# Patient Record
Sex: FEMALE | Race: ASIAN | NOT HISPANIC OR LATINO
[De-identification: names, ages, dates, MRNs, and addresses within clinical notes are randomized per-mention and may not be internally consistent; named-entity substitution may affect disease eponyms.]

---

## 2022-01-01 ENCOUNTER — TRANSCRIPTION ENCOUNTER (OUTPATIENT)
Age: 67
End: 2022-01-01

## 2022-01-01 ENCOUNTER — RESULT REVIEW (OUTPATIENT)
Age: 67
End: 2022-01-01

## 2022-01-01 ENCOUNTER — APPOINTMENT (OUTPATIENT)
Dept: INFUSION THERAPY | Facility: HOSPITAL | Age: 67
End: 2022-01-01

## 2022-01-01 ENCOUNTER — FORM ENCOUNTER (OUTPATIENT)
Age: 67
End: 2022-01-01

## 2022-01-01 ENCOUNTER — APPOINTMENT (OUTPATIENT)
Dept: HEMATOLOGY ONCOLOGY | Facility: CLINIC | Age: 67
End: 2022-01-01

## 2022-01-01 ENCOUNTER — NON-APPOINTMENT (OUTPATIENT)
Age: 67
End: 2022-01-01

## 2022-01-01 ENCOUNTER — APPOINTMENT (OUTPATIENT)
Dept: ULTRASOUND IMAGING | Facility: IMAGING CENTER | Age: 67
End: 2022-01-01
Payer: MEDICARE

## 2022-01-01 ENCOUNTER — APPOINTMENT (OUTPATIENT)
Dept: HEMATOLOGY ONCOLOGY | Facility: CLINIC | Age: 67
End: 2022-01-01
Payer: MEDICARE

## 2022-01-01 ENCOUNTER — APPOINTMENT (OUTPATIENT)
Dept: ULTRASOUND IMAGING | Facility: IMAGING CENTER | Age: 67
End: 2022-01-01

## 2022-01-01 ENCOUNTER — OUTPATIENT (OUTPATIENT)
Dept: OUTPATIENT SERVICES | Facility: HOSPITAL | Age: 67
LOS: 1 days | End: 2022-01-01
Payer: MEDICARE

## 2022-01-01 ENCOUNTER — OUTPATIENT (OUTPATIENT)
Dept: OUTPATIENT SERVICES | Facility: HOSPITAL | Age: 67
LOS: 1 days | End: 2022-01-01
Payer: COMMERCIAL

## 2022-01-01 ENCOUNTER — APPOINTMENT (OUTPATIENT)
Dept: GYNECOLOGIC ONCOLOGY | Facility: CLINIC | Age: 67
End: 2022-01-01
Payer: MEDICARE

## 2022-01-01 ENCOUNTER — APPOINTMENT (OUTPATIENT)
Dept: GYNECOLOGIC ONCOLOGY | Facility: HOSPITAL | Age: 67
End: 2022-01-01

## 2022-01-01 ENCOUNTER — OUTPATIENT (OUTPATIENT)
Dept: OUTPATIENT SERVICES | Facility: HOSPITAL | Age: 67
LOS: 1 days | Discharge: HOME | End: 2022-01-01

## 2022-01-01 ENCOUNTER — APPOINTMENT (OUTPATIENT)
Dept: HOME HEALTH SERVICES | Facility: HOME HEALTH | Age: 67
End: 2022-01-01
Payer: MEDICARE

## 2022-01-01 ENCOUNTER — APPOINTMENT (OUTPATIENT)
Dept: HOME HEALTH SERVICES | Facility: HOME HEALTH | Age: 67
End: 2022-01-01

## 2022-01-01 ENCOUNTER — APPOINTMENT (OUTPATIENT)
Dept: PULMONOLOGY | Facility: CLINIC | Age: 67
End: 2022-01-01

## 2022-01-01 ENCOUNTER — APPOINTMENT (OUTPATIENT)
Dept: NUCLEAR MEDICINE | Facility: IMAGING CENTER | Age: 67
End: 2022-01-01

## 2022-01-01 ENCOUNTER — APPOINTMENT (OUTPATIENT)
Dept: PULMONOLOGY | Facility: CLINIC | Age: 67
End: 2022-01-01
Payer: MEDICARE

## 2022-01-01 ENCOUNTER — INPATIENT (INPATIENT)
Facility: HOSPITAL | Age: 67
LOS: 16 days | Discharge: HOSPICE | End: 2022-07-29
Attending: HOSPITALIST | Admitting: HOSPITALIST

## 2022-01-01 ENCOUNTER — INPATIENT (INPATIENT)
Facility: HOSPITAL | Age: 67
LOS: 1 days | Discharge: HOME | End: 2022-04-24
Attending: OBSTETRICS & GYNECOLOGY | Admitting: OBSTETRICS & GYNECOLOGY
Payer: MEDICARE

## 2022-01-01 ENCOUNTER — APPOINTMENT (OUTPATIENT)
Dept: GYNECOLOGIC ONCOLOGY | Facility: CLINIC | Age: 67
End: 2022-01-01

## 2022-01-01 ENCOUNTER — INPATIENT (INPATIENT)
Facility: HOSPITAL | Age: 67
LOS: 1 days | Discharge: HOME HEALTH | End: 2022-06-09
Attending: STUDENT IN AN ORGANIZED HEALTH CARE EDUCATION/TRAINING PROGRAM | Admitting: STUDENT IN AN ORGANIZED HEALTH CARE EDUCATION/TRAINING PROGRAM
Payer: MEDICARE

## 2022-01-01 ENCOUNTER — INPATIENT (INPATIENT)
Facility: HOSPITAL | Age: 67
LOS: 3 days | Discharge: HOME HEALTH | End: 2022-04-30
Attending: THORACIC SURGERY (CARDIOTHORACIC VASCULAR SURGERY) | Admitting: THORACIC SURGERY (CARDIOTHORACIC VASCULAR SURGERY)
Payer: MEDICARE

## 2022-01-01 ENCOUNTER — APPOINTMENT (OUTPATIENT)
Dept: THORACIC SURGERY | Facility: CLINIC | Age: 67
End: 2022-01-01
Payer: MEDICARE

## 2022-01-01 ENCOUNTER — OUTPATIENT (OUTPATIENT)
Dept: OUTPATIENT SERVICES | Facility: HOSPITAL | Age: 67
LOS: 1 days | End: 2022-01-01
Payer: SELF-PAY

## 2022-01-01 ENCOUNTER — APPOINTMENT (OUTPATIENT)
Dept: RADIOLOGY | Facility: HOSPITAL | Age: 67
End: 2022-01-01

## 2022-01-01 ENCOUNTER — APPOINTMENT (OUTPATIENT)
Dept: THORACIC SURGERY | Facility: HOSPITAL | Age: 67
End: 2022-01-01

## 2022-01-01 ENCOUNTER — OUTPATIENT (OUTPATIENT)
Dept: OUTPATIENT SERVICES | Facility: HOSPITAL | Age: 67
LOS: 1 days | Discharge: HOME | End: 2022-01-01
Payer: MEDICARE

## 2022-01-01 VITALS
DIASTOLIC BLOOD PRESSURE: 74 MMHG | BODY MASS INDEX: 28.22 KG/M2 | SYSTOLIC BLOOD PRESSURE: 113 MMHG | OXYGEN SATURATION: 96 % | HEART RATE: 100 BPM | WEIGHT: 140 LBS | HEIGHT: 59 IN

## 2022-01-01 VITALS
RESPIRATION RATE: 16 BRPM | SYSTOLIC BLOOD PRESSURE: 108 MMHG | OXYGEN SATURATION: 99 % | HEIGHT: 58.5 IN | DIASTOLIC BLOOD PRESSURE: 57 MMHG | TEMPERATURE: 97 F | HEART RATE: 98 BPM

## 2022-01-01 VITALS
DIASTOLIC BLOOD PRESSURE: 80 MMHG | HEIGHT: 58.5 IN | WEIGHT: 145.95 LBS | HEART RATE: 109 BPM | TEMPERATURE: 98 F | SYSTOLIC BLOOD PRESSURE: 113 MMHG | OXYGEN SATURATION: 97 % | RESPIRATION RATE: 18 BRPM

## 2022-01-01 VITALS
RESPIRATION RATE: 17 BRPM | DIASTOLIC BLOOD PRESSURE: 53 MMHG | OXYGEN SATURATION: 97 % | SYSTOLIC BLOOD PRESSURE: 83 MMHG | HEART RATE: 97 BPM

## 2022-01-01 VITALS
RESPIRATION RATE: 16 BRPM | SYSTOLIC BLOOD PRESSURE: 130 MMHG | DIASTOLIC BLOOD PRESSURE: 80 MMHG | OXYGEN SATURATION: 98 % | HEART RATE: 91 BPM

## 2022-01-01 VITALS
RESPIRATION RATE: 14 BRPM | HEIGHT: 58.5 IN | WEIGHT: 145.95 LBS | SYSTOLIC BLOOD PRESSURE: 110 MMHG | TEMPERATURE: 99 F | DIASTOLIC BLOOD PRESSURE: 80 MMHG | OXYGEN SATURATION: 99 %

## 2022-01-01 VITALS
SYSTOLIC BLOOD PRESSURE: 110 MMHG | RESPIRATION RATE: 16 BRPM | DIASTOLIC BLOOD PRESSURE: 80 MMHG | OXYGEN SATURATION: 94 % | HEART RATE: 105 BPM

## 2022-01-01 VITALS
HEART RATE: 102 BPM | RESPIRATION RATE: 16 BRPM | HEIGHT: 59 IN | DIASTOLIC BLOOD PRESSURE: 62 MMHG | TEMPERATURE: 98 F | SYSTOLIC BLOOD PRESSURE: 106 MMHG | OXYGEN SATURATION: 100 %

## 2022-01-01 VITALS
OXYGEN SATURATION: 97 % | TEMPERATURE: 98 F | SYSTOLIC BLOOD PRESSURE: 94 MMHG | RESPIRATION RATE: 16 BRPM | HEART RATE: 82 BPM | DIASTOLIC BLOOD PRESSURE: 54 MMHG

## 2022-01-01 VITALS
HEART RATE: 97 BPM | BODY MASS INDEX: 31.25 KG/M2 | WEIGHT: 144.84 LBS | HEIGHT: 57.09 IN | SYSTOLIC BLOOD PRESSURE: 131 MMHG | DIASTOLIC BLOOD PRESSURE: 85 MMHG | RESPIRATION RATE: 16 BRPM | TEMPERATURE: 98.1 F | OXYGEN SATURATION: 98 %

## 2022-01-01 VITALS
SYSTOLIC BLOOD PRESSURE: 92 MMHG | TEMPERATURE: 98.1 F | BODY MASS INDEX: 28.22 KG/M2 | WEIGHT: 139.99 LBS | DIASTOLIC BLOOD PRESSURE: 62 MMHG | RESPIRATION RATE: 16 BRPM | HEIGHT: 58.98 IN | HEART RATE: 103 BPM | OXYGEN SATURATION: 98 %

## 2022-01-01 VITALS
HEART RATE: 78 BPM | TEMPERATURE: 98.1 F | RESPIRATION RATE: 16 BRPM | OXYGEN SATURATION: 94 % | SYSTOLIC BLOOD PRESSURE: 106 MMHG | DIASTOLIC BLOOD PRESSURE: 71 MMHG

## 2022-01-01 VITALS
SYSTOLIC BLOOD PRESSURE: 133 MMHG | HEIGHT: 58.5 IN | HEART RATE: 99 BPM | TEMPERATURE: 98 F | DIASTOLIC BLOOD PRESSURE: 79 MMHG | OXYGEN SATURATION: 99 % | RESPIRATION RATE: 18 BRPM

## 2022-01-01 VITALS
WEIGHT: 155 LBS | SYSTOLIC BLOOD PRESSURE: 131 MMHG | BODY MASS INDEX: 31.25 KG/M2 | HEIGHT: 59 IN | HEART RATE: 97 BPM | DIASTOLIC BLOOD PRESSURE: 81 MMHG

## 2022-01-01 VITALS
RESPIRATION RATE: 19 BRPM | TEMPERATURE: 98 F | DIASTOLIC BLOOD PRESSURE: 80 MMHG | HEART RATE: 95 BPM | OXYGEN SATURATION: 95 % | SYSTOLIC BLOOD PRESSURE: 120 MMHG

## 2022-01-01 VITALS — WEIGHT: 145.06 LBS

## 2022-01-01 VITALS
TEMPERATURE: 98 F | SYSTOLIC BLOOD PRESSURE: 115 MMHG | HEART RATE: 101 BPM | RESPIRATION RATE: 16 BRPM | DIASTOLIC BLOOD PRESSURE: 78 MMHG | OXYGEN SATURATION: 98 %

## 2022-01-01 VITALS
SYSTOLIC BLOOD PRESSURE: 89 MMHG | RESPIRATION RATE: 16 BRPM | DIASTOLIC BLOOD PRESSURE: 64 MMHG | OXYGEN SATURATION: 99 % | TEMPERATURE: 97 F | HEART RATE: 104 BPM | HEIGHT: 59 IN

## 2022-01-01 VITALS
DIASTOLIC BLOOD PRESSURE: 79 MMHG | TEMPERATURE: 98 F | OXYGEN SATURATION: 98 % | RESPIRATION RATE: 14 BRPM | SYSTOLIC BLOOD PRESSURE: 126 MMHG | HEART RATE: 77 BPM

## 2022-01-01 VITALS
SYSTOLIC BLOOD PRESSURE: 113 MMHG | OXYGEN SATURATION: 98 % | HEART RATE: 99 BPM | TEMPERATURE: 98.1 F | DIASTOLIC BLOOD PRESSURE: 80 MMHG

## 2022-01-01 DIAGNOSIS — Z23 ENCOUNTER FOR IMMUNIZATION: ICD-10-CM

## 2022-01-01 DIAGNOSIS — Z98.890 OTHER SPECIFIED POSTPROCEDURAL STATES: Chronic | ICD-10-CM

## 2022-01-01 DIAGNOSIS — J90 PLEURAL EFFUSION, NOT ELSEWHERE CLASSIFIED: ICD-10-CM

## 2022-01-01 DIAGNOSIS — C80.1 MALIGNANT (PRIMARY) NEOPLASM, UNSPECIFIED: ICD-10-CM

## 2022-01-01 DIAGNOSIS — Z90.49 ACQUIRED ABSENCE OF OTHER SPECIFIED PARTS OF DIGESTIVE TRACT: Chronic | ICD-10-CM

## 2022-01-01 DIAGNOSIS — K46.0 UNSPECIFIED ABDOMINAL HERNIA WITH OBSTRUCTION, WITHOUT GANGRENE: Chronic | ICD-10-CM

## 2022-01-01 DIAGNOSIS — R11.2 NAUSEA WITH VOMITING, UNSPECIFIED: ICD-10-CM

## 2022-01-01 DIAGNOSIS — Z98.51 TUBAL LIGATION STATUS: Chronic | ICD-10-CM

## 2022-01-01 DIAGNOSIS — C57.9 MALIGNANT NEOPLASM OF FEMALE GENITAL ORGAN, UNSPECIFIED: ICD-10-CM

## 2022-01-01 DIAGNOSIS — I26.99 OTHER PULMONARY EMBOLISM WITHOUT ACUTE COR PULMONALE: ICD-10-CM

## 2022-01-01 DIAGNOSIS — R18.8 OTHER ASCITES: ICD-10-CM

## 2022-01-01 DIAGNOSIS — R06.00 DYSPNEA, UNSPECIFIED: ICD-10-CM

## 2022-01-01 DIAGNOSIS — R19.00 INTRA-ABDOMINAL AND PELVIC SWELLING, MASS AND LUMP, UNSPECIFIED SITE: ICD-10-CM

## 2022-01-01 DIAGNOSIS — I95.9 HYPOTENSION, UNSPECIFIED: ICD-10-CM

## 2022-01-01 DIAGNOSIS — N39.0 URINARY TRACT INFECTION, SITE NOT SPECIFIED: ICD-10-CM

## 2022-01-01 DIAGNOSIS — Z51.11 ENCOUNTER FOR ANTINEOPLASTIC CHEMOTHERAPY: ICD-10-CM

## 2022-01-01 DIAGNOSIS — C78.6 SECONDARY MALIGNANT NEOPLASM OF RETROPERITONEUM AND PERITONEUM: ICD-10-CM

## 2022-01-01 DIAGNOSIS — K21.9 GASTRO-ESOPHAGEAL REFLUX DISEASE WITHOUT ESOPHAGITIS: ICD-10-CM

## 2022-01-01 DIAGNOSIS — Z51.5 ENCOUNTER FOR PALLIATIVE CARE: ICD-10-CM

## 2022-01-01 DIAGNOSIS — Z86.711 PERSONAL HISTORY OF PULMONARY EMBOLISM: ICD-10-CM

## 2022-01-01 DIAGNOSIS — K59.00 CONSTIPATION, UNSPECIFIED: ICD-10-CM

## 2022-01-01 DIAGNOSIS — C79.9 SECONDARY MALIGNANT NEOPLASM OF UNSPECIFIED SITE: ICD-10-CM

## 2022-01-01 DIAGNOSIS — Z00.8 ENCOUNTER FOR OTHER GENERAL EXAMINATION: ICD-10-CM

## 2022-01-01 DIAGNOSIS — R82.90 UNSPECIFIED ABNORMAL FINDINGS IN URINE: ICD-10-CM

## 2022-01-01 DIAGNOSIS — E78.5 HYPERLIPIDEMIA, UNSPECIFIED: ICD-10-CM

## 2022-01-01 DIAGNOSIS — A41.9 SEPSIS, UNSPECIFIED ORGANISM: ICD-10-CM

## 2022-01-01 DIAGNOSIS — T45.1X5A NAUSEA WITH VOMITING, UNSPECIFIED: ICD-10-CM

## 2022-01-01 DIAGNOSIS — K56.609 UNSPECIFIED INTESTINAL OBSTRUCTION, UNSPECIFIED AS TO PARTIAL VERSUS COMPLETE OBSTRUCTION: ICD-10-CM

## 2022-01-01 DIAGNOSIS — E03.9 HYPOTHYROIDISM, UNSPECIFIED: ICD-10-CM

## 2022-01-01 DIAGNOSIS — J98.4 OTHER DISORDERS OF LUNG: ICD-10-CM

## 2022-01-01 DIAGNOSIS — N17.9 ACUTE KIDNEY FAILURE, UNSPECIFIED: ICD-10-CM

## 2022-01-01 DIAGNOSIS — R09.89 OTHER SPECIFIED SYMPTOMS AND SIGNS INVOLVING THE CIRCULATORY AND RESPIRATORY SYSTEMS: ICD-10-CM

## 2022-01-01 DIAGNOSIS — Z01.818 ENCOUNTER FOR OTHER PREPROCEDURAL EXAMINATION: ICD-10-CM

## 2022-01-01 DIAGNOSIS — I82.90 ACUTE EMBOLISM AND THROMBOSIS OF UNSPECIFIED VEIN: ICD-10-CM

## 2022-01-01 DIAGNOSIS — R10.9 UNSPECIFIED ABDOMINAL PAIN: ICD-10-CM

## 2022-01-01 DIAGNOSIS — F43.23 ADJUSTMENT DISORDER WITH MIXED ANXIETY AND DEPRESSED MOOD: ICD-10-CM

## 2022-01-01 DIAGNOSIS — E16.2 HYPOGLYCEMIA, UNSPECIFIED: ICD-10-CM

## 2022-01-01 DIAGNOSIS — E87.1 HYPO-OSMOLALITY AND HYPONATREMIA: ICD-10-CM

## 2022-01-01 DIAGNOSIS — G89.3 NEOPLASM RELATED PAIN (ACUTE) (CHRONIC): ICD-10-CM

## 2022-01-01 DIAGNOSIS — Z29.9 ENCOUNTER FOR PROPHYLACTIC MEASURES, UNSPECIFIED: ICD-10-CM

## 2022-01-01 DIAGNOSIS — M17.11 UNILATERAL PRIMARY OSTEOARTHRITIS, RIGHT KNEE: ICD-10-CM

## 2022-01-01 DIAGNOSIS — K43.2 INCISIONAL HERNIA WITHOUT OBSTRUCTION OR GANGRENE: ICD-10-CM

## 2022-01-01 LAB
ALBUMIN SERPL ELPH-MCNC: 1.6 G/DL — LOW (ref 3.3–5)
ALBUMIN SERPL ELPH-MCNC: 1.7 G/DL — LOW (ref 3.3–5)
ALBUMIN SERPL ELPH-MCNC: 1.9 G/DL — LOW (ref 3.3–5)
ALBUMIN SERPL ELPH-MCNC: 1.9 G/DL — LOW (ref 3.3–5)
ALBUMIN SERPL ELPH-MCNC: 2.2 G/DL — LOW (ref 3.3–5)
ALBUMIN SERPL ELPH-MCNC: 2.3 G/DL — LOW (ref 3.3–5)
ALBUMIN SERPL ELPH-MCNC: 2.3 G/DL — LOW (ref 3.3–5)
ALBUMIN SERPL ELPH-MCNC: 2.4 G/DL — LOW (ref 3.3–5)
ALBUMIN SERPL ELPH-MCNC: 2.4 G/DL — LOW (ref 3.3–5)
ALBUMIN SERPL ELPH-MCNC: 2.7 G/DL — LOW (ref 3.3–5)
ALBUMIN SERPL ELPH-MCNC: 2.9 G/DL — LOW (ref 3.3–5)
ALBUMIN SERPL ELPH-MCNC: 3 G/DL — LOW (ref 3.3–5)
ALBUMIN SERPL ELPH-MCNC: 3.1 G/DL
ALBUMIN SERPL ELPH-MCNC: 3.2 G/DL
ALBUMIN SERPL ELPH-MCNC: 3.2 G/DL — LOW (ref 3.3–5)
ALBUMIN SERPL ELPH-MCNC: 3.3 G/DL — SIGNIFICANT CHANGE UP (ref 3.3–5)
ALBUMIN SERPL ELPH-MCNC: 3.4 G/DL — SIGNIFICANT CHANGE UP (ref 3.3–5)
ALBUMIN SERPL ELPH-MCNC: 3.4 G/DL — SIGNIFICANT CHANGE UP (ref 3.3–5)
ALP BLD-CCNC: 94 U/L
ALP BLD-CCNC: 96 U/L
ALP SERPL-CCNC: 107 U/L — SIGNIFICANT CHANGE UP (ref 40–120)
ALP SERPL-CCNC: 131 U/L — HIGH (ref 40–120)
ALP SERPL-CCNC: 145 U/L — HIGH (ref 40–120)
ALP SERPL-CCNC: 148 U/L — HIGH (ref 40–120)
ALP SERPL-CCNC: 171 U/L — HIGH (ref 40–120)
ALP SERPL-CCNC: 190 U/L — HIGH (ref 40–120)
ALP SERPL-CCNC: 215 U/L — HIGH (ref 40–120)
ALP SERPL-CCNC: 217 U/L — HIGH (ref 40–120)
ALP SERPL-CCNC: 218 U/L — HIGH (ref 40–120)
ALP SERPL-CCNC: 65 U/L — SIGNIFICANT CHANGE UP (ref 40–120)
ALP SERPL-CCNC: 78 U/L — SIGNIFICANT CHANGE UP (ref 40–120)
ALP SERPL-CCNC: 79 U/L — SIGNIFICANT CHANGE UP (ref 40–120)
ALP SERPL-CCNC: 82 U/L — SIGNIFICANT CHANGE UP (ref 40–120)
ALP SERPL-CCNC: 85 U/L — SIGNIFICANT CHANGE UP (ref 40–120)
ALP SERPL-CCNC: 90 U/L — SIGNIFICANT CHANGE UP (ref 40–120)
ALP SERPL-CCNC: 92 U/L — SIGNIFICANT CHANGE UP (ref 40–120)
ALT FLD-CCNC: 12 U/L — SIGNIFICANT CHANGE UP (ref 4–33)
ALT FLD-CCNC: 12 U/L — SIGNIFICANT CHANGE UP (ref 4–33)
ALT FLD-CCNC: 15 U/L — SIGNIFICANT CHANGE UP (ref 10–45)
ALT FLD-CCNC: 15 U/L — SIGNIFICANT CHANGE UP (ref 4–33)
ALT FLD-CCNC: 15 U/L — SIGNIFICANT CHANGE UP (ref 4–33)
ALT FLD-CCNC: 18 U/L — SIGNIFICANT CHANGE UP (ref 4–33)
ALT FLD-CCNC: 19 U/L — SIGNIFICANT CHANGE UP (ref 4–33)
ALT FLD-CCNC: 22 U/L — SIGNIFICANT CHANGE UP (ref 4–33)
ALT FLD-CCNC: 26 U/L — SIGNIFICANT CHANGE UP (ref 10–45)
ALT FLD-CCNC: 29 U/L — SIGNIFICANT CHANGE UP (ref 4–33)
ALT FLD-CCNC: 30 U/L — SIGNIFICANT CHANGE UP (ref 4–33)
ALT FLD-CCNC: 46 U/L — HIGH (ref 4–33)
ALT FLD-CCNC: 7 U/L — SIGNIFICANT CHANGE UP (ref 4–33)
ALT FLD-CCNC: 7 U/L — SIGNIFICANT CHANGE UP (ref 4–33)
ALT FLD-CCNC: 8 U/L — LOW (ref 10–45)
ALT FLD-CCNC: 8 U/L — LOW (ref 10–45)
ALT SERPL-CCNC: 11 U/L
ALT SERPL-CCNC: 33 U/L
ANION GAP SERPL CALC-SCNC: 10 MMOL/L — SIGNIFICANT CHANGE UP (ref 5–17)
ANION GAP SERPL CALC-SCNC: 10 MMOL/L — SIGNIFICANT CHANGE UP (ref 7–14)
ANION GAP SERPL CALC-SCNC: 11 MMOL/L — SIGNIFICANT CHANGE UP (ref 7–14)
ANION GAP SERPL CALC-SCNC: 12 MMOL/L — SIGNIFICANT CHANGE UP (ref 5–17)
ANION GAP SERPL CALC-SCNC: 12 MMOL/L — SIGNIFICANT CHANGE UP (ref 7–14)
ANION GAP SERPL CALC-SCNC: 12 MMOL/L — SIGNIFICANT CHANGE UP (ref 7–14)
ANION GAP SERPL CALC-SCNC: 13 MMOL/L
ANION GAP SERPL CALC-SCNC: 13 MMOL/L
ANION GAP SERPL CALC-SCNC: 13 MMOL/L — SIGNIFICANT CHANGE UP (ref 7–14)
ANION GAP SERPL CALC-SCNC: 14 MMOL/L
ANION GAP SERPL CALC-SCNC: 14 MMOL/L — SIGNIFICANT CHANGE UP (ref 5–17)
ANION GAP SERPL CALC-SCNC: 14 MMOL/L — SIGNIFICANT CHANGE UP (ref 5–17)
ANION GAP SERPL CALC-SCNC: 14 MMOL/L — SIGNIFICANT CHANGE UP (ref 7–14)
ANION GAP SERPL CALC-SCNC: 14 MMOL/L — SIGNIFICANT CHANGE UP (ref 7–14)
ANION GAP SERPL CALC-SCNC: 15 MMOL/L — HIGH (ref 7–14)
ANION GAP SERPL CALC-SCNC: 16 MMOL/L — HIGH (ref 7–14)
ANION GAP SERPL CALC-SCNC: 17 MMOL/L
ANION GAP SERPL CALC-SCNC: 17 MMOL/L — HIGH (ref 7–14)
ANION GAP SERPL CALC-SCNC: 19 MMOL/L — HIGH (ref 7–14)
ANION GAP SERPL CALC-SCNC: 20 MMOL/L — HIGH (ref 7–14)
APPEARANCE UR: ABNORMAL
APPEARANCE UR: ABNORMAL
APPEARANCE UR: CLEAR — SIGNIFICANT CHANGE UP
APTT BLD: 105 SEC — HIGH (ref 27–36.3)
APTT BLD: 110.1 SEC — HIGH (ref 27–36.3)
APTT BLD: 117.2 SEC — HIGH (ref 27–36.3)
APTT BLD: 127.4 SEC — CRITICAL HIGH (ref 27–36.3)
APTT BLD: 141.1 SEC — CRITICAL HIGH (ref 27–36.3)
APTT BLD: 146.9 SEC — CRITICAL HIGH (ref 27–36.3)
APTT BLD: 172.5 SEC — SIGNIFICANT CHANGE UP (ref 27–36.3)
APTT BLD: 24.4 SEC — LOW (ref 27–36.3)
APTT BLD: 28.9 SEC — SIGNIFICANT CHANGE UP (ref 27–36.3)
APTT BLD: 29.4 SEC — SIGNIFICANT CHANGE UP (ref 27–36.3)
APTT BLD: 29.8 SEC
APTT BLD: 29.9 SEC — SIGNIFICANT CHANGE UP (ref 27–36.3)
APTT BLD: 30.3 SEC — SIGNIFICANT CHANGE UP (ref 27–36.3)
APTT BLD: 31 SEC
APTT BLD: 32.1 SEC — SIGNIFICANT CHANGE UP (ref 27–36.3)
APTT BLD: 34.2 SEC — SIGNIFICANT CHANGE UP (ref 27–36.3)
APTT BLD: 45.2 SEC — HIGH (ref 27–36.3)
APTT BLD: 53.2 SEC — HIGH (ref 27–36.3)
APTT BLD: 54.9 SEC — HIGH (ref 27–36.3)
APTT BLD: 57.2 SEC — HIGH (ref 27–36.3)
APTT BLD: 59.3 SEC — HIGH (ref 27–36.3)
APTT BLD: 59.3 SEC — HIGH (ref 27–36.3)
APTT BLD: 60.4 SEC — HIGH (ref 27–36.3)
APTT BLD: 70.4 SEC — HIGH (ref 27–36.3)
APTT BLD: 79.8 SEC — HIGH (ref 27–36.3)
APTT BLD: 80 SEC — HIGH (ref 27–36.3)
APTT BLD: 80.6 SEC — HIGH (ref 27–36.3)
APTT BLD: 88.8 SEC — HIGH (ref 27–36.3)
APTT BLD: 97.7 SEC — HIGH (ref 27–36.3)
APTT BLD: >200 SEC — CRITICAL HIGH (ref 27–36.3)
AST SERPL-CCNC: 23 U/L — SIGNIFICANT CHANGE UP (ref 10–40)
AST SERPL-CCNC: 24 U/L — SIGNIFICANT CHANGE UP (ref 4–32)
AST SERPL-CCNC: 26 U/L — SIGNIFICANT CHANGE UP (ref 4–32)
AST SERPL-CCNC: 28 U/L — SIGNIFICANT CHANGE UP (ref 4–32)
AST SERPL-CCNC: 28 U/L — SIGNIFICANT CHANGE UP (ref 4–32)
AST SERPL-CCNC: 33 U/L — SIGNIFICANT CHANGE UP (ref 10–40)
AST SERPL-CCNC: 34 U/L
AST SERPL-CCNC: 36 U/L — SIGNIFICANT CHANGE UP (ref 10–40)
AST SERPL-CCNC: 38 U/L — HIGH (ref 4–32)
AST SERPL-CCNC: 38 U/L — SIGNIFICANT CHANGE UP (ref 10–40)
AST SERPL-CCNC: 40 U/L — HIGH (ref 4–32)
AST SERPL-CCNC: 43 U/L — HIGH (ref 4–32)
AST SERPL-CCNC: 43 U/L — HIGH (ref 4–32)
AST SERPL-CCNC: 48 U/L — HIGH (ref 4–32)
AST SERPL-CCNC: 51 U/L
AST SERPL-CCNC: 57 U/L — HIGH (ref 4–32)
AST SERPL-CCNC: 58 U/L — HIGH (ref 4–32)
AST SERPL-CCNC: 88 U/L — HIGH (ref 4–32)
B PERT DNA SPEC QL NAA+PROBE: SIGNIFICANT CHANGE UP
B PERT DNA SPEC QL NAA+PROBE: SIGNIFICANT CHANGE UP
B PERT IGG+IGM PNL SER: ABNORMAL
B PERT+PARAPERT DNA PNL SPEC NAA+PROBE: SIGNIFICANT CHANGE UP
B PERT+PARAPERT DNA PNL SPEC NAA+PROBE: SIGNIFICANT CHANGE UP
BACTERIA # UR AUTO: ABNORMAL
BACTERIA # UR AUTO: NEGATIVE — SIGNIFICANT CHANGE UP
BASE EXCESS BLDV CALC-SCNC: -1 MMOL/L — SIGNIFICANT CHANGE UP (ref -2–3)
BASE EXCESS BLDV CALC-SCNC: -2.6 MMOL/L — LOW (ref -2–3)
BASOPHILS # BLD AUTO: 0 K/UL — SIGNIFICANT CHANGE UP (ref 0–0.2)
BASOPHILS # BLD AUTO: 0.02 K/UL — SIGNIFICANT CHANGE UP (ref 0–0.2)
BASOPHILS # BLD AUTO: 0.03 K/UL — SIGNIFICANT CHANGE UP (ref 0–0.2)
BASOPHILS # BLD AUTO: 0.03 K/UL — SIGNIFICANT CHANGE UP (ref 0–0.2)
BASOPHILS # BLD AUTO: 0.04 K/UL
BASOPHILS # BLD AUTO: 0.05 K/UL — SIGNIFICANT CHANGE UP (ref 0–0.2)
BASOPHILS # BLD AUTO: 0.06 K/UL
BASOPHILS # BLD AUTO: 0.06 K/UL — SIGNIFICANT CHANGE UP (ref 0–0.2)
BASOPHILS # BLD AUTO: 0.07 K/UL — SIGNIFICANT CHANGE UP (ref 0–0.2)
BASOPHILS # BLD AUTO: 0.08 K/UL — SIGNIFICANT CHANGE UP (ref 0–0.2)
BASOPHILS # BLD AUTO: 0.08 K/UL — SIGNIFICANT CHANGE UP (ref 0–0.2)
BASOPHILS # BLD AUTO: 0.09 K/UL — SIGNIFICANT CHANGE UP (ref 0–0.2)
BASOPHILS # BLD AUTO: 0.11 K/UL — SIGNIFICANT CHANGE UP (ref 0–0.2)
BASOPHILS NFR BLD AUTO: 0 % — SIGNIFICANT CHANGE UP (ref 0–2)
BASOPHILS NFR BLD AUTO: 0.2 % — SIGNIFICANT CHANGE UP (ref 0–2)
BASOPHILS NFR BLD AUTO: 0.3 % — SIGNIFICANT CHANGE UP (ref 0–2)
BASOPHILS NFR BLD AUTO: 0.4 %
BASOPHILS NFR BLD AUTO: 0.6 % — SIGNIFICANT CHANGE UP (ref 0–2)
BASOPHILS NFR BLD AUTO: 0.7 %
BASOPHILS NFR BLD AUTO: 0.7 % — SIGNIFICANT CHANGE UP (ref 0–2)
BASOPHILS NFR BLD AUTO: 0.8 % — SIGNIFICANT CHANGE UP (ref 0–2)
BASOPHILS NFR BLD AUTO: 0.9 % — SIGNIFICANT CHANGE UP (ref 0–2)
BILIRUB SERPL-MCNC: 0.3 MG/DL
BILIRUB SERPL-MCNC: 0.3 MG/DL — SIGNIFICANT CHANGE UP (ref 0.2–1.2)
BILIRUB SERPL-MCNC: 0.4 MG/DL — SIGNIFICANT CHANGE UP (ref 0.2–1.2)
BILIRUB SERPL-MCNC: 0.5 MG/DL — SIGNIFICANT CHANGE UP (ref 0.2–1.2)
BILIRUB SERPL-MCNC: 0.6 MG/DL
BILIRUB SERPL-MCNC: 0.7 MG/DL — SIGNIFICANT CHANGE UP (ref 0.2–1.2)
BILIRUB SERPL-MCNC: <0.2 MG/DL — SIGNIFICANT CHANGE UP (ref 0.2–1.2)
BILIRUB UR-MCNC: ABNORMAL
BILIRUB UR-MCNC: NEGATIVE — SIGNIFICANT CHANGE UP
BILIRUB UR-MCNC: NEGATIVE — SIGNIFICANT CHANGE UP
BLD GP AB SCN SERPL QL: NEGATIVE — SIGNIFICANT CHANGE UP
BLOOD GAS ARTERIAL COMPREHENSIVE RESULT: SIGNIFICANT CHANGE UP
BLOOD GAS VENOUS COMPREHENSIVE RESULT: SIGNIFICANT CHANGE UP
BLOOD GAS VENOUS COMPREHENSIVE RESULT: SIGNIFICANT CHANGE UP
BORDETELLA PARAPERTUSSIS (RAPRVP): SIGNIFICANT CHANGE UP
BORDETELLA PARAPERTUSSIS (RAPRVP): SIGNIFICANT CHANGE UP
BUN SERPL-MCNC: 10 MG/DL — SIGNIFICANT CHANGE UP (ref 7–23)
BUN SERPL-MCNC: 10 MG/DL — SIGNIFICANT CHANGE UP (ref 7–23)
BUN SERPL-MCNC: 12 MG/DL — SIGNIFICANT CHANGE UP (ref 7–23)
BUN SERPL-MCNC: 16 MG/DL — SIGNIFICANT CHANGE UP (ref 7–23)
BUN SERPL-MCNC: 39 MG/DL — HIGH (ref 7–23)
BUN SERPL-MCNC: 42 MG/DL — HIGH (ref 7–23)
BUN SERPL-MCNC: 42 MG/DL — HIGH (ref 7–23)
BUN SERPL-MCNC: 43 MG/DL — HIGH (ref 7–23)
BUN SERPL-MCNC: 44 MG/DL — HIGH (ref 7–23)
BUN SERPL-MCNC: 46 MG/DL — HIGH (ref 7–23)
BUN SERPL-MCNC: 47 MG/DL — HIGH (ref 7–23)
BUN SERPL-MCNC: 48 MG/DL — HIGH (ref 7–23)
BUN SERPL-MCNC: 49 MG/DL — HIGH (ref 7–23)
BUN SERPL-MCNC: 51 MG/DL — HIGH (ref 7–23)
BUN SERPL-MCNC: 52 MG/DL — HIGH (ref 7–23)
BUN SERPL-MCNC: 52 MG/DL — HIGH (ref 7–23)
BUN SERPL-MCNC: 54 MG/DL — HIGH (ref 7–23)
BUN SERPL-MCNC: 58 MG/DL — HIGH (ref 7–23)
BUN SERPL-MCNC: 6 MG/DL — LOW (ref 7–23)
BUN SERPL-MCNC: 6 MG/DL — LOW (ref 7–23)
BUN SERPL-MCNC: 62 MG/DL — HIGH (ref 7–23)
BUN SERPL-MCNC: 63 MG/DL — HIGH (ref 7–23)
BUN SERPL-MCNC: 64 MG/DL — HIGH (ref 7–23)
BUN SERPL-MCNC: 7 MG/DL
BUN SERPL-MCNC: 7 MG/DL
BUN SERPL-MCNC: 7 MG/DL — SIGNIFICANT CHANGE UP (ref 7–23)
BUN SERPL-MCNC: 8 MG/DL
BUN SERPL-MCNC: 8 MG/DL — SIGNIFICANT CHANGE UP (ref 7–23)
BUN SERPL-MCNC: 9 MG/DL
BUN SERPL-MCNC: 9 MG/DL — SIGNIFICANT CHANGE UP (ref 7–23)
C PNEUM DNA SPEC QL NAA+PROBE: SIGNIFICANT CHANGE UP
C PNEUM DNA SPEC QL NAA+PROBE: SIGNIFICANT CHANGE UP
CA-I SERPL-SCNC: 1.01 MMOL/L — LOW (ref 1.15–1.33)
CALCIUM SERPL-MCNC: 11.2 MG/DL — HIGH (ref 8.4–10.5)
CALCIUM SERPL-MCNC: 6.3 MG/DL — CRITICAL LOW (ref 8.4–10.5)
CALCIUM SERPL-MCNC: 6.5 MG/DL — CRITICAL LOW (ref 8.4–10.5)
CALCIUM SERPL-MCNC: 7.4 MG/DL — LOW (ref 8.4–10.5)
CALCIUM SERPL-MCNC: 7.4 MG/DL — LOW (ref 8.4–10.5)
CALCIUM SERPL-MCNC: 7.5 MG/DL — LOW (ref 8.4–10.5)
CALCIUM SERPL-MCNC: 7.7 MG/DL — LOW (ref 8.4–10.5)
CALCIUM SERPL-MCNC: 7.8 MG/DL — LOW (ref 8.4–10.5)
CALCIUM SERPL-MCNC: 7.9 MG/DL — LOW (ref 8.4–10.5)
CALCIUM SERPL-MCNC: 8 MG/DL — LOW (ref 8.4–10.5)
CALCIUM SERPL-MCNC: 8.1 MG/DL — LOW (ref 8.4–10.5)
CALCIUM SERPL-MCNC: 8.2 MG/DL — LOW (ref 8.4–10.5)
CALCIUM SERPL-MCNC: 8.3 MG/DL — LOW (ref 8.4–10.5)
CALCIUM SERPL-MCNC: 8.3 MG/DL — LOW (ref 8.4–10.5)
CALCIUM SERPL-MCNC: 8.4 MG/DL
CALCIUM SERPL-MCNC: 8.5 MG/DL
CALCIUM SERPL-MCNC: 8.5 MG/DL — SIGNIFICANT CHANGE UP (ref 8.4–10.5)
CALCIUM SERPL-MCNC: 8.6 MG/DL
CALCIUM SERPL-MCNC: 8.6 MG/DL — SIGNIFICANT CHANGE UP (ref 8.4–10.5)
CALCIUM SERPL-MCNC: 8.8 MG/DL — SIGNIFICANT CHANGE UP (ref 8.4–10.5)
CALCIUM SERPL-MCNC: 9 MG/DL — SIGNIFICANT CHANGE UP (ref 8.4–10.5)
CALCIUM SERPL-MCNC: 9.1 MG/DL — SIGNIFICANT CHANGE UP (ref 8.4–10.5)
CALCIUM SERPL-MCNC: 9.1 MG/DL — SIGNIFICANT CHANGE UP (ref 8.4–10.5)
CALCIUM SERPL-MCNC: 9.3 MG/DL
CANCER AG19-9 SERPL-ACNC: 2778 U/ML — HIGH
CANCER AG19-9 SERPL-ACNC: 3061 U/ML — HIGH
CANCER AG19-9 SERPL-ACNC: 3684 U/ML — HIGH
CANCER AG19-9 SERPL-ACNC: 5500 U/ML — HIGH
CHLORIDE BLDV-SCNC: 85 MMOL/L — LOW (ref 96–108)
CHLORIDE BLDV-SCNC: 94 MMOL/L — LOW (ref 96–108)
CHLORIDE SERPL-SCNC: 100 MMOL/L
CHLORIDE SERPL-SCNC: 100 MMOL/L — SIGNIFICANT CHANGE UP (ref 98–107)
CHLORIDE SERPL-SCNC: 100 MMOL/L — SIGNIFICANT CHANGE UP (ref 98–107)
CHLORIDE SERPL-SCNC: 101 MMOL/L
CHLORIDE SERPL-SCNC: 101 MMOL/L — SIGNIFICANT CHANGE UP (ref 98–107)
CHLORIDE SERPL-SCNC: 101 MMOL/L — SIGNIFICANT CHANGE UP (ref 98–107)
CHLORIDE SERPL-SCNC: 102 MMOL/L — SIGNIFICANT CHANGE UP (ref 96–108)
CHLORIDE SERPL-SCNC: 112 MMOL/L — HIGH (ref 96–108)
CHLORIDE SERPL-SCNC: 80 MMOL/L — LOW (ref 98–107)
CHLORIDE SERPL-SCNC: 82 MMOL/L — LOW (ref 98–107)
CHLORIDE SERPL-SCNC: 82 MMOL/L — LOW (ref 98–107)
CHLORIDE SERPL-SCNC: 85 MMOL/L — LOW (ref 98–107)
CHLORIDE SERPL-SCNC: 86 MMOL/L — LOW (ref 98–107)
CHLORIDE SERPL-SCNC: 86 MMOL/L — LOW (ref 98–107)
CHLORIDE SERPL-SCNC: 87 MMOL/L — LOW (ref 98–107)
CHLORIDE SERPL-SCNC: 87 MMOL/L — LOW (ref 98–107)
CHLORIDE SERPL-SCNC: 88 MMOL/L — LOW (ref 98–107)
CHLORIDE SERPL-SCNC: 88 MMOL/L — LOW (ref 98–107)
CHLORIDE SERPL-SCNC: 89 MMOL/L — LOW (ref 98–107)
CHLORIDE SERPL-SCNC: 90 MMOL/L — LOW (ref 98–107)
CHLORIDE SERPL-SCNC: 90 MMOL/L — LOW (ref 98–107)
CHLORIDE SERPL-SCNC: 95 MMOL/L — LOW (ref 98–107)
CHLORIDE SERPL-SCNC: 96 MMOL/L
CHLORIDE SERPL-SCNC: 96 MMOL/L — LOW (ref 98–107)
CHLORIDE SERPL-SCNC: 97 MMOL/L — SIGNIFICANT CHANGE UP (ref 96–108)
CHLORIDE SERPL-SCNC: 98 MMOL/L
CHLORIDE SERPL-SCNC: 98 MMOL/L — SIGNIFICANT CHANGE UP (ref 98–107)
CHLORIDE SERPL-SCNC: 99 MMOL/L — SIGNIFICANT CHANGE UP (ref 96–108)
CHLORIDE SERPL-SCNC: 99 MMOL/L — SIGNIFICANT CHANGE UP (ref 98–107)
CHLORIDE UR-SCNC: <20 MMOL/L — SIGNIFICANT CHANGE UP
CHOLEST SERPL-MCNC: 101 MG/DL — SIGNIFICANT CHANGE UP
CO2 BLDV-SCNC: 23.7 MMOL/L — SIGNIFICANT CHANGE UP (ref 22–26)
CO2 BLDV-SCNC: 24.6 MMOL/L — SIGNIFICANT CHANGE UP (ref 22–26)
CO2 SERPL-SCNC: 15 MMOL/L — LOW (ref 22–31)
CO2 SERPL-SCNC: 15 MMOL/L — LOW (ref 22–31)
CO2 SERPL-SCNC: 17 MMOL/L — LOW (ref 22–31)
CO2 SERPL-SCNC: 18 MMOL/L — LOW (ref 22–31)
CO2 SERPL-SCNC: 18 MMOL/L — LOW (ref 22–31)
CO2 SERPL-SCNC: 19 MMOL/L — LOW (ref 22–31)
CO2 SERPL-SCNC: 20 MMOL/L — LOW (ref 22–31)
CO2 SERPL-SCNC: 21 MMOL/L
CO2 SERPL-SCNC: 21 MMOL/L — LOW (ref 22–31)
CO2 SERPL-SCNC: 22 MMOL/L — SIGNIFICANT CHANGE UP (ref 22–31)
CO2 SERPL-SCNC: 23 MMOL/L — SIGNIFICANT CHANGE UP (ref 22–31)
CO2 SERPL-SCNC: 24 MMOL/L
CO2 SERPL-SCNC: 24 MMOL/L
CO2 SERPL-SCNC: 24 MMOL/L — SIGNIFICANT CHANGE UP (ref 22–31)
CO2 SERPL-SCNC: 25 MMOL/L
CO2 SERPL-SCNC: 27 MMOL/L — SIGNIFICANT CHANGE UP (ref 22–31)
COLOR FLD: YELLOW
COLOR SPEC: ABNORMAL
COLOR SPEC: YELLOW — SIGNIFICANT CHANGE UP
COLOR SPEC: YELLOW — SIGNIFICANT CHANGE UP
COMMENT - FLUIDS: SIGNIFICANT CHANGE UP
COMMENT - FLUIDS: SIGNIFICANT CHANGE UP
COMMENT - URINE: SIGNIFICANT CHANGE UP
CORE LAB BIOPSY: NORMAL
CREAT SERPL-MCNC: 0.62 MG/DL — SIGNIFICANT CHANGE UP (ref 0.5–1.3)
CREAT SERPL-MCNC: 0.63 MG/DL — SIGNIFICANT CHANGE UP (ref 0.5–1.3)
CREAT SERPL-MCNC: 0.63 MG/DL — SIGNIFICANT CHANGE UP (ref 0.5–1.3)
CREAT SERPL-MCNC: 0.67 MG/DL — SIGNIFICANT CHANGE UP (ref 0.5–1.3)
CREAT SERPL-MCNC: 0.67 MG/DL — SIGNIFICANT CHANGE UP (ref 0.5–1.3)
CREAT SERPL-MCNC: 0.69 MG/DL — SIGNIFICANT CHANGE UP (ref 0.5–1.3)
CREAT SERPL-MCNC: 0.7 MG/DL — SIGNIFICANT CHANGE UP (ref 0.5–1.3)
CREAT SERPL-MCNC: 0.71 MG/DL — SIGNIFICANT CHANGE UP (ref 0.5–1.3)
CREAT SERPL-MCNC: 0.71 MG/DL — SIGNIFICANT CHANGE UP (ref 0.5–1.3)
CREAT SERPL-MCNC: 0.72 MG/DL — SIGNIFICANT CHANGE UP (ref 0.5–1.3)
CREAT SERPL-MCNC: 0.73 MG/DL
CREAT SERPL-MCNC: 0.73 MG/DL
CREAT SERPL-MCNC: 0.73 MG/DL — SIGNIFICANT CHANGE UP (ref 0.5–1.3)
CREAT SERPL-MCNC: 0.74 MG/DL
CREAT SERPL-MCNC: 0.77 MG/DL — SIGNIFICANT CHANGE UP (ref 0.5–1.3)
CREAT SERPL-MCNC: 0.79 MG/DL — SIGNIFICANT CHANGE UP (ref 0.5–1.3)
CREAT SERPL-MCNC: 0.82 MG/DL — SIGNIFICANT CHANGE UP (ref 0.5–1.3)
CREAT SERPL-MCNC: 0.92 MG/DL
CREAT SERPL-MCNC: 0.99 MG/DL — SIGNIFICANT CHANGE UP (ref 0.5–1.3)
CREAT SERPL-MCNC: 1.12 MG/DL — SIGNIFICANT CHANGE UP (ref 0.5–1.3)
CREAT SERPL-MCNC: 1.13 MG/DL — SIGNIFICANT CHANGE UP (ref 0.5–1.3)
CREAT SERPL-MCNC: 1.28 MG/DL — SIGNIFICANT CHANGE UP (ref 0.5–1.3)
CREAT SERPL-MCNC: 1.28 MG/DL — SIGNIFICANT CHANGE UP (ref 0.5–1.3)
CREAT SERPL-MCNC: 1.29 MG/DL — SIGNIFICANT CHANGE UP (ref 0.5–1.3)
CREAT SERPL-MCNC: 1.36 MG/DL — HIGH (ref 0.5–1.3)
CREAT SERPL-MCNC: 1.48 MG/DL — HIGH (ref 0.5–1.3)
CREAT SERPL-MCNC: 1.57 MG/DL — HIGH (ref 0.5–1.3)
CREAT SERPL-MCNC: 1.6 MG/DL — HIGH (ref 0.5–1.3)
CREAT SERPL-MCNC: 1.72 MG/DL — HIGH (ref 0.5–1.3)
CREAT SERPL-MCNC: 1.91 MG/DL — HIGH (ref 0.5–1.3)
CREAT SERPL-MCNC: 1.95 MG/DL — HIGH (ref 0.5–1.3)
CREAT SERPL-MCNC: 2 MG/DL — HIGH (ref 0.5–1.3)
CREAT SERPL-MCNC: 2.45 MG/DL — HIGH (ref 0.5–1.3)
CREAT SERPL-MCNC: 2.71 MG/DL — HIGH (ref 0.5–1.3)
CREAT SERPL-MCNC: 2.71 MG/DL — HIGH (ref 0.5–1.3)
CREAT SERPL-MCNC: 2.79 MG/DL — HIGH (ref 0.5–1.3)
CULTURE RESULTS: NO GROWTH — SIGNIFICANT CHANGE UP
CULTURE RESULTS: SIGNIFICANT CHANGE UP
DEPRECATED D DIMER PPP IA-ACNC: 904 NG/ML DDU
DIFF PNL FLD: ABNORMAL
DIFF PNL FLD: NEGATIVE — SIGNIFICANT CHANGE UP
DIFF PNL FLD: NEGATIVE — SIGNIFICANT CHANGE UP
EGFR: 18 ML/MIN/1.73M2 — LOW
EGFR: 19 ML/MIN/1.73M2 — LOW
EGFR: 19 ML/MIN/1.73M2 — LOW
EGFR: 21 ML/MIN/1.73M2 — LOW
EGFR: 27 ML/MIN/1.73M2 — LOW
EGFR: 28 ML/MIN/1.73M2 — LOW
EGFR: 28 ML/MIN/1.73M2 — LOW
EGFR: 32 ML/MIN/1.73M2 — LOW
EGFR: 35 ML/MIN/1.73M2 — LOW
EGFR: 36 ML/MIN/1.73M2 — LOW
EGFR: 39 ML/MIN/1.73M2 — LOW
EGFR: 43 ML/MIN/1.73M2 — LOW
EGFR: 45 ML/MIN/1.73M2 — LOW
EGFR: 46 ML/MIN/1.73M2 — LOW
EGFR: 46 ML/MIN/1.73M2 — LOW
EGFR: 53 ML/MIN/1.73M2 — LOW
EGFR: 54 ML/MIN/1.73M2 — LOW
EGFR: 62 ML/MIN/1.73M2 — SIGNIFICANT CHANGE UP
EGFR: 68 ML/MIN/1.73M2
EGFR: 78 ML/MIN/1.73M2 — SIGNIFICANT CHANGE UP
EGFR: 82 ML/MIN/1.73M2 — SIGNIFICANT CHANGE UP
EGFR: 84 ML/MIN/1.73M2 — SIGNIFICANT CHANGE UP
EGFR: 89 ML/MIN/1.73M2
EGFR: 90 ML/MIN/1.73M2
EGFR: 90 ML/MIN/1.73M2
EGFR: 90 ML/MIN/1.73M2 — SIGNIFICANT CHANGE UP
EGFR: 92 ML/MIN/1.73M2 — SIGNIFICANT CHANGE UP
EGFR: 93 ML/MIN/1.73M2 — SIGNIFICANT CHANGE UP
EGFR: 93 ML/MIN/1.73M2 — SIGNIFICANT CHANGE UP
EGFR: 95 ML/MIN/1.73M2 — SIGNIFICANT CHANGE UP
EGFR: 95 ML/MIN/1.73M2 — SIGNIFICANT CHANGE UP
EGFR: 96 ML/MIN/1.73M2 — SIGNIFICANT CHANGE UP
EGFR: 96 ML/MIN/1.73M2 — SIGNIFICANT CHANGE UP
EGFR: 97 ML/MIN/1.73M2 — SIGNIFICANT CHANGE UP
EGFR: 97 ML/MIN/1.73M2 — SIGNIFICANT CHANGE UP
EGFR: 98 ML/MIN/1.73M2 — SIGNIFICANT CHANGE UP
EOSINOPHIL # BLD AUTO: 0 K/UL — SIGNIFICANT CHANGE UP (ref 0–0.5)
EOSINOPHIL # BLD AUTO: 0 K/UL — SIGNIFICANT CHANGE UP (ref 0–0.5)
EOSINOPHIL # BLD AUTO: 0.01 K/UL — SIGNIFICANT CHANGE UP (ref 0–0.5)
EOSINOPHIL # BLD AUTO: 0.02 K/UL — SIGNIFICANT CHANGE UP (ref 0–0.5)
EOSINOPHIL # BLD AUTO: 0.02 K/UL — SIGNIFICANT CHANGE UP (ref 0–0.5)
EOSINOPHIL # BLD AUTO: 0.07 K/UL — SIGNIFICANT CHANGE UP (ref 0–0.5)
EOSINOPHIL # BLD AUTO: 0.12 K/UL — SIGNIFICANT CHANGE UP (ref 0–0.5)
EOSINOPHIL # BLD AUTO: 0.16 K/UL — SIGNIFICANT CHANGE UP (ref 0–0.5)
EOSINOPHIL # BLD AUTO: 0.23 K/UL — SIGNIFICANT CHANGE UP (ref 0–0.5)
EOSINOPHIL # BLD AUTO: 0.23 K/UL — SIGNIFICANT CHANGE UP (ref 0–0.5)
EOSINOPHIL # BLD AUTO: 0.26 K/UL
EOSINOPHIL # BLD AUTO: 0.3 K/UL — SIGNIFICANT CHANGE UP (ref 0–0.5)
EOSINOPHIL # BLD AUTO: 0.31 K/UL — SIGNIFICANT CHANGE UP (ref 0–0.5)
EOSINOPHIL # BLD AUTO: 0.37 K/UL — SIGNIFICANT CHANGE UP (ref 0–0.5)
EOSINOPHIL # BLD AUTO: 0.44 K/UL
EOSINOPHIL # FLD: 1 % — SIGNIFICANT CHANGE UP
EOSINOPHIL NFR BLD AUTO: 0 % — SIGNIFICANT CHANGE UP (ref 0–6)
EOSINOPHIL NFR BLD AUTO: 0 % — SIGNIFICANT CHANGE UP (ref 0–6)
EOSINOPHIL NFR BLD AUTO: 0.1 % — SIGNIFICANT CHANGE UP (ref 0–6)
EOSINOPHIL NFR BLD AUTO: 0.1 % — SIGNIFICANT CHANGE UP (ref 0–6)
EOSINOPHIL NFR BLD AUTO: 0.2 % — SIGNIFICANT CHANGE UP (ref 0–6)
EOSINOPHIL NFR BLD AUTO: 0.9 % — SIGNIFICANT CHANGE UP (ref 0–6)
EOSINOPHIL NFR BLD AUTO: 1.2 % — SIGNIFICANT CHANGE UP (ref 0–6)
EOSINOPHIL NFR BLD AUTO: 1.4 % — SIGNIFICANT CHANGE UP (ref 0–6)
EOSINOPHIL NFR BLD AUTO: 2.2 % — SIGNIFICANT CHANGE UP (ref 0–6)
EOSINOPHIL NFR BLD AUTO: 2.5 % — SIGNIFICANT CHANGE UP (ref 0–6)
EOSINOPHIL NFR BLD AUTO: 2.7 %
EOSINOPHIL NFR BLD AUTO: 2.9 % — SIGNIFICANT CHANGE UP (ref 0–6)
EOSINOPHIL NFR BLD AUTO: 3 % — SIGNIFICANT CHANGE UP (ref 0–6)
EOSINOPHIL NFR BLD AUTO: 3.9 % — SIGNIFICANT CHANGE UP (ref 0–6)
EOSINOPHIL NFR BLD AUTO: 5.2 %
EPI CELLS # UR: 1 /HPF — SIGNIFICANT CHANGE UP (ref 0–5)
EPI CELLS # UR: 2 /HPF — SIGNIFICANT CHANGE UP (ref 0–5)
ERYTHROCYTE [SEDIMENTATION RATE] IN BLOOD BY WESTERGREN METHOD: 85 MM/HR
FLUAV AG NPH QL: SIGNIFICANT CHANGE UP
FLUAV AG NPH QL: SIGNIFICANT CHANGE UP
FLUAV SUBTYP SPEC NAA+PROBE: SIGNIFICANT CHANGE UP
FLUAV SUBTYP SPEC NAA+PROBE: SIGNIFICANT CHANGE UP
FLUBV AG NPH QL: SIGNIFICANT CHANGE UP
FLUBV AG NPH QL: SIGNIFICANT CHANGE UP
FLUBV RNA SPEC QL NAA+PROBE: SIGNIFICANT CHANGE UP
FLUBV RNA SPEC QL NAA+PROBE: SIGNIFICANT CHANGE UP
FLUID INTAKE SUBSTANCE CLASS: SIGNIFICANT CHANGE UP
FOLATE+VIT B12 SERBLD-IMP: 0 % — SIGNIFICANT CHANGE UP
GAS PNL BLDV: 117 MMOL/L — CRITICAL LOW (ref 136–145)
GAS PNL BLDV: 121 MMOL/L — LOW (ref 136–145)
GAS PNL BLDV: SIGNIFICANT CHANGE UP
GAS PNL BLDV: SIGNIFICANT CHANGE UP
GLUCOSE BLDC GLUCOMTR-MCNC: 101 MG/DL — HIGH (ref 70–99)
GLUCOSE BLDC GLUCOMTR-MCNC: 101 MG/DL — HIGH (ref 70–99)
GLUCOSE BLDC GLUCOMTR-MCNC: 109 MG/DL — HIGH (ref 70–99)
GLUCOSE BLDC GLUCOMTR-MCNC: 110 MG/DL — HIGH (ref 70–99)
GLUCOSE BLDC GLUCOMTR-MCNC: 112 MG/DL — HIGH (ref 70–99)
GLUCOSE BLDC GLUCOMTR-MCNC: 113 MG/DL — HIGH (ref 70–99)
GLUCOSE BLDC GLUCOMTR-MCNC: 119 MG/DL — HIGH (ref 70–99)
GLUCOSE BLDC GLUCOMTR-MCNC: 122 MG/DL — HIGH (ref 70–99)
GLUCOSE BLDC GLUCOMTR-MCNC: 126 MG/DL — HIGH (ref 70–99)
GLUCOSE BLDC GLUCOMTR-MCNC: 296 MG/DL — HIGH (ref 70–99)
GLUCOSE BLDC GLUCOMTR-MCNC: 63 MG/DL — LOW (ref 70–99)
GLUCOSE BLDC GLUCOMTR-MCNC: 67 MG/DL — LOW (ref 70–99)
GLUCOSE BLDC GLUCOMTR-MCNC: 69 MG/DL — LOW (ref 70–99)
GLUCOSE BLDC GLUCOMTR-MCNC: 79 MG/DL — SIGNIFICANT CHANGE UP (ref 70–99)
GLUCOSE BLDC GLUCOMTR-MCNC: 82 MG/DL — SIGNIFICANT CHANGE UP (ref 70–99)
GLUCOSE BLDV-MCNC: 107 MG/DL — HIGH (ref 70–99)
GLUCOSE BLDV-MCNC: 160 MG/DL — HIGH (ref 70–99)
GLUCOSE SERPL-MCNC: 101 MG/DL — HIGH (ref 70–99)
GLUCOSE SERPL-MCNC: 101 MG/DL — HIGH (ref 70–99)
GLUCOSE SERPL-MCNC: 107 MG/DL — HIGH (ref 70–99)
GLUCOSE SERPL-MCNC: 109 MG/DL — HIGH (ref 70–99)
GLUCOSE SERPL-MCNC: 112 MG/DL — HIGH (ref 70–99)
GLUCOSE SERPL-MCNC: 117 MG/DL — HIGH (ref 70–99)
GLUCOSE SERPL-MCNC: 121 MG/DL — HIGH (ref 70–99)
GLUCOSE SERPL-MCNC: 32 MG/DL — CRITICAL LOW (ref 70–99)
GLUCOSE SERPL-MCNC: 53 MG/DL — CRITICAL LOW (ref 70–99)
GLUCOSE SERPL-MCNC: 65 MG/DL — LOW (ref 70–99)
GLUCOSE SERPL-MCNC: 65 MG/DL — LOW (ref 70–99)
GLUCOSE SERPL-MCNC: 66 MG/DL — LOW (ref 70–99)
GLUCOSE SERPL-MCNC: 69 MG/DL — LOW (ref 70–99)
GLUCOSE SERPL-MCNC: 71 MG/DL — SIGNIFICANT CHANGE UP (ref 70–99)
GLUCOSE SERPL-MCNC: 77 MG/DL — SIGNIFICANT CHANGE UP (ref 70–99)
GLUCOSE SERPL-MCNC: 78 MG/DL — SIGNIFICANT CHANGE UP (ref 70–99)
GLUCOSE SERPL-MCNC: 79 MG/DL — SIGNIFICANT CHANGE UP (ref 70–99)
GLUCOSE SERPL-MCNC: 80 MG/DL — SIGNIFICANT CHANGE UP (ref 70–99)
GLUCOSE SERPL-MCNC: 81 MG/DL — SIGNIFICANT CHANGE UP (ref 70–99)
GLUCOSE SERPL-MCNC: 83 MG/DL
GLUCOSE SERPL-MCNC: 83 MG/DL — SIGNIFICANT CHANGE UP (ref 70–99)
GLUCOSE SERPL-MCNC: 86 MG/DL — SIGNIFICANT CHANGE UP (ref 70–99)
GLUCOSE SERPL-MCNC: 88 MG/DL — SIGNIFICANT CHANGE UP (ref 70–99)
GLUCOSE SERPL-MCNC: 89 MG/DL — SIGNIFICANT CHANGE UP (ref 70–99)
GLUCOSE SERPL-MCNC: 90 MG/DL — SIGNIFICANT CHANGE UP (ref 70–99)
GLUCOSE SERPL-MCNC: 91 MG/DL — SIGNIFICANT CHANGE UP (ref 70–99)
GLUCOSE SERPL-MCNC: 92 MG/DL — SIGNIFICANT CHANGE UP (ref 70–99)
GLUCOSE SERPL-MCNC: 92 MG/DL — SIGNIFICANT CHANGE UP (ref 70–99)
GLUCOSE SERPL-MCNC: 93 MG/DL
GLUCOSE SERPL-MCNC: 94 MG/DL — SIGNIFICANT CHANGE UP (ref 70–99)
GLUCOSE SERPL-MCNC: 96 MG/DL — SIGNIFICANT CHANGE UP (ref 70–99)
GLUCOSE SERPL-MCNC: 97 MG/DL
GLUCOSE SERPL-MCNC: 98 MG/DL
GLUCOSE SERPL-MCNC: 98 MG/DL — SIGNIFICANT CHANGE UP (ref 70–99)
GLUCOSE UR QL: NEGATIVE — SIGNIFICANT CHANGE UP
GRAM STN FLD: SIGNIFICANT CHANGE UP
HADV DNA SPEC QL NAA+PROBE: SIGNIFICANT CHANGE UP
HADV DNA SPEC QL NAA+PROBE: SIGNIFICANT CHANGE UP
HBV CORE AB SER-ACNC: SIGNIFICANT CHANGE UP
HBV SURFACE AB SER-ACNC: SIGNIFICANT CHANGE UP
HBV SURFACE AG SER-ACNC: SIGNIFICANT CHANGE UP
HCO3 BLDV-SCNC: 22 MMOL/L — SIGNIFICANT CHANGE UP (ref 22–29)
HCO3 BLDV-SCNC: 24 MMOL/L — SIGNIFICANT CHANGE UP (ref 22–29)
HCOV 229E RNA SPEC QL NAA+PROBE: SIGNIFICANT CHANGE UP
HCOV 229E RNA SPEC QL NAA+PROBE: SIGNIFICANT CHANGE UP
HCOV HKU1 RNA SPEC QL NAA+PROBE: SIGNIFICANT CHANGE UP
HCOV HKU1 RNA SPEC QL NAA+PROBE: SIGNIFICANT CHANGE UP
HCOV NL63 RNA SPEC QL NAA+PROBE: SIGNIFICANT CHANGE UP
HCOV NL63 RNA SPEC QL NAA+PROBE: SIGNIFICANT CHANGE UP
HCOV OC43 RNA SPEC QL NAA+PROBE: SIGNIFICANT CHANGE UP
HCOV OC43 RNA SPEC QL NAA+PROBE: SIGNIFICANT CHANGE UP
HCT VFR BLD CALC: 24.3 % — LOW (ref 34.5–45)
HCT VFR BLD CALC: 24.5 % — LOW (ref 34.5–45)
HCT VFR BLD CALC: 25.1 % — LOW (ref 34.5–45)
HCT VFR BLD CALC: 25.4 % — LOW (ref 34.5–45)
HCT VFR BLD CALC: 25.5 % — LOW (ref 34.5–45)
HCT VFR BLD CALC: 25.9 % — LOW (ref 34.5–45)
HCT VFR BLD CALC: 27 % — LOW (ref 34.5–45)
HCT VFR BLD CALC: 27.4 % — LOW (ref 34.5–45)
HCT VFR BLD CALC: 27.7 % — LOW (ref 34.5–45)
HCT VFR BLD CALC: 28 % — LOW (ref 34.5–45)
HCT VFR BLD CALC: 28.3 % — LOW (ref 34.5–45)
HCT VFR BLD CALC: 30.2 % — LOW (ref 34.5–45)
HCT VFR BLD CALC: 33.5 % — LOW (ref 34.5–45)
HCT VFR BLD CALC: 34 % — LOW (ref 34.5–45)
HCT VFR BLD CALC: 34.3 % — LOW (ref 34.5–45)
HCT VFR BLD CALC: 34.5 % — SIGNIFICANT CHANGE UP (ref 34.5–45)
HCT VFR BLD CALC: 34.6 % — SIGNIFICANT CHANGE UP (ref 34.5–45)
HCT VFR BLD CALC: 34.7 % — SIGNIFICANT CHANGE UP (ref 34.5–45)
HCT VFR BLD CALC: 34.7 % — SIGNIFICANT CHANGE UP (ref 34.5–45)
HCT VFR BLD CALC: 35.7 % — SIGNIFICANT CHANGE UP (ref 34.5–45)
HCT VFR BLD CALC: 35.7 % — SIGNIFICANT CHANGE UP (ref 34.5–45)
HCT VFR BLD CALC: 36.7 % — SIGNIFICANT CHANGE UP (ref 34.5–45)
HCT VFR BLD CALC: 38.2 % — SIGNIFICANT CHANGE UP (ref 34.5–45)
HCT VFR BLD CALC: 39.1 % — SIGNIFICANT CHANGE UP (ref 34.5–45)
HCT VFR BLD CALC: 39.4 % — SIGNIFICANT CHANGE UP (ref 34.5–45)
HCT VFR BLD CALC: 39.6 % — SIGNIFICANT CHANGE UP (ref 34.5–45)
HCT VFR BLD CALC: 39.9 % — SIGNIFICANT CHANGE UP (ref 34.5–45)
HCT VFR BLD CALC: 40 % — SIGNIFICANT CHANGE UP (ref 34.5–45)
HCT VFR BLD CALC: 41.7 %
HCT VFR BLD CALC: 43.2 % — SIGNIFICANT CHANGE UP (ref 34.5–45)
HCT VFR BLD CALC: 44 % — SIGNIFICANT CHANGE UP (ref 34.5–45)
HCT VFR BLD CALC: 44.4 %
HCT VFR BLD CALC: 46.2 % — HIGH (ref 34.5–45)
HCT VFR BLDA CALC: 25 % — LOW (ref 34.5–46.5)
HCT VFR BLDA CALC: 25 % — LOW (ref 34.5–46.5)
HCV AB S/CO SERPL IA: 0.11 S/CO — SIGNIFICANT CHANGE UP (ref 0–0.99)
HCV AB S/CO SERPL IA: 0.11 S/CO — SIGNIFICANT CHANGE UP (ref 0–0.99)
HCV AB SERPL-IMP: SIGNIFICANT CHANGE UP
HCV AB SERPL-IMP: SIGNIFICANT CHANGE UP
HDLC SERPL-MCNC: 38 MG/DL — LOW
HEMOLYSIS INDEX: 27 — SIGNIFICANT CHANGE UP
HGB BLD CALC-MCNC: 8.2 G/DL — LOW (ref 11.5–15.5)
HGB BLD CALC-MCNC: 8.2 G/DL — LOW (ref 11.5–15.5)
HGB BLD-MCNC: 10.3 G/DL — LOW (ref 11.5–15.5)
HGB BLD-MCNC: 10.4 G/DL — LOW (ref 11.5–15.5)
HGB BLD-MCNC: 10.4 G/DL — LOW (ref 11.5–15.5)
HGB BLD-MCNC: 10.6 G/DL — LOW (ref 11.5–15.5)
HGB BLD-MCNC: 10.8 G/DL — LOW (ref 11.5–15.5)
HGB BLD-MCNC: 10.9 G/DL — LOW (ref 11.5–15.5)
HGB BLD-MCNC: 11.2 G/DL — LOW (ref 11.5–15.5)
HGB BLD-MCNC: 11.2 G/DL — LOW (ref 11.5–15.5)
HGB BLD-MCNC: 11.4 G/DL — LOW (ref 11.5–15.5)
HGB BLD-MCNC: 11.8 G/DL — SIGNIFICANT CHANGE UP (ref 11.5–15.5)
HGB BLD-MCNC: 12 G/DL — SIGNIFICANT CHANGE UP (ref 11.5–15.5)
HGB BLD-MCNC: 12.2 G/DL
HGB BLD-MCNC: 12.2 G/DL — SIGNIFICANT CHANGE UP (ref 11.5–15.5)
HGB BLD-MCNC: 12.3 G/DL — SIGNIFICANT CHANGE UP (ref 11.5–15.5)
HGB BLD-MCNC: 12.5 G/DL — SIGNIFICANT CHANGE UP (ref 11.5–15.5)
HGB BLD-MCNC: 13.1 G/DL — SIGNIFICANT CHANGE UP (ref 11.5–15.5)
HGB BLD-MCNC: 13.2 G/DL — SIGNIFICANT CHANGE UP (ref 11.5–15.5)
HGB BLD-MCNC: 13.3 G/DL
HGB BLD-MCNC: 14 G/DL — SIGNIFICANT CHANGE UP (ref 11.5–15.5)
HGB BLD-MCNC: 7.8 G/DL — LOW (ref 11.5–15.5)
HGB BLD-MCNC: 7.9 G/DL — LOW (ref 11.5–15.5)
HGB BLD-MCNC: 8 G/DL — LOW (ref 11.5–15.5)
HGB BLD-MCNC: 8.1 G/DL — LOW (ref 11.5–15.5)
HGB BLD-MCNC: 8.2 G/DL — LOW (ref 11.5–15.5)
HGB BLD-MCNC: 8.3 G/DL — LOW (ref 11.5–15.5)
HGB BLD-MCNC: 8.6 G/DL — LOW (ref 11.5–15.5)
HGB BLD-MCNC: 8.7 G/DL — LOW (ref 11.5–15.5)
HGB BLD-MCNC: 8.7 G/DL — LOW (ref 11.5–15.5)
HGB BLD-MCNC: 8.8 G/DL — LOW (ref 11.5–15.5)
HGB BLD-MCNC: 9 G/DL — LOW (ref 11.5–15.5)
HGB BLD-MCNC: 9.3 G/DL — LOW (ref 11.5–15.5)
HMPV RNA SPEC QL NAA+PROBE: SIGNIFICANT CHANGE UP
HMPV RNA SPEC QL NAA+PROBE: SIGNIFICANT CHANGE UP
HPIV1 RNA SPEC QL NAA+PROBE: SIGNIFICANT CHANGE UP
HPIV1 RNA SPEC QL NAA+PROBE: SIGNIFICANT CHANGE UP
HPIV2 RNA SPEC QL NAA+PROBE: SIGNIFICANT CHANGE UP
HPIV2 RNA SPEC QL NAA+PROBE: SIGNIFICANT CHANGE UP
HPIV3 RNA SPEC QL NAA+PROBE: SIGNIFICANT CHANGE UP
HPIV3 RNA SPEC QL NAA+PROBE: SIGNIFICANT CHANGE UP
HPIV4 RNA SPEC QL NAA+PROBE: SIGNIFICANT CHANGE UP
HPIV4 RNA SPEC QL NAA+PROBE: SIGNIFICANT CHANGE UP
HYALINE CASTS # UR AUTO: 2 /LPF — SIGNIFICANT CHANGE UP (ref 0–7)
HYALINE CASTS # UR AUTO: 2 /LPF — SIGNIFICANT CHANGE UP (ref 0–7)
IANC: 10.65 K/UL — HIGH (ref 1.8–7.4)
IANC: 23.58 K/UL — HIGH (ref 1.8–7.4)
IANC: 24 K/UL — HIGH (ref 1.8–7.4)
IANC: 5.92 K/UL — SIGNIFICANT CHANGE UP (ref 1.8–7.4)
IANC: 6.17 K/UL — SIGNIFICANT CHANGE UP (ref 1.8–7.4)
IANC: 6.45 K/UL — SIGNIFICANT CHANGE UP (ref 1.8–7.4)
IANC: 6.48 K/UL — SIGNIFICANT CHANGE UP (ref 1.8–7.4)
IANC: 7.09 K/UL — SIGNIFICANT CHANGE UP (ref 1.8–7.4)
IMM GRANULOCYTES NFR BLD AUTO: 0.2 %
IMM GRANULOCYTES NFR BLD AUTO: 0.2 % — SIGNIFICANT CHANGE UP (ref 0–1.5)
IMM GRANULOCYTES NFR BLD AUTO: 0.4 %
IMM GRANULOCYTES NFR BLD AUTO: 0.4 % — SIGNIFICANT CHANGE UP (ref 0–1.5)
IMM GRANULOCYTES NFR BLD AUTO: 0.6 % — SIGNIFICANT CHANGE UP (ref 0–1.5)
IMM GRANULOCYTES NFR BLD AUTO: 0.8 % — SIGNIFICANT CHANGE UP (ref 0–1.5)
IMM GRANULOCYTES NFR BLD AUTO: 0.8 % — SIGNIFICANT CHANGE UP (ref 0–1.5)
IMM GRANULOCYTES NFR BLD AUTO: 0.9 % — SIGNIFICANT CHANGE UP (ref 0–1.5)
IMM GRANULOCYTES NFR BLD AUTO: 1.1 % — SIGNIFICANT CHANGE UP (ref 0–1.5)
IMM GRANULOCYTES NFR BLD AUTO: 1.1 % — SIGNIFICANT CHANGE UP (ref 0–1.5)
IMM GRANULOCYTES NFR BLD AUTO: 8.1 % — HIGH (ref 0–1.5)
INR BLD: 0.99 RATIO — SIGNIFICANT CHANGE UP (ref 0.88–1.16)
INR BLD: 1.03 RATIO — SIGNIFICANT CHANGE UP (ref 0.88–1.16)
INR BLD: 1.08 RATIO — SIGNIFICANT CHANGE UP (ref 0.88–1.16)
INR BLD: 1.12 RATIO — SIGNIFICANT CHANGE UP (ref 0.88–1.16)
INR BLD: 1.24 RATIO — HIGH (ref 0.88–1.16)
INR BLD: 1.77 RATIO — HIGH (ref 0.88–1.16)
INR PPP: 0.93 RATIO
INR PPP: 0.97 RATIO
KETONES UR-MCNC: ABNORMAL
KETONES UR-MCNC: NEGATIVE — SIGNIFICANT CHANGE UP
KETONES UR-MCNC: NEGATIVE — SIGNIFICANT CHANGE UP
LACTATE BLDV-MCNC: 2.8 MMOL/L — HIGH (ref 0.5–2)
LACTATE BLDV-MCNC: 4.1 MMOL/L — CRITICAL HIGH (ref 0.5–2)
LDH SERPL L TO P-CCNC: 1298 U/L — SIGNIFICANT CHANGE UP
LEUKOCYTE ESTERASE UR-ACNC: ABNORMAL
LEUKOCYTE ESTERASE UR-ACNC: ABNORMAL
LEUKOCYTE ESTERASE UR-ACNC: NEGATIVE — SIGNIFICANT CHANGE UP
LIDOCAIN IGE QN: 29 U/L — SIGNIFICANT CHANGE UP (ref 7–60)
LIDOCAIN IGE QN: 40 U/L — SIGNIFICANT CHANGE UP (ref 7–60)
LIDOCAIN IGE QN: 66 U/L — HIGH (ref 7–60)
LIPID PNL WITH DIRECT LDL SERPL: 32 MG/DL — SIGNIFICANT CHANGE UP
LYMPHOCYTES # BLD AUTO: 0.9 K/UL — LOW (ref 1–3.3)
LYMPHOCYTES # BLD AUTO: 1.25 K/UL — SIGNIFICANT CHANGE UP (ref 1–3.3)
LYMPHOCYTES # BLD AUTO: 1.32 K/UL — SIGNIFICANT CHANGE UP (ref 1–3.3)
LYMPHOCYTES # BLD AUTO: 1.46 K/UL — SIGNIFICANT CHANGE UP (ref 1–3.3)
LYMPHOCYTES # BLD AUTO: 1.47 K/UL — SIGNIFICANT CHANGE UP (ref 1–3.3)
LYMPHOCYTES # BLD AUTO: 1.59 K/UL — SIGNIFICANT CHANGE UP (ref 1–3.3)
LYMPHOCYTES # BLD AUTO: 1.66 K/UL — SIGNIFICANT CHANGE UP (ref 1–3.3)
LYMPHOCYTES # BLD AUTO: 1.7 K/UL
LYMPHOCYTES # BLD AUTO: 1.81 K/UL — SIGNIFICANT CHANGE UP (ref 1–3.3)
LYMPHOCYTES # BLD AUTO: 1.9 K/UL
LYMPHOCYTES # BLD AUTO: 1.95 K/UL — SIGNIFICANT CHANGE UP (ref 1–3.3)
LYMPHOCYTES # BLD AUTO: 15.4 % — SIGNIFICANT CHANGE UP (ref 13–44)
LYMPHOCYTES # BLD AUTO: 15.5 % — SIGNIFICANT CHANGE UP (ref 13–44)
LYMPHOCYTES # BLD AUTO: 15.8 % — SIGNIFICANT CHANGE UP (ref 13–44)
LYMPHOCYTES # BLD AUTO: 16 % — SIGNIFICANT CHANGE UP (ref 13–44)
LYMPHOCYTES # BLD AUTO: 16.4 % — SIGNIFICANT CHANGE UP (ref 13–44)
LYMPHOCYTES # BLD AUTO: 17.3 % — SIGNIFICANT CHANGE UP (ref 13–44)
LYMPHOCYTES # BLD AUTO: 17.6 % — SIGNIFICANT CHANGE UP (ref 13–44)
LYMPHOCYTES # BLD AUTO: 19.6 % — SIGNIFICANT CHANGE UP (ref 13–44)
LYMPHOCYTES # BLD AUTO: 19.8 % — SIGNIFICANT CHANGE UP (ref 13–44)
LYMPHOCYTES # BLD AUTO: 2.05 K/UL — SIGNIFICANT CHANGE UP (ref 1–3.3)
LYMPHOCYTES # BLD AUTO: 2.07 K/UL — SIGNIFICANT CHANGE UP (ref 1–3.3)
LYMPHOCYTES # BLD AUTO: 2.1 K/UL — SIGNIFICANT CHANGE UP (ref 1–3.3)
LYMPHOCYTES # BLD AUTO: 2.34 K/UL — SIGNIFICANT CHANGE UP (ref 1–3.3)
LYMPHOCYTES # BLD AUTO: 22.3 % — SIGNIFICANT CHANGE UP (ref 13–44)
LYMPHOCYTES # BLD AUTO: 6.4 % — LOW (ref 13–44)
LYMPHOCYTES # BLD AUTO: 7 % — LOW (ref 13–44)
LYMPHOCYTES # BLD AUTO: 7.6 % — LOW (ref 13–44)
LYMPHOCYTES # FLD: 18 % — SIGNIFICANT CHANGE UP
LYMPHOCYTES NFR BLD AUTO: 20.1 %
LYMPHOCYTES NFR BLD AUTO: 20.2 %
M PNEUMO DNA SPEC QL NAA+PROBE: SIGNIFICANT CHANGE UP
M PNEUMO DNA SPEC QL NAA+PROBE: SIGNIFICANT CHANGE UP
MAGNESIUM SERPL-MCNC: 1.9 MG/DL — SIGNIFICANT CHANGE UP (ref 1.6–2.6)
MAGNESIUM SERPL-MCNC: 1.9 MG/DL — SIGNIFICANT CHANGE UP (ref 1.6–2.6)
MAGNESIUM SERPL-MCNC: 2 MG/DL — SIGNIFICANT CHANGE UP (ref 1.6–2.6)
MAGNESIUM SERPL-MCNC: 2.1 MG/DL — SIGNIFICANT CHANGE UP (ref 1.6–2.6)
MAGNESIUM SERPL-MCNC: 2.2 MG/DL — SIGNIFICANT CHANGE UP (ref 1.6–2.6)
MAGNESIUM SERPL-MCNC: 2.2 MG/DL — SIGNIFICANT CHANGE UP (ref 1.6–2.6)
MAN DIFF?: NORMAL
MAN DIFF?: NORMAL
MCHC RBC-ENTMCNC: 20.7 PG — LOW (ref 27–34)
MCHC RBC-ENTMCNC: 20.8 PG — LOW (ref 27–34)
MCHC RBC-ENTMCNC: 20.8 PG — LOW (ref 27–34)
MCHC RBC-ENTMCNC: 20.9 PG — LOW (ref 27–34)
MCHC RBC-ENTMCNC: 21 PG — LOW (ref 27–34)
MCHC RBC-ENTMCNC: 21.1 PG — LOW (ref 27–34)
MCHC RBC-ENTMCNC: 21.2 PG — LOW (ref 27–34)
MCHC RBC-ENTMCNC: 21.3 PG — LOW (ref 27–34)
MCHC RBC-ENTMCNC: 21.3 PG — LOW (ref 27–34)
MCHC RBC-ENTMCNC: 21.4 PG — LOW (ref 27–34)
MCHC RBC-ENTMCNC: 21.5 PG
MCHC RBC-ENTMCNC: 21.5 PG — LOW (ref 27–34)
MCHC RBC-ENTMCNC: 21.6 PG — LOW (ref 27–34)
MCHC RBC-ENTMCNC: 21.7 PG — LOW (ref 27–34)
MCHC RBC-ENTMCNC: 21.8 PG — LOW (ref 27–34)
MCHC RBC-ENTMCNC: 21.9 PG
MCHC RBC-ENTMCNC: 22 PG — LOW (ref 27–34)
MCHC RBC-ENTMCNC: 22.1 PG — LOW (ref 27–34)
MCHC RBC-ENTMCNC: 29.3 GM/DL
MCHC RBC-ENTMCNC: 29.3 GM/DL — LOW (ref 32–36)
MCHC RBC-ENTMCNC: 29.4 GM/DL — LOW (ref 32–36)
MCHC RBC-ENTMCNC: 29.9 GM/DL — LOW (ref 32–36)
MCHC RBC-ENTMCNC: 30 GM/DL
MCHC RBC-ENTMCNC: 30 GM/DL — LOW (ref 32–36)
MCHC RBC-ENTMCNC: 30.1 GM/DL — LOW (ref 32–36)
MCHC RBC-ENTMCNC: 30.3 G/DL — LOW (ref 32–36)
MCHC RBC-ENTMCNC: 30.3 GM/DL — LOW (ref 32–36)
MCHC RBC-ENTMCNC: 30.8 GM/DL — LOW (ref 32–36)
MCHC RBC-ENTMCNC: 30.8 GM/DL — LOW (ref 32–36)
MCHC RBC-ENTMCNC: 31.1 G/DL — LOW (ref 32–36)
MCHC RBC-ENTMCNC: 31.1 GM/DL — LOW (ref 32–36)
MCHC RBC-ENTMCNC: 31.1 GM/DL — LOW (ref 32–36)
MCHC RBC-ENTMCNC: 31.2 GM/DL — LOW (ref 32–36)
MCHC RBC-ENTMCNC: 31.3 GM/DL — LOW (ref 32–36)
MCHC RBC-ENTMCNC: 31.4 GM/DL — LOW (ref 32–36)
MCHC RBC-ENTMCNC: 31.5 G/DL — LOW (ref 32–36)
MCHC RBC-ENTMCNC: 31.5 GM/DL — LOW (ref 32–36)
MCHC RBC-ENTMCNC: 31.6 G/DL — LOW (ref 32–36)
MCHC RBC-ENTMCNC: 31.8 GM/DL — LOW (ref 32–36)
MCHC RBC-ENTMCNC: 31.9 G/DL — LOW (ref 32–36)
MCHC RBC-ENTMCNC: 32 GM/DL — SIGNIFICANT CHANGE UP (ref 32–36)
MCHC RBC-ENTMCNC: 32.1 GM/DL — SIGNIFICANT CHANGE UP (ref 32–36)
MCHC RBC-ENTMCNC: 32.2 GM/DL — SIGNIFICANT CHANGE UP (ref 32–36)
MCHC RBC-ENTMCNC: 32.2 GM/DL — SIGNIFICANT CHANGE UP (ref 32–36)
MCHC RBC-ENTMCNC: 32.3 GM/DL — SIGNIFICANT CHANGE UP (ref 32–36)
MCHC RBC-ENTMCNC: 32.6 GM/DL — SIGNIFICANT CHANGE UP (ref 32–36)
MCV RBC AUTO: 65.2 FL — LOW (ref 80–100)
MCV RBC AUTO: 65.2 FL — LOW (ref 80–100)
MCV RBC AUTO: 65.3 FL — LOW (ref 80–100)
MCV RBC AUTO: 65.4 FL — LOW (ref 80–100)
MCV RBC AUTO: 66.1 FL — LOW (ref 80–100)
MCV RBC AUTO: 66.1 FL — LOW (ref 80–100)
MCV RBC AUTO: 66.4 FL — LOW (ref 80–100)
MCV RBC AUTO: 66.8 FL — LOW (ref 80–100)
MCV RBC AUTO: 67 FL — LOW (ref 80–100)
MCV RBC AUTO: 67.2 FL — LOW (ref 80–100)
MCV RBC AUTO: 67.4 FL — LOW (ref 80–100)
MCV RBC AUTO: 67.4 FL — LOW (ref 80–100)
MCV RBC AUTO: 67.9 FL — LOW (ref 80–100)
MCV RBC AUTO: 68 FL — LOW (ref 80–100)
MCV RBC AUTO: 68.1 FL — LOW (ref 80–100)
MCV RBC AUTO: 68.4 FL — LOW (ref 80–100)
MCV RBC AUTO: 68.4 FL — LOW (ref 80–100)
MCV RBC AUTO: 69.1 FL — LOW (ref 80–100)
MCV RBC AUTO: 69.4 FL — LOW (ref 80–100)
MCV RBC AUTO: 69.4 FL — LOW (ref 80–100)
MCV RBC AUTO: 70.1 FL — LOW (ref 80–100)
MCV RBC AUTO: 70.4 FL — LOW (ref 80–100)
MCV RBC AUTO: 70.4 FL — LOW (ref 80–100)
MCV RBC AUTO: 71.3 FL — LOW (ref 80–100)
MCV RBC AUTO: 71.3 FL — LOW (ref 80–100)
MCV RBC AUTO: 71.6 FL — LOW (ref 80–100)
MCV RBC AUTO: 71.7 FL
MCV RBC AUTO: 71.8 FL — LOW (ref 80–100)
MCV RBC AUTO: 71.8 FL — LOW (ref 80–100)
MCV RBC AUTO: 71.9 FL — LOW (ref 80–100)
MCV RBC AUTO: 72.3 FL — LOW (ref 80–100)
MCV RBC AUTO: 72.8 FL — LOW (ref 80–100)
MCV RBC AUTO: 74.7 FL
MESOTHL CELL # FLD: 5 % — SIGNIFICANT CHANGE UP
MONOCYTES # BLD AUTO: 0.24 K/UL — SIGNIFICANT CHANGE UP (ref 0–0.9)
MONOCYTES # BLD AUTO: 0.41 K/UL — SIGNIFICANT CHANGE UP (ref 0–0.9)
MONOCYTES # BLD AUTO: 0.46 K/UL
MONOCYTES # BLD AUTO: 0.63 K/UL — SIGNIFICANT CHANGE UP (ref 0–0.9)
MONOCYTES # BLD AUTO: 0.64 K/UL
MONOCYTES # BLD AUTO: 0.64 K/UL — SIGNIFICANT CHANGE UP (ref 0–0.9)
MONOCYTES # BLD AUTO: 0.68 K/UL — SIGNIFICANT CHANGE UP (ref 0–0.9)
MONOCYTES # BLD AUTO: 0.87 K/UL — SIGNIFICANT CHANGE UP (ref 0–0.9)
MONOCYTES # BLD AUTO: 0.89 K/UL — SIGNIFICANT CHANGE UP (ref 0–0.9)
MONOCYTES # BLD AUTO: 0.89 K/UL — SIGNIFICANT CHANGE UP (ref 0–0.9)
MONOCYTES # BLD AUTO: 0.91 K/UL — HIGH (ref 0–0.9)
MONOCYTES # BLD AUTO: 1.03 K/UL — HIGH (ref 0–0.9)
MONOCYTES # BLD AUTO: 1.29 K/UL — HIGH (ref 0–0.9)
MONOCYTES # BLD AUTO: 1.75 K/UL — HIGH (ref 0–0.9)
MONOCYTES # BLD AUTO: 1.8 K/UL — HIGH (ref 0–0.9)
MONOCYTES NFR BLD AUTO: 10.9 % — SIGNIFICANT CHANGE UP (ref 2–14)
MONOCYTES NFR BLD AUTO: 2 % — SIGNIFICANT CHANGE UP (ref 2–14)
MONOCYTES NFR BLD AUTO: 5.5 %
MONOCYTES NFR BLD AUTO: 5.5 % — SIGNIFICANT CHANGE UP (ref 2–14)
MONOCYTES NFR BLD AUTO: 5.8 % — SIGNIFICANT CHANGE UP (ref 2–14)
MONOCYTES NFR BLD AUTO: 6 % — SIGNIFICANT CHANGE UP (ref 2–14)
MONOCYTES NFR BLD AUTO: 6.8 %
MONOCYTES NFR BLD AUTO: 6.8 % — SIGNIFICANT CHANGE UP (ref 2–14)
MONOCYTES NFR BLD AUTO: 7.6 % — SIGNIFICANT CHANGE UP (ref 2–14)
MONOCYTES NFR BLD AUTO: 8.4 % — SIGNIFICANT CHANGE UP (ref 2–14)
MONOCYTES NFR BLD AUTO: 8.5 % — SIGNIFICANT CHANGE UP (ref 2–14)
MONOCYTES NFR BLD AUTO: 8.6 % — SIGNIFICANT CHANGE UP (ref 2–14)
MONOCYTES NFR BLD AUTO: 8.6 % — SIGNIFICANT CHANGE UP (ref 2–14)
MONOCYTES NFR BLD AUTO: 9.4 % — SIGNIFICANT CHANGE UP (ref 2–14)
MONOCYTES NFR BLD AUTO: 9.6 % — SIGNIFICANT CHANGE UP (ref 2–14)
MONOS+MACROS # FLD: 19 % — SIGNIFICANT CHANGE UP
NEUTROPHILS # BLD AUTO: 10.65 K/UL — HIGH (ref 1.8–7.4)
NEUTROPHILS # BLD AUTO: 24 K/UL — HIGH (ref 1.8–7.4)
NEUTROPHILS # BLD AUTO: 25.53 K/UL — HIGH (ref 1.8–7.4)
NEUTROPHILS # BLD AUTO: 5.61 K/UL — SIGNIFICANT CHANGE UP (ref 1.8–7.4)
NEUTROPHILS # BLD AUTO: 5.74 K/UL
NEUTROPHILS # BLD AUTO: 5.92 K/UL — SIGNIFICANT CHANGE UP (ref 1.8–7.4)
NEUTROPHILS # BLD AUTO: 6.17 K/UL — SIGNIFICANT CHANGE UP (ref 1.8–7.4)
NEUTROPHILS # BLD AUTO: 6.45 K/UL — SIGNIFICANT CHANGE UP (ref 1.8–7.4)
NEUTROPHILS # BLD AUTO: 6.48 K/UL — SIGNIFICANT CHANGE UP (ref 1.8–7.4)
NEUTROPHILS # BLD AUTO: 6.58 K/UL
NEUTROPHILS # BLD AUTO: 6.82 K/UL — SIGNIFICANT CHANGE UP (ref 1.8–7.4)
NEUTROPHILS # BLD AUTO: 7.02 K/UL — SIGNIFICANT CHANGE UP (ref 1.8–7.4)
NEUTROPHILS # BLD AUTO: 7.09 K/UL — SIGNIFICANT CHANGE UP (ref 1.8–7.4)
NEUTROPHILS # BLD AUTO: 7.34 K/UL — SIGNIFICANT CHANGE UP (ref 1.8–7.4)
NEUTROPHILS # BLD AUTO: 9.7 K/UL — HIGH (ref 1.8–7.4)
NEUTROPHILS NFR BLD AUTO: 65.1 % — SIGNIFICANT CHANGE UP (ref 43–77)
NEUTROPHILS NFR BLD AUTO: 67.7 % — SIGNIFICANT CHANGE UP (ref 43–77)
NEUTROPHILS NFR BLD AUTO: 68.2 %
NEUTROPHILS NFR BLD AUTO: 68.3 % — SIGNIFICANT CHANGE UP (ref 43–77)
NEUTROPHILS NFR BLD AUTO: 69.6 %
NEUTROPHILS NFR BLD AUTO: 70.2 % — SIGNIFICANT CHANGE UP (ref 43–77)
NEUTROPHILS NFR BLD AUTO: 70.6 % — SIGNIFICANT CHANGE UP (ref 43–77)
NEUTROPHILS NFR BLD AUTO: 71.9 % — SIGNIFICANT CHANGE UP (ref 43–77)
NEUTROPHILS NFR BLD AUTO: 73.3 % — SIGNIFICANT CHANGE UP (ref 43–77)
NEUTROPHILS NFR BLD AUTO: 73.3 % — SIGNIFICANT CHANGE UP (ref 43–77)
NEUTROPHILS NFR BLD AUTO: 74.9 % — SIGNIFICANT CHANGE UP (ref 43–77)
NEUTROPHILS NFR BLD AUTO: 74.9 % — SIGNIFICANT CHANGE UP (ref 43–77)
NEUTROPHILS NFR BLD AUTO: 75 % — SIGNIFICANT CHANGE UP (ref 43–77)
NEUTROPHILS NFR BLD AUTO: 79.4 % — HIGH (ref 43–77)
NEUTROPHILS NFR BLD AUTO: 89.3 % — HIGH (ref 43–77)
NEUTROPHILS-BODY FLUID: 32 % — SIGNIFICANT CHANGE UP
NITRITE UR-MCNC: NEGATIVE — SIGNIFICANT CHANGE UP
NON HDL CHOLESTEROL: 63 MG/DL — SIGNIFICANT CHANGE UP
NON-GYNECOLOGICAL CYTOLOGY STUDY: SIGNIFICANT CHANGE UP
NON-GYNECOLOGICAL CYTOLOGY STUDY: SIGNIFICANT CHANGE UP
NRBC # BLD: 0 /100 WBCS — SIGNIFICANT CHANGE UP
NRBC # BLD: 0 /100 WBCS — SIGNIFICANT CHANGE UP (ref 0–0)
NRBC # FLD: 0 K/UL — SIGNIFICANT CHANGE UP
NRBC # FLD: 0.02 K/UL — HIGH
NRBC # FLD: 0.03 K/UL — HIGH
NRBC # FLD: 0.03 K/UL — HIGH
NRBC # FLD: 0.04 K/UL — HIGH
NRBC # FLD: 0.06 K/UL — HIGH
NRBC # FLD: 0.06 K/UL — HIGH
NRBC # FLD: 0.07 K/UL — HIGH
NRBC # FLD: 0.09 K/UL — HIGH
NRBC # FLD: 0.12 K/UL — HIGH
NT-PROBNP SERPL-MCNC: 174 PG/ML
OSMOLALITY SERPL: 273 MOSM/KG — LOW (ref 275–295)
OSMOLALITY SERPL: 281 MOSM/KG — SIGNIFICANT CHANGE UP (ref 275–295)
OSMOLALITY UR: 310 MOSM/KG — SIGNIFICANT CHANGE UP (ref 50–1200)
OTHER CELLS FLD MANUAL: 25 % — SIGNIFICANT CHANGE UP
PCO2 BLDV: 37 MMHG — LOW (ref 39–42)
PCO2 BLDV: 39 MMHG — SIGNIFICANT CHANGE UP (ref 39–42)
PH BLDV: 7.37 — SIGNIFICANT CHANGE UP (ref 7.32–7.43)
PH BLDV: 7.41 — SIGNIFICANT CHANGE UP (ref 7.32–7.43)
PH UR: 5.5 — SIGNIFICANT CHANGE UP (ref 5–8)
PH UR: 5.5 — SIGNIFICANT CHANGE UP (ref 5–8)
PH UR: 6 — SIGNIFICANT CHANGE UP (ref 5–8)
PHOSPHATE SERPL-MCNC: 2.3 MG/DL — LOW (ref 2.5–4.5)
PHOSPHATE SERPL-MCNC: 2.5 MG/DL — SIGNIFICANT CHANGE UP (ref 2.5–4.5)
PHOSPHATE SERPL-MCNC: 2.7 MG/DL — SIGNIFICANT CHANGE UP (ref 2.5–4.5)
PHOSPHATE SERPL-MCNC: 3 MG/DL — SIGNIFICANT CHANGE UP (ref 2.5–4.5)
PHOSPHATE SERPL-MCNC: 3.4 MG/DL — SIGNIFICANT CHANGE UP (ref 2.5–4.5)
PHOSPHATE SERPL-MCNC: 3.8 MG/DL — SIGNIFICANT CHANGE UP (ref 2.5–4.5)
PHOSPHATE SERPL-MCNC: 3.8 MG/DL — SIGNIFICANT CHANGE UP (ref 2.5–4.5)
PHOSPHATE SERPL-MCNC: 3.9 MG/DL — SIGNIFICANT CHANGE UP (ref 2.5–4.5)
PHOSPHATE SERPL-MCNC: 4 MG/DL — SIGNIFICANT CHANGE UP (ref 2.5–4.5)
PHOSPHATE SERPL-MCNC: 4.1 MG/DL — SIGNIFICANT CHANGE UP (ref 2.5–4.5)
PHOSPHATE SERPL-MCNC: 4.7 MG/DL — HIGH (ref 2.5–4.5)
PLATELET # BLD AUTO: 398 K/UL — SIGNIFICANT CHANGE UP (ref 150–400)
PLATELET # BLD AUTO: 446 K/UL — HIGH (ref 150–400)
PLATELET # BLD AUTO: 447 K/UL — HIGH (ref 150–400)
PLATELET # BLD AUTO: 448 K/UL — HIGH (ref 150–400)
PLATELET # BLD AUTO: 453 K/UL — HIGH (ref 150–400)
PLATELET # BLD AUTO: 454 K/UL — HIGH (ref 150–400)
PLATELET # BLD AUTO: 456 K/UL — HIGH (ref 150–400)
PLATELET # BLD AUTO: 465 K/UL — HIGH (ref 150–400)
PLATELET # BLD AUTO: 465 K/UL — HIGH (ref 150–400)
PLATELET # BLD AUTO: 467 K/UL
PLATELET # BLD AUTO: 468 K/UL — HIGH (ref 150–400)
PLATELET # BLD AUTO: 475 K/UL — HIGH (ref 150–400)
PLATELET # BLD AUTO: 479 K/UL — HIGH (ref 150–400)
PLATELET # BLD AUTO: 494 K/UL — HIGH (ref 150–400)
PLATELET # BLD AUTO: 494 K/UL — HIGH (ref 150–400)
PLATELET # BLD AUTO: 502 K/UL — HIGH (ref 150–400)
PLATELET # BLD AUTO: 504 K/UL — HIGH (ref 150–400)
PLATELET # BLD AUTO: 509 K/UL — HIGH (ref 150–400)
PLATELET # BLD AUTO: 514 K/UL — HIGH (ref 150–400)
PLATELET # BLD AUTO: 514 K/UL — HIGH (ref 150–400)
PLATELET # BLD AUTO: 518 K/UL — HIGH (ref 150–400)
PLATELET # BLD AUTO: 520 K/UL — HIGH (ref 150–400)
PLATELET # BLD AUTO: 524 K/UL — HIGH (ref 150–400)
PLATELET # BLD AUTO: 531 K/UL — HIGH (ref 150–400)
PLATELET # BLD AUTO: 532 K/UL — HIGH (ref 150–400)
PLATELET # BLD AUTO: 532 K/UL — HIGH (ref 150–400)
PLATELET # BLD AUTO: 535 K/UL — HIGH (ref 150–400)
PLATELET # BLD AUTO: 551 K/UL — HIGH (ref 150–400)
PLATELET # BLD AUTO: 563 K/UL — HIGH (ref 150–400)
PLATELET # BLD AUTO: 591 K/UL — HIGH (ref 150–400)
PLATELET # BLD AUTO: 595 K/UL
PLATELET # BLD AUTO: 610 K/UL — HIGH (ref 150–400)
PLATELET # BLD AUTO: 777 K/UL — HIGH (ref 150–400)
PO2 BLDV: 22 MMHG — SIGNIFICANT CHANGE UP
PO2 BLDV: 44 MMHG — SIGNIFICANT CHANGE UP
POTASSIUM BLDV-SCNC: 4.7 MMOL/L — SIGNIFICANT CHANGE UP (ref 3.5–5.1)
POTASSIUM BLDV-SCNC: 4.8 MMOL/L — SIGNIFICANT CHANGE UP (ref 3.5–5.1)
POTASSIUM SERPL-MCNC: 3.2 MMOL/L — LOW (ref 3.5–5.3)
POTASSIUM SERPL-MCNC: 3.4 MMOL/L — LOW (ref 3.5–5.3)
POTASSIUM SERPL-MCNC: 3.6 MMOL/L — SIGNIFICANT CHANGE UP (ref 3.5–5.3)
POTASSIUM SERPL-MCNC: 3.7 MMOL/L — SIGNIFICANT CHANGE UP (ref 3.5–5.3)
POTASSIUM SERPL-MCNC: 3.7 MMOL/L — SIGNIFICANT CHANGE UP (ref 3.5–5.3)
POTASSIUM SERPL-MCNC: 3.8 MMOL/L — SIGNIFICANT CHANGE UP (ref 3.5–5.3)
POTASSIUM SERPL-MCNC: 3.8 MMOL/L — SIGNIFICANT CHANGE UP (ref 3.5–5.3)
POTASSIUM SERPL-MCNC: 3.9 MMOL/L — SIGNIFICANT CHANGE UP (ref 3.5–5.3)
POTASSIUM SERPL-MCNC: 4 MMOL/L — SIGNIFICANT CHANGE UP (ref 3.5–5.3)
POTASSIUM SERPL-MCNC: 4.1 MMOL/L — SIGNIFICANT CHANGE UP (ref 3.5–5.3)
POTASSIUM SERPL-MCNC: 4.2 MMOL/L — SIGNIFICANT CHANGE UP (ref 3.5–5.3)
POTASSIUM SERPL-MCNC: 4.3 MMOL/L — SIGNIFICANT CHANGE UP (ref 3.5–5.3)
POTASSIUM SERPL-MCNC: 4.3 MMOL/L — SIGNIFICANT CHANGE UP (ref 3.5–5.3)
POTASSIUM SERPL-MCNC: 4.4 MMOL/L — SIGNIFICANT CHANGE UP (ref 3.5–5.3)
POTASSIUM SERPL-MCNC: 4.4 MMOL/L — SIGNIFICANT CHANGE UP (ref 3.5–5.3)
POTASSIUM SERPL-MCNC: 4.5 MMOL/L — SIGNIFICANT CHANGE UP (ref 3.5–5.3)
POTASSIUM SERPL-MCNC: 4.6 MMOL/L — SIGNIFICANT CHANGE UP (ref 3.5–5.3)
POTASSIUM SERPL-MCNC: 4.7 MMOL/L — SIGNIFICANT CHANGE UP (ref 3.5–5.3)
POTASSIUM SERPL-MCNC: 4.7 MMOL/L — SIGNIFICANT CHANGE UP (ref 3.5–5.3)
POTASSIUM SERPL-MCNC: 4.8 MMOL/L — SIGNIFICANT CHANGE UP (ref 3.5–5.3)
POTASSIUM SERPL-MCNC: 4.8 MMOL/L — SIGNIFICANT CHANGE UP (ref 3.5–5.3)
POTASSIUM SERPL-MCNC: 4.9 MMOL/L — SIGNIFICANT CHANGE UP (ref 3.5–5.3)
POTASSIUM SERPL-MCNC: 5 MMOL/L — SIGNIFICANT CHANGE UP (ref 3.5–5.3)
POTASSIUM SERPL-MCNC: 5 MMOL/L — SIGNIFICANT CHANGE UP (ref 3.5–5.3)
POTASSIUM SERPL-MCNC: 5.2 MMOL/L — SIGNIFICANT CHANGE UP (ref 3.5–5.3)
POTASSIUM SERPL-MCNC: 5.3 MMOL/L — SIGNIFICANT CHANGE UP (ref 3.5–5.3)
POTASSIUM SERPL-MCNC: 5.7 MMOL/L — HIGH (ref 3.5–5.3)
POTASSIUM SERPL-MCNC: 5.9 MMOL/L — HIGH (ref 3.5–5.3)
POTASSIUM SERPL-MCNC: SIGNIFICANT CHANGE UP MMOL/L (ref 3.5–5.3)
POTASSIUM SERPL-SCNC: 3.2 MMOL/L — LOW (ref 3.5–5.3)
POTASSIUM SERPL-SCNC: 3.4 MMOL/L — LOW (ref 3.5–5.3)
POTASSIUM SERPL-SCNC: 3.6 MMOL/L — SIGNIFICANT CHANGE UP (ref 3.5–5.3)
POTASSIUM SERPL-SCNC: 3.7 MMOL/L — SIGNIFICANT CHANGE UP (ref 3.5–5.3)
POTASSIUM SERPL-SCNC: 3.7 MMOL/L — SIGNIFICANT CHANGE UP (ref 3.5–5.3)
POTASSIUM SERPL-SCNC: 3.8 MMOL/L
POTASSIUM SERPL-SCNC: 3.8 MMOL/L
POTASSIUM SERPL-SCNC: 3.8 MMOL/L — SIGNIFICANT CHANGE UP (ref 3.5–5.3)
POTASSIUM SERPL-SCNC: 3.8 MMOL/L — SIGNIFICANT CHANGE UP (ref 3.5–5.3)
POTASSIUM SERPL-SCNC: 3.9 MMOL/L — SIGNIFICANT CHANGE UP (ref 3.5–5.3)
POTASSIUM SERPL-SCNC: 4 MMOL/L — SIGNIFICANT CHANGE UP (ref 3.5–5.3)
POTASSIUM SERPL-SCNC: 4.1 MMOL/L — SIGNIFICANT CHANGE UP (ref 3.5–5.3)
POTASSIUM SERPL-SCNC: 4.2 MMOL/L
POTASSIUM SERPL-SCNC: 4.2 MMOL/L — SIGNIFICANT CHANGE UP (ref 3.5–5.3)
POTASSIUM SERPL-SCNC: 4.3 MMOL/L — SIGNIFICANT CHANGE UP (ref 3.5–5.3)
POTASSIUM SERPL-SCNC: 4.3 MMOL/L — SIGNIFICANT CHANGE UP (ref 3.5–5.3)
POTASSIUM SERPL-SCNC: 4.4 MMOL/L
POTASSIUM SERPL-SCNC: 4.4 MMOL/L — SIGNIFICANT CHANGE UP (ref 3.5–5.3)
POTASSIUM SERPL-SCNC: 4.4 MMOL/L — SIGNIFICANT CHANGE UP (ref 3.5–5.3)
POTASSIUM SERPL-SCNC: 4.5 MMOL/L — SIGNIFICANT CHANGE UP (ref 3.5–5.3)
POTASSIUM SERPL-SCNC: 4.6 MMOL/L — SIGNIFICANT CHANGE UP (ref 3.5–5.3)
POTASSIUM SERPL-SCNC: 4.7 MMOL/L — SIGNIFICANT CHANGE UP (ref 3.5–5.3)
POTASSIUM SERPL-SCNC: 4.7 MMOL/L — SIGNIFICANT CHANGE UP (ref 3.5–5.3)
POTASSIUM SERPL-SCNC: 4.8 MMOL/L — SIGNIFICANT CHANGE UP (ref 3.5–5.3)
POTASSIUM SERPL-SCNC: 4.8 MMOL/L — SIGNIFICANT CHANGE UP (ref 3.5–5.3)
POTASSIUM SERPL-SCNC: 4.9 MMOL/L — SIGNIFICANT CHANGE UP (ref 3.5–5.3)
POTASSIUM SERPL-SCNC: 5 MMOL/L — SIGNIFICANT CHANGE UP (ref 3.5–5.3)
POTASSIUM SERPL-SCNC: 5 MMOL/L — SIGNIFICANT CHANGE UP (ref 3.5–5.3)
POTASSIUM SERPL-SCNC: 5.2 MMOL/L — SIGNIFICANT CHANGE UP (ref 3.5–5.3)
POTASSIUM SERPL-SCNC: 5.3 MMOL/L — SIGNIFICANT CHANGE UP (ref 3.5–5.3)
POTASSIUM SERPL-SCNC: 5.7 MMOL/L — HIGH (ref 3.5–5.3)
POTASSIUM SERPL-SCNC: 5.9 MMOL/L — HIGH (ref 3.5–5.3)
POTASSIUM SERPL-SCNC: SIGNIFICANT CHANGE UP MMOL/L (ref 3.5–5.3)
POTASSIUM UR-SCNC: 70.1 MMOL/L — SIGNIFICANT CHANGE UP
PROT FLD-MCNC: 3.7 G/DL — SIGNIFICANT CHANGE UP
PROT SERPL-MCNC: 4.3 G/DL — LOW (ref 6–8.3)
PROT SERPL-MCNC: 4.7 G/DL — LOW (ref 6–8.3)
PROT SERPL-MCNC: 5.3 G/DL — LOW (ref 6–8.3)
PROT SERPL-MCNC: 5.5 G/DL — LOW (ref 6–8.3)
PROT SERPL-MCNC: 5.6 G/DL — LOW (ref 6–8.3)
PROT SERPL-MCNC: 5.7 G/DL — LOW (ref 6–8.3)
PROT SERPL-MCNC: 5.8 G/DL — LOW (ref 6–8.3)
PROT SERPL-MCNC: 5.9 G/DL
PROT SERPL-MCNC: 6.1 G/DL — SIGNIFICANT CHANGE UP (ref 6–8.3)
PROT SERPL-MCNC: 6.1 G/DL — SIGNIFICANT CHANGE UP (ref 6–8.3)
PROT SERPL-MCNC: 6.2 G/DL — SIGNIFICANT CHANGE UP (ref 6–8.3)
PROT SERPL-MCNC: 6.3 G/DL
PROT SERPL-MCNC: 6.3 G/DL — SIGNIFICANT CHANGE UP (ref 6–8.3)
PROT SERPL-MCNC: 6.6 G/DL — SIGNIFICANT CHANGE UP (ref 6–8.3)
PROT SERPL-MCNC: 7 G/DL — SIGNIFICANT CHANGE UP (ref 6–8.3)
PROT SERPL-MCNC: 7.1 G/DL — SIGNIFICANT CHANGE UP (ref 6–8.3)
PROT SERPL-MCNC: 7.1 G/DL — SIGNIFICANT CHANGE UP (ref 6–8.3)
PROT SERPL-MCNC: 7.3 G/DL — SIGNIFICANT CHANGE UP (ref 6–8.3)
PROT UR-MCNC: ABNORMAL
PROTHROM AB SERPL-ACNC: 11.5 SEC — SIGNIFICANT CHANGE UP (ref 10.5–13.4)
PROTHROM AB SERPL-ACNC: 11.9 SEC — SIGNIFICANT CHANGE UP (ref 10.5–13.4)
PROTHROM AB SERPL-ACNC: 12.5 SEC — SIGNIFICANT CHANGE UP (ref 10.5–13.4)
PROTHROM AB SERPL-ACNC: 13 SEC — SIGNIFICANT CHANGE UP (ref 10.5–13.4)
PROTHROM AB SERPL-ACNC: 14.4 SEC — HIGH (ref 10.5–13.4)
PROTHROM AB SERPL-ACNC: 20.6 SEC — HIGH (ref 10.5–13.4)
PT BLD: 11 SEC
PT BLD: 11.3 SEC
RAPID RVP RESULT: SIGNIFICANT CHANGE UP
RAPID RVP RESULT: SIGNIFICANT CHANGE UP
RBC # BLD: 3.72 M/UL — LOW (ref 3.8–5.2)
RBC # BLD: 3.76 M/UL — LOW (ref 3.8–5.2)
RBC # BLD: 3.78 M/UL — LOW (ref 3.8–5.2)
RBC # BLD: 3.84 M/UL — SIGNIFICANT CHANGE UP (ref 3.8–5.2)
RBC # BLD: 3.84 M/UL — SIGNIFICANT CHANGE UP (ref 3.8–5.2)
RBC # BLD: 3.92 M/UL — SIGNIFICANT CHANGE UP (ref 3.8–5.2)
RBC # BLD: 4.09 M/UL — SIGNIFICANT CHANGE UP (ref 3.8–5.2)
RBC # BLD: 4.14 M/UL — SIGNIFICANT CHANGE UP (ref 3.8–5.2)
RBC # BLD: 4.19 M/UL — SIGNIFICANT CHANGE UP (ref 3.8–5.2)
RBC # BLD: 4.19 M/UL — SIGNIFICANT CHANGE UP (ref 3.8–5.2)
RBC # BLD: 4.2 M/UL — SIGNIFICANT CHANGE UP (ref 3.8–5.2)
RBC # BLD: 4.45 M/UL — SIGNIFICANT CHANGE UP (ref 3.8–5.2)
RBC # BLD: 4.81 M/UL — SIGNIFICANT CHANGE UP (ref 3.8–5.2)
RBC # BLD: 4.82 M/UL — SIGNIFICANT CHANGE UP (ref 3.8–5.2)
RBC # BLD: 4.92 M/UL — SIGNIFICANT CHANGE UP (ref 3.8–5.2)
RBC # BLD: 4.97 M/UL — SIGNIFICANT CHANGE UP (ref 3.8–5.2)
RBC # BLD: 5 M/UL — SIGNIFICANT CHANGE UP (ref 3.8–5.2)
RBC # BLD: 5.04 M/UL — SIGNIFICANT CHANGE UP (ref 3.8–5.2)
RBC # BLD: 5.09 M/UL — SIGNIFICANT CHANGE UP (ref 3.8–5.2)
RBC # BLD: 5.15 M/UL — SIGNIFICANT CHANGE UP (ref 3.8–5.2)
RBC # BLD: 5.17 M/UL — SIGNIFICANT CHANGE UP (ref 3.8–5.2)
RBC # BLD: 5.22 M/UL — HIGH (ref 3.8–5.2)
RBC # BLD: 5.28 M/UL — HIGH (ref 3.8–5.2)
RBC # BLD: 5.49 M/UL — HIGH (ref 3.8–5.2)
RBC # BLD: 5.51 M/UL — HIGH (ref 3.8–5.2)
RBC # BLD: 5.58 M/UL
RBC # BLD: 5.63 M/UL — HIGH (ref 3.8–5.2)
RBC # BLD: 5.68 M/UL — HIGH (ref 3.8–5.2)
RBC # BLD: 5.69 M/UL — HIGH (ref 3.8–5.2)
RBC # BLD: 6.06 M/UL — HIGH (ref 3.8–5.2)
RBC # BLD: 6.13 M/UL — HIGH (ref 3.8–5.2)
RBC # BLD: 6.19 M/UL
RBC # BLD: 6.56 M/UL — HIGH (ref 3.8–5.2)
RBC # FLD: 16.4 % — HIGH (ref 10.3–14.5)
RBC # FLD: 17 % — HIGH (ref 10.3–14.5)
RBC # FLD: 17.1 % — HIGH (ref 10.3–14.5)
RBC # FLD: 17.1 % — HIGH (ref 10.3–14.5)
RBC # FLD: 17.4 %
RBC # FLD: 17.4 % — HIGH (ref 10.3–14.5)
RBC # FLD: 17.6 % — HIGH (ref 10.3–14.5)
RBC # FLD: 18 % — HIGH (ref 10.3–14.5)
RBC # FLD: 18 % — HIGH (ref 10.3–14.5)
RBC # FLD: 18.2 % — HIGH (ref 10.3–14.5)
RBC # FLD: 18.2 % — HIGH (ref 10.3–14.5)
RBC # FLD: 18.3 % — HIGH (ref 10.3–14.5)
RBC # FLD: 18.4 % — HIGH (ref 10.3–14.5)
RBC # FLD: 18.6 %
RBC # FLD: 18.8 % — HIGH (ref 10.3–14.5)
RBC # FLD: 19.5 % — HIGH (ref 10.3–14.5)
RBC # FLD: 19.5 % — HIGH (ref 10.3–14.5)
RBC # FLD: 19.6 % — HIGH (ref 10.3–14.5)
RBC # FLD: 19.7 % — HIGH (ref 10.3–14.5)
RBC # FLD: 19.7 % — HIGH (ref 10.3–14.5)
RBC # FLD: 19.8 % — HIGH (ref 10.3–14.5)
RBC # FLD: 19.8 % — HIGH (ref 10.3–14.5)
RBC # FLD: 19.9 % — HIGH (ref 10.3–14.5)
RBC # FLD: 20 % — HIGH (ref 10.3–14.5)
RBC # FLD: 20 % — HIGH (ref 10.3–14.5)
RBC # FLD: 20.1 % — HIGH (ref 10.3–14.5)
RBC # FLD: 20.4 % — HIGH (ref 10.3–14.5)
RBC # FLD: 20.5 % — HIGH (ref 10.3–14.5)
RBC # FLD: 20.6 % — HIGH (ref 10.3–14.5)
RBC CASTS # UR COMP ASSIST: 2 /HPF — SIGNIFICANT CHANGE UP (ref 0–4)
RBC CASTS # UR COMP ASSIST: 3 /HPF — SIGNIFICANT CHANGE UP (ref 0–4)
RCV VOL RI: HIGH CELLS/UL (ref 0–5)
RH IG SCN BLD-IMP: POSITIVE — SIGNIFICANT CHANGE UP
RSV RNA NPH QL NAA+NON-PROBE: SIGNIFICANT CHANGE UP
RSV RNA NPH QL NAA+NON-PROBE: SIGNIFICANT CHANGE UP
RSV RNA SPEC QL NAA+PROBE: SIGNIFICANT CHANGE UP
RSV RNA SPEC QL NAA+PROBE: SIGNIFICANT CHANGE UP
RV+EV RNA SPEC QL NAA+PROBE: SIGNIFICANT CHANGE UP
RV+EV RNA SPEC QL NAA+PROBE: SIGNIFICANT CHANGE UP
SAO2 % BLDV: 30 % — SIGNIFICANT CHANGE UP
SAO2 % BLDV: 73.9 % — SIGNIFICANT CHANGE UP
SARS-COV-2 N GENE NPH QL NAA+PROBE: NOT DETECTED
SARS-COV-2 RNA SPEC QL NAA+PROBE: SIGNIFICANT CHANGE UP
SODIUM SERPL-SCNC: 116 MMOL/L — CRITICAL LOW (ref 135–145)
SODIUM SERPL-SCNC: 117 MMOL/L — CRITICAL LOW (ref 135–145)
SODIUM SERPL-SCNC: 117 MMOL/L — CRITICAL LOW (ref 135–145)
SODIUM SERPL-SCNC: 118 MMOL/L — CRITICAL LOW (ref 135–145)
SODIUM SERPL-SCNC: 120 MMOL/L — CRITICAL LOW (ref 135–145)
SODIUM SERPL-SCNC: 121 MMOL/L — LOW (ref 135–145)
SODIUM SERPL-SCNC: 122 MMOL/L — LOW (ref 135–145)
SODIUM SERPL-SCNC: 123 MMOL/L — LOW (ref 135–145)
SODIUM SERPL-SCNC: 124 MMOL/L — LOW (ref 135–145)
SODIUM SERPL-SCNC: 129 MMOL/L — LOW (ref 135–145)
SODIUM SERPL-SCNC: 131 MMOL/L — LOW (ref 135–145)
SODIUM SERPL-SCNC: 131 MMOL/L — LOW (ref 135–145)
SODIUM SERPL-SCNC: 132 MMOL/L — LOW (ref 135–145)
SODIUM SERPL-SCNC: 133 MMOL/L — LOW (ref 135–145)
SODIUM SERPL-SCNC: 133 MMOL/L — LOW (ref 135–145)
SODIUM SERPL-SCNC: 134 MMOL/L
SODIUM SERPL-SCNC: 134 MMOL/L — LOW (ref 135–145)
SODIUM SERPL-SCNC: 136 MMOL/L
SODIUM SERPL-SCNC: 136 MMOL/L — SIGNIFICANT CHANGE UP (ref 135–145)
SODIUM SERPL-SCNC: 137 MMOL/L
SODIUM SERPL-SCNC: 137 MMOL/L — SIGNIFICANT CHANGE UP (ref 135–145)
SODIUM SERPL-SCNC: 138 MMOL/L — SIGNIFICANT CHANGE UP (ref 135–145)
SODIUM SERPL-SCNC: 140 MMOL/L
SODIUM SERPL-SCNC: 141 MMOL/L — SIGNIFICANT CHANGE UP (ref 135–145)
SODIUM UR-SCNC: <20 MMOL/L — SIGNIFICANT CHANGE UP
SODIUM UR-SCNC: <20 MMOL/L — SIGNIFICANT CHANGE UP
SP GR SPEC: 1.03 — SIGNIFICANT CHANGE UP (ref 1–1.05)
SPECIMEN SOURCE: SIGNIFICANT CHANGE UP
SURGICAL PATHOLOGY STUDY: SIGNIFICANT CHANGE UP
SURGICAL PATHOLOGY STUDY: SIGNIFICANT CHANGE UP
T4 FREE SERPL-MCNC: 1.3 NG/DL — SIGNIFICANT CHANGE UP (ref 0.9–1.8)
TOTAL NUCLEATED CELL COUNT, BODY FLUID: 2648 CELLS/UL — HIGH (ref 0–5)
TRIGL SERPL-MCNC: 157 MG/DL — HIGH
TROPONIN T, HIGH SENSITIVITY RESULT: 24 NG/L — SIGNIFICANT CHANGE UP
TROPONIN T, HIGH SENSITIVITY RESULT: 27 NG/L — SIGNIFICANT CHANGE UP
TSH SERPL-MCNC: 20.96 UIU/ML — HIGH (ref 0.27–4.2)
TUBE TYPE: SIGNIFICANT CHANGE UP
URATE SERPL-MCNC: 6.8 MG/DL — SIGNIFICANT CHANGE UP (ref 2.5–7)
UROBILINOGEN FLD QL: ABNORMAL
UROBILINOGEN FLD QL: ABNORMAL
UROBILINOGEN FLD QL: SIGNIFICANT CHANGE UP
VANCOMYCIN TROUGH SERPL-MCNC: 20 UG/ML — SIGNIFICANT CHANGE UP (ref 10–20)
VANCOMYCIN TROUGH SERPL-MCNC: 9.5 UG/ML — LOW (ref 10–20)
WBC # BLD: 10.36 K/UL — SIGNIFICANT CHANGE UP (ref 3.8–10.5)
WBC # BLD: 10.37 K/UL — SIGNIFICANT CHANGE UP (ref 3.8–10.5)
WBC # BLD: 10.38 K/UL — SIGNIFICANT CHANGE UP (ref 3.8–10.5)
WBC # BLD: 10.47 K/UL — SIGNIFICANT CHANGE UP (ref 3.8–10.5)
WBC # BLD: 11.92 K/UL — HIGH (ref 3.8–10.5)
WBC # BLD: 13.48 K/UL — HIGH (ref 3.8–10.5)
WBC # BLD: 25.91 K/UL — HIGH (ref 3.8–10.5)
WBC # BLD: 26.2 K/UL — HIGH (ref 3.8–10.5)
WBC # BLD: 28.2 K/UL — HIGH (ref 3.8–10.5)
WBC # BLD: 29.71 K/UL — HIGH (ref 3.8–10.5)
WBC # BLD: 29.73 K/UL — HIGH (ref 3.8–10.5)
WBC # BLD: 30.04 K/UL — HIGH (ref 3.8–10.5)
WBC # BLD: 30.25 K/UL — HIGH (ref 3.8–10.5)
WBC # BLD: 33.84 K/UL — HIGH (ref 3.8–10.5)
WBC # BLD: 36.31 K/UL — HIGH (ref 3.8–10.5)
WBC # BLD: 38.75 K/UL — HIGH (ref 3.8–10.5)
WBC # BLD: 40.97 K/UL — CRITICAL HIGH (ref 3.8–10.5)
WBC # BLD: 43.95 K/UL — CRITICAL HIGH (ref 3.8–10.5)
WBC # BLD: 47.89 K/UL — CRITICAL HIGH (ref 3.8–10.5)
WBC # BLD: 48.43 K/UL — CRITICAL HIGH (ref 3.8–10.5)
WBC # BLD: 7.49 K/UL — SIGNIFICANT CHANGE UP (ref 3.8–10.5)
WBC # BLD: 7.9 K/UL — SIGNIFICANT CHANGE UP (ref 3.8–10.5)
WBC # BLD: 8.26 K/UL — SIGNIFICANT CHANGE UP (ref 3.8–10.5)
WBC # BLD: 8.43 K/UL — SIGNIFICANT CHANGE UP (ref 3.8–10.5)
WBC # BLD: 8.84 K/UL — SIGNIFICANT CHANGE UP (ref 3.8–10.5)
WBC # BLD: 9.24 K/UL — SIGNIFICANT CHANGE UP (ref 3.8–10.5)
WBC # BLD: 9.32 K/UL — SIGNIFICANT CHANGE UP (ref 3.8–10.5)
WBC # BLD: 9.46 K/UL — SIGNIFICANT CHANGE UP (ref 3.8–10.5)
WBC # BLD: 9.6 K/UL — SIGNIFICANT CHANGE UP (ref 3.8–10.5)
WBC # BLD: 9.68 K/UL — SIGNIFICANT CHANGE UP (ref 3.8–10.5)
WBC # BLD: 9.88 K/UL — SIGNIFICANT CHANGE UP (ref 3.8–10.5)
WBC # FLD AUTO: 10.36 K/UL — SIGNIFICANT CHANGE UP (ref 3.8–10.5)
WBC # FLD AUTO: 10.37 K/UL — SIGNIFICANT CHANGE UP (ref 3.8–10.5)
WBC # FLD AUTO: 10.38 K/UL — SIGNIFICANT CHANGE UP (ref 3.8–10.5)
WBC # FLD AUTO: 10.47 K/UL — SIGNIFICANT CHANGE UP (ref 3.8–10.5)
WBC # FLD AUTO: 11.92 K/UL — HIGH (ref 3.8–10.5)
WBC # FLD AUTO: 13.48 K/UL — HIGH (ref 3.8–10.5)
WBC # FLD AUTO: 25.91 K/UL — HIGH (ref 3.8–10.5)
WBC # FLD AUTO: 26.2 K/UL — HIGH (ref 3.8–10.5)
WBC # FLD AUTO: 28.2 K/UL — HIGH (ref 3.8–10.5)
WBC # FLD AUTO: 29.71 K/UL — HIGH (ref 3.8–10.5)
WBC # FLD AUTO: 29.73 K/UL — HIGH (ref 3.8–10.5)
WBC # FLD AUTO: 30.04 K/UL — HIGH (ref 3.8–10.5)
WBC # FLD AUTO: 30.25 K/UL — HIGH (ref 3.8–10.5)
WBC # FLD AUTO: 33.84 K/UL — HIGH (ref 3.8–10.5)
WBC # FLD AUTO: 36.31 K/UL — HIGH (ref 3.8–10.5)
WBC # FLD AUTO: 38.75 K/UL — HIGH (ref 3.8–10.5)
WBC # FLD AUTO: 40.97 K/UL — CRITICAL HIGH (ref 3.8–10.5)
WBC # FLD AUTO: 43.95 K/UL — CRITICAL HIGH (ref 3.8–10.5)
WBC # FLD AUTO: 47.89 K/UL — CRITICAL HIGH (ref 3.8–10.5)
WBC # FLD AUTO: 48.43 K/UL — CRITICAL HIGH (ref 3.8–10.5)
WBC # FLD AUTO: 7.49 K/UL — SIGNIFICANT CHANGE UP (ref 3.8–10.5)
WBC # FLD AUTO: 7.9 K/UL — SIGNIFICANT CHANGE UP (ref 3.8–10.5)
WBC # FLD AUTO: 8.26 K/UL — SIGNIFICANT CHANGE UP (ref 3.8–10.5)
WBC # FLD AUTO: 8.42 K/UL
WBC # FLD AUTO: 8.43 K/UL — SIGNIFICANT CHANGE UP (ref 3.8–10.5)
WBC # FLD AUTO: 8.84 K/UL — SIGNIFICANT CHANGE UP (ref 3.8–10.5)
WBC # FLD AUTO: 9.24 K/UL — SIGNIFICANT CHANGE UP (ref 3.8–10.5)
WBC # FLD AUTO: 9.32 K/UL — SIGNIFICANT CHANGE UP (ref 3.8–10.5)
WBC # FLD AUTO: 9.46 K/UL
WBC # FLD AUTO: 9.46 K/UL — SIGNIFICANT CHANGE UP (ref 3.8–10.5)
WBC # FLD AUTO: 9.6 K/UL — SIGNIFICANT CHANGE UP (ref 3.8–10.5)
WBC # FLD AUTO: 9.68 K/UL — SIGNIFICANT CHANGE UP (ref 3.8–10.5)
WBC # FLD AUTO: 9.88 K/UL — SIGNIFICANT CHANGE UP (ref 3.8–10.5)
WBC UR QL: 19 /HPF — HIGH (ref 0–5)
WBC UR QL: 2 /HPF — SIGNIFICANT CHANGE UP (ref 0–5)

## 2022-01-01 PROCEDURE — 88341 IMHCHEM/IMCYTCHM EA ADD ANTB: CPT | Mod: 26,59

## 2022-01-01 PROCEDURE — 99233 SBSQ HOSP IP/OBS HIGH 50: CPT

## 2022-01-01 PROCEDURE — 90791 PSYCH DIAGNOSTIC EVALUATION: CPT | Mod: 95

## 2022-01-01 PROCEDURE — 49083 ABD PARACENTESIS W/IMAGING: CPT

## 2022-01-01 PROCEDURE — 99233 SBSQ HOSP IP/OBS HIGH 50: CPT | Mod: GC

## 2022-01-01 PROCEDURE — 88360 TUMOR IMMUNOHISTOCHEM/MANUAL: CPT | Mod: 26

## 2022-01-01 PROCEDURE — 99205 OFFICE O/P NEW HI 60 MIN: CPT

## 2022-01-01 PROCEDURE — 88305 TISSUE EXAM BY PATHOLOGIST: CPT | Mod: 26

## 2022-01-01 PROCEDURE — 88172 CYTP DX EVAL FNA 1ST EA SITE: CPT

## 2022-01-01 PROCEDURE — 93970 EXTREMITY STUDY: CPT | Mod: 26

## 2022-01-01 PROCEDURE — 71250 CT THORAX DX C-: CPT | Mod: 26

## 2022-01-01 PROCEDURE — 76770 US EXAM ABDO BACK WALL COMP: CPT | Mod: 26

## 2022-01-01 PROCEDURE — 99232 SBSQ HOSP IP/OBS MODERATE 35: CPT

## 2022-01-01 PROCEDURE — 71045 X-RAY EXAM CHEST 1 VIEW: CPT | Mod: 26

## 2022-01-01 PROCEDURE — 71275 CT ANGIOGRAPHY CHEST: CPT | Mod: 26

## 2022-01-01 PROCEDURE — 94727 GAS DIL/WSHOT DETER LNG VOL: CPT

## 2022-01-01 PROCEDURE — 88112 CYTOPATH CELL ENHANCE TECH: CPT | Mod: 26

## 2022-01-01 PROCEDURE — 99285 EMERGENCY DEPT VISIT HI MDM: CPT

## 2022-01-01 PROCEDURE — 99215 OFFICE O/P EST HI 40 MIN: CPT

## 2022-01-01 PROCEDURE — 76942 ECHO GUIDE FOR BIOPSY: CPT | Mod: 26,59

## 2022-01-01 PROCEDURE — 99223 1ST HOSP IP/OBS HIGH 75: CPT | Mod: GC

## 2022-01-01 PROCEDURE — 93306 TTE W/DOPPLER COMPLETE: CPT | Mod: 26

## 2022-01-01 PROCEDURE — 99239 HOSP IP/OBS DSCHRG MGMT >30: CPT

## 2022-01-01 PROCEDURE — 49321 LAPAROSCOPY BIOPSY: CPT

## 2022-01-01 PROCEDURE — 88738 HGB QUANT TRANSCUTANEOUS: CPT

## 2022-01-01 PROCEDURE — 88331 PATH CONSLTJ SURG 1 BLK 1SPC: CPT | Mod: 26

## 2022-01-01 PROCEDURE — 99232 SBSQ HOSP IP/OBS MODERATE 35: CPT | Mod: GC

## 2022-01-01 PROCEDURE — 76942 ECHO GUIDE FOR BIOPSY: CPT

## 2022-01-01 PROCEDURE — 32550 INSERT PLEURAL CATH: CPT

## 2022-01-01 PROCEDURE — 99223 1ST HOSP IP/OBS HIGH 75: CPT

## 2022-01-01 PROCEDURE — 93010 ELECTROCARDIOGRAM REPORT: CPT

## 2022-01-01 PROCEDURE — 71046 X-RAY EXAM CHEST 2 VIEWS: CPT

## 2022-01-01 PROCEDURE — 74176 CT ABD & PELVIS W/O CONTRAST: CPT | Mod: 26

## 2022-01-01 PROCEDURE — 99497 ADVNCD CARE PLAN 30 MIN: CPT | Mod: 25

## 2022-01-01 PROCEDURE — 93975 VASCULAR STUDY: CPT | Mod: 26

## 2022-01-01 PROCEDURE — 99214 OFFICE O/P EST MOD 30 MIN: CPT | Mod: 25

## 2022-01-01 PROCEDURE — 99349 HOME/RES VST EST MOD MDM 40: CPT

## 2022-01-01 PROCEDURE — 99284 EMERGENCY DEPT VISIT MOD MDM: CPT | Mod: 25

## 2022-01-01 PROCEDURE — 88321 CONSLTJ&REPRT SLD PREP ELSWR: CPT

## 2022-01-01 PROCEDURE — 76705 ECHO EXAM OF ABDOMEN: CPT | Mod: 26

## 2022-01-01 PROCEDURE — 74177 CT ABD & PELVIS W/CONTRAST: CPT | Mod: 26

## 2022-01-01 PROCEDURE — 99291 CRITICAL CARE FIRST HOUR: CPT | Mod: GC

## 2022-01-01 PROCEDURE — 94729 DIFFUSING CAPACITY: CPT

## 2022-01-01 PROCEDURE — 20206 BIOPSY MUSCLE PERQ NEEDLE: CPT

## 2022-01-01 PROCEDURE — 99222 1ST HOSP IP/OBS MODERATE 55: CPT

## 2022-01-01 PROCEDURE — 32557 INSERT CATH PLEURA W/ IMAGE: CPT

## 2022-01-01 PROCEDURE — 99498 ADVNCD CARE PLAN ADDL 30 MIN: CPT | Mod: 25

## 2022-01-01 PROCEDURE — 71046 X-RAY EXAM CHEST 2 VIEWS: CPT | Mod: 26

## 2022-01-01 PROCEDURE — 12345: CPT | Mod: NC

## 2022-01-01 PROCEDURE — 70450 CT HEAD/BRAIN W/O DYE: CPT | Mod: 26

## 2022-01-01 PROCEDURE — 93970 EXTREMITY STUDY: CPT

## 2022-01-01 PROCEDURE — 88342 IMHCHEM/IMCYTCHM 1ST ANTB: CPT | Mod: 26,59

## 2022-01-01 PROCEDURE — 74177 CT ABD & PELVIS W/CONTRAST: CPT | Mod: 26,MA

## 2022-01-01 PROCEDURE — 88341 IMHCHEM/IMCYTCHM EA ADD ANTB: CPT | Mod: 26

## 2022-01-01 PROCEDURE — 99231 SBSQ HOSP IP/OBS SF/LOW 25: CPT | Mod: 25

## 2022-01-01 PROCEDURE — 90832 PSYTX W PT 30 MINUTES: CPT | Mod: 95

## 2022-01-01 PROCEDURE — 99349 HOME/RES VST EST MOD MDM 40: CPT | Mod: 25

## 2022-01-01 PROCEDURE — 94010 BREATHING CAPACITY TEST: CPT

## 2022-01-01 PROCEDURE — 99024 POSTOP FOLLOW-UP VISIT: CPT

## 2022-01-01 PROCEDURE — ZZZZZ: CPT

## 2022-01-01 PROCEDURE — 0004A: CPT

## 2022-01-01 PROCEDURE — 71045 X-RAY EXAM CHEST 1 VIEW: CPT | Mod: 26,77

## 2022-01-01 PROCEDURE — 99204 OFFICE O/P NEW MOD 45 MIN: CPT | Mod: 25

## 2022-01-01 PROCEDURE — 88108 CYTOPATH CONCENTRATE TECH: CPT | Mod: 26,59

## 2022-01-01 PROCEDURE — 88305 TISSUE EXAM BY PATHOLOGIST: CPT

## 2022-01-01 PROCEDURE — 88342 IMHCHEM/IMCYTCHM 1ST ANTB: CPT | Mod: 26

## 2022-01-01 PROCEDURE — 74018 RADEX ABDOMEN 1 VIEW: CPT | Mod: 26

## 2022-01-01 PROCEDURE — 32651 THORACOSCOPY REMOVE CORTEX: CPT

## 2022-01-01 PROCEDURE — 99214 OFFICE O/P EST MOD 30 MIN: CPT

## 2022-01-01 PROCEDURE — 44050 REDUCE BOWEL OBSTRUCTION: CPT | Mod: 78

## 2022-01-01 PROCEDURE — 88305 TISSUE EXAM BY PATHOLOGIST: CPT | Mod: 26,59

## 2022-01-01 DEVICE — PLEURX CATHETER KIT: Type: IMPLANTABLE DEVICE | Site: LEFT | Status: FUNCTIONAL

## 2022-01-01 DEVICE — TIT PORT W/ATTACH 8.0 CHRONOFLEX: Type: IMPLANTABLE DEVICE | Site: LEFT | Status: FUNCTIONAL

## 2022-01-01 RX ORDER — CEFEPIME 1 G/1
1000 INJECTION, POWDER, FOR SOLUTION INTRAMUSCULAR; INTRAVENOUS EVERY 12 HOURS
Refills: 0 | Status: DISCONTINUED | OUTPATIENT
Start: 2022-01-01 | End: 2022-01-01

## 2022-01-01 RX ORDER — HEPARIN SODIUM 5000 [USP'U]/ML
5000 INJECTION INTRAVENOUS; SUBCUTANEOUS EVERY 6 HOURS
Refills: 0 | Status: DISCONTINUED | OUTPATIENT
Start: 2022-01-01 | End: 2022-01-01

## 2022-01-01 RX ORDER — SODIUM CHLORIDE 9 MG/ML
500 INJECTION INTRAMUSCULAR; INTRAVENOUS; SUBCUTANEOUS ONCE
Refills: 0 | Status: COMPLETED | OUTPATIENT
Start: 2022-01-01 | End: 2022-01-01

## 2022-01-01 RX ORDER — APIXABAN 2.5 MG/1
10 TABLET, FILM COATED ORAL EVERY 12 HOURS
Refills: 0 | Status: DISCONTINUED | OUTPATIENT
Start: 2022-01-01 | End: 2022-01-01

## 2022-01-01 RX ORDER — HEPARIN SODIUM 5000 [USP'U]/ML
5000 INJECTION INTRAVENOUS; SUBCUTANEOUS EVERY 8 HOURS
Refills: 0 | Status: DISCONTINUED | OUTPATIENT
Start: 2022-01-01 | End: 2022-01-01

## 2022-01-01 RX ORDER — ACETAMINOPHEN 500 MG
2 TABLET ORAL
Qty: 0 | Refills: 0 | DISCHARGE
Start: 2022-01-01

## 2022-01-01 RX ORDER — POLYETHYLENE GLYCOL 3350 17 G/17G
17 POWDER, FOR SOLUTION ORAL
Qty: 85 | Refills: 0
Start: 2022-01-01 | End: 2022-01-01

## 2022-01-01 RX ORDER — SIMVASTATIN 20 MG/1
40 TABLET, FILM COATED ORAL AT BEDTIME
Refills: 0 | Status: DISCONTINUED | OUTPATIENT
Start: 2022-01-01 | End: 2022-01-01

## 2022-01-01 RX ORDER — LEVOTHYROXINE SODIUM 125 MCG
1 TABLET ORAL
Qty: 30 | Refills: 0
Start: 2022-01-01 | End: 2022-08-27

## 2022-01-01 RX ORDER — ALBUMIN HUMAN 25 %
50 VIAL (ML) INTRAVENOUS ONCE
Refills: 0 | Status: COMPLETED | OUTPATIENT
Start: 2022-01-01 | End: 2022-01-01

## 2022-01-01 RX ORDER — SENNA PLUS 8.6 MG/1
2 TABLET ORAL AT BEDTIME
Refills: 0 | Status: DISCONTINUED | OUTPATIENT
Start: 2022-01-01 | End: 2022-01-01

## 2022-01-01 RX ORDER — HYDROMORPHONE HYDROCHLORIDE 2 MG/ML
0.2 INJECTION INTRAMUSCULAR; INTRAVENOUS; SUBCUTANEOUS EVERY 6 HOURS
Refills: 0 | Status: DISCONTINUED | OUTPATIENT
Start: 2022-01-01 | End: 2022-01-01

## 2022-01-01 RX ORDER — ONDANSETRON 8 MG/1
4 TABLET, FILM COATED ORAL EVERY 8 HOURS
Refills: 0 | Status: DISCONTINUED | OUTPATIENT
Start: 2022-01-01 | End: 2022-01-01

## 2022-01-01 RX ORDER — OXYCODONE HYDROCHLORIDE 5 MG/1
1 TABLET ORAL
Qty: 20 | Refills: 0
Start: 2022-01-01 | End: 2022-01-01

## 2022-01-01 RX ORDER — HEPARIN SODIUM 5000 [USP'U]/ML
INJECTION INTRAVENOUS; SUBCUTANEOUS
Qty: 25000 | Refills: 0 | Status: DISCONTINUED | OUTPATIENT
Start: 2022-01-01 | End: 2022-01-01

## 2022-01-01 RX ORDER — POLYETHYLENE GLYCOL 3350 17 G/17G
17 POWDER, FOR SOLUTION ORAL
Qty: 510 | Refills: 0
Start: 2022-01-01 | End: 2022-08-27

## 2022-01-01 RX ORDER — LANOLIN ALCOHOL/MO/W.PET/CERES
3 CREAM (GRAM) TOPICAL AT BEDTIME
Refills: 0 | Status: DISCONTINUED | OUTPATIENT
Start: 2022-01-01 | End: 2022-01-01

## 2022-01-01 RX ORDER — SODIUM CHLORIDE 9 MG/ML
1 INJECTION INTRAMUSCULAR; INTRAVENOUS; SUBCUTANEOUS THREE TIMES A DAY
Refills: 0 | Status: DISCONTINUED | OUTPATIENT
Start: 2022-01-01 | End: 2022-01-01

## 2022-01-01 RX ORDER — ROBINUL 0.2 MG/ML
0.4 INJECTION INTRAMUSCULAR; INTRAVENOUS
Refills: 0 | Status: DISCONTINUED | OUTPATIENT
Start: 2022-01-01 | End: 2022-01-01

## 2022-01-01 RX ORDER — SODIUM CHLORIDE 9 MG/ML
1000 INJECTION INTRAMUSCULAR; INTRAVENOUS; SUBCUTANEOUS ONCE
Refills: 0 | Status: COMPLETED | OUTPATIENT
Start: 2022-01-01 | End: 2022-01-01

## 2022-01-01 RX ORDER — ONDANSETRON 8 MG/1
4 TABLET, FILM COATED ORAL ONCE
Refills: 0 | Status: DISCONTINUED | OUTPATIENT
Start: 2022-01-01 | End: 2022-01-01

## 2022-01-01 RX ORDER — CHOLECALCIFEROL (VITAMIN D3) 125 MCG
1000 CAPSULE ORAL DAILY
Refills: 0 | Status: DISCONTINUED | OUTPATIENT
Start: 2022-01-01 | End: 2022-01-01

## 2022-01-01 RX ORDER — ACETAMINOPHEN 500 MG
750 TABLET ORAL ONCE
Refills: 0 | Status: COMPLETED | OUTPATIENT
Start: 2022-01-01 | End: 2022-01-01

## 2022-01-01 RX ORDER — FAMOTIDINE 10 MG/ML
20 INJECTION INTRAVENOUS ONCE
Refills: 0 | Status: COMPLETED | OUTPATIENT
Start: 2022-01-01 | End: 2022-01-01

## 2022-01-01 RX ORDER — HYDROMORPHONE HYDROCHLORIDE 2 MG/ML
0.5 INJECTION INTRAMUSCULAR; INTRAVENOUS; SUBCUTANEOUS
Refills: 0 | Status: DISCONTINUED | OUTPATIENT
Start: 2022-01-01 | End: 2022-01-01

## 2022-01-01 RX ORDER — PANTOPRAZOLE SODIUM 20 MG/1
1 TABLET, DELAYED RELEASE ORAL
Qty: 30 | Refills: 0
Start: 2022-01-01 | End: 2022-08-27

## 2022-01-01 RX ORDER — ACETAMINOPHEN 500 MG
650 TABLET ORAL EVERY 6 HOURS
Refills: 0 | Status: DISCONTINUED | OUTPATIENT
Start: 2022-01-01 | End: 2022-01-01

## 2022-01-01 RX ORDER — SODIUM CHLORIDE 9 MG/ML
250 INJECTION INTRAMUSCULAR; INTRAVENOUS; SUBCUTANEOUS
Refills: 0 | Status: COMPLETED | OUTPATIENT
Start: 2022-01-01 | End: 2022-01-01

## 2022-01-01 RX ORDER — SIMVASTATIN 40 MG/1
40 TABLET, FILM COATED ORAL
Refills: 0 | Status: ACTIVE | COMMUNITY
Start: 2022-01-01

## 2022-01-01 RX ORDER — KETOROLAC TROMETHAMINE 30 MG/ML
15 SYRINGE (ML) INJECTION ONCE
Refills: 0 | Status: DISCONTINUED | OUTPATIENT
Start: 2022-01-01 | End: 2022-01-01

## 2022-01-01 RX ORDER — MEROPENEM 1 G/30ML
INJECTION INTRAVENOUS
Refills: 0 | Status: DISCONTINUED | OUTPATIENT
Start: 2022-01-01 | End: 2022-01-01

## 2022-01-01 RX ORDER — SODIUM CHLORIDE 9 MG/ML
500 INJECTION, SOLUTION INTRAVENOUS ONCE
Refills: 0 | Status: COMPLETED | OUTPATIENT
Start: 2022-01-01 | End: 2022-01-01

## 2022-01-01 RX ORDER — HEPARIN SODIUM 5000 [USP'U]/ML
5000 INJECTION INTRAVENOUS; SUBCUTANEOUS ONCE
Refills: 0 | Status: COMPLETED | OUTPATIENT
Start: 2022-01-01 | End: 2022-01-01

## 2022-01-01 RX ORDER — SIMVASTATIN 20 MG/1
1 TABLET, FILM COATED ORAL
Qty: 0 | Refills: 0 | DISCHARGE

## 2022-01-01 RX ORDER — ONDANSETRON 8 MG/1
4 TABLET, FILM COATED ORAL EVERY 6 HOURS
Refills: 0 | Status: DISCONTINUED | OUTPATIENT
Start: 2022-01-01 | End: 2022-01-01

## 2022-01-01 RX ORDER — VANCOMYCIN HCL 1 G
1000 VIAL (EA) INTRAVENOUS ONCE
Refills: 0 | Status: COMPLETED | OUTPATIENT
Start: 2022-01-01 | End: 2022-01-01

## 2022-01-01 RX ORDER — OLANZAPINE 2.5 MG/1
2.5 TABLET, FILM COATED ORAL
Qty: 30 | Refills: 1 | Status: ACTIVE | COMMUNITY
Start: 2022-01-01 | End: 1900-01-01

## 2022-01-01 RX ORDER — ASCORBIC ACID 60 MG
500 TABLET,CHEWABLE ORAL DAILY
Refills: 0 | Status: DISCONTINUED | OUTPATIENT
Start: 2022-01-01 | End: 2022-01-01

## 2022-01-01 RX ORDER — BUMETANIDE 0.25 MG/ML
1 INJECTION INTRAMUSCULAR; INTRAVENOUS ONCE
Refills: 0 | Status: COMPLETED | OUTPATIENT
Start: 2022-01-01 | End: 2022-01-01

## 2022-01-01 RX ORDER — LEVOTHYROXINE SODIUM 125 MCG
1 TABLET ORAL
Qty: 0 | Refills: 0 | DISCHARGE

## 2022-01-01 RX ORDER — MIDODRINE HYDROCHLORIDE 2.5 MG/1
20 TABLET ORAL THREE TIMES A DAY
Refills: 0 | Status: DISCONTINUED | OUTPATIENT
Start: 2022-01-01 | End: 2022-01-01

## 2022-01-01 RX ORDER — HEPARIN SODIUM 5000 [USP'U]/ML
2500 INJECTION INTRAVENOUS; SUBCUTANEOUS EVERY 6 HOURS
Refills: 0 | Status: DISCONTINUED | OUTPATIENT
Start: 2022-01-01 | End: 2022-01-01

## 2022-01-01 RX ORDER — LEVOTHYROXINE SODIUM 125 MCG
125 TABLET ORAL DAILY
Refills: 0 | Status: DISCONTINUED | OUTPATIENT
Start: 2022-01-01 | End: 2022-01-01

## 2022-01-01 RX ORDER — LEVOTHYROXINE SODIUM 125 UG/1
125 TABLET ORAL DAILY
Refills: 0 | Status: ACTIVE | COMMUNITY
Start: 2022-01-01

## 2022-01-01 RX ORDER — CEFTRIAXONE 500 MG/1
2000 INJECTION, POWDER, FOR SOLUTION INTRAMUSCULAR; INTRAVENOUS ONCE
Refills: 0 | Status: COMPLETED | OUTPATIENT
Start: 2022-01-01 | End: 2022-01-01

## 2022-01-01 RX ORDER — POTASSIUM CHLORIDE 20 MEQ
20 PACKET (EA) ORAL ONCE
Refills: 0 | Status: COMPLETED | OUTPATIENT
Start: 2022-01-01 | End: 2022-01-01

## 2022-01-01 RX ORDER — SODIUM CHLORIDE 9 MG/ML
1000 INJECTION, SOLUTION INTRAVENOUS
Refills: 0 | Status: DISCONTINUED | OUTPATIENT
Start: 2022-01-01 | End: 2022-01-01

## 2022-01-01 RX ORDER — ONDANSETRON 8 MG/1
8 TABLET, FILM COATED ORAL EVERY 8 HOURS
Refills: 0 | Status: DISCONTINUED | OUTPATIENT
Start: 2022-01-01 | End: 2022-01-01

## 2022-01-01 RX ORDER — MEROPENEM 1 G/30ML
1000 INJECTION INTRAVENOUS EVERY 12 HOURS
Refills: 0 | Status: DISCONTINUED | OUTPATIENT
Start: 2022-01-01 | End: 2022-01-01

## 2022-01-01 RX ORDER — CEFTRIAXONE 500 MG/1
1000 INJECTION, POWDER, FOR SOLUTION INTRAMUSCULAR; INTRAVENOUS EVERY 24 HOURS
Refills: 0 | Status: DISCONTINUED | OUTPATIENT
Start: 2022-01-01 | End: 2022-01-01

## 2022-01-01 RX ORDER — ONDANSETRON 8 MG/1
4 TABLET, FILM COATED ORAL ONCE
Refills: 0 | Status: COMPLETED | OUTPATIENT
Start: 2022-01-01 | End: 2022-01-01

## 2022-01-01 RX ORDER — ACETAMINOPHEN 500 MG
650 TABLET ORAL EVERY 4 HOURS
Refills: 0 | Status: DISCONTINUED | OUTPATIENT
Start: 2022-01-01 | End: 2022-01-01

## 2022-01-01 RX ORDER — ASCORBIC ACID 60 MG
1 TABLET,CHEWABLE ORAL
Qty: 0 | Refills: 0 | DISCHARGE

## 2022-01-01 RX ORDER — OXYCODONE HYDROCHLORIDE 5 MG/1
5 TABLET ORAL EVERY 4 HOURS
Refills: 0 | Status: DISCONTINUED | OUTPATIENT
Start: 2022-01-01 | End: 2022-01-01

## 2022-01-01 RX ORDER — ONDANSETRON 8 MG/1
1 TABLET, FILM COATED ORAL
Qty: 42 | Refills: 0
Start: 2022-01-01 | End: 2022-08-11

## 2022-01-01 RX ORDER — ONDANSETRON 8 MG/1
1 TABLET, FILM COATED ORAL
Qty: 30 | Refills: 0
Start: 2022-01-01 | End: 2022-08-07

## 2022-01-01 RX ORDER — MULTIVIT-MIN/FOLIC/VIT K/LYCOP 400-300MCG
25 MCG TABLET ORAL
Refills: 0 | Status: ACTIVE | COMMUNITY
Start: 2022-01-01

## 2022-01-01 RX ORDER — ACETAMINOPHEN 500 MG
1000 TABLET ORAL ONCE
Refills: 0 | Status: DISCONTINUED | OUTPATIENT
Start: 2022-01-01 | End: 2022-01-01

## 2022-01-01 RX ORDER — ACETAMINOPHEN 500 MG
975 TABLET ORAL EVERY 6 HOURS
Refills: 0 | Status: DISCONTINUED | OUTPATIENT
Start: 2022-01-01 | End: 2022-01-01

## 2022-01-01 RX ORDER — MEROPENEM 1 G/30ML
1000 INJECTION INTRAVENOUS ONCE
Refills: 0 | Status: COMPLETED | OUTPATIENT
Start: 2022-01-01 | End: 2022-01-01

## 2022-01-01 RX ORDER — OXYCODONE HYDROCHLORIDE 5 MG/1
1 TABLET ORAL
Qty: 5 | Refills: 0
Start: 2022-01-01

## 2022-01-01 RX ORDER — OXYCODONE HYDROCHLORIDE 5 MG/1
5 TABLET ORAL ONCE
Refills: 0 | Status: DISCONTINUED | OUTPATIENT
Start: 2022-01-01 | End: 2022-01-01

## 2022-01-01 RX ORDER — HYDROMORPHONE HYDROCHLORIDE 2 MG/ML
1 INJECTION INTRAMUSCULAR; INTRAVENOUS; SUBCUTANEOUS
Qty: 0 | Refills: 0 | DISCHARGE
Start: 2022-01-01

## 2022-01-01 RX ORDER — HALOPERIDOL DECANOATE 100 MG/ML
0.25 INJECTION INTRAMUSCULAR EVERY 6 HOURS
Refills: 0 | Status: DISCONTINUED | OUTPATIENT
Start: 2022-01-01 | End: 2022-01-01

## 2022-01-01 RX ORDER — SODIUM BICARBONATE 1 MEQ/ML
0.12 SYRINGE (ML) INTRAVENOUS
Qty: 75 | Refills: 0 | Status: DISCONTINUED | OUTPATIENT
Start: 2022-01-01 | End: 2022-01-01

## 2022-01-01 RX ORDER — LIDOCAINE AND PRILOCAINE 25; 25 MG/G; MG/G
2.5-2.5 CREAM TOPICAL
Qty: 1 | Refills: 2 | Status: ACTIVE | COMMUNITY
Start: 2022-01-01 | End: 1900-01-01

## 2022-01-01 RX ORDER — HYDROMORPHONE HYDROCHLORIDE 2 MG/ML
2 INJECTION INTRAMUSCULAR; INTRAVENOUS; SUBCUTANEOUS EVERY 4 HOURS
Refills: 0 | Status: DISCONTINUED | OUTPATIENT
Start: 2022-01-01 | End: 2022-01-01

## 2022-01-01 RX ORDER — CHLORHEXIDINE GLUCONATE 4 %
1000 LIQUID (ML) TOPICAL
Refills: 0 | Status: ACTIVE | COMMUNITY
Start: 2022-01-01

## 2022-01-01 RX ORDER — POLYETHYLENE GLYCOL 3350 17 G/17G
17 POWDER, FOR SOLUTION ORAL DAILY
Refills: 0 | Status: DISCONTINUED | OUTPATIENT
Start: 2022-01-01 | End: 2022-01-01

## 2022-01-01 RX ORDER — MIDODRINE HYDROCHLORIDE 2.5 MG/1
2 TABLET ORAL
Qty: 0 | Refills: 0 | DISCHARGE
Start: 2022-01-01

## 2022-01-01 RX ORDER — DEXTROSE 50 % IN WATER 50 %
50 SYRINGE (ML) INTRAVENOUS ONCE
Refills: 0 | Status: COMPLETED | OUTPATIENT
Start: 2022-01-01 | End: 2022-01-01

## 2022-01-01 RX ORDER — SODIUM CHLORIDE 9 MG/ML
3 INJECTION INTRAMUSCULAR; INTRAVENOUS; SUBCUTANEOUS EVERY 8 HOURS
Refills: 0 | Status: DISCONTINUED | OUTPATIENT
Start: 2022-01-01 | End: 2022-01-01

## 2022-01-01 RX ORDER — LEVOTHYROXINE SODIUM 125 MCG
100 TABLET ORAL AT BEDTIME
Refills: 0 | Status: DISCONTINUED | OUTPATIENT
Start: 2022-01-01 | End: 2022-01-01

## 2022-01-01 RX ORDER — OXYCODONE 10 MG/1
10 TABLET ORAL EVERY 6 HOURS
Qty: 60 | Refills: 0 | Status: ACTIVE | COMMUNITY
Start: 2022-01-01 | End: 1900-01-01

## 2022-01-01 RX ORDER — APIXABAN 5 MG/1
5 TABLET, FILM COATED ORAL
Refills: 0 | Status: ACTIVE | COMMUNITY
Start: 2022-01-01

## 2022-01-01 RX ORDER — MIDODRINE HYDROCHLORIDE 2.5 MG/1
2 TABLET ORAL
Qty: 180 | Refills: 0
Start: 2022-01-01 | End: 2022-08-27

## 2022-01-01 RX ORDER — SENNA PLUS 8.6 MG/1
2 TABLET ORAL
Qty: 10 | Refills: 0
Start: 2022-01-01 | End: 2022-01-01

## 2022-01-01 RX ORDER — HYDROMORPHONE HYDROCHLORIDE 2 MG/ML
0.2 INJECTION INTRAMUSCULAR; INTRAVENOUS; SUBCUTANEOUS
Refills: 0 | Status: DISCONTINUED | OUTPATIENT
Start: 2022-01-01 | End: 2022-01-01

## 2022-01-01 RX ORDER — ACETAMINOPHEN 500 MG
1000 TABLET ORAL ONCE
Refills: 0 | Status: COMPLETED | OUTPATIENT
Start: 2022-01-01 | End: 2022-01-01

## 2022-01-01 RX ORDER — ONDANSETRON 4 MG/1
4 TABLET ORAL
Qty: 45 | Refills: 1 | Status: COMPLETED | COMMUNITY
Start: 2022-01-01 | End: 2022-01-01

## 2022-01-01 RX ORDER — HYDROMORPHONE HYDROCHLORIDE 2 MG/ML
30 INJECTION INTRAMUSCULAR; INTRAVENOUS; SUBCUTANEOUS
Refills: 0 | Status: DISCONTINUED | OUTPATIENT
Start: 2022-01-01 | End: 2022-01-01

## 2022-01-01 RX ORDER — BUMETANIDE 0.25 MG/ML
1 INJECTION INTRAMUSCULAR; INTRAVENOUS ONCE
Refills: 0 | Status: DISCONTINUED | OUTPATIENT
Start: 2022-01-01 | End: 2022-01-01

## 2022-01-01 RX ORDER — VANCOMYCIN HCL 1 G
1000 VIAL (EA) INTRAVENOUS EVERY 24 HOURS
Refills: 0 | Status: DISCONTINUED | OUTPATIENT
Start: 2022-01-01 | End: 2022-01-01

## 2022-01-01 RX ORDER — PANTOPRAZOLE SODIUM 20 MG/1
1 TABLET, DELAYED RELEASE ORAL
Qty: 0 | Refills: 0 | DISCHARGE
Start: 2022-01-01

## 2022-01-01 RX ORDER — DEXTROSE 50 % IN WATER 50 %
25 SYRINGE (ML) INTRAVENOUS ONCE
Refills: 0 | Status: COMPLETED | OUTPATIENT
Start: 2022-01-01 | End: 2022-01-01

## 2022-01-01 RX ORDER — ONDANSETRON 8 MG/1
2 TABLET, FILM COATED ORAL ONCE
Refills: 0 | Status: COMPLETED | OUTPATIENT
Start: 2022-01-01 | End: 2022-01-01

## 2022-01-01 RX ORDER — PANTOPRAZOLE SODIUM 20 MG/1
40 TABLET, DELAYED RELEASE ORAL
Refills: 0 | Status: DISCONTINUED | OUTPATIENT
Start: 2022-01-01 | End: 2022-01-01

## 2022-01-01 RX ORDER — MIDODRINE HYDROCHLORIDE 2.5 MG/1
10 TABLET ORAL ONCE
Refills: 0 | Status: COMPLETED | OUTPATIENT
Start: 2022-01-01 | End: 2022-01-01

## 2022-01-01 RX ORDER — LIDOCAINE 4 G/100G
10 CREAM TOPICAL ONCE
Refills: 0 | Status: COMPLETED | OUTPATIENT
Start: 2022-01-01 | End: 2022-01-01

## 2022-01-01 RX ORDER — PANTOPRAZOLE 40 MG/1
40 TABLET, DELAYED RELEASE ORAL DAILY
Qty: 30 | Refills: 5 | Status: ACTIVE | COMMUNITY
Start: 2022-01-01

## 2022-01-01 RX ORDER — CEFEPIME 1 G/1
1000 INJECTION, POWDER, FOR SOLUTION INTRAMUSCULAR; INTRAVENOUS ONCE
Refills: 0 | Status: COMPLETED | OUTPATIENT
Start: 2022-01-01 | End: 2022-01-01

## 2022-01-01 RX ORDER — LIDOCAINE 4 G/100G
1 CREAM TOPICAL EVERY 24 HOURS
Refills: 0 | Status: DISCONTINUED | OUTPATIENT
Start: 2022-01-01 | End: 2022-01-01

## 2022-01-01 RX ORDER — LEVOTHYROXINE SODIUM 125 MCG
125 TABLET ORAL AT BEDTIME
Refills: 0 | Status: DISCONTINUED | OUTPATIENT
Start: 2022-01-01 | End: 2022-01-01

## 2022-01-01 RX ORDER — HYDROMORPHONE HYDROCHLORIDE 2 MG/ML
1 INJECTION INTRAMUSCULAR; INTRAVENOUS; SUBCUTANEOUS
Qty: 42 | Refills: 0
Start: 2022-01-01 | End: 2022-08-04

## 2022-01-01 RX ORDER — HYDROMORPHONE HYDROCHLORIDE 2 MG/ML
1 INJECTION INTRAMUSCULAR; INTRAVENOUS; SUBCUTANEOUS
Refills: 0 | Status: DISCONTINUED | OUTPATIENT
Start: 2022-01-01 | End: 2022-01-01

## 2022-01-01 RX ORDER — SENNA PLUS 8.6 MG/1
2 TABLET ORAL
Qty: 60 | Refills: 0
Start: 2022-01-01 | End: 2022-08-27

## 2022-01-01 RX ORDER — PROCHLORPERAZINE MALEATE 10 MG/1
10 TABLET ORAL EVERY 6 HOURS
Qty: 40 | Refills: 2 | Status: ACTIVE | COMMUNITY
Start: 2022-01-01 | End: 1900-01-01

## 2022-01-01 RX ORDER — CEFTRIAXONE 500 MG/1
1000 INJECTION, POWDER, FOR SOLUTION INTRAMUSCULAR; INTRAVENOUS ONCE
Refills: 0 | Status: DISCONTINUED | OUTPATIENT
Start: 2022-01-01 | End: 2022-01-01

## 2022-01-01 RX ORDER — CHOLECALCIFEROL (VITAMIN D3) 125 MCG
1 CAPSULE ORAL
Qty: 0 | Refills: 0 | DISCHARGE

## 2022-01-01 RX ORDER — LEVOTHYROXINE SODIUM 125 MCG
112 TABLET ORAL DAILY
Refills: 0 | Status: DISCONTINUED | OUTPATIENT
Start: 2022-01-01 | End: 2022-01-01

## 2022-01-01 RX ORDER — ACETAMINOPHEN 500 MG
100 TABLET ORAL
Qty: 0 | Refills: 0 | DISCHARGE
Start: 2022-01-01

## 2022-01-01 RX ORDER — ONDANSETRON 8 MG/1
1 TABLET, FILM COATED ORAL
Qty: 21 | Refills: 0
Start: 2022-01-01 | End: 2022-08-04

## 2022-01-01 RX ORDER — CALCIUM GLUCONATE 100 MG/ML
2 VIAL (ML) INTRAVENOUS ONCE
Refills: 0 | Status: COMPLETED | OUTPATIENT
Start: 2022-01-01 | End: 2022-01-01

## 2022-01-01 RX ORDER — ONDANSETRON 8 MG/1
1 TABLET, FILM COATED ORAL
Qty: 0 | Refills: 0 | DISCHARGE
Start: 2022-01-01

## 2022-01-01 RX ORDER — APIXABAN 2.5 MG/1
1 TABLET, FILM COATED ORAL
Qty: 1 | Refills: 0
Start: 2022-01-01

## 2022-01-01 RX ORDER — LANOLIN ALCOHOL/MO/W.PET/CERES
1 CREAM (GRAM) TOPICAL
Qty: 0 | Refills: 0 | DISCHARGE
Start: 2022-01-01

## 2022-01-01 RX ORDER — PANTOPRAZOLE SODIUM 20 MG/1
1 TABLET, DELAYED RELEASE ORAL
Qty: 30 | Refills: 0
Start: 2022-01-01 | End: 2022-01-01

## 2022-01-01 RX ORDER — ACETAMINOPHEN 500 MG
1000 TABLET ORAL EVERY 6 HOURS
Refills: 0 | Status: DISCONTINUED | OUTPATIENT
Start: 2022-01-01 | End: 2022-01-01

## 2022-01-01 RX ORDER — SENNA PLUS 8.6 MG/1
2 TABLET ORAL
Qty: 0 | Refills: 0 | DISCHARGE
Start: 2022-01-01

## 2022-01-01 RX ORDER — CEFEPIME 1 G/1
1000 INJECTION, POWDER, FOR SOLUTION INTRAMUSCULAR; INTRAVENOUS EVERY 24 HOURS
Refills: 0 | Status: DISCONTINUED | OUTPATIENT
Start: 2022-01-01 | End: 2022-01-01

## 2022-01-01 RX ORDER — POLYETHYLENE GLYCOL 3350 17 G/17G
17 POWDER, FOR SOLUTION ORAL DAILY
Qty: 1 | Refills: 0 | Status: ACTIVE | COMMUNITY
Start: 2022-01-01 | End: 1900-01-01

## 2022-01-01 RX ORDER — PANTOPRAZOLE SODIUM 20 MG/1
1 TABLET, DELAYED RELEASE ORAL
Qty: 0 | Refills: 0 | DISCHARGE

## 2022-01-01 RX ORDER — KETOROLAC TROMETHAMINE 30 MG/ML
30 SYRINGE (ML) INJECTION EVERY 6 HOURS
Refills: 0 | Status: DISCONTINUED | OUTPATIENT
Start: 2022-01-01 | End: 2022-01-01

## 2022-01-01 RX ORDER — NALOXONE HYDROCHLORIDE 4 MG/.1ML
0.1 SPRAY NASAL
Refills: 0 | Status: DISCONTINUED | OUTPATIENT
Start: 2022-01-01 | End: 2022-01-01

## 2022-01-01 RX ORDER — ELECTROLYTES/DEXTROSE
SOLUTION, ORAL ORAL
Refills: 0 | Status: ACTIVE | COMMUNITY
Start: 2022-01-01

## 2022-01-01 RX ORDER — SODIUM CHLORIDE 9 MG/ML
1000 INJECTION, SOLUTION INTRAVENOUS ONCE
Refills: 0 | Status: COMPLETED | OUTPATIENT
Start: 2022-01-01 | End: 2022-01-01

## 2022-01-01 RX ORDER — SODIUM POLYSTYRENE SULFONATE 4.1 MEQ/G
15 POWDER, FOR SUSPENSION ORAL ONCE
Refills: 0 | Status: DISCONTINUED | OUTPATIENT
Start: 2022-01-01 | End: 2022-01-01

## 2022-01-01 RX ORDER — CEFEPIME 1 G/1
INJECTION, POWDER, FOR SOLUTION INTRAMUSCULAR; INTRAVENOUS
Refills: 0 | Status: DISCONTINUED | OUTPATIENT
Start: 2022-01-01 | End: 2022-01-01

## 2022-01-01 RX ORDER — ALPRAZOLAM 0.5 MG/1
0.5 TABLET ORAL
Qty: 1 | Refills: 0 | Status: ACTIVE | COMMUNITY
Start: 2022-01-01

## 2022-01-01 RX ORDER — LEVOTHYROXINE SODIUM 125 MCG
1 TABLET ORAL
Qty: 0 | Refills: 0 | DISCHARGE
Start: 2022-01-01

## 2022-01-01 RX ORDER — OXYCODONE HYDROCHLORIDE 5 MG/1
5 TABLET ORAL
Refills: 0 | Status: DISCONTINUED | OUTPATIENT
Start: 2022-01-01 | End: 2022-01-01

## 2022-01-01 RX ORDER — SODIUM ZIRCONIUM CYCLOSILICATE 10 G/10G
10 POWDER, FOR SUSPENSION ORAL ONCE
Refills: 0 | Status: DISCONTINUED | OUTPATIENT
Start: 2022-01-01 | End: 2022-01-01

## 2022-01-01 RX ORDER — UBIDECARENONE 200 MG
500 CAPSULE ORAL
Refills: 0 | Status: ACTIVE | COMMUNITY
Start: 2022-01-01

## 2022-01-01 RX ORDER — POLYETHYLENE GLYCOL 3350 17 G/17G
17 POWDER, FOR SOLUTION ORAL
Qty: 0 | Refills: 0 | DISCHARGE
Start: 2022-01-01

## 2022-01-01 RX ORDER — METOCLOPRAMIDE HCL 10 MG
10 TABLET ORAL ONCE
Refills: 0 | Status: DISCONTINUED | OUTPATIENT
Start: 2022-01-01 | End: 2022-01-01

## 2022-01-01 RX ORDER — FENOFIBRATE 48 MG/1
48 TABLET ORAL
Qty: 90 | Refills: 0 | Status: COMPLETED | COMMUNITY
Start: 2021-01-01

## 2022-01-01 RX ORDER — HEPARIN SODIUM 5000 [USP'U]/ML
1100 INJECTION INTRAVENOUS; SUBCUTANEOUS
Qty: 25000 | Refills: 0 | Status: DISCONTINUED | OUTPATIENT
Start: 2022-01-01 | End: 2022-01-01

## 2022-01-01 RX ORDER — METOCLOPRAMIDE 10 MG/1
10 TABLET ORAL 3 TIMES DAILY
Qty: 45 | Refills: 2 | Status: COMPLETED | COMMUNITY
Start: 2022-01-01 | End: 2022-01-01

## 2022-01-01 RX ORDER — MIDODRINE HYDROCHLORIDE 2.5 MG/1
5 TABLET ORAL ONCE
Refills: 0 | Status: COMPLETED | OUTPATIENT
Start: 2022-01-01 | End: 2022-01-01

## 2022-01-01 RX ADMIN — ONDANSETRON 8 MILLIGRAM(S): 8 TABLET, FILM COATED ORAL at 06:20

## 2022-01-01 RX ADMIN — Medication 1000 MILLIGRAM(S): at 08:00

## 2022-01-01 RX ADMIN — Medication 125 MICROGRAM(S): at 05:41

## 2022-01-01 RX ADMIN — HYDROMORPHONE HYDROCHLORIDE 0.2 MILLIGRAM(S): 2 INJECTION INTRAMUSCULAR; INTRAVENOUS; SUBCUTANEOUS at 18:03

## 2022-01-01 RX ADMIN — Medication 112 MICROGRAM(S): at 05:02

## 2022-01-01 RX ADMIN — Medication 100 MEQ/KG/HR: at 20:43

## 2022-01-01 RX ADMIN — HYDROMORPHONE HYDROCHLORIDE 0.5 MILLIGRAM(S): 2 INJECTION INTRAMUSCULAR; INTRAVENOUS; SUBCUTANEOUS at 01:05

## 2022-01-01 RX ADMIN — HYDROMORPHONE HYDROCHLORIDE 0.5 MILLIGRAM(S): 2 INJECTION INTRAMUSCULAR; INTRAVENOUS; SUBCUTANEOUS at 00:55

## 2022-01-01 RX ADMIN — ONDANSETRON 4 MILLIGRAM(S): 8 TABLET, FILM COATED ORAL at 18:52

## 2022-01-01 RX ADMIN — PANTOPRAZOLE SODIUM 40 MILLIGRAM(S): 20 TABLET, DELAYED RELEASE ORAL at 05:02

## 2022-01-01 RX ADMIN — Medication 650 MILLIGRAM(S): at 22:40

## 2022-01-01 RX ADMIN — HYDROMORPHONE HYDROCHLORIDE 30 MILLILITER(S): 2 INJECTION INTRAMUSCULAR; INTRAVENOUS; SUBCUTANEOUS at 21:03

## 2022-01-01 RX ADMIN — HYDROMORPHONE HYDROCHLORIDE 0.5 MILLIGRAM(S): 2 INJECTION INTRAMUSCULAR; INTRAVENOUS; SUBCUTANEOUS at 00:35

## 2022-01-01 RX ADMIN — MIDODRINE HYDROCHLORIDE 20 MILLIGRAM(S): 2.5 TABLET ORAL at 13:56

## 2022-01-01 RX ADMIN — HYDROMORPHONE HYDROCHLORIDE 0.5 MILLIGRAM(S): 2 INJECTION INTRAMUSCULAR; INTRAVENOUS; SUBCUTANEOUS at 21:33

## 2022-01-01 RX ADMIN — HYDROMORPHONE HYDROCHLORIDE 0.5 MILLIGRAM(S): 2 INJECTION INTRAMUSCULAR; INTRAVENOUS; SUBCUTANEOUS at 00:20

## 2022-01-01 RX ADMIN — HYDROMORPHONE HYDROCHLORIDE 0.5 MILLIGRAM(S): 2 INJECTION INTRAMUSCULAR; INTRAVENOUS; SUBCUTANEOUS at 06:31

## 2022-01-01 RX ADMIN — Medication 650 MILLIGRAM(S): at 12:42

## 2022-01-01 RX ADMIN — HEPARIN SODIUM 5000 UNIT(S): 5000 INJECTION INTRAVENOUS; SUBCUTANEOUS at 12:05

## 2022-01-01 RX ADMIN — PANTOPRAZOLE SODIUM 40 MILLIGRAM(S): 20 TABLET, DELAYED RELEASE ORAL at 06:21

## 2022-01-01 RX ADMIN — HYDROMORPHONE HYDROCHLORIDE 0.2 MILLIGRAM(S): 2 INJECTION INTRAMUSCULAR; INTRAVENOUS; SUBCUTANEOUS at 14:08

## 2022-01-01 RX ADMIN — SIMVASTATIN 40 MILLIGRAM(S): 20 TABLET, FILM COATED ORAL at 21:54

## 2022-01-01 RX ADMIN — Medication 1000 UNIT(S): at 13:59

## 2022-01-01 RX ADMIN — SIMVASTATIN 40 MILLIGRAM(S): 20 TABLET, FILM COATED ORAL at 22:48

## 2022-01-01 RX ADMIN — HEPARIN SODIUM 800 UNIT(S)/HR: 5000 INJECTION INTRAVENOUS; SUBCUTANEOUS at 07:01

## 2022-01-01 RX ADMIN — MEROPENEM 100 MILLIGRAM(S): 1 INJECTION INTRAVENOUS at 06:32

## 2022-01-01 RX ADMIN — HEPARIN SODIUM 5000 UNIT(S): 5000 INJECTION INTRAVENOUS; SUBCUTANEOUS at 13:34

## 2022-01-01 RX ADMIN — HYDROMORPHONE HYDROCHLORIDE 2 MILLIGRAM(S): 2 INJECTION INTRAMUSCULAR; INTRAVENOUS; SUBCUTANEOUS at 17:27

## 2022-01-01 RX ADMIN — ONDANSETRON 8 MILLIGRAM(S): 8 TABLET, FILM COATED ORAL at 22:05

## 2022-01-01 RX ADMIN — Medication 30 MILLIGRAM(S): at 23:59

## 2022-01-01 RX ADMIN — HYDROMORPHONE HYDROCHLORIDE 2 MILLIGRAM(S): 2 INJECTION INTRAMUSCULAR; INTRAVENOUS; SUBCUTANEOUS at 14:12

## 2022-01-01 RX ADMIN — Medication 650 MILLIGRAM(S): at 02:15

## 2022-01-01 RX ADMIN — MIDODRINE HYDROCHLORIDE 20 MILLIGRAM(S): 2.5 TABLET ORAL at 13:44

## 2022-01-01 RX ADMIN — MIDODRINE HYDROCHLORIDE 20 MILLIGRAM(S): 2.5 TABLET ORAL at 22:11

## 2022-01-01 RX ADMIN — HYDROMORPHONE HYDROCHLORIDE 2 MILLIGRAM(S): 2 INJECTION INTRAMUSCULAR; INTRAVENOUS; SUBCUTANEOUS at 01:44

## 2022-01-01 RX ADMIN — HYDROMORPHONE HYDROCHLORIDE 0.2 MILLIGRAM(S): 2 INJECTION INTRAMUSCULAR; INTRAVENOUS; SUBCUTANEOUS at 06:30

## 2022-01-01 RX ADMIN — Medication 1 TABLET(S): at 13:59

## 2022-01-01 RX ADMIN — ONDANSETRON 4 MILLIGRAM(S): 8 TABLET, FILM COATED ORAL at 05:00

## 2022-01-01 RX ADMIN — HYDROMORPHONE HYDROCHLORIDE 0.2 MILLIGRAM(S): 2 INJECTION INTRAMUSCULAR; INTRAVENOUS; SUBCUTANEOUS at 22:27

## 2022-01-01 RX ADMIN — Medication 1 TABLET(S): at 12:05

## 2022-01-01 RX ADMIN — HYDROMORPHONE HYDROCHLORIDE 0.5 MILLIGRAM(S): 2 INJECTION INTRAMUSCULAR; INTRAVENOUS; SUBCUTANEOUS at 21:23

## 2022-01-01 RX ADMIN — HYDROMORPHONE HYDROCHLORIDE 0.5 MILLIGRAM(S): 2 INJECTION INTRAMUSCULAR; INTRAVENOUS; SUBCUTANEOUS at 20:14

## 2022-01-01 RX ADMIN — HEPARIN SODIUM 0 UNIT(S)/HR: 5000 INJECTION INTRAVENOUS; SUBCUTANEOUS at 00:36

## 2022-01-01 RX ADMIN — Medication 112 MICROGRAM(S): at 05:14

## 2022-01-01 RX ADMIN — ONDANSETRON 4 MILLIGRAM(S): 8 TABLET, FILM COATED ORAL at 06:50

## 2022-01-01 RX ADMIN — Medication 1 TABLET(S): at 12:02

## 2022-01-01 RX ADMIN — MEROPENEM 100 MILLIGRAM(S): 1 INJECTION INTRAVENOUS at 18:20

## 2022-01-01 RX ADMIN — Medication 750 MILLIGRAM(S): at 02:24

## 2022-01-01 RX ADMIN — MIDODRINE HYDROCHLORIDE 20 MILLIGRAM(S): 2.5 TABLET ORAL at 13:58

## 2022-01-01 RX ADMIN — ONDANSETRON 4 MILLIGRAM(S): 8 TABLET, FILM COATED ORAL at 14:15

## 2022-01-01 RX ADMIN — SIMVASTATIN 40 MILLIGRAM(S): 20 TABLET, FILM COATED ORAL at 22:23

## 2022-01-01 RX ADMIN — ONDANSETRON 8 MILLIGRAM(S): 8 TABLET, FILM COATED ORAL at 21:05

## 2022-01-01 RX ADMIN — MIDODRINE HYDROCHLORIDE 20 MILLIGRAM(S): 2.5 TABLET ORAL at 05:13

## 2022-01-01 RX ADMIN — ONDANSETRON 8 MILLIGRAM(S): 8 TABLET, FILM COATED ORAL at 05:51

## 2022-01-01 RX ADMIN — OXYCODONE HYDROCHLORIDE 5 MILLIGRAM(S): 5 TABLET ORAL at 13:33

## 2022-01-01 RX ADMIN — Medication 30 MILLIGRAM(S): at 00:30

## 2022-01-01 RX ADMIN — Medication 1 TABLET(S): at 14:14

## 2022-01-01 RX ADMIN — Medication 250 MILLIGRAM(S): at 16:42

## 2022-01-01 RX ADMIN — Medication 250 MILLIGRAM(S): at 11:14

## 2022-01-01 RX ADMIN — HEPARIN SODIUM 1000 UNIT(S)/HR: 5000 INJECTION INTRAVENOUS; SUBCUTANEOUS at 07:31

## 2022-01-01 RX ADMIN — HYDROMORPHONE HYDROCHLORIDE 2 MILLIGRAM(S): 2 INJECTION INTRAMUSCULAR; INTRAVENOUS; SUBCUTANEOUS at 18:33

## 2022-01-01 RX ADMIN — Medication 112 MICROGRAM(S): at 07:36

## 2022-01-01 RX ADMIN — Medication 650 MILLIGRAM(S): at 21:48

## 2022-01-01 RX ADMIN — HYDROMORPHONE HYDROCHLORIDE 0.2 MILLIGRAM(S): 2 INJECTION INTRAMUSCULAR; INTRAVENOUS; SUBCUTANEOUS at 12:20

## 2022-01-01 RX ADMIN — HYDROMORPHONE HYDROCHLORIDE 0.2 MILLIGRAM(S): 2 INJECTION INTRAMUSCULAR; INTRAVENOUS; SUBCUTANEOUS at 15:50

## 2022-01-01 RX ADMIN — SIMVASTATIN 40 MILLIGRAM(S): 20 TABLET, FILM COATED ORAL at 22:06

## 2022-01-01 RX ADMIN — HYDROMORPHONE HYDROCHLORIDE 0.5 MILLIGRAM(S): 2 INJECTION INTRAMUSCULAR; INTRAVENOUS; SUBCUTANEOUS at 13:46

## 2022-01-01 RX ADMIN — HYDROMORPHONE HYDROCHLORIDE 0.5 MILLIGRAM(S): 2 INJECTION INTRAMUSCULAR; INTRAVENOUS; SUBCUTANEOUS at 06:00

## 2022-01-01 RX ADMIN — SIMVASTATIN 40 MILLIGRAM(S): 20 TABLET, FILM COATED ORAL at 21:26

## 2022-01-01 RX ADMIN — HEPARIN SODIUM 800 UNIT(S)/HR: 5000 INJECTION INTRAVENOUS; SUBCUTANEOUS at 00:37

## 2022-01-01 RX ADMIN — HYDROMORPHONE HYDROCHLORIDE 0.5 MILLIGRAM(S): 2 INJECTION INTRAMUSCULAR; INTRAVENOUS; SUBCUTANEOUS at 21:08

## 2022-01-01 RX ADMIN — Medication 1000 MILLIGRAM(S): at 05:40

## 2022-01-01 RX ADMIN — ONDANSETRON 8 MILLIGRAM(S): 8 TABLET, FILM COATED ORAL at 05:50

## 2022-01-01 RX ADMIN — HEPARIN SODIUM 0 UNIT(S)/HR: 5000 INJECTION INTRAVENOUS; SUBCUTANEOUS at 10:00

## 2022-01-01 RX ADMIN — SODIUM CHLORIDE 1 GRAM(S): 9 INJECTION INTRAMUSCULAR; INTRAVENOUS; SUBCUTANEOUS at 06:19

## 2022-01-01 RX ADMIN — Medication 1 TABLET(S): at 13:33

## 2022-01-01 RX ADMIN — HYDROMORPHONE HYDROCHLORIDE 0.5 MILLIGRAM(S): 2 INJECTION INTRAMUSCULAR; INTRAVENOUS; SUBCUTANEOUS at 00:10

## 2022-01-01 RX ADMIN — SENNA PLUS 2 TABLET(S): 8.6 TABLET ORAL at 21:52

## 2022-01-01 RX ADMIN — HYDROMORPHONE HYDROCHLORIDE 0.2 MILLIGRAM(S): 2 INJECTION INTRAMUSCULAR; INTRAVENOUS; SUBCUTANEOUS at 11:08

## 2022-01-01 RX ADMIN — Medication 100 MEQ/KG/HR: at 06:18

## 2022-01-01 RX ADMIN — HYDROMORPHONE HYDROCHLORIDE 0.5 MILLIGRAM(S): 2 INJECTION INTRAMUSCULAR; INTRAVENOUS; SUBCUTANEOUS at 22:48

## 2022-01-01 RX ADMIN — MEROPENEM 100 MILLIGRAM(S): 1 INJECTION INTRAVENOUS at 17:43

## 2022-01-01 RX ADMIN — Medication 650 MILLIGRAM(S): at 12:24

## 2022-01-01 RX ADMIN — Medication 1 TABLET(S): at 12:54

## 2022-01-01 RX ADMIN — Medication 650 MILLIGRAM(S): at 09:55

## 2022-01-01 RX ADMIN — SODIUM CHLORIDE 1000 MILLILITER(S): 9 INJECTION INTRAMUSCULAR; INTRAVENOUS; SUBCUTANEOUS at 01:45

## 2022-01-01 RX ADMIN — Medication 500 MILLIGRAM(S): at 12:56

## 2022-01-01 RX ADMIN — HEPARIN SODIUM 5000 UNIT(S): 5000 INJECTION INTRAVENOUS; SUBCUTANEOUS at 06:08

## 2022-01-01 RX ADMIN — Medication 650 MILLIGRAM(S): at 13:30

## 2022-01-01 RX ADMIN — CEFEPIME 100 MILLIGRAM(S): 1 INJECTION, POWDER, FOR SOLUTION INTRAMUSCULAR; INTRAVENOUS at 06:09

## 2022-01-01 RX ADMIN — SODIUM CHLORIDE 1000 MILLILITER(S): 9 INJECTION INTRAMUSCULAR; INTRAVENOUS; SUBCUTANEOUS at 20:34

## 2022-01-01 RX ADMIN — HYDROMORPHONE HYDROCHLORIDE 0.2 MILLIGRAM(S): 2 INJECTION INTRAMUSCULAR; INTRAVENOUS; SUBCUTANEOUS at 09:43

## 2022-01-01 RX ADMIN — SIMVASTATIN 40 MILLIGRAM(S): 20 TABLET, FILM COATED ORAL at 22:40

## 2022-01-01 RX ADMIN — Medication 975 MILLIGRAM(S): at 23:57

## 2022-01-01 RX ADMIN — HYDROMORPHONE HYDROCHLORIDE 2 MILLIGRAM(S): 2 INJECTION INTRAMUSCULAR; INTRAVENOUS; SUBCUTANEOUS at 14:02

## 2022-01-01 RX ADMIN — APIXABAN 10 MILLIGRAM(S): 2.5 TABLET, FILM COATED ORAL at 15:27

## 2022-01-01 RX ADMIN — MEROPENEM 100 MILLIGRAM(S): 1 INJECTION INTRAVENOUS at 05:37

## 2022-01-01 RX ADMIN — Medication 112 MICROGRAM(S): at 05:00

## 2022-01-01 RX ADMIN — Medication 100 MEQ/KG/HR: at 21:03

## 2022-01-01 RX ADMIN — Medication 1000 UNIT(S): at 12:42

## 2022-01-01 RX ADMIN — Medication 112 MICROGRAM(S): at 06:19

## 2022-01-01 RX ADMIN — HEPARIN SODIUM 1000 UNIT(S)/HR: 5000 INJECTION INTRAVENOUS; SUBCUTANEOUS at 08:56

## 2022-01-01 RX ADMIN — Medication 400 MILLIGRAM(S): at 01:26

## 2022-01-01 RX ADMIN — APIXABAN 10 MILLIGRAM(S): 2.5 TABLET, FILM COATED ORAL at 15:15

## 2022-01-01 RX ADMIN — HYDROMORPHONE HYDROCHLORIDE 0.5 MILLIGRAM(S): 2 INJECTION INTRAMUSCULAR; INTRAVENOUS; SUBCUTANEOUS at 18:25

## 2022-01-01 RX ADMIN — SIMVASTATIN 40 MILLIGRAM(S): 20 TABLET, FILM COATED ORAL at 19:29

## 2022-01-01 RX ADMIN — HYDROMORPHONE HYDROCHLORIDE 0.2 MILLIGRAM(S): 2 INJECTION INTRAMUSCULAR; INTRAVENOUS; SUBCUTANEOUS at 15:35

## 2022-01-01 RX ADMIN — HYDROMORPHONE HYDROCHLORIDE 0.2 MILLIGRAM(S): 2 INJECTION INTRAMUSCULAR; INTRAVENOUS; SUBCUTANEOUS at 05:49

## 2022-01-01 RX ADMIN — MEROPENEM 100 MILLIGRAM(S): 1 INJECTION INTRAVENOUS at 17:38

## 2022-01-01 RX ADMIN — Medication 400 MILLIGRAM(S): at 11:42

## 2022-01-01 RX ADMIN — Medication 100 MEQ/KG/HR: at 12:40

## 2022-01-01 RX ADMIN — HYDROMORPHONE HYDROCHLORIDE 0.5 MILLIGRAM(S): 2 INJECTION INTRAMUSCULAR; INTRAVENOUS; SUBCUTANEOUS at 23:52

## 2022-01-01 RX ADMIN — Medication 1 TABLET(S): at 12:56

## 2022-01-01 RX ADMIN — Medication 975 MILLIGRAM(S): at 10:47

## 2022-01-01 RX ADMIN — HYDROMORPHONE HYDROCHLORIDE 0.5 MILLIGRAM(S): 2 INJECTION INTRAMUSCULAR; INTRAVENOUS; SUBCUTANEOUS at 05:16

## 2022-01-01 RX ADMIN — Medication 100 MEQ/KG/HR: at 07:00

## 2022-01-01 RX ADMIN — HEPARIN SODIUM 1000 UNIT(S)/HR: 5000 INJECTION INTRAVENOUS; SUBCUTANEOUS at 03:56

## 2022-01-01 RX ADMIN — MEROPENEM 100 MILLIGRAM(S): 1 INJECTION INTRAVENOUS at 18:49

## 2022-01-01 RX ADMIN — ONDANSETRON 4 MILLIGRAM(S): 8 TABLET, FILM COATED ORAL at 21:04

## 2022-01-01 RX ADMIN — MEROPENEM 100 MILLIGRAM(S): 1 INJECTION INTRAVENOUS at 18:14

## 2022-01-01 RX ADMIN — Medication 125 MICROGRAM(S): at 05:17

## 2022-01-01 RX ADMIN — HYDROMORPHONE HYDROCHLORIDE 0.5 MILLIGRAM(S): 2 INJECTION INTRAMUSCULAR; INTRAVENOUS; SUBCUTANEOUS at 18:30

## 2022-01-01 RX ADMIN — SODIUM CHLORIDE 1000 MILLILITER(S): 9 INJECTION, SOLUTION INTRAVENOUS at 21:24

## 2022-01-01 RX ADMIN — Medication 200 GRAM(S): at 22:32

## 2022-01-01 RX ADMIN — MEROPENEM 100 MILLIGRAM(S): 1 INJECTION INTRAVENOUS at 17:31

## 2022-01-01 RX ADMIN — HYDROMORPHONE HYDROCHLORIDE 0.5 MILLIGRAM(S): 2 INJECTION INTRAMUSCULAR; INTRAVENOUS; SUBCUTANEOUS at 20:19

## 2022-01-01 RX ADMIN — ONDANSETRON 4 MILLIGRAM(S): 8 TABLET, FILM COATED ORAL at 12:53

## 2022-01-01 RX ADMIN — HEPARIN SODIUM 900 UNIT(S)/HR: 5000 INJECTION INTRAVENOUS; SUBCUTANEOUS at 08:17

## 2022-01-01 RX ADMIN — Medication 975 MILLIGRAM(S): at 22:26

## 2022-01-01 RX ADMIN — HYDROMORPHONE HYDROCHLORIDE 0.5 MILLIGRAM(S): 2 INJECTION INTRAMUSCULAR; INTRAVENOUS; SUBCUTANEOUS at 10:00

## 2022-01-01 RX ADMIN — MEROPENEM 100 MILLIGRAM(S): 1 INJECTION INTRAVENOUS at 05:52

## 2022-01-01 RX ADMIN — HEPARIN SODIUM 800 UNIT(S)/HR: 5000 INJECTION INTRAVENOUS; SUBCUTANEOUS at 19:32

## 2022-01-01 RX ADMIN — ONDANSETRON 4 MILLIGRAM(S): 8 TABLET, FILM COATED ORAL at 02:16

## 2022-01-01 RX ADMIN — Medication 30 MILLIGRAM(S): at 13:52

## 2022-01-01 RX ADMIN — ONDANSETRON 4 MILLIGRAM(S): 8 TABLET, FILM COATED ORAL at 10:50

## 2022-01-01 RX ADMIN — Medication 1000 MILLIGRAM(S): at 12:10

## 2022-01-01 RX ADMIN — FAMOTIDINE 20 MILLIGRAM(S): 10 INJECTION INTRAVENOUS at 11:33

## 2022-01-01 RX ADMIN — HEPARIN SODIUM 1100 UNIT(S)/HR: 5000 INJECTION INTRAVENOUS; SUBCUTANEOUS at 01:08

## 2022-01-01 RX ADMIN — MEROPENEM 100 MILLIGRAM(S): 1 INJECTION INTRAVENOUS at 05:49

## 2022-01-01 RX ADMIN — HYDROMORPHONE HYDROCHLORIDE 0.5 MILLIGRAM(S): 2 INJECTION INTRAMUSCULAR; INTRAVENOUS; SUBCUTANEOUS at 17:30

## 2022-01-01 RX ADMIN — PANTOPRAZOLE SODIUM 40 MILLIGRAM(S): 20 TABLET, DELAYED RELEASE ORAL at 06:51

## 2022-01-01 RX ADMIN — Medication 400 MILLIGRAM(S): at 14:02

## 2022-01-01 RX ADMIN — HYDROMORPHONE HYDROCHLORIDE 2 MILLIGRAM(S): 2 INJECTION INTRAMUSCULAR; INTRAVENOUS; SUBCUTANEOUS at 18:58

## 2022-01-01 RX ADMIN — HYDROMORPHONE HYDROCHLORIDE 0.5 MILLIGRAM(S): 2 INJECTION INTRAMUSCULAR; INTRAVENOUS; SUBCUTANEOUS at 09:30

## 2022-01-01 RX ADMIN — ONDANSETRON 8 MILLIGRAM(S): 8 TABLET, FILM COATED ORAL at 05:14

## 2022-01-01 RX ADMIN — PANTOPRAZOLE SODIUM 40 MILLIGRAM(S): 20 TABLET, DELAYED RELEASE ORAL at 05:17

## 2022-01-01 RX ADMIN — HYDROMORPHONE HYDROCHLORIDE 0.2 MILLIGRAM(S): 2 INJECTION INTRAMUSCULAR; INTRAVENOUS; SUBCUTANEOUS at 18:15

## 2022-01-01 RX ADMIN — HEPARIN SODIUM 0 UNIT(S)/HR: 5000 INJECTION INTRAVENOUS; SUBCUTANEOUS at 06:05

## 2022-01-01 RX ADMIN — ONDANSETRON 4 MILLIGRAM(S): 8 TABLET, FILM COATED ORAL at 12:01

## 2022-01-01 RX ADMIN — MIDODRINE HYDROCHLORIDE 20 MILLIGRAM(S): 2.5 TABLET ORAL at 12:03

## 2022-01-01 RX ADMIN — Medication 1000 MILLIGRAM(S): at 23:30

## 2022-01-01 RX ADMIN — Medication 650 MILLIGRAM(S): at 03:05

## 2022-01-01 RX ADMIN — Medication 1 TABLET(S): at 12:41

## 2022-01-01 RX ADMIN — HEPARIN SODIUM 1000 UNIT(S)/HR: 5000 INJECTION INTRAVENOUS; SUBCUTANEOUS at 11:02

## 2022-01-01 RX ADMIN — PANTOPRAZOLE SODIUM 40 MILLIGRAM(S): 20 TABLET, DELAYED RELEASE ORAL at 06:20

## 2022-01-01 RX ADMIN — HYDROMORPHONE HYDROCHLORIDE 2 MILLIGRAM(S): 2 INJECTION INTRAMUSCULAR; INTRAVENOUS; SUBCUTANEOUS at 19:33

## 2022-01-01 RX ADMIN — ONDANSETRON 8 MILLIGRAM(S): 8 TABLET, FILM COATED ORAL at 13:57

## 2022-01-01 RX ADMIN — Medication 1000 UNIT(S): at 12:56

## 2022-01-01 RX ADMIN — PANTOPRAZOLE SODIUM 40 MILLIGRAM(S): 20 TABLET, DELAYED RELEASE ORAL at 06:00

## 2022-01-01 RX ADMIN — HYDROMORPHONE HYDROCHLORIDE 0.2 MILLIGRAM(S): 2 INJECTION INTRAMUSCULAR; INTRAVENOUS; SUBCUTANEOUS at 11:23

## 2022-01-01 RX ADMIN — Medication 1000 MILLIGRAM(S): at 02:00

## 2022-01-01 RX ADMIN — HEPARIN SODIUM 800 UNIT(S)/HR: 5000 INJECTION INTRAVENOUS; SUBCUTANEOUS at 10:14

## 2022-01-01 RX ADMIN — Medication 112 MICROGRAM(S): at 05:49

## 2022-01-01 RX ADMIN — HYDROMORPHONE HYDROCHLORIDE 0.2 MILLIGRAM(S): 2 INJECTION INTRAMUSCULAR; INTRAVENOUS; SUBCUTANEOUS at 19:38

## 2022-01-01 RX ADMIN — HYDROMORPHONE HYDROCHLORIDE 30 MILLILITER(S): 2 INJECTION INTRAMUSCULAR; INTRAVENOUS; SUBCUTANEOUS at 22:02

## 2022-01-01 RX ADMIN — CEFEPIME 100 MILLIGRAM(S): 1 INJECTION, POWDER, FOR SOLUTION INTRAMUSCULAR; INTRAVENOUS at 17:36

## 2022-01-01 RX ADMIN — HEPARIN SODIUM 5000 UNIT(S): 5000 INJECTION INTRAVENOUS; SUBCUTANEOUS at 13:53

## 2022-01-01 RX ADMIN — ONDANSETRON 4 MILLIGRAM(S): 8 TABLET, FILM COATED ORAL at 02:47

## 2022-01-01 RX ADMIN — HEPARIN SODIUM 0 UNIT(S)/HR: 5000 INJECTION INTRAVENOUS; SUBCUTANEOUS at 20:07

## 2022-01-01 RX ADMIN — HEPARIN SODIUM 5000 UNIT(S): 5000 INJECTION INTRAVENOUS; SUBCUTANEOUS at 06:11

## 2022-01-01 RX ADMIN — HYDROMORPHONE HYDROCHLORIDE 0.5 MILLIGRAM(S): 2 INJECTION INTRAMUSCULAR; INTRAVENOUS; SUBCUTANEOUS at 06:59

## 2022-01-01 RX ADMIN — HEPARIN SODIUM 800 UNIT(S)/HR: 5000 INJECTION INTRAVENOUS; SUBCUTANEOUS at 19:31

## 2022-01-01 RX ADMIN — HYDROMORPHONE HYDROCHLORIDE 0.5 MILLIGRAM(S): 2 INJECTION INTRAMUSCULAR; INTRAVENOUS; SUBCUTANEOUS at 19:36

## 2022-01-01 RX ADMIN — Medication 112 MICROGRAM(S): at 05:50

## 2022-01-01 RX ADMIN — MEROPENEM 100 MILLIGRAM(S): 1 INJECTION INTRAVENOUS at 18:25

## 2022-01-01 RX ADMIN — MEROPENEM 100 MILLIGRAM(S): 1 INJECTION INTRAVENOUS at 06:29

## 2022-01-01 RX ADMIN — ONDANSETRON 8 MILLIGRAM(S): 8 TABLET, FILM COATED ORAL at 13:56

## 2022-01-01 RX ADMIN — HEPARIN SODIUM 900 UNIT(S)/HR: 5000 INJECTION INTRAVENOUS; SUBCUTANEOUS at 19:22

## 2022-01-01 RX ADMIN — Medication 30 MILLILITER(S): at 18:24

## 2022-01-01 RX ADMIN — HYDROMORPHONE HYDROCHLORIDE 0.5 MILLIGRAM(S): 2 INJECTION INTRAMUSCULAR; INTRAVENOUS; SUBCUTANEOUS at 02:54

## 2022-01-01 RX ADMIN — Medication 30 MILLIGRAM(S): at 18:22

## 2022-01-01 RX ADMIN — ONDANSETRON 4 MILLIGRAM(S): 8 TABLET, FILM COATED ORAL at 21:00

## 2022-01-01 RX ADMIN — Medication 1000 UNIT(S): at 14:23

## 2022-01-01 RX ADMIN — HYDROMORPHONE HYDROCHLORIDE 0.5 MILLIGRAM(S): 2 INJECTION INTRAMUSCULAR; INTRAVENOUS; SUBCUTANEOUS at 05:29

## 2022-01-01 RX ADMIN — ONDANSETRON 4 MILLIGRAM(S): 8 TABLET, FILM COATED ORAL at 15:35

## 2022-01-01 RX ADMIN — Medication 300 MILLIGRAM(S): at 00:43

## 2022-01-01 RX ADMIN — HYDROMORPHONE HYDROCHLORIDE 0.5 MILLIGRAM(S): 2 INJECTION INTRAMUSCULAR; INTRAVENOUS; SUBCUTANEOUS at 22:33

## 2022-01-01 RX ADMIN — Medication 500 MILLIGRAM(S): at 14:14

## 2022-01-01 RX ADMIN — ONDANSETRON 4 MILLIGRAM(S): 8 TABLET, FILM COATED ORAL at 06:56

## 2022-01-01 RX ADMIN — CEFEPIME 100 MILLIGRAM(S): 1 INJECTION, POWDER, FOR SOLUTION INTRAMUSCULAR; INTRAVENOUS at 05:26

## 2022-01-01 RX ADMIN — HYDROMORPHONE HYDROCHLORIDE 0.5 MILLIGRAM(S): 2 INJECTION INTRAMUSCULAR; INTRAVENOUS; SUBCUTANEOUS at 08:41

## 2022-01-01 RX ADMIN — HYDROMORPHONE HYDROCHLORIDE 0.5 MILLIGRAM(S): 2 INJECTION INTRAMUSCULAR; INTRAVENOUS; SUBCUTANEOUS at 14:01

## 2022-01-01 RX ADMIN — SODIUM CHLORIDE 1 GRAM(S): 9 INJECTION INTRAMUSCULAR; INTRAVENOUS; SUBCUTANEOUS at 17:36

## 2022-01-01 RX ADMIN — HYDROMORPHONE HYDROCHLORIDE 2 MILLIGRAM(S): 2 INJECTION INTRAMUSCULAR; INTRAVENOUS; SUBCUTANEOUS at 13:02

## 2022-01-01 RX ADMIN — CEFEPIME 100 MILLIGRAM(S): 1 INJECTION, POWDER, FOR SOLUTION INTRAMUSCULAR; INTRAVENOUS at 18:19

## 2022-01-01 RX ADMIN — Medication 100 MEQ/KG/HR: at 22:54

## 2022-01-01 RX ADMIN — MEROPENEM 100 MILLIGRAM(S): 1 INJECTION INTRAVENOUS at 17:32

## 2022-01-01 RX ADMIN — ONDANSETRON 8 MILLIGRAM(S): 8 TABLET, FILM COATED ORAL at 05:30

## 2022-01-01 RX ADMIN — MEROPENEM 100 MILLIGRAM(S): 1 INJECTION INTRAVENOUS at 06:16

## 2022-01-01 RX ADMIN — MIDODRINE HYDROCHLORIDE 20 MILLIGRAM(S): 2.5 TABLET ORAL at 17:55

## 2022-01-01 RX ADMIN — ONDANSETRON 8 MILLIGRAM(S): 8 TABLET, FILM COATED ORAL at 22:51

## 2022-01-01 RX ADMIN — Medication 975 MILLIGRAM(S): at 22:05

## 2022-01-01 RX ADMIN — Medication 975 MILLIGRAM(S): at 10:00

## 2022-01-01 RX ADMIN — PANTOPRAZOLE SODIUM 40 MILLIGRAM(S): 20 TABLET, DELAYED RELEASE ORAL at 06:11

## 2022-01-01 RX ADMIN — Medication 975 MILLIGRAM(S): at 09:11

## 2022-01-01 RX ADMIN — SODIUM CHLORIDE 1 GRAM(S): 9 INJECTION INTRAMUSCULAR; INTRAVENOUS; SUBCUTANEOUS at 13:57

## 2022-01-01 RX ADMIN — Medication 100 MEQ/KG/HR: at 14:50

## 2022-01-01 RX ADMIN — Medication 400 MILLIGRAM(S): at 22:51

## 2022-01-01 RX ADMIN — ONDANSETRON 4 MILLIGRAM(S): 8 TABLET, FILM COATED ORAL at 15:27

## 2022-01-01 RX ADMIN — HYDROMORPHONE HYDROCHLORIDE 0.2 MILLIGRAM(S): 2 INJECTION INTRAMUSCULAR; INTRAVENOUS; SUBCUTANEOUS at 09:55

## 2022-01-01 RX ADMIN — MIDODRINE HYDROCHLORIDE 10 MILLIGRAM(S): 2.5 TABLET ORAL at 00:42

## 2022-01-01 RX ADMIN — HEPARIN SODIUM 1100 UNIT(S)/HR: 5000 INJECTION INTRAVENOUS; SUBCUTANEOUS at 04:54

## 2022-01-01 RX ADMIN — SODIUM CHLORIDE 1000 MILLILITER(S): 9 INJECTION INTRAMUSCULAR; INTRAVENOUS; SUBCUTANEOUS at 11:34

## 2022-01-01 RX ADMIN — Medication 50 MILLILITER(S): at 07:20

## 2022-01-01 RX ADMIN — HEPARIN SODIUM 5000 UNIT(S): 5000 INJECTION INTRAVENOUS; SUBCUTANEOUS at 22:23

## 2022-01-01 RX ADMIN — SODIUM CHLORIDE 75 MILLILITER(S): 9 INJECTION, SOLUTION INTRAVENOUS at 05:58

## 2022-01-01 RX ADMIN — HEPARIN SODIUM 5000 UNIT(S): 5000 INJECTION INTRAVENOUS; SUBCUTANEOUS at 04:55

## 2022-01-01 RX ADMIN — Medication 25 GRAM(S): at 08:36

## 2022-01-01 RX ADMIN — HYDROMORPHONE HYDROCHLORIDE 0.5 MILLIGRAM(S): 2 INJECTION INTRAMUSCULAR; INTRAVENOUS; SUBCUTANEOUS at 16:16

## 2022-01-01 RX ADMIN — SODIUM CHLORIDE 250 MILLILITER(S): 9 INJECTION INTRAMUSCULAR; INTRAVENOUS; SUBCUTANEOUS at 12:34

## 2022-01-01 RX ADMIN — Medication 975 MILLIGRAM(S): at 23:48

## 2022-01-01 RX ADMIN — SIMVASTATIN 40 MILLIGRAM(S): 20 TABLET, FILM COATED ORAL at 22:30

## 2022-01-01 RX ADMIN — Medication 20 MILLIEQUIVALENT(S): at 23:51

## 2022-01-01 RX ADMIN — ONDANSETRON 8 MILLIGRAM(S): 8 TABLET, FILM COATED ORAL at 13:03

## 2022-01-01 RX ADMIN — HEPARIN SODIUM 900 UNIT(S)/HR: 5000 INJECTION INTRAVENOUS; SUBCUTANEOUS at 05:27

## 2022-01-01 RX ADMIN — HYDROMORPHONE HYDROCHLORIDE 0.2 MILLIGRAM(S): 2 INJECTION INTRAMUSCULAR; INTRAVENOUS; SUBCUTANEOUS at 15:57

## 2022-01-01 RX ADMIN — HYDROMORPHONE HYDROCHLORIDE 0.2 MILLIGRAM(S): 2 INJECTION INTRAMUSCULAR; INTRAVENOUS; SUBCUTANEOUS at 19:32

## 2022-01-01 RX ADMIN — MEROPENEM 100 MILLIGRAM(S): 1 INJECTION INTRAVENOUS at 17:27

## 2022-01-01 RX ADMIN — MIDODRINE HYDROCHLORIDE 20 MILLIGRAM(S): 2.5 TABLET ORAL at 08:00

## 2022-01-01 RX ADMIN — SODIUM CHLORIDE 75 MILLILITER(S): 9 INJECTION, SOLUTION INTRAVENOUS at 22:00

## 2022-01-01 RX ADMIN — Medication 100 MICROGRAM(S): at 22:01

## 2022-01-01 RX ADMIN — HYDROMORPHONE HYDROCHLORIDE 0.5 MILLIGRAM(S): 2 INJECTION INTRAMUSCULAR; INTRAVENOUS; SUBCUTANEOUS at 08:58

## 2022-01-01 RX ADMIN — Medication 125 MICROGRAM(S): at 06:11

## 2022-01-01 RX ADMIN — MEROPENEM 100 MILLIGRAM(S): 1 INJECTION INTRAVENOUS at 05:16

## 2022-01-01 RX ADMIN — ONDANSETRON 4 MILLIGRAM(S): 8 TABLET, FILM COATED ORAL at 06:12

## 2022-01-01 RX ADMIN — Medication 30 MILLILITER(S): at 22:20

## 2022-01-01 RX ADMIN — HYDROMORPHONE HYDROCHLORIDE 0.5 MILLIGRAM(S): 2 INJECTION INTRAMUSCULAR; INTRAVENOUS; SUBCUTANEOUS at 02:55

## 2022-01-01 RX ADMIN — Medication 112 MICROGRAM(S): at 06:13

## 2022-01-01 RX ADMIN — Medication 400 MILLIGRAM(S): at 07:24

## 2022-01-01 RX ADMIN — HEPARIN SODIUM 1100 UNIT(S)/HR: 5000 INJECTION INTRAVENOUS; SUBCUTANEOUS at 19:35

## 2022-01-01 RX ADMIN — Medication 1 TABLET(S): at 11:14

## 2022-01-01 RX ADMIN — HEPARIN SODIUM 5000 UNIT(S): 5000 INJECTION INTRAVENOUS; SUBCUTANEOUS at 22:06

## 2022-01-01 RX ADMIN — HEPARIN SODIUM 900 UNIT(S)/HR: 5000 INJECTION INTRAVENOUS; SUBCUTANEOUS at 02:46

## 2022-01-01 RX ADMIN — HYDROMORPHONE HYDROCHLORIDE 0.5 MILLIGRAM(S): 2 INJECTION INTRAMUSCULAR; INTRAVENOUS; SUBCUTANEOUS at 02:59

## 2022-01-01 RX ADMIN — HYDROMORPHONE HYDROCHLORIDE 0.5 MILLIGRAM(S): 2 INJECTION INTRAMUSCULAR; INTRAVENOUS; SUBCUTANEOUS at 13:25

## 2022-01-01 RX ADMIN — HEPARIN SODIUM 5000 UNIT(S): 5000 INJECTION INTRAVENOUS; SUBCUTANEOUS at 21:27

## 2022-01-01 RX ADMIN — HYDROMORPHONE HYDROCHLORIDE 0.5 MILLIGRAM(S): 2 INJECTION INTRAMUSCULAR; INTRAVENOUS; SUBCUTANEOUS at 04:44

## 2022-01-01 RX ADMIN — HYDROMORPHONE HYDROCHLORIDE 0.2 MILLIGRAM(S): 2 INJECTION INTRAMUSCULAR; INTRAVENOUS; SUBCUTANEOUS at 19:57

## 2022-01-01 RX ADMIN — HEPARIN SODIUM 5000 UNIT(S): 5000 INJECTION INTRAVENOUS; SUBCUTANEOUS at 23:16

## 2022-01-01 RX ADMIN — PANTOPRAZOLE SODIUM 40 MILLIGRAM(S): 20 TABLET, DELAYED RELEASE ORAL at 06:14

## 2022-01-01 RX ADMIN — Medication 650 MILLIGRAM(S): at 13:00

## 2022-01-01 RX ADMIN — HYDROMORPHONE HYDROCHLORIDE 0.2 MILLIGRAM(S): 2 INJECTION INTRAMUSCULAR; INTRAVENOUS; SUBCUTANEOUS at 06:16

## 2022-01-01 RX ADMIN — OXYCODONE HYDROCHLORIDE 5 MILLIGRAM(S): 5 TABLET ORAL at 08:40

## 2022-01-01 RX ADMIN — Medication 1000 UNIT(S): at 12:54

## 2022-01-01 RX ADMIN — ONDANSETRON 8 MILLIGRAM(S): 8 TABLET, FILM COATED ORAL at 06:30

## 2022-01-01 RX ADMIN — Medication 100 MEQ/KG/HR: at 12:53

## 2022-01-01 RX ADMIN — HYDROMORPHONE HYDROCHLORIDE 0.5 MILLIGRAM(S): 2 INJECTION INTRAMUSCULAR; INTRAVENOUS; SUBCUTANEOUS at 06:09

## 2022-01-01 RX ADMIN — HYDROMORPHONE HYDROCHLORIDE 0.5 MILLIGRAM(S): 2 INJECTION INTRAMUSCULAR; INTRAVENOUS; SUBCUTANEOUS at 05:31

## 2022-01-01 RX ADMIN — MIDODRINE HYDROCHLORIDE 20 MILLIGRAM(S): 2.5 TABLET ORAL at 12:56

## 2022-01-01 RX ADMIN — Medication 650 MILLIGRAM(S): at 05:39

## 2022-01-01 RX ADMIN — BUMETANIDE 1 MILLIGRAM(S): 0.25 INJECTION INTRAMUSCULAR; INTRAVENOUS at 19:22

## 2022-01-01 RX ADMIN — MIDODRINE HYDROCHLORIDE 20 MILLIGRAM(S): 2.5 TABLET ORAL at 12:42

## 2022-01-01 RX ADMIN — MIDODRINE HYDROCHLORIDE 20 MILLIGRAM(S): 2.5 TABLET ORAL at 21:44

## 2022-01-01 RX ADMIN — HEPARIN SODIUM 5000 UNIT(S): 5000 INJECTION INTRAVENOUS; SUBCUTANEOUS at 05:00

## 2022-01-01 RX ADMIN — MEROPENEM 100 MILLIGRAM(S): 1 INJECTION INTRAVENOUS at 18:03

## 2022-01-01 RX ADMIN — HEPARIN SODIUM 900 UNIT(S)/HR: 5000 INJECTION INTRAVENOUS; SUBCUTANEOUS at 18:07

## 2022-01-01 RX ADMIN — ONDANSETRON 4 MILLIGRAM(S): 8 TABLET, FILM COATED ORAL at 06:54

## 2022-01-01 RX ADMIN — Medication 975 MILLIGRAM(S): at 21:54

## 2022-01-01 RX ADMIN — Medication 500 MILLIGRAM(S): at 12:41

## 2022-01-01 RX ADMIN — HEPARIN SODIUM 2500 UNIT(S): 5000 INJECTION INTRAVENOUS; SUBCUTANEOUS at 03:44

## 2022-01-01 RX ADMIN — Medication 125 MICROGRAM(S): at 05:31

## 2022-01-01 RX ADMIN — MIDODRINE HYDROCHLORIDE 20 MILLIGRAM(S): 2.5 TABLET ORAL at 17:43

## 2022-01-01 RX ADMIN — SODIUM CHLORIDE 1 GRAM(S): 9 INJECTION INTRAMUSCULAR; INTRAVENOUS; SUBCUTANEOUS at 08:01

## 2022-01-01 RX ADMIN — HYDROMORPHONE HYDROCHLORIDE 0.2 MILLIGRAM(S): 2 INJECTION INTRAMUSCULAR; INTRAVENOUS; SUBCUTANEOUS at 06:50

## 2022-01-01 RX ADMIN — HYDROMORPHONE HYDROCHLORIDE 0.2 MILLIGRAM(S): 2 INJECTION INTRAMUSCULAR; INTRAVENOUS; SUBCUTANEOUS at 13:38

## 2022-01-01 RX ADMIN — SODIUM CHLORIDE 1000 MILLILITER(S): 9 INJECTION INTRAMUSCULAR; INTRAVENOUS; SUBCUTANEOUS at 23:08

## 2022-01-01 RX ADMIN — ONDANSETRON 4 MILLIGRAM(S): 8 TABLET, FILM COATED ORAL at 06:29

## 2022-01-01 RX ADMIN — HEPARIN SODIUM 900 UNIT(S)/HR: 5000 INJECTION INTRAVENOUS; SUBCUTANEOUS at 20:11

## 2022-01-01 RX ADMIN — HEPARIN SODIUM 1000 UNIT(S)/HR: 5000 INJECTION INTRAVENOUS; SUBCUTANEOUS at 11:44

## 2022-01-01 RX ADMIN — CEFEPIME 1000 MILLIGRAM(S): 1 INJECTION, POWDER, FOR SOLUTION INTRAMUSCULAR; INTRAVENOUS at 21:51

## 2022-01-01 RX ADMIN — SIMVASTATIN 40 MILLIGRAM(S): 20 TABLET, FILM COATED ORAL at 21:04

## 2022-01-01 RX ADMIN — SODIUM CHLORIDE 1 GRAM(S): 9 INJECTION INTRAMUSCULAR; INTRAVENOUS; SUBCUTANEOUS at 21:03

## 2022-01-01 RX ADMIN — Medication 112 MICROGRAM(S): at 06:21

## 2022-01-01 RX ADMIN — HYDROMORPHONE HYDROCHLORIDE 0.5 MILLIGRAM(S): 2 INJECTION INTRAMUSCULAR; INTRAVENOUS; SUBCUTANEOUS at 09:12

## 2022-01-01 RX ADMIN — HYDROMORPHONE HYDROCHLORIDE 0.2 MILLIGRAM(S): 2 INJECTION INTRAMUSCULAR; INTRAVENOUS; SUBCUTANEOUS at 21:59

## 2022-01-01 RX ADMIN — Medication 1000 UNIT(S): at 12:02

## 2022-01-01 RX ADMIN — HEPARIN SODIUM 700 UNIT(S)/HR: 5000 INJECTION INTRAVENOUS; SUBCUTANEOUS at 03:43

## 2022-01-01 RX ADMIN — SODIUM CHLORIDE 125 MILLILITER(S): 9 INJECTION, SOLUTION INTRAVENOUS at 23:59

## 2022-01-01 RX ADMIN — SODIUM CHLORIDE 30 MILLILITER(S): 9 INJECTION, SOLUTION INTRAVENOUS at 21:12

## 2022-01-01 RX ADMIN — HYDROMORPHONE HYDROCHLORIDE 2 MILLIGRAM(S): 2 INJECTION INTRAMUSCULAR; INTRAVENOUS; SUBCUTANEOUS at 19:32

## 2022-01-01 RX ADMIN — SIMVASTATIN 40 MILLIGRAM(S): 20 TABLET, FILM COATED ORAL at 22:20

## 2022-01-01 RX ADMIN — HYDROMORPHONE HYDROCHLORIDE 0.2 MILLIGRAM(S): 2 INJECTION INTRAMUSCULAR; INTRAVENOUS; SUBCUTANEOUS at 22:12

## 2022-01-01 RX ADMIN — HEPARIN SODIUM 5000 UNIT(S): 5000 INJECTION INTRAVENOUS; SUBCUTANEOUS at 20:15

## 2022-01-01 RX ADMIN — CEFEPIME 100 MILLIGRAM(S): 1 INJECTION, POWDER, FOR SOLUTION INTRAMUSCULAR; INTRAVENOUS at 17:12

## 2022-01-01 RX ADMIN — HEPARIN SODIUM 900 UNIT(S)/HR: 5000 INJECTION INTRAVENOUS; SUBCUTANEOUS at 08:23

## 2022-01-01 RX ADMIN — ONDANSETRON 4 MILLIGRAM(S): 8 TABLET, FILM COATED ORAL at 21:50

## 2022-01-01 RX ADMIN — SODIUM CHLORIDE 1000 MILLILITER(S): 9 INJECTION, SOLUTION INTRAVENOUS at 04:31

## 2022-01-01 RX ADMIN — HEPARIN SODIUM 2500 UNIT(S): 5000 INJECTION INTRAVENOUS; SUBCUTANEOUS at 03:59

## 2022-01-01 RX ADMIN — HYDROMORPHONE HYDROCHLORIDE 0.5 MILLIGRAM(S): 2 INJECTION INTRAMUSCULAR; INTRAVENOUS; SUBCUTANEOUS at 03:20

## 2022-01-01 RX ADMIN — CEFEPIME 100 MILLIGRAM(S): 1 INJECTION, POWDER, FOR SOLUTION INTRAMUSCULAR; INTRAVENOUS at 18:20

## 2022-01-01 RX ADMIN — HYDROMORPHONE HYDROCHLORIDE 0.2 MILLIGRAM(S): 2 INJECTION INTRAMUSCULAR; INTRAVENOUS; SUBCUTANEOUS at 21:44

## 2022-01-01 RX ADMIN — PANTOPRAZOLE SODIUM 40 MILLIGRAM(S): 20 TABLET, DELAYED RELEASE ORAL at 05:41

## 2022-01-01 RX ADMIN — HYDROMORPHONE HYDROCHLORIDE 0.2 MILLIGRAM(S): 2 INJECTION INTRAMUSCULAR; INTRAVENOUS; SUBCUTANEOUS at 20:15

## 2022-01-01 RX ADMIN — CEFTRIAXONE 100 MILLIGRAM(S): 500 INJECTION, POWDER, FOR SOLUTION INTRAMUSCULAR; INTRAVENOUS at 21:40

## 2022-01-01 RX ADMIN — SODIUM CHLORIDE 1 GRAM(S): 9 INJECTION INTRAMUSCULAR; INTRAVENOUS; SUBCUTANEOUS at 13:03

## 2022-01-01 RX ADMIN — Medication 30 MILLIGRAM(S): at 06:16

## 2022-01-01 RX ADMIN — Medication 100 MEQ/KG/HR: at 13:13

## 2022-01-01 RX ADMIN — HYDROMORPHONE HYDROCHLORIDE 0.5 MILLIGRAM(S): 2 INJECTION INTRAMUSCULAR; INTRAVENOUS; SUBCUTANEOUS at 05:36

## 2022-01-01 RX ADMIN — HEPARIN SODIUM 5000 UNIT(S): 5000 INJECTION INTRAVENOUS; SUBCUTANEOUS at 05:42

## 2022-01-01 RX ADMIN — Medication 3 MILLIGRAM(S): at 23:09

## 2022-01-01 RX ADMIN — SODIUM CHLORIDE 1000 MILLILITER(S): 9 INJECTION INTRAMUSCULAR; INTRAVENOUS; SUBCUTANEOUS at 18:44

## 2022-01-01 RX ADMIN — HEPARIN SODIUM 800 UNIT(S)/HR: 5000 INJECTION INTRAVENOUS; SUBCUTANEOUS at 11:17

## 2022-01-01 RX ADMIN — HYDROMORPHONE HYDROCHLORIDE 0.2 MILLIGRAM(S): 2 INJECTION INTRAMUSCULAR; INTRAVENOUS; SUBCUTANEOUS at 10:39

## 2022-01-01 RX ADMIN — OXYCODONE HYDROCHLORIDE 5 MILLIGRAM(S): 5 TABLET ORAL at 14:13

## 2022-01-01 RX ADMIN — Medication 100 MEQ/KG/HR: at 18:36

## 2022-01-01 RX ADMIN — Medication 125 MICROGRAM(S): at 06:07

## 2022-01-01 RX ADMIN — HYDROMORPHONE HYDROCHLORIDE 30 MILLILITER(S): 2 INJECTION INTRAMUSCULAR; INTRAVENOUS; SUBCUTANEOUS at 07:51

## 2022-01-01 RX ADMIN — ONDANSETRON 8 MILLIGRAM(S): 8 TABLET, FILM COATED ORAL at 22:02

## 2022-01-01 RX ADMIN — HYDROMORPHONE HYDROCHLORIDE 0.5 MILLIGRAM(S): 2 INJECTION INTRAMUSCULAR; INTRAVENOUS; SUBCUTANEOUS at 18:35

## 2022-01-01 RX ADMIN — ONDANSETRON 4 MILLIGRAM(S): 8 TABLET, FILM COATED ORAL at 17:55

## 2022-01-01 RX ADMIN — HEPARIN SODIUM 800 UNIT(S)/HR: 5000 INJECTION INTRAVENOUS; SUBCUTANEOUS at 07:28

## 2022-01-01 RX ADMIN — HEPARIN SODIUM 800 UNIT(S)/HR: 5000 INJECTION INTRAVENOUS; SUBCUTANEOUS at 12:45

## 2022-01-01 RX ADMIN — SODIUM CHLORIDE 1 GRAM(S): 9 INJECTION INTRAMUSCULAR; INTRAVENOUS; SUBCUTANEOUS at 06:00

## 2022-01-01 RX ADMIN — HYDROMORPHONE HYDROCHLORIDE 0.5 MILLIGRAM(S): 2 INJECTION INTRAMUSCULAR; INTRAVENOUS; SUBCUTANEOUS at 00:06

## 2022-01-01 RX ADMIN — HYDROMORPHONE HYDROCHLORIDE 0.5 MILLIGRAM(S): 2 INJECTION INTRAMUSCULAR; INTRAVENOUS; SUBCUTANEOUS at 21:05

## 2022-01-01 RX ADMIN — HYDROMORPHONE HYDROCHLORIDE 0.5 MILLIGRAM(S): 2 INJECTION INTRAMUSCULAR; INTRAVENOUS; SUBCUTANEOUS at 17:43

## 2022-01-01 RX ADMIN — HEPARIN SODIUM 800 UNIT(S)/HR: 5000 INJECTION INTRAVENOUS; SUBCUTANEOUS at 14:13

## 2022-01-01 RX ADMIN — MIDODRINE HYDROCHLORIDE 5 MILLIGRAM(S): 2.5 TABLET ORAL at 10:55

## 2022-01-01 RX ADMIN — ROBINUL 0.4 MILLIGRAM(S): 0.2 INJECTION INTRAMUSCULAR; INTRAVENOUS at 18:07

## 2022-01-01 RX ADMIN — Medication 250 MILLIGRAM(S): at 17:11

## 2022-01-01 RX ADMIN — Medication 650 MILLIGRAM(S): at 20:25

## 2022-01-01 RX ADMIN — HEPARIN SODIUM 900 UNIT(S)/HR: 5000 INJECTION INTRAVENOUS; SUBCUTANEOUS at 01:13

## 2022-01-01 RX ADMIN — OXYCODONE HYDROCHLORIDE 5 MILLIGRAM(S): 5 TABLET ORAL at 09:25

## 2022-01-01 RX ADMIN — HEPARIN SODIUM 900 UNIT(S)/HR: 5000 INJECTION INTRAVENOUS; SUBCUTANEOUS at 06:07

## 2022-01-01 RX ADMIN — HEPARIN SODIUM 900 UNIT(S)/HR: 5000 INJECTION INTRAVENOUS; SUBCUTANEOUS at 20:57

## 2022-01-01 RX ADMIN — SODIUM CHLORIDE 500 MILLILITER(S): 9 INJECTION INTRAMUSCULAR; INTRAVENOUS; SUBCUTANEOUS at 21:04

## 2022-01-01 RX ADMIN — ONDANSETRON 8 MILLIGRAM(S): 8 TABLET, FILM COATED ORAL at 21:44

## 2022-01-01 RX ADMIN — PANTOPRAZOLE SODIUM 40 MILLIGRAM(S): 20 TABLET, DELAYED RELEASE ORAL at 05:31

## 2022-01-01 RX ADMIN — HYDROMORPHONE HYDROCHLORIDE 0.5 MILLIGRAM(S): 2 INJECTION INTRAMUSCULAR; INTRAVENOUS; SUBCUTANEOUS at 13:13

## 2022-01-01 RX ADMIN — Medication 1000 UNIT(S): at 12:41

## 2022-01-01 RX ADMIN — HYDROMORPHONE HYDROCHLORIDE 0.5 MILLIGRAM(S): 2 INJECTION INTRAMUSCULAR; INTRAVENOUS; SUBCUTANEOUS at 00:15

## 2022-01-01 RX ADMIN — Medication 500 MILLIGRAM(S): at 12:54

## 2022-01-01 RX ADMIN — SODIUM CHLORIDE 250 MILLILITER(S): 9 INJECTION INTRAMUSCULAR; INTRAVENOUS; SUBCUTANEOUS at 19:34

## 2022-01-01 RX ADMIN — Medication 250 MILLIGRAM(S): at 16:11

## 2022-01-01 RX ADMIN — Medication 500 MILLIGRAM(S): at 12:42

## 2022-01-01 RX ADMIN — Medication 50 MILLILITER(S): at 21:04

## 2022-01-01 RX ADMIN — ONDANSETRON 8 MILLIGRAM(S): 8 TABLET, FILM COATED ORAL at 13:38

## 2022-01-01 RX ADMIN — ONDANSETRON 8 MILLIGRAM(S): 8 TABLET, FILM COATED ORAL at 06:27

## 2022-01-01 RX ADMIN — Medication 112 MICROGRAM(S): at 05:26

## 2022-01-01 RX ADMIN — MIDODRINE HYDROCHLORIDE 20 MILLIGRAM(S): 2.5 TABLET ORAL at 05:50

## 2022-01-01 RX ADMIN — MIDODRINE HYDROCHLORIDE 20 MILLIGRAM(S): 2.5 TABLET ORAL at 13:12

## 2022-01-01 RX ADMIN — HYDROMORPHONE HYDROCHLORIDE 0.5 MILLIGRAM(S): 2 INJECTION INTRAMUSCULAR; INTRAVENOUS; SUBCUTANEOUS at 12:38

## 2022-01-01 RX ADMIN — MIDODRINE HYDROCHLORIDE 20 MILLIGRAM(S): 2.5 TABLET ORAL at 13:03

## 2022-01-01 RX ADMIN — ONDANSETRON 2 MILLIGRAM(S): 8 TABLET, FILM COATED ORAL at 17:36

## 2022-01-01 RX ADMIN — APIXABAN 10 MILLIGRAM(S): 2.5 TABLET, FILM COATED ORAL at 03:05

## 2022-01-01 RX ADMIN — SODIUM CHLORIDE 1 GRAM(S): 9 INJECTION INTRAMUSCULAR; INTRAVENOUS; SUBCUTANEOUS at 21:54

## 2022-01-01 RX ADMIN — ONDANSETRON 8 MILLIGRAM(S): 8 TABLET, FILM COATED ORAL at 06:31

## 2022-01-01 RX ADMIN — PANTOPRAZOLE SODIUM 40 MILLIGRAM(S): 20 TABLET, DELAYED RELEASE ORAL at 05:49

## 2022-01-01 RX ADMIN — HYDROMORPHONE HYDROCHLORIDE 2 MILLIGRAM(S): 2 INJECTION INTRAMUSCULAR; INTRAVENOUS; SUBCUTANEOUS at 11:00

## 2022-01-01 RX ADMIN — PANTOPRAZOLE SODIUM 40 MILLIGRAM(S): 20 TABLET, DELAYED RELEASE ORAL at 07:36

## 2022-01-01 RX ADMIN — SODIUM CHLORIDE 1 GRAM(S): 9 INJECTION INTRAMUSCULAR; INTRAVENOUS; SUBCUTANEOUS at 05:50

## 2022-01-01 RX ADMIN — CEFEPIME 100 MILLIGRAM(S): 1 INJECTION, POWDER, FOR SOLUTION INTRAMUSCULAR; INTRAVENOUS at 06:17

## 2022-01-01 RX ADMIN — PANTOPRAZOLE SODIUM 40 MILLIGRAM(S): 20 TABLET, DELAYED RELEASE ORAL at 06:28

## 2022-01-01 RX ADMIN — Medication 400 MILLIGRAM(S): at 05:08

## 2022-01-01 RX ADMIN — PANTOPRAZOLE SODIUM 40 MILLIGRAM(S): 20 TABLET, DELAYED RELEASE ORAL at 05:00

## 2022-01-01 RX ADMIN — MIDODRINE HYDROCHLORIDE 20 MILLIGRAM(S): 2.5 TABLET ORAL at 16:50

## 2022-01-01 RX ADMIN — Medication 100 MEQ/KG/HR: at 08:58

## 2022-01-01 RX ADMIN — Medication 15 MILLIGRAM(S): at 04:27

## 2022-01-01 RX ADMIN — ONDANSETRON 4 MILLIGRAM(S): 8 TABLET, FILM COATED ORAL at 12:29

## 2022-01-01 RX ADMIN — HYDROMORPHONE HYDROCHLORIDE 0.5 MILLIGRAM(S): 2 INJECTION INTRAMUSCULAR; INTRAVENOUS; SUBCUTANEOUS at 16:50

## 2022-01-01 RX ADMIN — Medication 30 MILLIGRAM(S): at 06:46

## 2022-01-01 RX ADMIN — HYDROMORPHONE HYDROCHLORIDE 2 MILLIGRAM(S): 2 INJECTION INTRAMUSCULAR; INTRAVENOUS; SUBCUTANEOUS at 00:45

## 2022-01-01 RX ADMIN — Medication 100 MEQ/KG/HR: at 21:41

## 2022-01-01 RX ADMIN — Medication 500 MILLIGRAM(S): at 14:22

## 2022-01-01 RX ADMIN — HYDROMORPHONE HYDROCHLORIDE 0.5 MILLIGRAM(S): 2 INJECTION INTRAMUSCULAR; INTRAVENOUS; SUBCUTANEOUS at 06:15

## 2022-01-01 RX ADMIN — HYDROMORPHONE HYDROCHLORIDE 0.2 MILLIGRAM(S): 2 INJECTION INTRAMUSCULAR; INTRAVENOUS; SUBCUTANEOUS at 23:17

## 2022-01-01 RX ADMIN — HYDROMORPHONE HYDROCHLORIDE 0.5 MILLIGRAM(S): 2 INJECTION INTRAMUSCULAR; INTRAVENOUS; SUBCUTANEOUS at 18:36

## 2022-01-01 RX ADMIN — CEFEPIME 100 MILLIGRAM(S): 1 INJECTION, POWDER, FOR SOLUTION INTRAMUSCULAR; INTRAVENOUS at 05:03

## 2022-01-01 RX ADMIN — ONDANSETRON 8 MILLIGRAM(S): 8 TABLET, FILM COATED ORAL at 22:08

## 2022-01-01 RX ADMIN — HYDROMORPHONE HYDROCHLORIDE 2 MILLIGRAM(S): 2 INJECTION INTRAMUSCULAR; INTRAVENOUS; SUBCUTANEOUS at 13:12

## 2022-01-01 RX ADMIN — HYDROMORPHONE HYDROCHLORIDE 0.5 MILLIGRAM(S): 2 INJECTION INTRAMUSCULAR; INTRAVENOUS; SUBCUTANEOUS at 21:20

## 2022-01-01 RX ADMIN — HYDROMORPHONE HYDROCHLORIDE 0.5 MILLIGRAM(S): 2 INJECTION INTRAMUSCULAR; INTRAVENOUS; SUBCUTANEOUS at 16:31

## 2022-01-01 RX ADMIN — Medication 975 MILLIGRAM(S): at 11:12

## 2022-01-01 RX ADMIN — HYDROMORPHONE HYDROCHLORIDE 0.2 MILLIGRAM(S): 2 INJECTION INTRAMUSCULAR; INTRAVENOUS; SUBCUTANEOUS at 05:34

## 2022-01-01 RX ADMIN — HYDROMORPHONE HYDROCHLORIDE 0.5 MILLIGRAM(S): 2 INJECTION INTRAMUSCULAR; INTRAVENOUS; SUBCUTANEOUS at 05:55

## 2022-01-01 RX ADMIN — MIDODRINE HYDROCHLORIDE 20 MILLIGRAM(S): 2.5 TABLET ORAL at 06:27

## 2022-01-01 RX ADMIN — HEPARIN SODIUM 900 UNIT(S)/HR: 5000 INJECTION INTRAVENOUS; SUBCUTANEOUS at 16:16

## 2022-01-01 RX ADMIN — ONDANSETRON 4 MILLIGRAM(S): 8 TABLET, FILM COATED ORAL at 18:07

## 2022-01-01 RX ADMIN — MIDODRINE HYDROCHLORIDE 20 MILLIGRAM(S): 2.5 TABLET ORAL at 06:19

## 2022-01-01 RX ADMIN — HYDROMORPHONE HYDROCHLORIDE 0.5 MILLIGRAM(S): 2 INJECTION INTRAMUSCULAR; INTRAVENOUS; SUBCUTANEOUS at 19:33

## 2022-01-01 RX ADMIN — MIDODRINE HYDROCHLORIDE 20 MILLIGRAM(S): 2.5 TABLET ORAL at 21:04

## 2022-01-01 RX ADMIN — Medication 1 TABLET(S): at 14:22

## 2022-01-01 RX ADMIN — ONDANSETRON 8 MILLIGRAM(S): 8 TABLET, FILM COATED ORAL at 05:56

## 2022-01-01 RX ADMIN — MEROPENEM 100 MILLIGRAM(S): 1 INJECTION INTRAVENOUS at 05:29

## 2022-01-01 RX ADMIN — HYDROMORPHONE HYDROCHLORIDE 0.5 MILLIGRAM(S): 2 INJECTION INTRAMUSCULAR; INTRAVENOUS; SUBCUTANEOUS at 06:43

## 2022-01-01 RX ADMIN — HYDROMORPHONE HYDROCHLORIDE 0.2 MILLIGRAM(S): 2 INJECTION INTRAMUSCULAR; INTRAVENOUS; SUBCUTANEOUS at 21:48

## 2022-01-01 RX ADMIN — HYDROMORPHONE HYDROCHLORIDE 0.2 MILLIGRAM(S): 2 INJECTION INTRAMUSCULAR; INTRAVENOUS; SUBCUTANEOUS at 08:34

## 2022-01-01 RX ADMIN — Medication 100 MEQ/KG/HR: at 20:36

## 2022-01-01 RX ADMIN — HYDROMORPHONE HYDROCHLORIDE 0.5 MILLIGRAM(S): 2 INJECTION INTRAMUSCULAR; INTRAVENOUS; SUBCUTANEOUS at 14:05

## 2022-01-01 RX ADMIN — HYDROMORPHONE HYDROCHLORIDE 2 MILLIGRAM(S): 2 INJECTION INTRAMUSCULAR; INTRAVENOUS; SUBCUTANEOUS at 10:14

## 2022-01-01 RX ADMIN — CEFEPIME 100 MILLIGRAM(S): 1 INJECTION, POWDER, FOR SOLUTION INTRAMUSCULAR; INTRAVENOUS at 17:22

## 2022-01-01 RX ADMIN — MEROPENEM 100 MILLIGRAM(S): 1 INJECTION INTRAVENOUS at 06:30

## 2022-01-01 RX ADMIN — ONDANSETRON 8 MILLIGRAM(S): 8 TABLET, FILM COATED ORAL at 13:44

## 2022-01-01 RX ADMIN — HEPARIN SODIUM 5000 UNIT(S): 5000 INJECTION INTRAVENOUS; SUBCUTANEOUS at 11:59

## 2022-01-01 RX ADMIN — Medication 15 MILLIGRAM(S): at 03:42

## 2022-01-01 RX ADMIN — MIDODRINE HYDROCHLORIDE 20 MILLIGRAM(S): 2.5 TABLET ORAL at 05:49

## 2022-01-01 RX ADMIN — HYDROMORPHONE HYDROCHLORIDE 0.5 MILLIGRAM(S): 2 INJECTION INTRAMUSCULAR; INTRAVENOUS; SUBCUTANEOUS at 09:45

## 2022-01-01 RX ADMIN — Medication 112 MICROGRAM(S): at 06:27

## 2022-01-01 RX ADMIN — MEROPENEM 100 MILLIGRAM(S): 1 INJECTION INTRAVENOUS at 06:21

## 2022-01-01 RX ADMIN — PANTOPRAZOLE SODIUM 40 MILLIGRAM(S): 20 TABLET, DELAYED RELEASE ORAL at 06:35

## 2022-01-01 RX ADMIN — HYDROMORPHONE HYDROCHLORIDE 0.2 MILLIGRAM(S): 2 INJECTION INTRAMUSCULAR; INTRAVENOUS; SUBCUTANEOUS at 23:36

## 2022-01-01 RX ADMIN — MIDODRINE HYDROCHLORIDE 20 MILLIGRAM(S): 2.5 TABLET ORAL at 06:16

## 2022-01-01 RX ADMIN — Medication 1000 UNIT(S): at 14:14

## 2022-01-01 RX ADMIN — HYDROMORPHONE HYDROCHLORIDE 0.5 MILLIGRAM(S): 2 INJECTION INTRAMUSCULAR; INTRAVENOUS; SUBCUTANEOUS at 02:06

## 2022-01-01 RX ADMIN — HEPARIN SODIUM 600 UNIT(S)/HR: 5000 INJECTION INTRAVENOUS; SUBCUTANEOUS at 21:09

## 2022-01-01 RX ADMIN — HYDROMORPHONE HYDROCHLORIDE 0.5 MILLIGRAM(S): 2 INJECTION INTRAMUSCULAR; INTRAVENOUS; SUBCUTANEOUS at 13:41

## 2022-01-01 RX ADMIN — FAMOTIDINE 20 MILLIGRAM(S): 10 INJECTION INTRAVENOUS at 06:05

## 2022-01-01 RX ADMIN — HEPARIN SODIUM 1100 UNIT(S)/HR: 5000 INJECTION INTRAVENOUS; SUBCUTANEOUS at 15:12

## 2022-01-01 RX ADMIN — MIDODRINE HYDROCHLORIDE 20 MILLIGRAM(S): 2.5 TABLET ORAL at 22:05

## 2022-01-01 RX ADMIN — MIDODRINE HYDROCHLORIDE 20 MILLIGRAM(S): 2.5 TABLET ORAL at 12:05

## 2022-01-01 RX ADMIN — ONDANSETRON 4 MILLIGRAM(S): 8 TABLET, FILM COATED ORAL at 14:17

## 2022-01-01 RX ADMIN — Medication 50 MILLILITER(S): at 15:02

## 2022-01-01 RX ADMIN — LIDOCAINE 10 MILLILITER(S): 4 CREAM TOPICAL at 16:25

## 2022-01-01 RX ADMIN — PANTOPRAZOLE SODIUM 40 MILLIGRAM(S): 20 TABLET, DELAYED RELEASE ORAL at 06:07

## 2022-01-01 RX ADMIN — HYDROMORPHONE HYDROCHLORIDE 0.2 MILLIGRAM(S): 2 INJECTION INTRAMUSCULAR; INTRAVENOUS; SUBCUTANEOUS at 23:22

## 2022-01-01 RX ADMIN — ONDANSETRON 4 MILLIGRAM(S): 8 TABLET, FILM COATED ORAL at 20:34

## 2022-01-01 RX ADMIN — HEPARIN SODIUM 1100 UNIT(S)/HR: 5000 INJECTION INTRAVENOUS; SUBCUTANEOUS at 07:08

## 2022-01-01 RX ADMIN — ONDANSETRON 4 MILLIGRAM(S): 8 TABLET, FILM COATED ORAL at 11:33

## 2022-01-01 RX ADMIN — HEPARIN SODIUM 1100 UNIT(S)/HR: 5000 INJECTION INTRAVENOUS; SUBCUTANEOUS at 23:17

## 2022-01-01 RX ADMIN — HYDROMORPHONE HYDROCHLORIDE 0.5 MILLIGRAM(S): 2 INJECTION INTRAMUSCULAR; INTRAVENOUS; SUBCUTANEOUS at 20:43

## 2022-01-01 RX ADMIN — Medication 100 MEQ/KG/HR: at 12:04

## 2022-01-01 RX ADMIN — ONDANSETRON 4 MILLIGRAM(S): 8 TABLET, FILM COATED ORAL at 22:34

## 2022-01-01 RX ADMIN — SODIUM CHLORIDE 75 MILLILITER(S): 9 INJECTION, SOLUTION INTRAVENOUS at 20:15

## 2022-01-01 RX ADMIN — BUMETANIDE 1 MILLIGRAM(S): 0.25 INJECTION INTRAMUSCULAR; INTRAVENOUS at 12:55

## 2022-01-01 RX ADMIN — Medication 250 MILLIGRAM(S): at 17:13

## 2022-01-01 RX ADMIN — Medication 500 MILLIGRAM(S): at 12:02

## 2022-01-01 RX ADMIN — HEPARIN SODIUM 0 UNIT(S)/HR: 5000 INJECTION INTRAVENOUS; SUBCUTANEOUS at 23:30

## 2022-01-01 RX ADMIN — Medication 100 MEQ/KG/HR: at 22:18

## 2022-01-01 RX ADMIN — ONDANSETRON 8 MILLIGRAM(S): 8 TABLET, FILM COATED ORAL at 21:12

## 2022-01-01 RX ADMIN — Medication 500 MILLIGRAM(S): at 13:59

## 2022-01-01 RX ADMIN — ONDANSETRON 8 MILLIGRAM(S): 8 TABLET, FILM COATED ORAL at 05:35

## 2022-01-01 RX ADMIN — Medication 650 MILLIGRAM(S): at 08:55

## 2022-01-01 RX ADMIN — Medication 250 MILLIGRAM(S): at 22:16

## 2022-01-01 RX ADMIN — CEFEPIME 100 MILLIGRAM(S): 1 INJECTION, POWDER, FOR SOLUTION INTRAMUSCULAR; INTRAVENOUS at 05:00

## 2022-01-01 RX ADMIN — HYDROMORPHONE HYDROCHLORIDE 0.2 MILLIGRAM(S): 2 INJECTION INTRAMUSCULAR; INTRAVENOUS; SUBCUTANEOUS at 20:33

## 2022-01-01 RX ADMIN — ONDANSETRON 8 MILLIGRAM(S): 8 TABLET, FILM COATED ORAL at 13:12

## 2022-01-01 RX ADMIN — ONDANSETRON 4 MILLIGRAM(S): 8 TABLET, FILM COATED ORAL at 14:13

## 2022-01-01 RX ADMIN — MIDODRINE HYDROCHLORIDE 20 MILLIGRAM(S): 2.5 TABLET ORAL at 17:36

## 2022-01-01 RX ADMIN — PANTOPRAZOLE SODIUM 40 MILLIGRAM(S): 20 TABLET, DELAYED RELEASE ORAL at 05:26

## 2022-01-01 RX ADMIN — HEPARIN SODIUM 5000 UNIT(S): 5000 INJECTION INTRAVENOUS; SUBCUTANEOUS at 05:08

## 2022-01-01 RX ADMIN — SODIUM CHLORIDE 1 GRAM(S): 9 INJECTION INTRAMUSCULAR; INTRAVENOUS; SUBCUTANEOUS at 14:35

## 2022-01-01 RX ADMIN — SODIUM CHLORIDE 1 GRAM(S): 9 INJECTION INTRAMUSCULAR; INTRAVENOUS; SUBCUTANEOUS at 22:11

## 2022-01-01 RX ADMIN — HYDROMORPHONE HYDROCHLORIDE 0.2 MILLIGRAM(S): 2 INJECTION INTRAMUSCULAR; INTRAVENOUS; SUBCUTANEOUS at 12:06

## 2022-01-01 RX ADMIN — SIMVASTATIN 40 MILLIGRAM(S): 20 TABLET, FILM COATED ORAL at 21:00

## 2022-01-01 RX ADMIN — HYDROMORPHONE HYDROCHLORIDE 0.5 MILLIGRAM(S): 2 INJECTION INTRAMUSCULAR; INTRAVENOUS; SUBCUTANEOUS at 01:54

## 2022-01-01 RX ADMIN — HYDROMORPHONE HYDROCHLORIDE 0.5 MILLIGRAM(S): 2 INJECTION INTRAMUSCULAR; INTRAVENOUS; SUBCUTANEOUS at 13:20

## 2022-01-01 RX ADMIN — SODIUM CHLORIDE 125 MILLILITER(S): 9 INJECTION, SOLUTION INTRAVENOUS at 20:14

## 2022-01-01 RX ADMIN — Medication 112 MICROGRAM(S): at 06:16

## 2022-03-07 PROBLEM — Z00.00 ENCOUNTER FOR PREVENTIVE HEALTH EXAMINATION: Status: ACTIVE | Noted: 2022-01-01

## 2022-04-12 NOTE — H&P PST ADULT - GENERAL COMMENTS
loss of 24 pounds since 2/2022// preop dx intra-abdominal and pelvic swelling mass and lump, other ascites

## 2022-04-12 NOTE — H&P PST ADULT - GASTROINTESTINAL COMMENTS
preop dx intra-abdominal and pelvic swelling mass and lump, other ascites RUQ pain- localized, constant, s/p paracentesis 4300 ml removed 4/11/22,  preop dx intra-abdominal and pelvic swelling mass and lump, other ascites

## 2022-04-12 NOTE — H&P PST ADULT - HISTORY OF PRESENT ILLNESS
66 yr old female presents with preop dx intra-abdominal and pelvic swelling mass and lump, other ascites scheduled for diagnostic laparoscopy, tumor biopsies, patient reports right to mid epigastric pain in February 2022, admitted to [facility]  2/28- CT AP- Multiple suspicious carcinomatosis, hepatic/splenic lesions, with ascites and complex left pleural effusion. IR guided paracentesis on 3/1 4 L removed; 4/12 4300 ml removed, reports mild nausea

## 2022-04-12 NOTE — H&P PST ADULT - PROBLEM SELECTOR PLAN 2
Problem: Hypothyroidism  Plan: Instructed to take levothyroxine as prescribed, verbalized understanding

## 2022-04-12 NOTE — H&P PST ADULT - NSICDXPASTMEDICALHX_GEN_ALL_CORE_FT
PAST MEDICAL HISTORY:  Arthritis     Ascites     GERD (gastroesophageal reflux disease)     HLD (hyperlipidemia)     Hypothyroidism     Intra-abdominal and pelvic swelling of mass or lump

## 2022-04-12 NOTE — H&P PST ADULT - PROBLEM SELECTOR PLAN 1
Assessment and Plan: Patient scheduled for surgery on 4/18/22  Patient provided with verbal and written presurgical instructions; verbalized understanding  with teach back.    Patient provided with famotidine for GI prophylaxis; verbalized understanding.    Patient provided with Chlorhexidine wash, verbal and written instructions reviewed. Patient demonstrated understanding with teach back.   Patient instructed to call surgeon to schedule COVID appointment     Medical evaluation requested by PST for midsternal chest pain, patient verbalized understanding, will obtain    patient instructed to stop mtv today Assessment and Plan: Patient scheduled for surgery on 4/18/22  Patient provided with verbal and written presurgical instructions; verbalized understanding  with teach back.    Patient provided with famotidine for GI prophylaxis; verbalized understanding.    Patient provided with Chlorhexidine wash, verbal and written instructions reviewed. Patient demonstrated understanding with teach back.   Patient instructed to call surgeon to schedule COVID appointment     Medical evaluation requested by PST for midsternal chest pain, diminished breath sounds patient verbalized understanding, will obtain    patient instructed to stop mtv today

## 2022-04-12 NOTE — H&P PST ADULT - NSANTHOSAYNRD_GEN_A_CORE
No. DOMITILA screening performed.  STOP BANG Legend: 0-2 = LOW Risk; 3-4 = INTERMEDIATE Risk; 5-8 = HIGH Risk

## 2022-04-12 NOTE — H&P PST ADULT - NSICDXPASTSURGICALHX_GEN_ALL_CORE_FT
PAST SURGICAL HISTORY:  S/P bunionectomy left foot    S/P cholecystectomy     S/P tubal ligation

## 2022-04-15 NOTE — ASU PATIENT PROFILE, ADULT - FALL HARM RISK - HARM RISK INTERVENTIONS

## 2022-04-18 NOTE — ASU DISCHARGE PLAN (ADULT/PEDIATRIC) - ASU DC SPECIAL INSTRUCTIONSFT
Postoperative Instructions      Pain control     For pain control, take the followin. Motrin 600mg four times a day, take with food  2. Add Tylenol 975mg four times a day, alternated with motrin  3. Add oxycodone as needed for severe pain not managed well by motrin and tylenol. A prescription has been sent to your pharmacy on file.     Motrin and Tylenol can be obtained over the counter.    Do not drive or make important decisions for 24 hours after anesthesia. You may feel drowsy for 24 hours and should have a responsible adult with you during that time. No tub baths, pools or hot tubs for 8 weeks (showers are ok!). No lifting anything heavier than 15 lbs, no strenuous exercise for 8 weeks after surgery. Do not pull or cut any stitches that you see around your incision.       Signs of Infection  Some postoperative pain and discomfort is normal. However, if you experience any of the following, you may be developing an infection and should be seen by your doctor: pain that does not get better with the oral medications listed above, fever greater than 100.4F, foul smelling discharge coming from the surgical site, nausea and vomiting that does not stop (especially if you are unable to tolerate oral intake), or inability to urinate. If you experience any of these symptoms, call your doctor's office to be seen right away.    Follow Up  Call Dr. Jordan's office to schedule a postoperative appointment in 2 weeks. The results from the procedure will be discussed with you at that time.

## 2022-04-18 NOTE — BRIEF OPERATIVE NOTE - OPERATION/FINDINGS
EUA: EUA: Nodular cervix with posterior mass, shortened parametria b/l, mass occupying the posterior cul de sac  Laparoscopy: 3 L ascites drained.  Extensive carcinomatosis with nodularity throughout upper abdomen, liver, hemidiaphragms, stomach, omentum. Bilateral tubal masses with pelvic mass adherent to sigmoid colon.   FS = carcinoma

## 2022-04-18 NOTE — ASU DISCHARGE PLAN (ADULT/PEDIATRIC) - FOLLOW UP APPOINTMENTS
436 may also call Recovery Room (PACU) 24/7 @ [phone]/[facility]

## 2022-04-18 NOTE — ASU DISCHARGE PLAN (ADULT/PEDIATRIC) - NS MD DC FALL RISK RISK
For information on Fall & Injury Prevention, visit: [email or web address] OR  [email or web address] OR  https://www.cdc.gov/steadi/patient.html

## 2022-04-18 NOTE — CHART NOTE - NSCHARTNOTEFT_GEN_A_CORE
Patient seen and examined at bedside, recently post-op. Endorses minimal back soreness. Denies CP, SOB, N/V, fevers, and chills.    Vital Signs Last 24 Hours  T(C): 36.7 (04-18-22 @ 16:20), Max: 36.7 (04-18-22 @ 16:20)  HR: 89 (04-18-22 @ 17:15) (89 - 109)  BP: 98/62 (04-18-22 @ 17:15) (98/62 - 115/66)  RR: 16 (04-18-22 @ 17:15) (16 - 27)  SpO2: 95% (04-18-22 @ 17:15) (93% - 97%)    I&O's Summary    18 Apr 2022 07:01  -  18 Apr 2022 17:37  --------------------------------------------------------  IN: 135 mL / OUT: 0 mL / NET: 135 mL    Physical Exam:  General: NAD  CV: RRR  Lungs: CTA-B  Abdomen: Soft, appropriately tender, mildly distended, +BS  Incision: port sites c/d/i dressings in place  Ext: No pain or swelling    MEDICATIONS  (STANDING):  lactated ringers. 1000 milliLiter(s) (75 mL/Hr) IV Continuous <Continuous>  lactated ringers. 1000 milliLiter(s) (30 mL/Hr) IV Continuous <Continuous>  sodium chloride 0.9% lock flush 3 milliLiter(s) IV Push every 8 hours    MEDICATIONS  (PRN):      66yyo s/p recovering well in acute post-operative state.    Neuro: c/w PO pain meds  CV: Hemodynamically stable  Pulm: Ecourage oob/ambulation, incentive spirometer at bedside  GI: Regular Diet    : F DTV@130a  Heme: SCDs for DVT PPX, encourage OOB  ID: afebrile  Dispo: discharge home once voided, pt instructed to f/u with Dr. Jordan in 2 weeks    D/w Oncology Team  Odessa Kruse PGY-1

## 2022-04-18 NOTE — ASU DISCHARGE PLAN (ADULT/PEDIATRIC) - CARE PROVIDER_API CALL
Allyson Jordan)  Gynecologic Oncology; Obstetrics and Gynecology  [address] [place] [address]  Phone: [phone]  Fax: [phone]  Follow Up Time: 2 weeks

## 2022-04-18 NOTE — ASU DISCHARGE PLAN (ADULT/PEDIATRIC) - CALL YOUR DOCTOR IF YOU HAVE ANY OF THE FOLLOWING:
Bleeding that does not stop/Pain not relieved by Medications/Fever greater than (need to indicate Fahrenheit or Celsius)/Wound/Surgical Site with redness, or foul smelling discharge or pus/Nausea and vomiting that does not stop/Unable to urinate/Excessive diarrhea/Inability to tolerate liquids or foods/Increased irritability or sluggishness Bleeding that does not stop/Swelling that gets worse/Pain not relieved by Medications/Fever greater than (need to indicate Fahrenheit or Celsius)/Wound/Surgical Site with redness, or foul smelling discharge or pus/Nausea and vomiting that does not stop/Unable to urinate/Excessive diarrhea/Inability to tolerate liquids or foods/Increased irritability or sluggishness

## 2022-04-21 PROBLEM — E78.5 HYPERLIPIDEMIA, UNSPECIFIED: Chronic | Status: ACTIVE | Noted: 2022-01-01

## 2022-04-21 PROBLEM — M19.90 UNSPECIFIED OSTEOARTHRITIS, UNSPECIFIED SITE: Chronic | Status: ACTIVE | Noted: 2022-01-01

## 2022-04-21 PROBLEM — K21.9 GASTRO-ESOPHAGEAL REFLUX DISEASE WITHOUT ESOPHAGITIS: Chronic | Status: ACTIVE | Noted: 2022-01-01

## 2022-04-21 PROBLEM — R18.8 OTHER ASCITES: Chronic | Status: ACTIVE | Noted: 2022-01-01

## 2022-04-21 PROBLEM — E03.9 HYPOTHYROIDISM, UNSPECIFIED: Chronic | Status: ACTIVE | Noted: 2022-01-01

## 2022-04-22 NOTE — ED PROVIDER NOTE - PHYSICAL EXAMINATION
Yair CASTRO:  VITALS: Initial triage and subsequent vitals have been reviewed by me.  GEN APPEARANCE: WDWN, alert and cooperative, non-toxic appearing and in NAD  HEAD: Atraumatic, normocephalic   EYES: PERRLa, EOMI, vision grossly intact.   EARS: Gross hearing intact.   NOSE: No nasal discharge, no external evidence of epistaxis.   NECK: Supple  CV: RRR, S1S2, no c/r/m/g. No cyanosis or pallor. Extremities warm, well perfused. Cap refill <2 seconds. No bruits.   LUNGS: CTAB. No wheezing. No rales. No rhonchi. No diminished breath sounds.   ABDOMEN: Soft, NTND. No guarding or rebound. No masses.   MSK/EXT: Spine appears normal, no spine point tenderness. No CVA ttp. Normal muscular development. Pelvis stable. No obvious joint or bony deformity, no peripheral edema.   NEURO: Alert, follows commands. Weight bearing normal. Speech normal. Sensation and motor normal x4 extremities.   SKIN: Normal color for race, warm, dry and intact. No evidence of rash. x3 bx sites on anterior abdomen appear c/d/i   PSYCH: Normal mood and affect.

## 2022-04-22 NOTE — CONSULT NOTE ADULT - SUBJECTIVE AND OBJECTIVE BOX
GENERAL SURGERY CONSULT  ========================    HPI:  65yo F with a history of cholecystectomy, tubal ligation and recently diagnosed peritoneal carcinomatosis s/p diagnostic lap, evacuation of ascites and omental biopsy (2022, Dr. Jordan) presenting for nausea, vomiting and abdominal pain. Patient reports onset of nausea, vomiting and abdominal pain on Tuesday. Endorses pain at umbilical port site. Reports last flatus and BM yesterday. Denies fevers, chills. Endorses poor PO intake.    In the ED, patient was stable. Labs unremarkable. CT showed an SBO with transition point at the level of the umbilicus containing fat and bowel.    PAST MEDICAL & SURGICAL HISTORY:  Ascites    Intra-abdominal and pelvic swelling of mass or lump    Hypothyroidism    GERD (gastroesophageal reflux disease)    Arthritis    HLD (hyperlipidemia)    S/P cholecystectomy    S/P tubal ligation    S/P bunionectomy  left foot    MEDICATIONS  (STANDING):  lactated ringers. 1000 milliLiter(s) (125 mL/Hr) IV Continuous <Continuous>    ALLERGIES:  Nuts (Anaphylaxis)  penicillin (Angioedema; Urticaria)  sulfa drugs (Hives; Urticaria)    ___________________________________________  REVIEW OF SYSTEMS:  All ROS negative except as per HPI.    ___________________________________________  VITALS:  Vital Signs Last 24 Hrs  T(C): 36.6 (2022 18:10), Max: 37 (2022 15:57)  T(F): 97.8 (2022 18:10), Max: 98.6 (2022 15:57)  HR: 97 (2022 18:10) (94 - 99)  BP: 119/82 (2022 18:10) (119/82 - 141/89)  BP(mean): --  RR: 18 (2022 18:10) (16 - 20)  SpO2: 95% (2022 18:10) (95% - 100%)    PHYSICAL EXAM:  General: AAOx3, no acute distress.  Respiratory: breathing comfortably, no increased WOB   Abdomen: soft, mildly distended, port sites c/d/i, mild tenderness over supraumbilical port site, no skin changes, no rebound, no guarding  Extremities: Moves all four.     ____________________________________________  LABS:  CBC Full  -  ( 2022 11:48 )  WBC Count : 9.68 K/uL  RBC Count : 6.56 M/uL  Hemoglobin : 14.0 g/dL  Hematocrit : 46.2 %  Platelet Count - Automated : 479 K/uL  Mean Cell Volume : 70.4 fL  Mean Cell Hemoglobin : 21.3 pg  Mean Cell Hemoglobin Concentration : 30.3 gm/dL  Auto Neutrophil # : 7.09 K/uL  Auto Lymphocyte # : 1.59 K/uL  Auto Monocyte # : 0.91 K/uL  Auto Eosinophil # : 0.02 K/uL  Auto Basophil # : 0.03 K/uL  Auto Neutrophil % : 73.3 %  Auto Lymphocyte % : 16.4 %  Auto Monocyte % : 9.4 %  Auto Eosinophil % : 0.2 %  Auto Basophil % : 0.3 %        137  |  96<L>  |  16  ----------------------------<  92  4.5   |  24  |  0.73    Ca    9.0      2022 11:48    TPro  7.1  /  Alb  3.4  /  TBili  0.7  /  DBili  x   /  AST  58<H>  /  ALT  46<H>  /  AlkPhos  78  04-    LIVER FUNCTIONS - ( 2022 11:48 )  Alb: 3.4 g/dL / Pro: 7.1 g/dL / ALK PHOS: 78 U/L / ALT: 46 U/L / AST: 58 U/L / GGT: x           PT/INR - ( 2022 11:48 )   PT: 11.5 sec;   INR: 0.99 ratio      PTT - ( 2022 11:48 )  PTT:24.4 sec  Urinalysis Basic - ( 2022 12:51 )    Color: Yellow / Appearance: Slightly Turbid / S.035 / pH: x  Gluc: x / Ketone: Large  / Bili: Small / Urobili: 6 mg/dL   Blood: x / Protein: 30 mg/dL / Nitrite: Negative   Leuk Esterase: Negative / RBC: 3 /HPF / WBC 2 /HPF   Sq Epi: x / Non Sq Epi: 2 /HPF / Bacteria: Few    ____________________________________________  RADIOLOGY & ADDITIONAL STUDIES:    < from: CT Abdomen and Pelvis w/ IV Cont (22 @ 14:46) >  ACC: [id number] EXAM:  CT ABDOMEN AND PELVIS IC                          PROCEDURE DATE:  2022      INTERPRETATION:  CLINICAL INFORMATION: Nausea and vomiting. Evaluate for   bowel obstruction.    COMPARISON: PET CT scan 3/9/2022    CONTRAST/COMPLICATIONS:  IV Contrast: Omnipaque 350  90 mL administered. 10 mL discarded  Oral Contrast: None  Complications: None reported at the time of exam    PROCEDURE:  CT of the Abdomen and Pelvis was performed.  Sagittal and coronal reformats were performed.    FINDINGS:  LOWER CHEST: Within normal limits.    LIVER: Within normal limits.  BILE DUCTS: Normal caliber.  GALLBLADDER: Within normal limits.  SPLEEN: Within normal limits.  PANCREAS: Within normal limits.  ADRENALS: Within normal limits.  KIDNEYS/URETERS: Within normal limits.    BLADDER: Within normal limits.  REPRODUCTIVE ORGANS: Small ascites.    BOWEL: Small bowel obstruction with transition point at the level of a   bowel and fat-containing ventral midline infraumbilical hernia (2:79).    PERITONEUM: Small amount of ascites. Extensive peritoneal carcinomatosis   with numerous implants in the pelvis, along the diaphragm, capsule liver,   as well as in the omentum. For reference:  *  Left upper quadrant implant along the gastric fundus measuring 3.5 x   3.5 cm. (2:23)  *  Omental implant measures 2.1 x 2.3 cm (2:53)  *  Left adnexal lesion measures 2.8 x 2.9 cm (2:93)    VESSELS: Mild atherosclerotic calcifications.  RETROPERITONEUM/LYMPH NODES: No lymphadenopathy.  ABDOMINAL WALL: Hernia as above.  *  Umbilical implant measures 1.8 cm. (602:71)    BONES: Degenerative changes.    IMPRESSION:  Small bowel obstruction with transition point in the infraumbilical   abdominal midline, at the level of a bowel containing ventral hernia.  Extensive peritoneal carcinomatosis with small ascites.    < end of copied text >

## 2022-04-22 NOTE — H&P ADULT - ATTENDING COMMENTS
Patient seen and evaluated.   CT images reviewed.   SBO - suspected incarcerated loop in recent surgical incision.   For exploration, reduction, possible bowel resection urgently.

## 2022-04-22 NOTE — ED ADULT NURSE REASSESSMENT NOTE - NS ED NURSE REASSESS COMMENT FT1
NGT inserted into left nostril by Dr. Duarte. 700 mL output prior to leaving E.D. for the O.R. Pt endorsed relief of stomach discomfort.

## 2022-04-22 NOTE — ED PROVIDER NOTE - ATTENDING CONTRIBUTION TO CARE
I have personally performed a face to face medical and diagnostic evaluation of the patient. I have discussed with and reviewed the Resident's note and agree with the History, ROS, Physical Exam and MDM unless otherwise indicated. A brief summary of my personal evaluation and impression can be found below.    Yair CASTRO: Please see the HPI, ROS, PE and MDM as authored by me.

## 2022-04-22 NOTE — ED PROVIDER NOTE - CLINICAL SUMMARY MEDICAL DECISION MAKING FREE TEXT BOX
Yair CASTRO: 66F [facility]x of ovarian ca [facility], hypothyroidism, s/p nolan years prior presents with a cc of periumbilical abdominal discomfort a/w repeated episodes of NB nausea and vomiting since Monday has not been able to tolerate PO since, no fevers, no rash, no urinary complaints, is passing small bowel movements, decreased gas 2/2 decreased PO intake, no discharge from drainage site. Also notes mild lightheadedness, dizziness and headache.  exam vss non toxic PE as above ddx c/f bowel obstruction, mass, or other intraabdominal surgical pathology will get labs ctap urine meds as needed, reassess thereafter.

## 2022-04-22 NOTE — CONSULT NOTE ADULT - ASSESSMENT
65yo F with a history of cholecystectomy, tubal ligation and recently diagnosed peritoneal carcinomatosis s/p diagnostic lap, evacuation of ascites and omental biopsy (4/18/2022, Dr. Jordan) presenting for nausea, vomiting and abdominal pain found to have an incarcerated bowel-containing umbilical hernia likely at site of recent surgical incision. Nontoxic and hemodynamically stable.    Plan/Recommendations:  - NGT placement  - NPO/IVF  - To OR for repair, possible bowel resection with Gyn Onc team    D/w Dr. Mikael Putnam, PGY2  B Team Surgery   [id number]

## 2022-04-22 NOTE — ED ADULT TRIAGE NOTE - CHIEF COMPLAINT QUOTE
amb abd surgery- exploratory monday " to check source of cancer"   reports nausea since ,vomited x 1 this am. reports weakness. denies fever

## 2022-04-22 NOTE — H&P ADULT - NSHPPHYSICALEXAM_GEN_ALL_CORE
Vital Signs Last 24 Hrs  T(C): 37 (22 Apr 2022 15:57), Max: 37 (22 Apr 2022 15:57)  T(F): 98.6 (22 Apr 2022 15:57), Max: 98.6 (22 Apr 2022 15:57)  HR: 95 (22 Apr 2022 15:57) (94 - 99)  BP: 141/89 (22 Apr 2022 15:57) (129/81 - 141/89)  BP(mean): --  RR: 20 (22 Apr 2022 15:57) (16 - 20)  SpO2: 100% (22 Apr 2022 15:57) (99% - 100%)      Physical Exam:   General: sitting comftorably in bed, NAD   HEENT: neck supple, full ROM  CV: RR S1S2 no m/r/g  Lungs: CTA b/l, good air flow b/l   Back: No CVA tenderness  Abd: Soft, non-tender, non-distended.  Bowel sounds present.    :  No bleeding on pad.    External labia wnl.  Bimanual exam with no cervical motion tenderness, uterus wnl, adnexa non palpable b/l.  Cervix closed vs. Cervix dilated    cm   Speculum Exam: No active bleeding from os.  Physiologic discharge.    Ext: non-tender b/l, no edema Vital Signs Last 24 Hrs  T(C): 37 (22 Apr 2022 15:57), Max: 37 (22 Apr 2022 15:57)  T(F): 98.6 (22 Apr 2022 15:57), Max: 98.6 (22 Apr 2022 15:57)  HR: 95 (22 Apr 2022 15:57) (94 - 99)  BP: 141/89 (22 Apr 2022 15:57) (129/81 - 141/89)  BP(mean): --  RR: 20 (22 Apr 2022 15:57) (16 - 20)  SpO2: 100% (22 Apr 2022 15:57) (99% - 100%)    Physical Exam:   General: sitting comfortably in bed, NAD, NGT in place  HEENT: neck supple, full ROM  CV: RR S1S2 no m/r/g  Lungs: CTA b/l, good air flow b/l   Back: No CVA tenderness  Abd: Soft, non-tender, non-distended.  Bowel sounds present. Supraumbilical and L lateral port site with steri strips, c/d.   :  deferred  Ext: non-tender b/l, no edema

## 2022-04-22 NOTE — ED ADULT NURSE REASSESSMENT NOTE - NS ED NURSE REASSESS COMMENT FT1
Pt resting in bed. Medicated for nausea as per eMAR. Pending surgery consult for possible obstruction. Call bell in reach.

## 2022-04-22 NOTE — ED ADULT TRIAGE NOTE - RESPIRATORY RATE (BREATHS/MIN)
70yo M pmhx prostate ca on chemo, kidney transplant, HTN p/w CC SOB. Pt. explains he has been experiencing productive cough w/ worsening shortness of breath for past couple of days, came to ED today bc SOB so severe he could barely speaking. On arrival pt. O2 Sat 78% on 100% NRB. Denies fever chills CP palpitations n/v/d abdominal pain. 18

## 2022-04-22 NOTE — CONSULT NOTE ADULT - ATTENDING COMMENTS
CT findings reviewed.  Agree with resident eval and plan.  Will be available if general surgery is needed by operating surgeon.

## 2022-04-22 NOTE — BRIEF OPERATIVE NOTE - NSICDXBRIEFPROCEDURE_GEN_ALL_CORE_FT
PROCEDURES:  Laparoscopy, diagnostic 22-Apr-2022 20:11:34  Denise Cedillo  Reduction, hernia, internal 22-Apr-2022 20:12:50  Denise Cedillo

## 2022-04-22 NOTE — ED PROVIDER NOTE - OBJECTIVE STATEMENT
Yair CASTRO: 66F hx of ovarian ca s/p bx on Monday w [facility], hypothyroidism, s/p nolan years prior presents with a cc of periumbilical abdominal discomfort a/w repeated episodes of NB nausea and vomiting since Monday has not been able to tolerate PO since, no fevers, no rash, no urinary complaints, is passing small bowel movements, decreased gas 2/2 decreased PO intake, no discharge from drainage site. Also notes mild lightheadedness, dizziness and headache. Denies /f/c/cp/sob. Denies syncope, Denies chest palpitations, abdominal pain. Denies edema. Denies dysuria, hematuria, BRBPR, tarry stools, diarrhea,

## 2022-04-22 NOTE — BRIEF OPERATIVE NOTE - OPERATION/FINDINGS
2L ascites drained. Extensive carcinomatosis with nodularity throughout upper abdomen, liver, hemidiaphragms, stomach, omentum. Annular area of hyperemic small bowel noted following reduction of incarcerated hernia, bowel otherwise normal with peristalsis noted. 2L ascites drained. Extensive carcinomatosis with nodularity throughout upper abdomen, liver, hemidiaphragms, stomach, omentum. Annular area of hyperemic small bowel noted following reduction of incarcerated hernia through inferior portion of supraumbilical port site, bowel otherwise normal with peristalsis noted. 1L ascites drained. Extensive carcinomatosis with nodularity throughout upper abdomen, liver, hemidiaphragms, stomach, omentum. Annular area of hyperemic small bowel noted following reduction of incarcerated hernia through inferior portion of supraumbilical port site, bowel otherwise normal with peristalsis noted.

## 2022-04-22 NOTE — ED PROVIDER NOTE - PROGRESS NOTE DETAILS
Reji Duarte PGY2: Pt seen by gyn onc and surg for SBO. NG tube placed. Plan for OR this afternoon. Admitting to GYN ONC. Yair CASTRO: (Back Charting) Pt signed out to my colleague Dr. VLAD Sutherland pending surg eval dispo planning.

## 2022-04-22 NOTE — H&P ADULT - ASSESSMENT
A/P: 65y/o Female POD#4 s/p Dx laparoscopy with biopsies and drainage of ascites for a pelvic mass (frozen = carcinoma), presents with worsening abdominal pain and n/v. Patient found to have an incarcerated hernia.    -Patient added-on to the OR emergently    Patient seen and d/w Dr. Shonna Saldana PGY-3 A/P: 65y/o F POD#4 s/p Dx laparoscopy with biopsies and drainage of ascites for a pelvic mass (frozen = carcinoma), presents with worsening abdominal pain and n/v. Patient found to have an SBO with incarcerated hernia through umbilical incision. Currently with stable vitals, added on emergently to OR.     -LR@125  -NPO  -Consents signed    Patient seen and d/w Dr. Kilpatrick and Dr. Julian Saldana PGY-3 A/P: 67y/o F POD#4 s/p Dx laparoscopy with biopsies and drainage of ascites for a pelvic mass (frozen = carcinoma), presents with worsening abdominal pain and n/v. Patient found to have an SBO with incarcerated hernia through umbilical incision. Currently with stable vitals, added on emergently to OR.     -LR@125  -NPO  -Consents signed    Patient seen and d/w Dr. Kilpatrick and Dr. Julian Saldana PGY-3

## 2022-04-22 NOTE — PATIENT PROFILE ADULT - FALL HARM RISK - HARM RISK INTERVENTIONS

## 2022-04-22 NOTE — H&P ADULT - NSHPLABSRESULTS_GEN_ALL_CORE
14.0   9.68  )-----------( 479      ( 2022 11:48 )             46.2         137  |  96<L>  |  16  ----------------------------<  92  4.5   |  24  |  0.73    Ca    9.0      2022 11:48    TPro  7.1  /  Alb  3.4  /  TBili  0.7  /  DBili  x   /  AST  58<H>  /  ALT  46<H>  /  AlkPhos  78      I&O's Detail    PT/INR - ( 2022 11:48 )   PT: 11.5 sec;   INR: 0.99 ratio         PTT - ( 2022 11:48 )  PTT:24.4 sec  Urinalysis Basic - ( 2022 12:51 )    Color: Yellow / Appearance: Slightly Turbid / S.035 / pH: x  Gluc: x / Ketone: Large  / Bili: Small / Urobili: 6 mg/dL   Blood: x / Protein: 30 mg/dL / Nitrite: Negative   Leuk Esterase: Negative / RBC: 3 /HPF / WBC 2 /HPF   Sq Epi: x / Non Sq Epi: 2 /HPF / Bacteria: Few    RADIOLOGY:    ACC: [id number] EXAM:  CT ABDOMEN AND PELVIS IC                          PROCEDURE DATE:  2022      INTERPRETATION:  CLINICAL INFORMATION: Nausea and vomiting. Evaluate for   bowel obstruction.    COMPARISON: PET CT scan 3/9/2022    CONTRAST/COMPLICATIONS:  IV Contrast: Omnipaque 350  90 mL administered. 10 mL discarded  Oral Contrast: None  Complications: None reported at the time of exam    PROCEDURE:  CT of the Abdomen and Pelvis was performed.  Sagittal and coronal reformats were performed.    FINDINGS:  LOWER CHEST: Within normal limits.    LIVER: Within normal limits.  BILE DUCTS: Normal caliber.  GALLBLADDER: Within normal limits.  SPLEEN: Within normal limits.  PANCREAS: Within normal limits.  ADRENALS: Within normal limits.  KIDNEYS/URETERS: Within normal limits.    BLADDER: Within normal limits.  REPRODUCTIVE ORGANS: Small ascites.    BOWEL: Small bowel obstruction with transition point at the level of a   bowel and fat-containing ventral midline infraumbilical hernia (2:79).    PERITONEUM: Small amount of ascites. Extensive peritoneal carcinomatosis   with numerous implants in the pelvis, along the diaphragm, capsule liver,   as well as in the omentum. For reference:  *  Left upper quadrant implant along the gastric fundus measuring 3.5 x   3.5 cm. (2:23)  *  Omental implant measures 2.1 x 2.3 cm (2:53)  *  Left adnexal lesion measures 2.8 x 2.9 cm (2:93)    VESSELS: Mild atherosclerotic calcifications.  RETROPERITONEUM/LYMPH NODES: No lymphadenopathy.  ABDOMINAL WALL: Hernia as above.  *  Umbilical implant measures 1.8 cm. (602:71)    BONES: Degenerative changes.    IMPRESSION:  Small bowel obstruction with transition point in the infraumbilical   abdominal midline, at the level of a bowel containing ventral hernia.  Extensive peritoneal carcinomatosis with small ascites.

## 2022-04-22 NOTE — CHART NOTE - NSCHARTNOTEFT_GEN_A_CORE
R2 OBGYN POST-OP CHECK    S: Patient seen and evaluated at bedside.  Pt awake and alert resting comfortably in bed. Patient reports pain controlled with analgesia. Pt denies lightheadedness/dizziness, N/V, SOB, CP, palpitations, fever/chills. Not OOB yet.    O:   T(C): 36.5 (04-22-22 @ 21:00), Max: 37.1 (04-22-22 @ 19:40)  HR: 84 (04-22-22 @ 21:30) (77 - 100)  BP: 119/71 (04-22-22 @ 21:30) (105/68 - 135/73)  RR: 20 (04-22-22 @ 21:30) (18 - 25)  SpO2: 94% (04-22-22 @ 21:30) (93% - 98%)  Wt(kg): --  I&O's Summary    22 Apr 2022 07:01  -  22 Apr 2022 21:50  --------------------------------------------------------  IN: 250 mL / OUT: 120 mL / NET: 130 mL      Gen: Resting comfortably in bed, NAD, NGT in place with no output  CV: RRR  Lungs: CTA  Abd: soft, appropriately tender, occasional BS x 4 quadrants.    Inc: LSC incisions,  clean/dry/intact w/ bandage in place  : Powers in place, 20cc of concentrated urine in   Ext: SCD's in place and functional, non-tender b/l, no edema        A/P: 66y Female s/p diagnostic laparoscopy, reduction of incarcerated hernia. EBL 5cc. Recovering appropriately.    Neuro: IV tylenol, Toradol, and dilaudid PRN for pain control  CV: Hemodynamically stable.  Monitor VS. CBC in AM.   Pulm: Desaturated after receiving dilaudid, now satting well on 2L NC.  CXR with large L pleural effusion. Encourage OOB and incentive spirometer use.   GI: NPO, NGT in place, placement confirmed by CXR  : Powers to gravity. UOP not adequate, giving 500cc bolus.   FEN: Electrolytes: LR@125cc/hr. BMP in AM.   Heme: DVT ppx w/ SCD's while in bed. Early ambulation, initially with assistance then as tolerated.  Continue HSQ   ID: Afebrile  Endo: Continue home synthroid.     Dispo: Discharge from PACU when criteria met     Teresa Elizondo, PGY2

## 2022-04-22 NOTE — H&P ADULT - HISTORY OF PRESENT ILLNESS
INCOMPLETE    HPI:  65y/o Female     GYN Oncologist:    POB:      Pgyn: Denies fibroids, cysts, endometriosis, STI's, Abnormal pap smears     Home meds:     Hospital Meds:   MEDICATIONS  (STANDING):    MEDICATIONS  (PRN):      Allergies    Nuts (Anaphylaxis)  penicillin (Angioedema; Urticaria)  sulfa drugs (Hives; Urticaria)    Intolerances        PAST MEDICAL & SURGICAL HISTORY:  Ascites    Intra-abdominal and pelvic swelling of mass or lump    Hypothyroidism    GERD (gastroesophageal reflux disease)    Arthritis    HLD (hyperlipidemia)    S/P cholecystectomy    S/P tubal ligation    S/P bunionectomy  left foot        FAMILY HISTORY:  FH: stroke (Grandparent)        Social History:  Denies smoking use, drug use, alcohol use.   +occasional social alcohol use GynOnc H&P    Ms. REYES is a 66 year old  female, initially seen for right adnexal mass and ascites, after ED admission. See course below.  Today, s/p Dx LSC, biopsy, and removal of ascites. Since Tuesday has had n/v/abdominal pain. No passage of gas. Worsening symptoms today brought her in. Denies abdominal distention, no reported hunger. Has not kept down any PO including liquids since Tuesday.      Course as follows:  - CT AP- Multiple suspicious carcinomatosis, hepatic/splenic lesions, with ascites and complex left pleural effusion. There is a complex cystic and solid soft tissue mass in the pelvis ? 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 Hypodensity in the spleen measuring 1.8 x 1.7 cm    3/3- Pancreatic MRI- WNL    3/3- CEA- 10.1, - 2585, - 109, AFP- 2.5     3/4 EGD/Colonoscopy - biopsies negative    3/9- PET/CT- Increased uptake in the region of the KATHY suggests neoplasm/metastasis, FDG active in bilateral adnexa to suggest neoplasm/metastasis, numerous metabolically active foci in the abdomen/pelvis as seen  Metabolically active foci localizing to the pleura in the left mid lower lung fields for pleural metastasis. Mild avide left pleural effusion likely malignant         PMHx- hypothyroidism, HLD  PSHx- Laparoscopic Cholecystectomy (), bunion surgery, tubal ligation     HM  Pap  - WNL as per patient  Mammo - 2018 WLN as per patient  Colonoscopy/EGD- 3/2022     GYN Oncologist: Dr. Jordan      Pgyn: Denies fibroids, cysts, endometriosis, STI's, Abnormal pap smears           Allergies    Nuts (Anaphylaxis)  penicillin (Angioedema; Urticaria)  sulfa drugs (Hives; Urticaria)    Intolerances      PAST MEDICAL & SURGICAL HISTORY:  Ascites    Intra-abdominal and pelvic swelling of mass or lump    Hypothyroidism    GERD (gastroesophageal reflux disease)    Arthritis    HLD (hyperlipidemia)    S/P cholecystectomy    S/P tubal ligation    S/P bunionectomy  left foot        FAMILY HISTORY:  FH: stroke (Grandparent)        Social History:  Denies smoking use, drug use, alcohol use.   +occasional social alcohol use (2022 17:22)        Name of GYN Physician:     CELE:      Pgyn: Denies fibroids, cysts, endometriosis, STI's, Abnormal pap smears     Home meds:     Hospital Meds:   MEDICATIONS  (STANDING):    MEDICATIONS  (PRN):      Allergies    Nuts (Anaphylaxis)  penicillin (Angioedema; Urticaria)  sulfa drugs (Hives; Urticaria)    Intolerances        PAST MEDICAL & SURGICAL HISTORY:  Ascites    Intra-abdominal and pelvic swelling of mass or lump    Hypothyroidism    GERD (gastroesophageal reflux disease)    Arthritis    HLD (hyperlipidemia)    S/P cholecystectomy    S/P tubal ligation    S/P bunionectomy  left foot        FAMILY HISTORY:  FH: stroke (Grandparent)        Social History:  Denies smoking use, drug use, alcohol use.   +occasional social alcohol use    LABS:                        14.0   9.68  )-----------( 479      ( 2022 11:48 )             46.2         137  |  96<L>  |  16  ----------------------------<  92  4.5   |  24  |  0.73    Ca    9.0      2022 11:48    TPro  7.1  /  Alb  3.4  /  TBili  0.7  /  DBili  x   /  AST  58<H>  /  ALT  46<H>  /  AlkPhos  78      I&O's Detail    PT/INR - ( 2022 11:48 )   PT: 11.5 sec;   INR: 0.99 ratio         PTT - ( 2022 11:48 )  PTT:24.4 sec  Urinalysis Basic - ( 2022 12:51 )    Color: Yellow / Appearance: Slightly Turbid / S.035 / pH: x  Gluc: x / Ketone: Large  / Bili: Small / Urobili: 6 mg/dL   Blood: x / Protein: 30 mg/dL / Nitrite: Negative   Leuk Esterase: Negative / RBC: 3 /HPF / WBC 2 /HPF   Sq Epi: x / Non Sq Epi: 2 /HPF / Bacteria: Few

## 2022-04-23 NOTE — PROGRESS NOTE ADULT - SUBJECTIVE AND OBJECTIVE BOX
POD # 1    Pt seen and examined at bedside. No overnight events.  Pt without complaints. Pain well controlled on current regimen.   She denies SOB/CP/palpitations, fever/chills, nausea/emesis. Tolerating regular diet.  +OOB,  No flatus, haddad in place    MEDICATIONS  (STANDING):  acetaminophen   IVPB .. 1000 milliGRAM(s) IV Intermittent once  acetaminophen   IVPB .. 1000 milliGRAM(s) IV Intermittent once  heparin   Injectable 5000 Unit(s) SubCutaneous every 8 hours  ketorolac   Injectable 30 milliGRAM(s) IV Push every 6 hours  lactated ringers. 1000 milliLiter(s) (125 mL/Hr) IV Continuous <Continuous>  levothyroxine Injectable 100 MICROGram(s) IV Push at bedtime    MEDICATIONS  (PRN):  ondansetron Injectable 4 milliGRAM(s) IV Push every 6 hours PRN Nausea and/or Vomiting        Vital Signs Last 24 Hrs  T(C): 37 (23 Apr 2022 05:06), Max: 37.1 (22 Apr 2022 19:40)  T(F): 98.6 (23 Apr 2022 05:06), Max: 98.8 (22 Apr 2022 19:40)  HR: 76 (23 Apr 2022 05:06) (71 - 100)  BP: 115/69 (23 Apr 2022 05:06) (105/68 - 141/89)  BP(mean): 87 (22 Apr 2022 22:00) (78 - 91)  RR: 18 (23 Apr 2022 05:06) (16 - 25)  SpO2: 96% (23 Apr 2022 05:06) (71% - 100%)    04-22 @ 07:01  -  04-23 @ 06:03  --------------------------------------------------------  IN: 1850 mL / OUT: 295 mL / NET: 1555 mL        PHYSICAL EXAM:  Gen: NAD, A+O x 3  CV: RRR  Pulm: CTA BL  Abd: Soft, appropriately tender,  mildly distended, no rebound or guarding, +BS  Incision: Clean, dry and intact; dressing in place  Extremities: No swelling or calf tenderness, SCDs in place    Labs, additional tests:             14.0   9.68  )-----------( 479      ( 04-22 @ 11:48 )             46.2       04-22    137  |  96<L>  |  16  ----------------------------<  92  4.5   |  24  |  0.73    Ca    9.0      22 Apr 2022 11:48    TPro  7.1  /  Alb  3.4  /  TBili  0.7  /  DBili  x   /  AST  58<H>  /  ALT  46<H>  /  AlkPhos  78  04-22       POD # 1    Pt seen and examined at bedside. No overnight events.  Pt without concerns, though did endorse nausea overnight. Pain well controlled on current regimen.   She denies SOB/CP/palpitations, fever/chills, emesis. NPO.   No flatus, haddad in place    MEDICATIONS  (STANDING):  acetaminophen   IVPB .. 1000 milliGRAM(s) IV Intermittent once  acetaminophen   IVPB .. 1000 milliGRAM(s) IV Intermittent once  heparin   Injectable 5000 Unit(s) SubCutaneous every 8 hours  ketorolac   Injectable 30 milliGRAM(s) IV Push every 6 hours  lactated ringers. 1000 milliLiter(s) (125 mL/Hr) IV Continuous <Continuous>  levothyroxine Injectable 100 MICROGram(s) IV Push at bedtime    MEDICATIONS  (PRN):  ondansetron Injectable 4 milliGRAM(s) IV Push every 6 hours PRN Nausea and/or Vomiting        Vital Signs Last 24 Hrs  T(C): 37 (23 Apr 2022 05:06), Max: 37.1 (22 Apr 2022 19:40)  T(F): 98.6 (23 Apr 2022 05:06), Max: 98.8 (22 Apr 2022 19:40)  HR: 76 (23 Apr 2022 05:06) (71 - 100)  BP: 115/69 (23 Apr 2022 05:06) (105/68 - 141/89)  BP(mean): 87 (22 Apr 2022 22:00) (78 - 91)  RR: 18 (23 Apr 2022 05:06) (16 - 25)  SpO2: 96% (23 Apr 2022 05:06) (71% - 100%)    04-22 @ 07:01  -  04-23 @ 06:03  --------------------------------------------------------  IN: 1850 mL / OUT: 295 mL / NET: 1555 mL        PHYSICAL EXAM:  Gen: NAD, A+O x 3  CV: RRR  Pulm: CTA BL  Abd: Soft, appropriately tender, non distended, no rebound or guarding, +BS  Incision: lsc port sites clean, dry and intact  Extremities: No swelling or calf tenderness, SCDs in place    Labs, additional tests:             14.0   9.68  )-----------( 479      ( 04-22 @ 11:48 )             46.2       04-22    137  |  96<L>  |  16  ----------------------------<  92  4.5   |  24  |  0.73    Ca    9.0      22 Apr 2022 11:48    TPro  7.1  /  Alb  3.4  /  TBili  0.7  /  DBili  x   /  AST  58<H>  /  ALT  46<H>  /  AlkPhos  78  04-22

## 2022-04-24 NOTE — DISCHARGE NOTE NURSING/CASE MANAGEMENT/SOCIAL WORK - NSDCPNINST_GEN_ALL_CORE
Call MD with any problems . notify MD for fever, inability to tolerate food or liquids or increased drainage from incisions

## 2022-04-24 NOTE — DISCHARGE NOTE PROVIDER - CARE PROVIDER_API CALL
Allyson Jordan)  Gynecologic Oncology; Obstetrics and Gynecology  [address] [place] [address]  Phone: [phone]  Fax: [phone]  Established Patient  Follow Up Time:

## 2022-04-24 NOTE — DISCHARGE NOTE NURSING/CASE MANAGEMENT/SOCIAL WORK - PATIENT PORTAL LINK FT
You can access the FollowMyHealth Patient Portal offered by [facility] by registering at the following website: [email or web address]. By joining [facility]’s FollowMyHealth portal, you will also be able to view your health information using other applications (apps) compatible with our system.

## 2022-04-24 NOTE — DISCHARGE NOTE NURSING/CASE MANAGEMENT/SOCIAL WORK - NSDCPEFALRISK_GEN_ALL_CORE
For information on Fall & Injury Prevention, visit: [email or web address] OR  [email or web address] OR  https://www.cdc.gov/steadi/patient.html

## 2022-04-24 NOTE — PROGRESS NOTE ADULT - ASSESSMENT
A/P: 66y Female s/p diagnostic laparoscopy, reduction of incarcerated hernia (ebl 5cc). Pt stable and recovering well postoperatively.     Neuro: IV tylenol, Toradol, and dilaudid PRN for pain control  CV: Hemodynamically stable.  Monitor VS. CBC in AM.   Pulm: On 2L NC.  CXR with large L pleural effusion. Encourage OOB and incentive spirometer use.   GI: NPO, NGT in place  : Powers to gravity. UOP not adequate, giving 500cc bolus.   FEN: Electrolytes: LR@125cc/hr. BMP in AM.   Heme: DVT ppx w/ SCD's while in bed. Early ambulation, initially with assistance then as tolerated.  Continue HSQ   ID: Afebrile  Endo: Continue home synthroid.     Dispo: Continued inpt care    Raquel Rangel, PGY-2
`This 66y female, s/p  diagnostic laparoscopy, reduction of incarcerated hernia (ebl 5cc). Pt stable and recovering well postoperatively.     CV: hemodynamically stable, H/H stable  PUL: adequate on RA  GI: passing a lot of flatus, tolerating clear fluid, advance to low residue diet.  :  voiding with adequate output without dysuria  ID: afebrile, WBC stable  DVT prophylaxis: SQH, venodynes when not ambulating  ENDO: c/w Synthroid   Pain Management: controlled on current regimen  d/w Dr. Bryson and Dr. Dhillon on morning rounds  -encourage ambulation and OOB to chair  -encourage incentive spirometry  -start d/c planning for possible late today or for Monday (4/25) morning.  All questions answered, medications sent to pt pharmacy. Home instructions verbalized by both myself and the RN.  continue with current inpatient care    ELISE Leung  #[id number] spectra

## 2022-04-24 NOTE — PROGRESS NOTE ADULT - SUBJECTIVE AND OBJECTIVE BOX
PA GynOnc Progress Note POD #    Pt seen, examined at bedside and doing well meeting all post operative milestones. Pt states mild abdominal pain.  Pt denies fever, chills, chest pain, SOB, nausea, vomiting, lightheadedness, dizziness.  Pt states passing flatus, []BM    T(F): 97.6 (22 @ 05:10), Max: 98.4 (22 @ 01:52)  HR: 74 (22 @ 05:10) (72 - 77)  BP: 108/69 (22 @ 05:10) (108/69 - 128/70)  RR: 16 (22 @ 05:10) (14 - 17)  SpO2: 96% (22 @ 05:10) (94% - 99%)  Wt(kg): --  CAPILLARY BLOOD GLUCOSE    I&O's Detail    2022 07:01  -  2022 07:00  --------------------------------------------------------  IN:    IV PiggyBack: 200 mL    Lactated Ringers: 1500 mL    Oral Fluid: 910 mL  Total IN: 2610 mL    OUT:    Indwelling Catheter - Urethral (mL): 100 mL    Nasogastric/Oral tube (mL): 100 mL    Voided (mL): 300 mL  Total OUT: 500 mL    Total NET: 2110 mL          MEDICATIONS  (STANDING):  acetaminophen   IVPB .. 1000 milliGRAM(s) IV Intermittent every 6 hours  acetaminophen   IVPB .. 1000 milliGRAM(s) IV Intermittent once  heparin   Injectable 5000 Unit(s) SubCutaneous every 8 hours  lactated ringers. 1000 milliLiter(s) (75 mL/Hr) IV Continuous <Continuous>  levothyroxine Injectable 100 MICROGram(s) IV Push at bedtime    MEDICATIONS  (PRN):  aluminum hydroxide/magnesium hydroxide/simethicone Suspension 30 milliLiter(s) Oral every 4 hours PRN Dyspepsia  ondansetron Injectable 4 milliGRAM(s) IV Push every 6 hours PRN Nausea and/or Vomiting      Physical Exam:  Constitutional: WDWN female, NAD AxOx3  Skin: no breakdowns noted, warm and dry  Chest: s1s2+, RRR, clear to auscultation bilaterally, no w/r/r    Abdomen: softly distended, no guarding, no rebound, [] bowel sounds, appropriate tenderness noted   Incision site:   vertical incision clean and dry with []intact.    Extremities: no lower extremity edema or calf tenderness bilaterally; intermittent compression stockings in place     LABS:             12.0   8.43  )-----------( 454      (  @ 06:15 )             39.6                11.8   7.90  )-----------( 453      (  @ 07:20 )             39.4                14.0   9.68  )-----------( 479      (  @ 11:48 )             46.2       04    133<L>  |  99     |  16     ----------------------------<  80     3.4<L>   |  24     |  0.63   04    137    |  101    |  16     ----------------------------<  89     4.1     |  23     |  0.71     Ca    7.7<L>      2022 06:15  Ca    8.2<L>      2022 07:20  Phos  2.3       04-24  Phos  4.1       04-23  Mg     1.90      04-24  Mg     2.00      04-23          PT/INR - ( 2022 11:48 )   PT: 11.5 sec;   INR: 0.99 ratio         PTT - ( 2022 11:48 )  PTT:24.4 sec  Urinalysis Basic - ( 2022 12:51 )    Color: Yellow / Appearance: Slightly Turbid / S.035 / pH: x  Gluc: x / Ketone: Large  / Bili: Small / Urobili: 6 mg/dL   Blood: x / Protein: 30 mg/dL / Nitrite: Negative   Leuk Esterase: Negative / RBC: 3 /HPF / WBC 2 /HPF   Sq Epi: x / Non Sq Epi: 2 /HPF / Bacteria: Few        RADIOLOGY & ADDITIONAL TESTS:    a/p: This 66y female, s/p     CV: hemodynamically stable, H/H  PUL: adequate on RA  GI: tolerating  regular diet  :  voiding with adequate output without dysuria  ID: afebrile, WBC stable  DVT prophylaxis:   Pain Management: controlled on current regimen  d/w [] on morning rounds  -encourage ambulation and OOB to chair  -encourage incentive spirometry  -continue IV fluids  -start d/c planning for home later today. All questions answered, medications sent to pt pharmacy.  Home instructions verbalized by both myself and the RN.  continue with current care    Barb Gallegos PAC  #[id number] spectra PA GynOnc Progress Note POD #    Pt seen, examined at bedside and doing well meeting all post operative milestones. Pt states mild abdominal pain.  Pt denies fever, chills, chest pain, SOB, nausea, vomiting, lightheadedness, dizziness.  Pt states passing flatus, +bm (very little)    T(F): 97.6 (22 @ 05:10), Max: 98.4 (22 @ 01:52)  HR: 74 (22 @ 05:10) (72 - 77)  BP: 108/69 (22 @ 05:10) (108/69 - 128/70)  RR: 16 (22 @ 05:10) (14 - 17)  SpO2: 96% (22 @ 05:10) (94% - 99%)    CAPILLARY BLOOD GLUCOSE    I&O's Detail    2022 07:01  -  2022 07:00  --------------------------------------------------------  IN:    IV PiggyBack: 200 mL    Lactated Ringers: 1500 mL    Oral Fluid: 910 mL  Total IN: 2610 mL    OUT:    Indwelling Catheter - Urethral (mL): 100 mL    Nasogastric/Oral tube (mL): 100 mL    Voided (mL): 300 mL  Total OUT: 500 mL    Total NET: 2110 mL      MEDICATIONS  (STANDING):  acetaminophen   IVPB .. 1000 milliGRAM(s) IV Intermittent every 6 hours  acetaminophen   IVPB .. 1000 milliGRAM(s) IV Intermittent once  heparin   Injectable 5000 Unit(s) SubCutaneous every 8 hours  lactated ringers. 1000 milliLiter(s) (75 mL/Hr) IV Continuous <Continuous>  levothyroxine Injectable 100 MICROGram(s) IV Push at bedtime    MEDICATIONS  (PRN):  aluminum hydroxide/magnesium hydroxide/simethicone Suspension 30 milliLiter(s) Oral every 4 hours PRN Dyspepsia  ondansetron Injectable 4 milliGRAM(s) IV Push every 6 hours PRN Nausea and/or Vomiting      Physical Exam:  Constitutional: WDWN female, NAD AxOx3  Skin: no breakdowns noted, warm and dry  Chest: s1s2+, RRR, clear to auscultation bilaterally, no w/r/r    Abdomen: softly distended, no guarding, no rebound, + bowel sounds, appropriate tenderness noted   Incision site: vertical incision clean and dry with dressing intact.    Extremities: no lower extremity edema or calf tenderness bilaterally; intermittent compression stockings in place     LABS:             12.0   8.43  )-----------( 454      (  @ 06:15 )             39.6                11.8   7.90  )-----------( 453      (  @ 07:20 )             39.4                14.0   9.68  )-----------( 479      (  @ 11:48 )             46.2       04    133<L>  |  99     |  16     ----------------------------<  80     3.4<L>   |  24     |  0.63       137    |  101    |  16     ----------------------------<  89     4.1     |  23     |  0.71     Ca    7.7<L>      2022 06:15  Ca    8.2<L>      2022 07:20  Phos  2.3       04-24  Phos  4.1       04-23  Mg     1.90      04-24  Mg     2.00      04-23    PT/INR - ( 2022 11:48 )   PT: 11.5 sec;   INR: 0.99 ratio    PTT - ( 2022 11:48 )  PTT:24.4 sec  Urinalysis Basic - ( 2022 12:51 )    Color: Yellow / Appearance: Slightly Turbid / S.035 / pH: x  Gluc: x / Ketone: Large  / Bili: Small / Urobili: 6 mg/dL   Blood: x / Protein: 30 mg/dL / Nitrite: Negative   Leuk Esterase: Negative / RBC: 3 /HPF / WBC 2 /HPF   Sq Epi: x / Non Sq Epi: 2 /HPF / Bacteria: Few

## 2022-04-24 NOTE — DISCHARGE NOTE PROVIDER - NSDCMRMEDTOKEN_GEN_ALL_CORE_FT
acetaminophen 10 mg/mL intravenous solution: 100 milliliter(s) intravenous once  acetaminophen 325 mg oral tablet: 2 tab(s) orally every 6 hours, As needed, Mild Pain (1 - 3)  oxyCODONE 5 mg oral tablet: 1 tab(s) orally every 6 hours, As Needed MDD:4 tabs (20 mg)   Protonix 40 mg oral delayed release tablet: 1 tab(s) orally once a day  Synthroid 125 mcg (0.125 mg) oral tablet: 1 tab(s) orally once a day  Vitamin C: 1 tab(s) orally once a day  Vitamin D3: 1 tab(s) orally once a day  Zocor 40 mg oral tablet: 1 tab(s) orally once a day (at bedtime)   acetaminophen 325 mg oral tablet: 2 tab(s) orally every 6 hours, As needed, Mild Pain (1 - 3)  oxyCODONE 5 mg oral tablet: 1 tab(s) orally every 6 hours, As Needed MDD:4 tabs (20 mg)   Protonix 40 mg oral delayed release tablet: 1 tab(s) orally once a day  Synthroid 125 mcg (0.125 mg) oral tablet: 1 tab(s) orally once a day  Vitamin C: 1 tab(s) orally once a day  Vitamin D3: 1 tab(s) orally once a day  Zocor 40 mg oral tablet: 1 tab(s) orally once a day (at bedtime)

## 2022-04-24 NOTE — DISCHARGE NOTE PROVIDER - NSDCFUADDINST_GEN_ALL_CORE_FT
Call Dr. Santana office for an appointment in one week after discharge from hospital  follow the low residue diet

## 2022-04-24 NOTE — DISCHARGE NOTE PROVIDER - NSDCACTIVITY_GEN_ALL_CORE
Showering allowed/Do not make important decisions/Walking - Indoors allowed/Follow Instructions Provided by your Surgical Team

## 2022-04-24 NOTE — DISCHARGE NOTE PROVIDER - HOSPITAL COURSE
Ms. REYES is a 66 year old female, initially seen for right adnexal mass and ascites, after ED admission. Pt presented to the ED for worsening abdominal pain and no flatus. Since 4/19 has had n/v/abdominal pain; she denies abdominal distention, no reported hunger. Has not kept down any PO including liquids since 4/19.  At the time of the ED visit, pt was POD#4 diagnostic laparoscopy with biopsies and drainage of ascites for a pelvic mass (frozen = carcinoma).  Patient found to have an Small Bowel Obstruction with incarcerated hernia through umbilical incision. Pt was taken to the OR emergently. On 4/22/2022 pt underwent Laparoscopy, diagnostic, Reduction, hernia, internal ( Incarcerated incisional hernia).  Patient's hospital course was uncomplicated. She had an NGT for decompression with minimal output. NGT was removed on POD#2 and she tolerated clear liquid diet. She was passing flatus and having bowel movements. Pt was advanced to low residue diet, which she was able to tolerate. Pt was ambulating and all vital signs remained stable throughout her hospital stay.    Pt will have a follow up appointment with Dr. Jordan in one week after discharge.               12.0   8.43  )-----------( 454      ( 04-24 @ 06:15 )             39.6                11.8   7.90  )-----------( 453      ( 04-23 @ 07:20 )             39.4                14.0   9.68  )-----------( 479      ( 04-22 @ 11:48 )             46.2     Vital Signs Last 24 Hrs  T(C): 36.4 (04-24-22 @ 10:02), Max: 36.9 (04-24-22 @ 01:52)  T(F): 97.6 (04-24-22 @ 10:02), Max: 98.4 (04-24-22 @ 01:52)  HR: 77 (04-24-22 @ 10:02) (72 - 77)  BP: 126/79 (04-24-22 @ 10:02) (108/69 - 128/70)  RR: 14 (04-24-22 @ 10:02) (14 - 17)  SpO2: 98% (04-24-22 @ 10:02) (94% - 99%)

## 2022-04-24 NOTE — PROGRESS NOTE ADULT - ATTENDING COMMENTS
patient feeling better, s/p OR - patient +flatus  advance to clears  d/c haddad  restart synthroid
Patient seen and evaluated, albaro clears, advanced to low res diet  ambulating, voiding  lovenox/scds  +flatus/bm  possible d/c home later tonight

## 2022-04-24 NOTE — PROGRESS NOTE ADULT - PROBLEM SELECTOR PLAN 1
GYN ONC Fellow Addendum:    Pt seen and examined at bedside. Agree with above. Pain controlled. Tolerating CLD, -n/v. +flatus, +BM. ambulating and voiding.    VS reviewed  Labs reviewed    - Continue current pain regimen  - Advance to reg diet  - Encourage ambulation and IS use  - Replete lytes prn  - DVT ppx: lovenox  - Dispo: continue inpatient management    SHANTEL Kilpatrick, PGY6
GYN ONC Fellow Addendum:    Pt seen and examined at bedside. Agree with above. Pain controlled.  -n/v. +flatus. haddad in situ.  VS reviewed  Labs reviewed    - Continue current pain regimen  - d/c NGT, advance to CLD  - d/c haddad, monitor I/Os  - Encourage ambulation and IS use  - Replete lytes prn  - DVT ppx: lovenox  - Dispo: continue inpatient management    SHANTEL Kilpatrick, PGY6

## 2022-04-26 PROBLEM — R19.00 PELVIC MASS IN FEMALE: Status: ACTIVE | Noted: 2022-01-01

## 2022-04-26 NOTE — PROCEDURE
[FreeTextEntry1] : \par  Xray Chest 2 Views PA/Lat             Final\par \par  \par Chest x-ray PA and lateral views performed in my office today showed a large left pleural effusion at least two thirds the way up left hemithorax, no evidence of infiltrates. \par \par \par  Ordered by: VARINDER REYNOSO       Collected/Examined: 26Apr2022 03:07PM       \par Verification Required       Stage: Final       \par  Performed at: In Office       Performed by: VARINDER REYNOSO       Resulted: 26Apr2022 03:07PM       Last Updated: 26Apr2022 03:08PM       \par \par \par Pulmonary function test performed in my office today: Spirometry shows suggestive evidence of moderate restrictive defect; lung volume shows moderate restrictive defect; diffusion shows severe impairment.

## 2022-04-26 NOTE — DISCUSSION/SUMMARY
[FreeTextEntry1] : Edith is a patient with worsening shortness of breath and pleuritic chest pain, possibly secondary to rapidly enlarging left pleural effusion (likely malignant effusion), rule out PE.  Secondly, she is a patient with recently discovered peritoneal carcinomatosis/malignant ascites.

## 2022-04-26 NOTE — ED ADULT TRIAGE NOTE - CHIEF COMPLAINT QUOTE
pt sent in by pulmonologist for fluid in the left leg. pt c/o sob since yesterday. denies cough, fever. reports chest pain with inspiration. no respiratory distress noted at this time. hx. hyperthyroidism, HLD.

## 2022-04-26 NOTE — PHYSICAL EXAM
[No Acute Distress] : no acute distress [Normal Oropharynx] : normal oropharynx [Normal Appearance] : normal appearance [No Neck Mass] : no neck mass [Normal Rate/Rhythm] : normal rate/rhythm [Normal S1, S2] : normal s1, s2 [No Murmurs] : no murmurs [No Resp Distress] : no resp distress [No Abnormalities] : no abnormalities [Benign] : benign [Normal Gait] : normal gait [No Clubbing] : no clubbing [No Cyanosis] : no cyanosis [No Edema] : no edema [FROM] : FROM [Normal Color/ Pigmentation] : normal color/ pigmentation [No Focal Deficits] : no focal deficits [Oriented x3] : oriented x3 [Normal Affect] : normal affect [TextBox_68] : Decreased breath sounds over mid to lower left lung field

## 2022-04-26 NOTE — CONSULT LETTER
[Dear  ___] : Dear  [unfilled], [Courtesy Letter:] : I had the pleasure of seeing your patient, [unfilled], in my office today. [Please see my note below.] : Please see my note below. [Consult Closing:] : Thank you very much for allowing me to participate in the care of this patient.  If you have any questions, please do not hesitate to contact me. [Sincerely,] : Sincerely, [DrMarquis  ___] : Dr. CORRAL [FreeTextEntry3] : Dr. Nighat Copeland

## 2022-04-26 NOTE — ED ADULT NURSE NOTE - OBJECTIVE STATEMENT
66 year old female received to rm 16 AAOx4 ambulatory. Pt with recent dx of possible ovarian CA. Pt had biopsy @[facility] last Monday with complication to intestine corrected on Sunday. Pt went to pulmonologist today and told fluid in left lung. Pt endorses shortness of breath and dyspnea on exertion x 2 days. Pt endorses pain to left flank radiating to left upper back. Pt denies headache, dizziness, cp, n/v/d, fever. Respirations even and slightly labored and tachypneic sating 97% on room air. NSR on cardiac monitor. Afebrile at this time 97.9.. PIV #20 left AC, labs drawn and sent. VS as noted. Bed in lowest position, call bell within reach 66 year old female received to rm 16 AAOx4 ambulatory. Pt with recent dx of possible ovarian CA. Pt had biopsy @[facility] last Monday with complication to intestine corrected on Sunday. Pt went to pulmonologist today and told fluid in left lung. Pt endorses shortness of breath and dyspnea on exertion x 2 days. Pt endorses pain to left flank radiating to left upper back. Pt denies headache, dizziness, cp, n/v/d, fever. Respirations even and slightly labored and tachypneic sating 97% on room air. NSR on cardiac monitor. Afebrile at this time 97.9. Pt has three abdominal surgical wounds, CDI w/ gauze tape. PIV #20 left AC, labs drawn and sent. VS as noted. Bed in lowest position, call bell within reach

## 2022-04-26 NOTE — CONSULT NOTE ADULT - ASSESSMENT
- F/u pleural cytology  - GYN ONC will continue to follow    D/w Dr. Shonna lEizondo, PGY-2 66 year old  female s/p LSC tumor biopsy and drainage of ascites on 4/18 (frozen = carcinoma) and POD#5 s/p LSC reduction of incarcerated hernia and drainage of ascites presenting from pulmonology office with SOB in the setting of worsening L-sided pleural effusion. Admitted to CT surgery for drainage. VSS and oxygenating well on room air. No acute complaints.   - F/u pleural cytology  - GYN ONC will continue to follow    D/w Dr. Shonna Elizondo, PGY-2   66 year old  female s/p LSC tumor biopsy and drainage of ascites on 4/18 (frozen = carcinoma) and POD#5 s/p LSC reduction of incarcerated hernia and drainage of ascites presenting from pulmonology office with SOB in the setting of worsening L-sided pleural effusion. Admitted to CT surgery for drainage. VSS and oxygenating well on room air. No acute complaints.   - F/u pleural cytology  - Please call with any questions or concerns.    D/w Dr. Shonna Elizondo, PGY-2

## 2022-04-26 NOTE — ED PROVIDER NOTE - OBJECTIVE STATEMENT
65 yo F with a history of cholecystectomy, tubal ligation and recently diagnosed peritoneal carcinomatosis s/p diagnostic lap, evacuation of ascites and omental biopsy (4/18/2022, Dr. Jordan) sent from her pulmonologist with large left pleural effusion. plan is for admission for PTC drainage. patient endorse KLEIN and SOB, she denies fever, chills, n/v/d, abdominal pain

## 2022-04-26 NOTE — H&P ADULT - NSHPSOCIALHISTORY_GEN_ALL_CORE
Marital status:  Lives with: Family    Tobacco use: Denies  Alcohol use: Denies  Illicit drug use: Denies

## 2022-04-26 NOTE — H&P ADULT - PROBLEM SELECTOR PLAN 1
CXR from 4/24 c/w Left pleural effusion   Pt with KLEIN and SOB, although stable with 99% room air oxygenation in NAD  Sent from pulmonologist to be admitted to thoracic surgery under Dr Ravi for evaluation   Plan for PTC for drainage of effusion   Will need labs and CXR imaging as ordered prior  O2 monitoring   Pt denies     Thoracic  36599 CXR from 4/24 c/w Left pleural effusion   Pt with KLEIN and SOB, although stable with 99% room air oxygenation in NAD  Sent from pulmonologist to be admitted to thoracic surgery under Dr Ravi for evaluation   Plan for PTC for drainage of effusion   Will need labs and CXR imaging as ordered prior  O2 monitoring   Pt denies AC  Recall GYN/ONC     Thoracic  [id number]

## 2022-04-26 NOTE — CONSULT NOTE ADULT - SUBJECTIVE AND OBJECTIVE BOX
DUSTY REYES  66y  Female [id number]    INCOMPLETE NOTE    HPI:  65yo F with a history of cholecystectomy, tubal ligation and recently diagnosed peritoneal carcinomatosis s/p diagnostic lap, evacuation of ascites and omental biopsy (4/18/2022, Dr. Jordan). Pt recently admitted 4/22-4/24 with NV and found to have SBO on CT abdomen now s/p 1L ascites drained and reduction of incarcerated hernia. Patient was then discharged home. Today patient presents to ED sent from her pulmonologist office with c/o KLEIN and sob which is stable since discharge. A CXR from 4/24 showed da large left pleural effusion which had increased in size since the previous day.     Pt seen in ED, currently she is in NAD with no SOB at rest; her O2 saturation is 99% on room air with mask, BP and HR stable. As noted above she admits to KLEIN and SOB which she was experiencing two days ago while in the hospital but not worsened since. She states she had no idea she had fluid in her lungs. Her abdominal wounds are clean without drainage, but she does admit to tenderness and feeling of fluid starting to come back again. Otherwise stable. Pt to admitted to CTS service for drainage of pleural effusion.  (26 Apr 2022 19:05)        Name of GYN Physician:     POB:    Pgyn: Denies fibroids, cysts, endometriosis, STI's, Abnormal pap smears   PMH:  PSH:  Meds:  All:  SH: Denies smoking use, drug use, alcohol use.   +occasional social alcohol use  Home meds:     Hospital Meds:   MEDICATIONS  (STANDING):  heparin   Injectable 5000 Unit(s) SubCutaneous every 8 hours  levothyroxine 125 MICROGram(s) Oral daily  multivitamin 1 Tablet(s) Oral daily  pantoprazole    Tablet 40 milliGRAM(s) Oral before breakfast  potassium chloride    Tablet ER 20 milliEquivalent(s) Oral once  simvastatin 40 milliGRAM(s) Oral at bedtime    MEDICATIONS  (PRN):  acetaminophen     Tablet .. 975 milliGRAM(s) Oral every 6 hours PRN Temp greater or equal to 38C (100.4F), Mild Pain (1 - 3)  oxyCODONE    IR 5 milliGRAM(s) Oral every 4 hours PRN Moderate Pain (4 - 6)  senna 2 Tablet(s) Oral at bedtime PRN Constipation      Allergies    Nuts (Anaphylaxis)  penicillin (Angioedema; Urticaria)  sulfa drugs (Hives; Urticaria)    Intolerances        PAST MEDICAL & SURGICAL HISTORY:  Ascites    Intra-abdominal and pelvic swelling of mass or lump    Hypothyroidism    GERD (gastroesophageal reflux disease)    Arthritis    HLD (hyperlipidemia)    S/P cholecystectomy    S/P tubal ligation    S/P bunionectomy  left foot        FAMILY HISTORY:  FH: stroke (Grandparent)          Vital Signs Last 24 Hrs  T(C): 36.6 (26 Apr 2022 21:01), Max: 36.9 (26 Apr 2022 18:24)  T(F): 97.9 (26 Apr 2022 21:01), Max: 98.4 (26 Apr 2022 18:24)  HR: 88 (26 Apr 2022 21:01) (88 - 98)  BP: 117/83 (26 Apr 2022 21:01) (104/66 - 117/83)  BP(mean): --  RR: 25 (26 Apr 2022 21:01) (16 - 25)  SpO2: 97% (26 Apr 2022 21:01) (97% - 99%)    Physical Exam:   General: sitting comftorably in bed, NAD   HEENT: neck supple, full ROM  CV: RR S1S2 no m/r/g  Lungs: CTA b/l, good air flow b/l   Back: No CVA tenderness  Abd: Soft, non-tender, non-distended.  Bowel sounds present.    :  No bleeding on pad.    External labia wnl.  Bimanual exam with no cervical motion tenderness, uterus wnl, adnexa non palpable b/l.  Cervix closed vs. Cervix dilated    cm   Speculum Exam: No active bleeding from os.  Physiologic discharge.    Ext: non-tender b/l, no edema     LABS:                              13.1   9.24  )-----------( 610      ( 26 Apr 2022 21:13 )             43.2     04-26    136  |  98  |  7   ----------------------------<  101<H>  3.7   |  27  |  0.67    Ca    8.6      26 Apr 2022 21:13  Phos  2.5     04-26  Mg     2.20     04-26    TPro  6.1  /  Alb  3.2<L>  /  TBili  0.4  /  DBili  x   /  AST  43<H>  /  ALT  30  /  AlkPhos  90  04-26    I&O's Detail    PT/INR - ( 26 Apr 2022 21:13 )   PT: 11.9 sec;   INR: 1.03 ratio         PTT - ( 26 Apr 2022 21:13 )  PTT:30.3 sec      RADIOLOGY & ADDITIONAL STUDIES:        Teresa Elizondo  PGY-2 DUSTY REYES  66y  Female [id number]    Ms. REYES is a 66 year old  female s/p LSC tumor biopsy and drainage of ascites on 4/18 and POD#5 LSC reduction of incarcerated hernia and drainage of ascites presenting from pulmonology office with SOB in the setting of worsening L-sided pleural effusion. Patient was discharged on POD#2 in stable condition, ambulating, tolerating PO, voiding, and passing flatus. Postoperative CXR on 4/22 demonstrated L-sided pleural effusion but patient did not have any SOB at th time and was saturating in high 90% at the time of admission. Was discharged home with outpatient pulmonology follow up. Patient states that yesterday she developed SOB, pleuritic chest pain and wheezing with ambulation only. She presented to Dr. Copeland who obtained CXR that showed worsening pleural effusion on the left and she was referred to the ED for drainage.     In the ED, patient was noted to have stable vital signs on presentation. O2 Saturation 98-99% without any supplemental oxygen. She continues to report mild SOB. Also reporting worsening abdominal distention and fullness sensation. She reports postoperative abdominal discomfort. She has had multiple bowel movements at home. Patient denies any F/C, CP, cough, severe abdominal pain, nausea, vomiting, dysuria, headache, or leg swelling.      Course as follows:  2/28- CT AP- Multiple suspicious carcinomatosis, hepatic/splenic lesions, with ascites and complex left pleural effusion. There is a complex cystic and solid soft tissue mass in the pelvis ? 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 Hypodensity in the spleen measuring 1.8 x 1.7 cm    3/3- Pancreatic MRI- WNL    3/3- CEA- 10.1, - 2585, - 109, AFP- 2.5     3/4 EGD/Colonoscopy - biopsies negative    3/9- PET/CT- Increased uptake in the region of the KATHY suggests neoplasm/metastasis, FDG active in bilateral adnexa to suggest neoplasm/metastasis, numerous metabolically active foci in the abdomen/pelvis as seen  Metabolically active foci localizing to the pleura in the left mid lower lung fields for pleural metastasis. Mild avide left pleural effusion likely malignant   4/18-  LSC tumor biopsy and drainage of ascites (Frozen = metastatic adenocarcinoma)  4/22-  LSC reduction of incarcerated hernia and drainage of ascites      Name of GYN ONC Physician: Dr. Jordan     Pgyn: Denies fibroids, cysts, endometriosis, STI's, Abnormal pap smears   PMH: hypothyroidism, HLD  PSH:  Laparoscopic Cholecystectomy (2018), bunion surgery, tubal ligation     HM  Pap 2022 - WNL as per patient  Mammo - 2018 [facility]N as per patient  Colonoscopy/EGD- 3/2022     Hospital Meds:   MEDICATIONS  (STANDING):  heparin   Injectable 5000 Unit(s) SubCutaneous every 8 hours  levothyroxine 125 MICROGram(s) Oral daily  multivitamin 1 Tablet(s) Oral daily  pantoprazole    Tablet 40 milliGRAM(s) Oral before breakfast  potassium chloride    Tablet ER 20 milliEquivalent(s) Oral once  simvastatin 40 milliGRAM(s) Oral at bedtime    MEDICATIONS  (PRN):  acetaminophen     Tablet .. 975 milliGRAM(s) Oral every 6 hours PRN Temp greater or equal to 38C (100.4F), Mild Pain (1 - 3)  oxyCODONE    IR 5 milliGRAM(s) Oral every 4 hours PRN Moderate Pain (4 - 6)  senna 2 Tablet(s) Oral at bedtime PRN Constipation      Allergies    Nuts (Anaphylaxis)  penicillin (Angioedema; Urticaria)  sulfa drugs (Hives; Urticaria)        Vital Signs Last 24 Hrs  T(C): 36.6 (26 Apr 2022 21:01), Max: 36.9 (26 Apr 2022 18:24)  T(F): 97.9 (26 Apr 2022 21:01), Max: 98.4 (26 Apr 2022 18:24)  HR: 88 (26 Apr 2022 21:01) (88 - 98)  BP: 117/83 (26 Apr 2022 21:01) (104/66 - 117/83)  BP(mean): --  RR: 25 (26 Apr 2022 21:01) (16 - 25)  SpO2: 97% (26 Apr 2022 21:01) (97% - 99%)    Physical Exam:   General: sitting comfortably in bed, NAD   CV: RRR  Lungs: decreased breath sounds in L midlung and lower lung. No wheezing appreciated.   Back: No CVA tenderness  Abd: Soft, +fluid wave, non-tender, non-distended.  Bowel sounds present.    Incision: 3 LSC incisions with steri-strips in place, bruising around supraumbilical incision noted   :  Deferred  Ext: non-tender b/l    LABS:                        13.1   9.24  )-----------( 610      ( 26 Apr 2022 21:13 )             43.2     04-26    136  |  98  |  7   ----------------------------<  101<H>  3.7   |  27  |  0.67    Ca    8.6      26 Apr 2022 21:13  Phos  2.5     04-26  Mg     2.20     04-26    TPro  6.1  /  Alb  3.2<L>  /  TBili  0.4  /  DBili  x   /  AST  43<H>  /  ALT  30  /  AlkPhos  90  04-26    I&O's Detail    PT/INR - ( 26 Apr 2022 21:13 )   PT: 11.9 sec;   INR: 1.03 ratio         PTT - ( 26 Apr 2022 21:13 )  PTT:30.3 sec      RADIOLOGY & ADDITIONAL STUDIES:  < from: Xray Chest 1 View- PORTABLE-Urgent (Xray Chest 1 View- PORTABLE-Urgent .) (04.26.22 @ 20:42) >  INDINGS:  Large left pleural effusion, not significant change when compared to   prior study 4/22/2022. There is subsequent rightward mediastinal shift,   unchanged.  The right lung is clear.  No pneumothorax.  The heart size is not well evaluated on this projection.  The visualized osseous structures demonstrate no acute pathology.    IMPRESSION:  Similar large left pleural effusion.    < end of copied text >

## 2022-04-26 NOTE — HISTORY OF PRESENT ILLNESS
[TextBox_4] : Edith a pleasant 66-year-old female with history of hypothyroidism, GERD, hypercholesterolemia, s/p biopsy of right adnexal mass and repeated paracentesis recently, she came in complaining of worsening shortness of breath for the last 3 days, also came in complaining of bilateral pleuritic chest pain, and wheezes, no cough/fever.

## 2022-04-26 NOTE — ED PROVIDER NOTE - CLINICAL SUMMARY MEDICAL DECISION MAKING FREE TEXT BOX
65yo F with a history of cholecystectomy, tubal ligation and recently diagnosed peritoneal carcinomatosis s/p diagnostic lap, evacuation of ascites and omental biopsy (4/18/2022, Dr. Jordan) presents to ED sent from her pulmonologist with large left pleural effusion and admission for PTC drainage.

## 2022-04-26 NOTE — ASSESSMENT
[FreeTextEntry1] : Venipuncture with labs drawn in office.  May require CTPA to rule out PE, should D-dimer return elevated.\tasha Edith needs to be admitted into [facility] at this point for possible left chest tube placement, followed by left Pleurx catheter for long-term management, because I suspect the large left pleural effusion could be secondary to malignant pleural effusion from primary GYN malignancy.\par I have contacted Dr. Ramsey Ravi for thoracic surgery evaluation.\par Also advised her to closely follow with you clinically.

## 2022-04-26 NOTE — H&P ADULT - NSHPPHYSICALEXAM_GEN_ALL_CORE
Neuro: A+O x 3, non-focal, speech clear and intact  HEENT: PERRL  Neck: supple, no JVD  CV: regular rate, regular rhythm  Pulm/chest: no accessory muscle use noted  Abd: soft, tender to deep palpation. Umbilical surgical incision CDI  Ext: Moves all extremities x 4, without clubbing/cyanosis/edema  Skin: warm, well perfused, no rashes

## 2022-04-26 NOTE — ED PROVIDER NOTE - ATTENDING APP SHARED VISIT CONTRIBUTION OF CARE
The patient is a 66y Female who has presents to be admitted for ascites and new  Intra-abdominal and carcinomatosis, pleural effusion SOB since yesterday and inspiratory chest pain as described; other PMHx includes Hypothyroidism GERD Arthritis HLD s/p recent cholecystectomy P tubal ligation P bunionectomy PTED also noted to have a large pleural effusion and sob since yesterday She denies cough, and fever of sputum no jaundice    Vital Signs Last 24 Hrs  T(F): 98.4 HR: 94 BP: 104/66 RR: 17 SpO2: 98% (26 Apr 2022 18:24)   PE: as described; my additions and exceptions are noted in the chart    DATA:  EKG: No old EKG   LAB: Pending at time of evaluation    IMPRESSION/RISK:  Dx= pleural effusion and SOB in setting of abdominopelvic carcinomatosis   Consideration include  Plan   Probable metastatic dz  ·  Problem: Pleural effusion.   ·  Plan: CXR from 4/24 c/w Left pleural effusion TBA to Dr Ravi PTC for drainage of effusion   Seen by Surgery who will consult GYN/ONC

## 2022-04-26 NOTE — H&P ADULT - ASSESSMENT
67yo F with a history of cholecystectomy, tubal ligation and recently diagnosed peritoneal carcinomatosis s/p diagnostic lap, evacuation of ascites and omental biopsy (4/18/2022, Dr. Jordan) presents to ED sent from her pulmonologist with large left pleural effusion.

## 2022-04-26 NOTE — H&P ADULT - HISTORY OF PRESENT ILLNESS
65yo F with a history of cholecystectomy, tubal ligation and recently diagnosed peritoneal carcinomatosis s/p diagnostic lap, evacuation of ascites and omental biopsy (4/18/2022, Dr. Jordan). Pt recently admitted 4/22-4/24 with NV and found to have SBO on CT abdomen now s/p 1L ascites drained and reduction of incarcerated hernia. Patient was then discharged home. Today patient presents to ED sent from her pulmonologist office with c/o KLEIN and sob which is stable since discharge. A CXR from 4/24 showed da large left pleural effusion which had increased in size since the previous day.     Pt seen in ED, currently she is in NAD with no SOB at rest; her O2 saturation is 99% on room air with mask, BP and HR stable. As noted above she admits to KLEIN and SOB which she was experiencing two days ago while in the hospital but not worsened since. She states she had no idea she had fluid in her lungs. Her abdominal wounds are clean without drainage, but she does admit to tenderness and feeling of fluid starting to come back again. Otherwise stable. Pt to admitted to CTS service for drainage of pleural effusion.

## 2022-04-26 NOTE — ED PROVIDER NOTE - IV ALTEPLASE DOOR HIDDEN
Assumed care of pt at 20:49 in ST. Pt A&Ox3 c/o HA and dizziness x2 weeks. Pt states pain has since progressively gotten worse over 2-3 days. Pt endorses photophobia, denies hx of vertigo.
show

## 2022-04-26 NOTE — ED PROVIDER NOTE - NS ED ATTENDING STATEMENT MOD
This was a shared visit with the SIMONA. I reviewed and verified the documentation and independently performed the documented:

## 2022-04-27 NOTE — PHYSICAL THERAPY INITIAL EVALUATION ADULT - PATIENT PROFILE REVIEW, REHAB EVAL
ACTIVITY: Ambulate as Tolerated; Spoke with RN Joseph De Guzman prior to PT evaluation--> Pt OK for PT consult/OOB activity./yes

## 2022-04-27 NOTE — PROGRESS NOTE ADULT - SUBJECTIVE AND OBJECTIVE BOX
PULMONARY PROGRESS NOTE    DUSTY REYES  MRN-[id number]    Patient is a 66y old  Female who presents with a chief complaint of Left Pleural effusion (26 Apr 2022 23:15)      HPI:  -seen by Dr Copeland in the office for initial visit yesterday  -PMH: hypothyroidism, GERD, peritoneal carcinomatosis , cholecystectomy  - s/p IR guided paracentesis on 3/1      ROS:   -    ACTIVE MEDICATION LIST:  MEDICATIONS  (STANDING):  heparin   Injectable 5000 Unit(s) SubCutaneous every 8 hours  levothyroxine 125 MICROGram(s) Oral daily  multivitamin 1 Tablet(s) Oral daily  pantoprazole    Tablet 40 milliGRAM(s) Oral before breakfast  simvastatin 40 milliGRAM(s) Oral at bedtime    MEDICATIONS  (PRN):  acetaminophen     Tablet .. 975 milliGRAM(s) Oral every 6 hours PRN Temp greater or equal to 38C (100.4F), Mild Pain (1 - 3)  oxyCODONE    IR 5 milliGRAM(s) Oral every 4 hours PRN Moderate Pain (4 - 6)  senna 2 Tablet(s) Oral at bedtime PRN Constipation      EXAM:  Vital Signs Last 24 Hrs  T(C): 36.5 (27 Apr 2022 12:02), Max: 36.9 (26 Apr 2022 18:24)  T(F): 97.7 (27 Apr 2022 12:02), Max: 98.4 (26 Apr 2022 18:24)  HR: 90 (27 Apr 2022 12:02) (84 - 98)  BP: 93/63 (27 Apr 2022 12:02) (93/63 - 117/83)  BP(mean): --  RR: 18 (27 Apr 2022 12:02) (16 - 25)  SpO2: 97% (27 Apr 2022 12:02) (96% - 100%)    GENERAL: The patient is awake and alert in no apparent distress.     LUNGS: Clear to auscultation without wheezing, rales or rhonchi; respirations unlabored    HEART: Regular rate and rhythm without murmur.                            12.5   9.32  )-----------( 531      ( 27 Apr 2022 07:37 )             40.0       04-27    134<L>  |  100  |  6<L>  ----------------------------<  88  3.9   |  22  |  0.67    Ca    8.2<L>      27 Apr 2022 07:37  Phos  2.5     04-26  Mg     2.20     04-26    TPro  5.3<L>  /  Alb  2.3<L>  /  TBili  0.4  /  DBili  x   /  AST  40<H>  /  ALT  29  /  AlkPhos  79  04-27    >>> <<<    PROBLEM LIST:  66y Female with HEALTH ISSUES - PROBLEM Dx:  Pleural effusion              RECS:        Please call with any questions.    Jennifer Mosqueda DO  [facility] Pulmonary/Sleep Medicine  [phone]   PULMONARY PROGRESS NOTE    DUSTY REYES  MRN-[id number]    Patient is a 66y old  Female who presents with a chief complaint of Left Pleural effusion (26 Apr 2022 23:15)      HPI:  -seen by Dr Copeland in the office for initial visit yesterday for dyspnea, wheezing  -PMH: hypothyroidism, GERD, metastatic adenocarcinoma   - admitted to [facility] for chest tube placement  - seen earlier today on room air. uncomfortable but no cough       ROS:   -    ACTIVE MEDICATION LIST:  MEDICATIONS  (STANDING):  heparin   Injectable 5000 Unit(s) SubCutaneous every 8 hours  levothyroxine 125 MICROGram(s) Oral daily  multivitamin 1 Tablet(s) Oral daily  pantoprazole    Tablet 40 milliGRAM(s) Oral before breakfast  simvastatin 40 milliGRAM(s) Oral at bedtime    MEDICATIONS  (PRN):  acetaminophen     Tablet .. 975 milliGRAM(s) Oral every 6 hours PRN Temp greater or equal to 38C (100.4F), Mild Pain (1 - 3)  oxyCODONE    IR 5 milliGRAM(s) Oral every 4 hours PRN Moderate Pain (4 - 6)  senna 2 Tablet(s) Oral at bedtime PRN Constipation      EXAM:  Vital Signs Last 24 Hrs  T(C): 36.5 (27 Apr 2022 12:02), Max: 36.9 (26 Apr 2022 18:24)  T(F): 97.7 (27 Apr 2022 12:02), Max: 98.4 (26 Apr 2022 18:24)  HR: 90 (27 Apr 2022 12:02) (84 - 98)  BP: 93/63 (27 Apr 2022 12:02) (93/63 - 117/83)  BP(mean): --  RR: 18 (27 Apr 2022 12:02) (16 - 25)  SpO2: 97% (27 Apr 2022 12:02) (96% - 100%)    GENERAL: The patient is awake and alert in no apparent distress.     LUNGS: Clear to auscultation without wheezing righ tthorax  diminished breath sounds > 50% left thorax    HEART: Regular rate                              12.5   9.32  )-----------( 531      ( 27 Apr 2022 07:37 )             40.0       04-27    134<L>  |  100  |  6<L>  ----------------------------<  88  3.9   |  22  |  0.67    Ca    8.2<L>      27 Apr 2022 07:37  Phos  2.5     04-26  Mg     2.20     04-26    TPro  5.3<L>  /  Alb  2.3<L>  /  TBili  0.4  /  DBili  x   /  AST  40<H>  /  ALT  29  /  AlkPhos  79  04-27     rad< from: CT Angio Chest PE Protocol w/ IV Cont (04.27.22 @ 00:55) >    ACC: [id number] EXAM:  CT ANGIO CHEST PULM [facility]                          PROCEDURE DATE:  04/27/2022          INTERPRETATION:  CLINICAL INFORMATION: Shortness of breath and pleural   effusion. History of peritoneal carcinomatosis with small bowel   obstruction. Evaluate for pulmonary embolism.    COMPARISON: CT abdomen pelvis 4/22/2022 and CT chest 3/9/2022    CONTRAST/COMPLICATIONS:  IV Contrast: Omnipaque 350  90 cc administered   10 cc discarded  Oral Contrast: NONE  Complications: None reported at time of study completion    PROCEDURE:  CT Angiogram of the chest was obtained with intravenous contrast. Three   dimensional maximum intensity projection (MIP) images were generated.    FINDINGS:    PULMONARY ANGIOGRAM: No pulmonary arteryembolism.    LYMPH NODES: No mediastinal or axillary lymphadenopathy.    HEART/VASCULATURE: Heart size is within normal limits. No pericardial   effusion. Coronary artery and aortic calcifications.    AIRWAYS/LUNGS/PLEURA: Large left pleural effusionwith associated   atelectasis results in near complete collapse of the left upper and   partial collapse of the left lower lobes. Right lung is clear.  Rightward   mediastinal shift.    UPPER ABDOMEN: Stable splenic hypodensities. Trace ascites and peritoneal   carcinomatosis.    BONES/SOFT TISSUES: Multiple areas of mediastinal soft tissues and   nodularity along the epi-pericardial fat may indicate metastatic   implants. Subcutaneous edema. Degenerative changes of the spine. Chronic   left sided rib fractures.    IMPRESSION:    No pulmonary embolus.    Large left pleural effusion with associated atelectasis results in near   complete collapse of the left upper and lower lobes. Right lung is clear.    Redemonstration of peritoneal carcinomatosis and trace ascites.  Multiple areas of mediastinal soft tissue and nodularity along the   epi-pericardial fat may indicate metastatic implants.        --- End of Report ---          < end of copied text >  < from: Xray Chest 1 View- PORTABLE-Urgent (Xray Chest 1 View- PORTABLE-Urgent .) (04.26.22 @ 20:42) >    ACC: [id number] EXAM:  XR CHEST PORTABLE URGENT 1V                          PROCEDURE DATE:  04/26/2022          INTERPRETATION:  CLINICAL INDICATION: Dyspnea    EXAM: Frontal radiograph of the chest.    COMPARISON: Chest radiograph from 4/22/2022.    FINDINGS:  Large left pleural effusion, not significant change when compared to   prior study 4/22/2022. There is subsequent rightward mediastinal shift,   unchanged.  The right lung is clear.  No pneumothorax.  The heart size is not well evaluated on this projection.  The visualized osseous structures demonstrate no acute pathology.    IMPRESSION: Large left pleural effusion.      --- End of Report ---          LUCA SHEFFIELD MD; Resident Radiology  This document has been electronically signed.  ERICKA MANZANARES MD; Attending Radiologist  This document has been electronically signed. Apr 27 2022  7:02AM    < end of copied text >      PROBLEM LIST:  66y Female with HEALTH ISSUES - PROBLEM Dx:  Pleural effusion             RECS:  appreciate CTS  CTA negative for PE  check US dopplers- post procedure  IS  close f/u with Dr Copeland outpatient        Please call with any questions.    Jennifer Mosqueda DO  [facility] Pulmonary/Sleep Medicine  [phone]

## 2022-04-27 NOTE — PHYSICAL THERAPY INITIAL EVALUATION ADULT - GENERAL OBSERVATIONS, REHAB EVAL
Pt encountered in semisupine position, no distress, AxOx4, with +IV, +cardiac monitor, +pulse oximeter, and +chest tube.

## 2022-04-27 NOTE — PROGRESS NOTE ADULT - SUBJECTIVE AND OBJECTIVE BOX
Subjective: "I have shortness of breath when I do activities" Pt now s/p PTC insertion at bedside. 2L serous fluid drained. CXR improved.     Vital Signs:  Vital Signs Last 24 Hrs  T(C): 36.6 (04-27-22 @ 17:05), Max: 36.9 (04-26-22 @ 18:24)  T(F): 97.8 (04-27-22 @ 17:05), Max: 98.4 (04-26-22 @ 18:24)  HR: 91 (04-27-22 @ 17:05) (84 - 94)  BP: 103/66 (04-27-22 @ 17:05) (93/63 - 117/83)  RR: 17 (04-27-22 @ 17:05) (17 - 25)  SpO2: 99% (04-27-22 @ 17:05) (96% - 100%) on (O2)    Telemetry/Alarms:  General: WN/WD NAD  Neurology: Awake, nonfocal, MARCIAL x 4  Eyes: Scleras clear, PERRLA/ EOMI, Gross vision intact  ENT:Gross hearing intact, grossly patent pharynx, no stridor  Neck: Neck supple, trachea midline, No JVD,   Respiratory: decreased left side. CTA rt side.   CV: RRR, S1S2, no murmurs, rubs or gallops  Abdominal: Soft, NT, ND +BS, no BM  Extremities: No edema, + peripheral pulses  Skin: No Rashes, Hematoma, Ecchymosis  Lymphatic: No Neck, axilla, groin LAD  Psych: Oriented x 3, normal affect  Tubes: Left PTC placed, 2L drained.   Relevant labs, radiology and Medications reviewed           CXR effusion improved.              12.5   9.32  )-----------( 531      ( 27 Apr 2022 07:37 )             40.0     04-27    134<L>  |  100  |  6<L>  ----------------------------<  88  3.9   |  22  |  0.67    Ca    8.2<L>      27 Apr 2022 07:37  Phos  2.5     04-26  Mg     2.20     04-26    TPro  5.3<L>  /  Alb  2.3<L>  /  TBili  0.4  /  DBili  x   /  AST  40<H>  /  ALT  29  /  AlkPhos  79  04-27    PT/INR - ( 26 Apr 2022 21:13 )   PT: 11.9 sec;   INR: 1.03 ratio         PTT - ( 26 Apr 2022 21:13 )  PTT:30.3 sec  MEDICATIONS  (STANDING):  heparin   Injectable 5000 Unit(s) SubCutaneous every 8 hours  levothyroxine 125 MICROGram(s) Oral daily  multivitamin 1 Tablet(s) Oral daily  pantoprazole    Tablet 40 milliGRAM(s) Oral before breakfast  simvastatin 40 milliGRAM(s) Oral at bedtime    MEDICATIONS  (PRN):  acetaminophen     Tablet .. 975 milliGRAM(s) Oral every 6 hours PRN Temp greater or equal to 38C (100.4F), Mild Pain (1 - 3)  oxyCODONE    IR 5 milliGRAM(s) Oral every 4 hours PRN Moderate Pain (4 - 6)  senna 2 Tablet(s) Oral at bedtime PRN Constipation    Pertinent Physical Exam  I&O's Summary    26 Apr 2022 07:01  -  27 Apr 2022 07:00  --------------------------------------------------------  IN: 0 mL / OUT: 250 mL / NET: -250 mL    27 Apr 2022 07:01  -  27 Apr 2022 17:57  --------------------------------------------------------  IN: 0 mL / OUT: 150 mL / NET: -150 mL    Social History (marital status, living situation, occupation, tobacco use, alcohol and drug use, and sexual history): Marital status:  Lives with: Family    Tobacco use: Denies  Alcohol use: Denies  Illicit drug use: Denies      Assessment  66y Female  w/ PAST MEDICAL & SURGICAL HISTORY:  Ascites    Intra-abdominal and pelvic swelling of mass or lump    Hypothyroidism    GERD (gastroesophageal reflux disease)    Arthritis    HLD (hyperlipidemia)    S/P cholecystectomy    S/P tubal ligation    S/P bunionectomy  left foot    admitted with complaints of Patient is a 66y old  Female who presents with a chief complaint of Left Pleural effusion (27 Apr 2022 12:44)  History of Present Illness:   67yo F with a history of cholecystectomy, tubal ligation and recently diagnosed peritoneal carcinomatosis s/p diagnostic lap, evacuation of ascites and omental biopsy (4/18/2022, Dr. Jordan). Pt recently admitted 4/22-4/24 with NV and found to have SBO on CT abdomen now s/p 1L ascites drained and reduction of incarcerated hernia. Patient was then discharged home. Today patient presents to ED sent from her pulmonologist office with c/o KLEIN and sob which is stable since discharge. A CXR from 4/24 showed da large left pleural effusion which had increased in size since the previous day.     Pt seen in ED, currently she is in NAD with no SOB at rest; her O2 saturation is 99% on room air with mask, BP and HR stable. As noted above she admits to KLEIN and SOB which she was experiencing two days ago while in the hospital but not worsened since. She states she had no idea she had fluid in her lungs. Her abdominal wounds are clean without drainage, but she does admit to tenderness and feeling of fluid starting to come back again. Otherwise stable. Pt to admitted to CTS service for drainage of pleural effusion.       PLAN  Neuro: Pain management  Pulm: Encourage coughing, deep breathing and use of incentive spirometry. Wean off supplemental oxygen as able. Daily CXR.   Cardio: Monitor telemetry/alarms  GI: Tolerating diet. Continue stool softeners.  Renal: monitor urine output, supplement electrolytes as needed  Vasc: Heparin SC/SCDs for DVT prophylaxis  Heme: Stable H/H. .   ID: Off antibiotics. Stable.  Therapy: OOB/ambulate  Tubes: Monitor Chest tube output, keep to waterseal.   Disposition: Aim to D/C to home once tube removed.   Discussed with Cardiothoracic Team at AM rounds.

## 2022-04-27 NOTE — PHYSICAL THERAPY INITIAL EVALUATION ADULT - ADDITIONAL COMMENTS
Pt reports that she lives in a private house with her 2 sons with ~6 steps to enter; (+)bilateral handrails; bedroom/bathroom is on the first floor. Prior to hospital admission pt was completely independent and used no assistive device. She does have a single axis cane if she needs, pt reports bad OA in right knee. Pt denies any recent falls.    Pt left comfortable in chair, NAD, all lines intact, all precautions maintained, with call bell in reach, and RN aware of PT evaluation.

## 2022-04-27 NOTE — PHYSICAL THERAPY INITIAL EVALUATION ADULT - PERTINENT HX OF CURRENT PROBLEM, REHAB EVAL
65yo F with a history of cholecystectomy, tubal ligation and recently diagnosed peritoneal carcinomatosis s/p diagnostic lap, evacuation of ascites and omental biopsy (4/18/2022, Dr. Jordan) presents to ED sent from her pulmonologist with large left pleural effusion.

## 2022-04-27 NOTE — PATIENT PROFILE ADULT - FALL HARM RISK - HARM RISK INTERVENTIONS

## 2022-04-27 NOTE — PROCEDURE NOTE - ADDITIONAL PROCEDURE DETAILS
14Fr left pleural pigtail catheter placed under sterile precautions with ultrasound guidance.  Written consent in chart.  1,200ml serous fluid drained and chest tube clamped at 9:20am.  CXR with improved left pleural effusion.  Tube unclamped at 11:20am and another 500ml drained for a total of 1,700ml.  Pigtail left on water-seal.  No complications.  Pleural fluid sent for cytology, culture, and fluid studies.

## 2022-04-27 NOTE — PATIENT PROFILE ADULT - HOW PATIENT ADDRESSED, PROFILE
Pt presents to ER via EMS after being called by PD when pt was found in truck in the middle of the road, unconscious. When EMS arrived, pt was awake and ambulated to stretcher. Heroin needle was found next to pt, and pt admits to doing heroin last night at 2200 and meth 2 days ago. Upon arrival to ER, pt is tweaking, fully oriented, stable gait.    [email or web address]

## 2022-04-27 NOTE — PHYSICAL THERAPY INITIAL EVALUATION ADULT - DISCHARGE DISPOSITION, PT EVAL
will keep pt on PT program while @ [facility] to prevent weakness/ deconditioning./home/no skilled PT needs

## 2022-04-27 NOTE — PHYSICAL THERAPY INITIAL EVALUATION ADULT - DIAGNOSIS, PT EVAL
Pt admitted pleural Effusion; CT Angio of Chest (-)PE; Pt presents with decrease aerobic capacity/endurance.

## 2022-04-28 NOTE — PROGRESS NOTE ADULT - SUBJECTIVE AND OBJECTIVE BOX
PULMONARY PROGRESS NOTE    DUSTY REYES  MRN-[id number]    Patient is a 66y old  Female who presents with a chief complaint of Left Pleural effusion (27 Apr 2022 17:56)      HPI:  -s/p PTC  - has IS at bedside  feels better  anxious about discharge      ROS:   -    ACTIVE MEDICATION LIST:  MEDICATIONS  (STANDING):  heparin   Injectable 5000 Unit(s) SubCutaneous every 8 hours  levothyroxine 125 MICROGram(s) Oral daily  multivitamin 1 Tablet(s) Oral daily  pantoprazole    Tablet 40 milliGRAM(s) Oral before breakfast  simvastatin 40 milliGRAM(s) Oral at bedtime    MEDICATIONS  (PRN):  acetaminophen     Tablet .. 975 milliGRAM(s) Oral every 6 hours PRN Temp greater or equal to 38C (100.4F), Mild Pain (1 - 3)  oxyCODONE    IR 5 milliGRAM(s) Oral every 4 hours PRN Moderate Pain (4 - 6)  senna 2 Tablet(s) Oral at bedtime PRN Constipation      EXAM:  Vital Signs Last 24 Hrs  T(C): 36.6 (28 Apr 2022 05:41), Max: 36.8 (28 Apr 2022 00:15)  T(F): 97.9 (28 Apr 2022 05:41), Max: 98.2 (28 Apr 2022 00:15)  HR: 84 (28 Apr 2022 05:41) (83 - 91)  BP: 106/61 (28 Apr 2022 05:41) (93/63 - 106/65)  BP(mean): --  RR: 16 (28 Apr 2022 05:41) (16 - 18)  SpO2: 96% (28 Apr 2022 05:41) (96% - 99%)    GENERAL: The patient is awake and alert in no apparent distress.     LUNGS: improved aeration left chest  no wheezing  some pain at chest tube insertion site                               12.2   9.88  )-----------( 514      ( 28 Apr 2022 06:26 )             39.1       04-28    133<L>  |  98  |  8   ----------------------------<  90  4.0   |  23  |  0.69    Ca    8.1<L>      28 Apr 2022 06:26  Phos  2.5     04-26  Mg     2.20     04-26    TPro  5.3<L>  /  Alb  2.3<L>  /  TBili  0.4  /  DBili  x   /  AST  40<H>  /  ALT  29  /  AlkPhos  79  04-27     rad< from: Xray Chest 1 View- PORTABLE-Urgent (04.27.22 @ 10:13) >    ACC: [id number] EXAM:  XR CHEST PORTABLE URGENT 1V                          PROCEDURE DATE:  04/27/2022          INTERPRETATION:  Portable chest radiograph    CLINICAL INFORMATION: Dyspnea, shortness of breath.    TECHNIQUE:  Portable  AP chest radiograph.    COMPARISON: 4/26/2022 chest .    FINDINGS:  CATHETERS AND TUBES: LEFT multi-sidehole pigtail catheter overlies LEFT   lower hemithorax.    PULMONARY: LEFT effusion diminished with improved aeration of the upper   zone and apex. Residual LEFTbasilar  airspace disease and/or fluid   obscuring LEFT diaphragm contour. RIGHT lung parenchyma clear    HEART/VASCULAR: The heart and mediastinum size and configuration are   within normal limits.    BONES: Visualized osseous structures are intact.    IMPRESSION:   LEFT chest tube in place. Decreased LEFT effusion with   residual LEFT basilar airspace disease and/or small effusion. No   pneumothorax.    --- End of Report ---            HUGH ROONEY MD; Attending Radiologist  This document has been electronically signed. Apr 27 2022  8:29PM    < end of copied text >    PROBLEM LIST:  66y Female with HEALTH ISSUES - PROBLEM Dx:  Pleural effusion       RECS:  appreciate CTS  f/u pathology  exudative based on protein  she had high dimer outpatient, although CTA negative for PE , she has risk factors for VTE and would scan her LE prior to discharge  continue to use IS      Please call with any questions.    Jennifer Mosqueda DO  [facility] Pulmonary/Sleep Medicine  [phone]

## 2022-04-28 NOTE — PROGRESS NOTE ADULT - SUBJECTIVE AND OBJECTIVE BOX
Subjective: shortness of breath improved since drainage  pt without acute complaints    Vital Signs:  Vital Signs Last 24 Hrs  T(C): 36.5 (04-28-22 @ 08:30), Max: 36.8 (04-28-22 @ 00:15)  T(F): 97.7 (04-28-22 @ 08:30), Max: 98.2 (04-28-22 @ 00:15)  HR: 84 (04-28-22 @ 08:30) (83 - 91)  BP: 96/60 (04-28-22 @ 08:30) (93/63 - 106/65)  RR: 18 (04-28-22 @ 08:30) (16 - 18)  SpO2: 100% (04-28-22 @ 08:30) (96% - 100%) on (O2)    Telemetry/Alarms:sr  General: WN/WD NAD  Neurology: Awake, nonfocal, MARCIAL x 4  Eyes: Scleras clear, PERRLA/ EOMI, Gross vision intact  ENT:Gross hearing intact, grossly patent pharynx, no stridor  Neck: Neck supple, trachea midline, No JVD,   Respiratory: CTA B/L, No wheezing, rales, rhonchi  CV: RRR, S1S2, no murmurs, rubs or gallops  Abdominal: Soft, NT, ND +BS,   Extremities: No edema, + peripheral pulses  Skin: No Rashes, Hematoma, Ecchymosis  Lymphatic: No Neck, axilla, groin LAD  Psych: Oriented x 3, normal affect  Incisions: c,d,i  Tubes: L PTC ws, no air leak      Relevant labs, radiology and Medications reviewed                        12.2   9.88  )-----------( 514      ( 28 Apr 2022 06:26 )             39.1     04-28    133<L>  |  98  |  8   ----------------------------<  90  4.0   |  23  |  0.69    Ca    8.1<L>      28 Apr 2022 06:26  Phos  2.5     04-26  Mg     2.20     04-26    TPro  5.3<L>  /  Alb  2.3<L>  /  TBili  0.4  /  DBili  x   /  AST  40<H>  /  ALT  29  /  AlkPhos  79  04-27    PT/INR - ( 26 Apr 2022 21:13 )   PT: 11.9 sec;   INR: 1.03 ratio         PTT - ( 26 Apr 2022 21:13 )  PTT:30.3 sec  MEDICATIONS  (STANDING):  heparin   Injectable 5000 Unit(s) SubCutaneous every 8 hours  levothyroxine 125 MICROGram(s) Oral daily  multivitamin 1 Tablet(s) Oral daily  pantoprazole    Tablet 40 milliGRAM(s) Oral before breakfast  simvastatin 40 milliGRAM(s) Oral at bedtime    MEDICATIONS  (PRN):  acetaminophen     Tablet .. 975 milliGRAM(s) Oral every 6 hours PRN Temp greater or equal to 38C (100.4F), Mild Pain (1 - 3)  oxyCODONE    IR 5 milliGRAM(s) Oral every 4 hours PRN Moderate Pain (4 - 6)  senna 2 Tablet(s) Oral at bedtime PRN Constipation    Pertinent Physical Exam  I&O's Summary    27 Apr 2022 07:01  -  28 Apr 2022 07:00  --------------------------------------------------------  IN: 0 mL / OUT: 630 mL / NET: -630 mL          Social History (marital status, living situation, occupation, tobacco use, alcohol and drug use, and sexual history): Marital status:  Lives with: Family    Tobacco use: Denies  Alcohol use: Denies  Illicit drug use: Denies      CXR reviewed      Assessment  66y Female  w/ PAST MEDICAL & SURGICAL HISTORY:  Ascites    Intra-abdominal and pelvic swelling of mass or lump    Hypothyroidism    GERD (gastroesophageal reflux disease)    Arthritis    HLD (hyperlipidemia)    S/P cholecystectomy    S/P tubal ligation    S/P bunionectomy  left foot    presents with a chief complaint of Left Pleural effusion (27 Apr 2022 12:44)    67yo F with a history of cholecystectomy, tubal ligation and recently diagnosed peritoneal carcinomatosis s/p diagnostic lap, evacuation of ascites and omental biopsy (4/18/2022, Dr. Jordan). Pt recently admitted 4/22-4/24 with NV and found to have SBO on CT abdomen now s/p 1L ascites drained and reduction of incarcerated hernia. Patient was then discharged home. Today patient presents to ED sent from her pulmonologist office with c/o KLEIN and sob which is stable since discharge. A CXR from 4/24 showed da large left pleural effusion which had increased in size since the previous day.     Pt seen in ED, currently she is in NAD with no SOB at rest; her O2 saturation is 99% on room air with mask, BP and HR stable. As noted above she admits to KLEIN and SOB which she was experiencing two days ago while in the hospital but not worsened since. She states she had no idea she had fluid in her lungs. Her abdominal wounds are clean without drainage, but she does admit to tenderness and feeling of fluid starting to come back again. Otherwise stable. Pt to admitted to CTS service for drainage of pleural effusion.       PLAN  Neuro: Pain management  Pulm: Encourage coughing, deep breathing and use of incentive spirometry. Wean off supplemental oxygen as able. Daily CXR.   Cardio: Monitor telemetry/alarms  GI: Tolerating diet. Continue stool softeners.  Renal: monitor urine output, supplement electrolytes as needed  Vasc: Heparin SC/SCDs for DVT prophylaxis  Heme: Stable H/H. .   ID: Off antibiotics. Stable.  Therapy: OOB/ambulate  Tubes: Monitor Chest tube output, keep to waterseal.   Disposition: OR tomorrow MONET Hurtado  Discussed with Cardiothoracic Team at AM rounds.

## 2022-04-29 NOTE — DISCHARGE NOTE PROVIDER - [id number]_GEN_ALL_CORE_FT
Follow up with Dr. Hurtado in 7-10 days  Chest x-ray 1-2 days prior to appointment with Dr. Hurtado.    Follow up with primary care provider in one week

## 2022-04-29 NOTE — DISCHARGE NOTE PROVIDER - HOSPITAL COURSE
65yo F with a history of cholecystectomy, tubal ligation and recently diagnosed peritoneal carcinomatosis s/p diagnostic lap, evacuation of ascites and omental biopsy (4/18/2022, Dr. Jordan). Pt recently admitted 4/22-4/24 with NV and found to have SBO on CT abdomen now s/p 1L ascites drained and reduction of incarcerated hernia. Patient was then discharged home. Today patient presents to ED sent from her pulmonologist office with c/o KLEIN and sob which is stable since discharge. A CXR from 4/24 showed da large left pleural effusion which had increased in size since the previous day.     Pt seen in ED, currently she is in NAD with no SOB at rest; her O2 saturation is 99% on room air with mask, BP and HR stable. As noted above she admits to KLEIN and SOB which she was experiencing two days ago while in the hospital but not worsened since. She states she had no idea she had fluid in her lungs. Her abdominal wounds are clean without drainage, but she does admit to tenderness and feeling of fluid starting to come back again. Otherwise stable. Pt to admitted to CTS service for drainage of pleural effusion.  Left pigtail placed on 4/27/22, drained 1.7L .  Patient s/p Left Vats, pleurx cath placement, pleural biopsy and Mediport placement on 4/29/22.  Post op course without complication, patient stable for discharge home when visiting nurse service arranged for drainage of Pleurex cath.

## 2022-04-29 NOTE — PROGRESS NOTE ADULT - SUBJECTIVE AND OBJECTIVE BOX
PULMONARY PROGRESS NOTE    DUSTY REYES  MRN-[id number]    Patient is a 66y old  Female who presents with a chief complaint of Left Pleural effusion (28 Apr 2022 11:01)      HPI:  - on room air  - using IS- abotu 500ml     ROS:   -    ACTIVE MEDICATION LIST:  MEDICATIONS  (STANDING):  heparin   Injectable 5000 Unit(s) SubCutaneous every 8 hours  levothyroxine 125 MICROGram(s) Oral daily  multivitamin 1 Tablet(s) Oral daily  pantoprazole    Tablet 40 milliGRAM(s) Oral before breakfast  simvastatin 40 milliGRAM(s) Oral at bedtime    MEDICATIONS  (PRN):  acetaminophen     Tablet .. 975 milliGRAM(s) Oral every 6 hours PRN Temp greater or equal to 38C (100.4F), Mild Pain (1 - 3)  oxyCODONE    IR 5 milliGRAM(s) Oral every 4 hours PRN Moderate Pain (4 - 6)  senna 2 Tablet(s) Oral at bedtime PRN Constipation      EXAM:  Vital Signs Last 24 Hrs  T(C): 36.3 (29 Apr 2022 09:30), Max: 37.2 (28 Apr 2022 23:51)  T(F): 97.4 (29 Apr 2022 09:30), Max: 99 (28 Apr 2022 23:51)  HR: 93 (29 Apr 2022 09:30) (78 - 93)  BP: 98/70 (29 Apr 2022 09:30) (98/58 - 118/74)  BP(mean): --  RR: 17 (29 Apr 2022 09:30) (17 - 18)  SpO2: 98% (29 Apr 2022 09:30) (98% - 99%)    GENERAL: The patient is awake and alert in no apparent distress.     LUNGS: tenderness at CT insertion site   improved aeration  no wheeze                             11.2   8.84  )-----------( 518      ( 29 Apr 2022 06:36 )             35.7       04-29    132<L>  |  99  |  10  ----------------------------<  98  3.8   |  23  |  0.70    Ca    7.8<L>      29 Apr 2022 06:36  Phos  2.7     04-29  Mg     1.90     04-29     rad< from: US Duplex Venous Lower Ext Complete, Bilateral (04.28.22 @ 11:36) >    ACC: [id number] EXAM:  US DPLX LWR EXT VEINS COMPL BI                          PROCEDURE DATE:  04/28/2022          INTERPRETATION:  CLINICAL INFORMATION: Elevated d-dimer. History of   pelvic mass with metastases and pleural effusion. Evaluate for DVT.    COMPARISON: None available.    TECHNIQUE: Duplex sonography of the BILATERAL LOWER extremity veins with   color and spectral Doppler, with and without compression.    FINDINGS:    RIGHT:  Normal compressibility of the RIGHT common femoral, femoral and popliteal   veins.  Doppler examination shows normal spontaneous and phasic flow.  No RIGHT calf vein thrombosis is detected.    LEFT:  Normal compressibility of the LEFT common femoral, femoral and popliteal   veins.  Doppler examination shows normal spontaneous and phasic flow.  No LEFT calf vein thrombosis is detected.    IMPRESSION:  No evidence of deep venous thrombosis in either lower extremity.    --- End of Report ---          RITA MARRUFO MD; Resident Radiologist  This document has been electronically signed.  HERMELINDA DESAI MD; Attending Radiologist  This document has been electronically signed. Apr 28 2022      < end of copied text >  < from: CT Angio Chest PE Protocol w/ IV Cont (04.27.22 @ 00:55) >    ACC: [id number] EXAM:  CT ANGIO CHEST PULM [facility]                          PROCEDURE DATE:  04/27/2022          INTERPRETATION:  CLINICAL INFORMATION: Shortness of breath and pleural   effusion. History of peritoneal carcinomatosis with small bowel   obstruction. Evaluate for pulmonary embolism.    COMPARISON: CT abdomen pelvis 4/22/2022 and CT chest 3/9/2022    CONTRAST/COMPLICATIONS:  IV Contrast: Omnipaque 350  90 cc administered   10 cc discarded  Oral Contrast: NONE  Complications: None reported at time of study completion    PROCEDURE:  CT Angiogram of the chest was obtained with intravenous contrast. Three   dimensional maximum intensity projection (MIP) images were generated.    FINDINGS:    PULMONARY ANGIOGRAM: No pulmonary arteryembolism.    LYMPH NODES: No mediastinal or axillary lymphadenopathy.    HEART/VASCULATURE: Heart size is within normal limits. No pericardial   effusion. Coronary artery and aortic calcifications.    AIRWAYS/LUNGS/PLEURA: Large left pleural effusionwith associated   atelectasis results in near complete collapse of the left upper and   partial collapse of the left lower lobes. Right lung is clear.  Rightward   mediastinal shift.    UPPER ABDOMEN: Stable splenic hypodensities. Trace ascites and peritoneal   carcinomatosis.    BONES/SOFT TISSUES: Multiple areas of mediastinal soft tissues and   nodularity along the epi-pericardial fat may indicate metastatic   implants. Subcutaneous edema. Degenerative changes of the spine. Chronic   left sided rib fractures.    IMPRESSION:    No pulmonary embolus.    Large left pleural effusion with associated atelectasis results in near   complete collapse of the left upper and lower lobes. Right lung is clear.    Redemonstration of peritoneal carcinomatosis and trace ascites.  Multiple areas of mediastinal soft tissue and nodularity along the   epi-pericardial fat may indicate metastatic implants.        --- End of Report ---        < end of copied text >      PROBLEM LIST:  66y Female with HEALTH ISSUES - PROBLEM Dx:  Pleural effusion              RECS:  f/u path results  use IS  activity as tolerated  noted plan for pleurx today with Dr Hurtado  appreciate CTS  close fu with Dr Copeland Upon discharge      Please call with any questions over weekend    Jennifer Mosqueda DO  [facility] Pulmonary/Sleep Medicine  [phone]

## 2022-04-29 NOTE — DISCHARGE NOTE PROVIDER - CARE PROVIDERS DIRECT ADDRESSES
[email or web address]

## 2022-04-29 NOTE — DISCHARGE NOTE PROVIDER - CARE PROVIDER_API CALL
Shola Hurtado)  Surgery; Thoracic Surgery  [address] [place] [address], [place] [address]  Phone: [phone]  Fax: [phone]  Follow Up Time:    Ramsey Ravi)  Surgery; Thoracic Surgery  [address] [place] [address], [place] [address]  Phone: [phone]  Fax: [phone]  Follow Up Time: 2 weeks

## 2022-04-29 NOTE — DISCHARGE NOTE PROVIDER - NSDCCPCAREPLAN_GEN_ALL_CORE_FT
PRINCIPAL DISCHARGE DIAGNOSIS  Diagnosis: Pleural effusion  Assessment and Plan of Treatment:        PRINCIPAL DISCHARGE DIAGNOSIS  Diagnosis: Pleural effusion  Assessment and Plan of Treatment: - Follow up with Dr. Ravi in 1-2 weeks [phone]. Please call the office to make an appointment.   - Have your Chest x-ray done 1-2 days prior to your appointment.    - Please bring copy of x-ray to your appointment.  - Follow up with primary care provider in one week.  - Take the prescribed pain medication ONLY as needed.  - Can use over the counter Ibuprofen & or Tylenol as directed on the bottle.   - Please take a laxative or stool softeners to help support your bowel movements while on narcotic pain medication as it can cause constipation. Laxatives & stool softeners can be available over the counter at your local pharmacy.

## 2022-04-29 NOTE — DISCHARGE NOTE PROVIDER - NSDCFUADDINST_GEN_ALL_CORE_FT
Please walk 4-5 x per day; increase as tolerated. You may climb stairs. Continue to use the incentive spirometer.   You may keep wounds uncovered. Please shower daily with soap and water. The suture will be removed in the office at the follow up appointment.   Please call the office at [phone] if you have fevers, chills, worsening shortness of breath, chest pain, warmth, redness or purulent discharge from the wound.

## 2022-04-29 NOTE — BRIEF OPERATIVE NOTE - NSICDXBRIEFPROCEDURE_GEN_ALL_CORE_FT
PROCEDURES:  Thoracoscopy with insertion of silicone tunneled single-cuffed pleural vacuum drainage catheter system 29-Apr-2022 17:30:56  aDna Denise  Fluoroscopic guidance for central venous access device 29-Apr-2022 17:31:19 Mediport placement Dana Denise   PROCEDURES:  Thoracoscopy with insertion of silicone tunneled single-cuffed pleural vacuum drainage catheter system 29-Apr-2022 17:30:56 Pleural biopsy Dana Denise  Fluoroscopic guidance for central venous access device 29-Apr-2022 17:31:19 Mediport placement Dana Denise

## 2022-04-29 NOTE — PROVIDER CONTACT NOTE (OTHER) - ACTION/TREATMENT ORDERED:
Provider made aware. No new orders. Provider will discuss with day team. Will continue to monitor patient.

## 2022-04-29 NOTE — CHART NOTE - NSCHARTNOTEFT_GEN_A_CORE
Patient s/p Left Vats, Pleurx cath placement, pleural biopsy and Mediport placement.  Patient resting comfortably.  Pleurx cath to pleurovac to water seal, no air leak noted.  Incision with dressing clean and dry.  Mediport site clean and dry, no redness or swelling noted.  Vital Signs Last 24 Hrs  T(C): 36.3 (29 Apr 2022 19:40), Max: 37.2 (28 Apr 2022 23:51)  T(F): 97.3 (29 Apr 2022 19:40), Max: 99 (28 Apr 2022 23:51)  HR: 97 (29 Apr 2022 21:30) (78 - 104)  BP: 109/73 (29 Apr 2022 21:30) (98/58 - 118/74)  BP(mean): 81 (29 Apr 2022 21:30) (76 - 81)  RR: 20 (29 Apr 2022 21:30) (17 - 22)  SpO2: 93% (29 Apr 2022 21:30) (93% - 100%)    I&O's Detail    28 Apr 2022 07:01  -  29 Apr 2022 07:00  --------------------------------------------------------  IN:  Total IN: 0 mL    OUT:    Chest Tube (mL): 250 mL    Voided (mL): 550 mL  Total OUT: 800 mL    Total NET: -800 mL      29 Apr 2022 07:01  -  29 Apr 2022 23:00  --------------------------------------------------------  IN:    Oral Fluid: 50 mL  Total IN: 50 mL    OUT:    Blood Loss (mL): 150 mL    Chest Tube (mL): 10 mL    Voided (mL): 150 mL  Total OUT: 310 mL    Total NET: -260 mL      MEDICATIONS  (STANDING):  heparin   Injectable 5000 Unit(s) SubCutaneous every 8 hours  HYDROmorphone PCA (1 mG/mL) 30 milliLiter(s) PCA Continuous PCA Continuous  lactated ringers. 1000 milliLiter(s) (30 mL/Hr) IV Continuous <Continuous>  levothyroxine 125 MICROGram(s) Oral daily  multivitamin 1 Tablet(s) Oral daily  pantoprazole    Tablet 40 milliGRAM(s) Oral before breakfast  simvastatin 40 milliGRAM(s) Oral at bedtime    A/P: S/P Left Vats, Pleurx cath placement, pleural biopsy and Mediport placement   Continue Pleurx to water seal   Follow up labs and CXR in am   Chest PT, ambulation and incentive spiromter   Pain management

## 2022-04-29 NOTE — DISCHARGE NOTE PROVIDER - NSDCCPTREATMENT_GEN_ALL_CORE_FT
PRINCIPAL PROCEDURE  Procedure: Thoracoscopy with insertion of silicone tunneled single-cuffed pleural vacuum drainage catheter system  Findings and Treatment: Pleural biopsy

## 2022-04-29 NOTE — DISCHARGE NOTE NURSING/CASE MANAGEMENT/SOCIAL WORK - NSSCNAMETXT_GEN_ALL_CORE
[facility] [phone] initial visit will be day after discharge home. A nurse will call prior to the home visit.

## 2022-04-29 NOTE — DISCHARGE NOTE PROVIDER - NSDCFUSCHEDAPPT_GEN_ALL_CORE_FT
[facility]  Gynonc [address]  Scheduled Appointment: 05/03/2022    [facility]  PulmMed [address]  Scheduled Appointment: 05/06/2022

## 2022-04-29 NOTE — DISCHARGE NOTE NURSING/CASE MANAGEMENT/SOCIAL WORK - PATIENT PORTAL LINK FT
You can access the FollowMyHealth Patient Portal offered by [facility] by registering at the following website: [email or web address]. By joining [employer]’s FollowMyHealth portal, you will also be able to view your health information using other applications (apps) compatible with our system.

## 2022-04-30 NOTE — PROGRESS NOTE ADULT - REASON FOR ADMISSION
Left Pleural effusion

## 2022-04-30 NOTE — PROGRESS NOTE ADULT - SUBJECTIVE AND OBJECTIVE BOX
Anesthesia Pain Management Service    SUBJECTIVE: Patient is doing well with IV PCA and no significant problems reported.    Pain Scale Score	At rest: __3/10_ 	With Activity: ___ 	[X ] Refer to charted pain scores    THERAPY:    [ ] IV PCA Morphine		[ ] 5 mg/mL	[ ] 1 mg/mL  [X ] IV PCA Hydromorphone	[ ] 5 mg/mL	[X ] 1 mg/mL  [ ] IV PCA Fentanyl		[ ] 50 micrograms/mL    Demand dose __0.2_ lockout __6_ (minutes) Continuous Rate _0__ Total: _0__   mg used (in past 24 hrs)      MEDICATIONS  (STANDING):  heparin   Injectable 5000 Unit(s) SubCutaneous every 8 hours  HYDROmorphone PCA (1 mG/mL) 30 milliLiter(s) PCA Continuous PCA Continuous  lactated ringers. 1000 milliLiter(s) (30 mL/Hr) IV Continuous <Continuous>  levothyroxine 125 MICROGram(s) Oral daily  multivitamin 1 Tablet(s) Oral daily  pantoprazole    Tablet 40 milliGRAM(s) Oral before breakfast  simvastatin 40 milliGRAM(s) Oral at bedtime    MEDICATIONS  (PRN):  acetaminophen     Tablet .. 975 milliGRAM(s) Oral every 6 hours PRN Temp greater or equal to 38C (100.4F), Mild Pain (1 - 3)  HYDROmorphone PCA (1 mG/mL) Rescue Clinician Bolus 0.5 milliGRAM(s) IV Push every 15 minutes PRN for Pain Scale GREATER THAN 6  metoclopramide Injectable 10 milliGRAM(s) IV Push once PRN Nausea and/or Vomiting  naloxone Injectable 0.1 milliGRAM(s) IV Push every 3 minutes PRN For ANY of the following changes in patient status:  A. RR LESS THAN 10 breaths per minute, B. Oxygen saturation LESS THAN 90%, C. Sedation score of 6  ondansetron Injectable 4 milliGRAM(s) IV Push every 6 hours PRN Nausea  ondansetron Injectable 4 milliGRAM(s) IV Push once PRN Nausea and/or Vomiting  senna 2 Tablet(s) Oral at bedtime PRN Constipation      OBJECTIVE: Patient sitting up in chair, chest tubex1.    Sedation Score:	[ X] Alert	[ ] Drowsy 	[ ] Arousable	[ ] Asleep	[ ] Unresponsive    Side Effects:	[X ] None	[ ] Nausea	[ ] Vomiting	[ ] Pruritus  		[ ] Other:    Vital Signs Last 24 Hrs  T(C): 36.7 (30 Apr 2022 10:51), Max: 36.8 (30 Apr 2022 06:52)  T(F): 98.1 (30 Apr 2022 10:51), Max: 98.2 (30 Apr 2022 06:52)  HR: 99 (30 Apr 2022 10:51) (85 - 104)  BP: 112/59 (30 Apr 2022 10:51) (100/66 - 117/76)  BP(mean): 81 (29 Apr 2022 21:30) (76 - 81)  RR: 16 (30 Apr 2022 10:51) (16 - 22)  SpO2: 99% (30 Apr 2022 10:51) (93% - 100%)    ASSESSMENT/ PLAN    Therapy to  be:	[ ] Continue   [ X] Discontinued   [X ] Change to prn Analgesics    Documentation and Verification of current medications:   [X] Done	[ ] Not done, not elligible    Comments: Discussed patient with primary team, IV PCA discontinued. PRN Oral/IV opioids and/or Adjuvant non-opioid medication to be ordered at this point.    Progress Note written now but Patient was seen earlier.

## 2022-04-30 NOTE — PROGRESS NOTE ADULT - SUBJECTIVE AND OBJECTIVE BOX
ANESTHESIA POSTOP CHECK    66y Female POSTOP DAY 1 S/P thoracoscopy    Vital Signs Last 24 Hrs  T(C): 36.7 (30 Apr 2022 10:51), Max: 36.8 (30 Apr 2022 06:52)  T(F): 98.1 (30 Apr 2022 10:51), Max: 98.2 (30 Apr 2022 06:52)  HR: 99 (30 Apr 2022 10:51) (85 - 99)  BP: 112/59 (30 Apr 2022 10:51) (100/66 - 115/70)  BP(mean): 81 (29 Apr 2022 21:30) (76 - 81)  RR: 16 (30 Apr 2022 10:51) (16 - 22)  SpO2: 99% (30 Apr 2022 10:51) (93% - 100%)  I&O's Summary    29 Apr 2022 07:01  -  30 Apr 2022 07:00  --------------------------------------------------------  IN: 320 mL / OUT: 917 mL / NET: -597 mL        [X ] NO APPARENT ANESTHESIA COMPLICATIONS      Comments:

## 2022-04-30 NOTE — PROGRESS NOTE ADULT - PROVIDER SPECIALTY LIST ADULT
CT Surgery
Pain Medicine
Pulmonology
Thoracic Surgery
Anesthesia
Gyn Onc
Pain Medicine

## 2022-04-30 NOTE — PROGRESS NOTE ADULT - ASSESSMENT
Assessment/Plan: 66y POD#8 s/p LSC tumor biopsy and drainage of ascites (frozen = carcinoma) and POD#5 s/p LSC reduction of incarcerated hernia and drainage of ascites a/w worsening L sided pleural effusion. She is now POD#1 from left Vats, Pleurx cath placement, pleural biopsy and Mediport placement. Patient with no acute complaints.    Neuro: PCA/Tylenol  CV: Hemodynamically stable. On home dose of statin.  Pulm: s/p Vats, Pleurx cath placement, pleural biopsy 4/30  GI: Regular diet. Zofran/Protonix/Senna  FEN: LR@30  : Voiding spontanesouly. Adequate UOP  Heme: HSQ/Venodynes for DVT ppx. Increase OOB.    ID: Afebrile  Endo: Hx of hypothyroidism on home dose of Synthroid  Dispo: care per primary team, Gyn Oncology to sign off and patient to follow up outpatient with Dr. Hernandez. Please call with any further questions [phone]    Dorcas Boothe PGY2

## 2022-04-30 NOTE — PROGRESS NOTE ADULT - SUBJECTIVE AND OBJECTIVE BOX
R2 Gyn ONC Progress Note POD#8  HD#5    Subjective:   Pt seen and examined at bedside. Pain well controlled. Patient ambulating. Tolerating regular diet. POD#1 from successful left Vats, Pleurx cath placement, pleural biopsy and Mediport placement. Pt denies fever, chills, chest pain, SOB, nausea, vomiting, lightheadedness, dizziness.      Objective:  T(F): 98.2 (04-30-22 @ 06:52), Max: 98.2 (04-30-22 @ 06:52)  HR: 85 (04-30-22 @ 06:52) (85 - 104)  BP: 100/66 (04-30-22 @ 06:52) (100/66 - 117/76)  RR: 18 (04-30-22 @ 06:52) (17 - 22)  SpO2: 94% (04-30-22 @ 06:52) (93% - 100%)  Wt(kg): --  I&O's Summary    29 Apr 2022 07:01  -  30 Apr 2022 07:00  --------------------------------------------------------  IN: 320 mL / OUT: 917 mL / NET: -597 mL      CAPILLARY BLOOD GLUCOSE          MEDICATIONS  (STANDING):  heparin   Injectable 5000 Unit(s) SubCutaneous every 8 hours  HYDROmorphone PCA (1 mG/mL) 30 milliLiter(s) PCA Continuous PCA Continuous  lactated ringers. 1000 milliLiter(s) (30 mL/Hr) IV Continuous <Continuous>  levothyroxine 125 MICROGram(s) Oral daily  multivitamin 1 Tablet(s) Oral daily  pantoprazole    Tablet 40 milliGRAM(s) Oral before breakfast  simvastatin 40 milliGRAM(s) Oral at bedtime  vancomycin  IVPB 1000 milliGRAM(s) IV Intermittent once    MEDICATIONS  (PRN):  acetaminophen     Tablet .. 975 milliGRAM(s) Oral every 6 hours PRN Temp greater or equal to 38C (100.4F), Mild Pain (1 - 3)  HYDROmorphone PCA (1 mG/mL) Rescue Clinician Bolus 0.5 milliGRAM(s) IV Push every 15 minutes PRN for Pain Scale GREATER THAN 6  metoclopramide Injectable 10 milliGRAM(s) IV Push once PRN Nausea and/or Vomiting  naloxone Injectable 0.1 milliGRAM(s) IV Push every 3 minutes PRN For ANY of the following changes in patient status:  A. RR LESS THAN 10 breaths per minute, B. Oxygen saturation LESS THAN 90%, C. Sedation score of 6  ondansetron Injectable 4 milliGRAM(s) IV Push every 6 hours PRN Nausea  ondansetron Injectable 4 milliGRAM(s) IV Push once PRN Nausea and/or Vomiting  senna 2 Tablet(s) Oral at bedtime PRN Constipation      Physical Exam:  Constitutional: NAD, A+O x3  CV: RR no m/r/g  Lungs: good air flow b/l  Abdomen: soft, non-distended, non-tender. no guarding, no rebound.  Incision: clean, dry, intact laparoscopic port sites.  Extremities: no lower extremity edema or calf tenderness bilaterally    LABS:                        12.3   11.92 )-----------( 563      ( 30 Apr 2022 07:10 )             39.9     04-30    131<L>  |  95<L>  |  9      ----------------------------<  107<H>  4.3     |  19<L>  |  0.62   04-29    132<L>  |  99     |  10     ----------------------------<  98     3.8     |  23     |  0.70     Ca    8.6        30 Apr 2022 07:10  Ca    7.8<L>      29 Apr 2022 06:36  Phos  2.7       04-29  Mg     1.90      04-29

## 2022-05-07 NOTE — HISTORY OF PRESENT ILLNESS
[AJCC Stage: ____] : AJCC Stage: [unfilled] [Disease: _____________________] : Disease: [unfilled] [de-identified] : 66 F h/o hypothyroidism, presents for further management of adenocarcinoma of unknown primary (GI origin). \par \par In February 2022, patient noted the development of epigastric discomfort x 2 weeks, decreased appetite, abdominal bloating. She initially presented to [facility] on 2/10/22 and her work-up revealed the following: elevated liver enzymes, CT abd/pel showed diffuse abdominal/pelvic ascites, right adnexal cyst, pleural based nodule in the left lower lobe. She was discharged with appropriate follow-ups but returned to the hospital on 2/15/22 due to persistent symptoms. She underwent paracentesis twice on 2/1/22 but it was unsuccessful. GYN was consulted and had pt set up for further imaging with pelvic + transvaginal ultrasound in outpatient setting. \par \par Patient was readmitted on 2/28/22 due to worsening abdominal pain, distention and shortness of breath at rest. Repeat CT CAP was done on 2/28/22 and it demonstrated heterogenous, lobular, complex cystic and soft tissue masses within the pelvic inseparable from the region of the uterus, cervix and adnexa, multiple suspicious nodular peritoneal, omental, hepatic and splenic lesions worrisome for metastatic disease. Patient underwent paracentesis on 3/1/22 (4 Liters removed); pathology confirmed high grade adenocarcinoma- exact etiology cannot be determined. \par \par CT Chest from 3/2/22 revealed no intrathoracic metastasis abnormal ascites with enhancing peritoneal nodules likely due to metastatic disease. Hypodensity in the spleen measuring 1.8 x 1.7 cm. On 3/4/22, she underwent EGD/Colonoscopy; biopsies were negative. Her PET/CT from 3/9/22 showed the following: increased uptake in the region of the KATHY suggests neoplasm/metastasis, FDG active in bilateral adnexa to suggest neoplasm/metastasis, numerous metabolically active foci in the abdomen/pelvis as seen\par Metabolically active foci localizing to the pleura in the left mid lower lung fields for pleural metastasis. Mild avid left pleural effusion likely malignant. \par \par Pt presented to the [facility] ED for worsening abdominal pain and no flatus on 4/22/22. Since 4/19 has had n/v/abdominal pain; she denies abdominal distention, no reported hunger. Has not kept down any PO including liquids since 4/19. At the time of the ED visit, pt was POD#4 diagnostic laparoscopy with biopsies and drainage of ascites for a pelvic mass (frozen = carcinoma). Patient found to have an Small Bowel Obstruction with incarcerated hernia through umbilical incision. Pt was taken to the OR emergently. On 4/22/2022 pt underwent Laparoscopy, diagnostic, Reduction, hernia, internal ( Incarcerated incisional hernia). Patient's hospital course was uncomplicated. She had an NGT for decompression with minimal output. NGT was removed on POD#2 and she tolerated clear liquid diet. She was passing flatus and having bowel movements. Pt was advanced to low residue diet, which she was able to tolerate. Was d/c on 4/24/22.\par \par Edith was readmitted from 4/26-4/30/22 to [facility] for worsening KLEIN and sob. Found to have a large left pleural effusion. Pt to admitted to CTS service for drainage of pleural effusion. Left pigtail placed on 4/27/22, drained 1.7L. Patient s/p L vats, pleurx cath placement, pleural biopsy and Mediport placement on 4/29/22. \par \par Referred to medical oncology for further management. \par \par \par  [de-identified] : mucinous moderately differentiated adenocarcinoma [de-identified] : Patient currently reported the following updates: \par \par - fatigue remains unchanged, able to complete all self-care ADLs independently at this time.\par - abdominal pain/distention (generalized; believed to be gassy in nature; uses Oxycodone on PRN basis but it makes her sleepy/dizzy)\par - decreased appetite (makes conscious effort to eat, tolerating small meals), occasional nausea. \par - bowel movements -soft, 1-2 times daily and semi-solid consistency\par - left chest Pleurx catheter in place, removing 100 ml every 1-2 days (managed by home care team); next home visit on Friday. \par + SOB improved, with pleurix \par + 35 lb weight loss currently (~ 140 lbs, previously 170+ lbs)\par lives with son, daughter close by \par 3 kids: 2 sons, daughter, 7 great kids

## 2022-05-07 NOTE — REVIEW OF SYSTEMS
[Fatigue] : fatigue [Cough] : cough [SOB on Exertion] : shortness of breath during exertion [Abdominal Pain] : abdominal pain [Difficulty Walking] : difficulty walking [Recent Change In Weight] : ~T recent weight change [Negative] : Allergic/Immunologic

## 2022-05-07 NOTE — REASON FOR VISIT
[Initial Consultation] : an initial consultation [Other: _____] : [unfilled] [FreeTextEntry2] : Adenocarcinoma of unknown primary (GI origin)

## 2022-05-08 PROBLEM — R18.8 PELVIC FLUID COLLECTION: Status: ACTIVE | Noted: 2022-01-01

## 2022-05-08 NOTE — ASSESSMENT
[FreeTextEntry1] : Continue left Pleurx catheter drainage.  \par Thoracic surgery follow-up.\par Hematology/oncology follow-up regarding chemo treatment.\par GYN oncology follow-up.  \par Return for pulmonary follow-up in 2 weeks.\par

## 2022-05-08 NOTE — PHYSICAL EXAM
[No Acute Distress] : no acute distress [Normal Oropharynx] : normal oropharynx [Normal Appearance] : normal appearance [No Neck Mass] : no neck mass [Normal Rate/Rhythm] : normal rate/rhythm [Normal S1, S2] : normal s1, s2 [No Murmurs] : no murmurs [No Resp Distress] : no resp distress [No Abnormalities] : no abnormalities [Benign] : benign [Normal Gait] : normal gait [No Clubbing] : no clubbing [No Cyanosis] : no cyanosis [No Edema] : no edema [FROM] : FROM [Normal Color/ Pigmentation] : normal color/ pigmentation [No Focal Deficits] : no focal deficits [Oriented x3] : oriented x3 [Normal Affect] : normal affect [TextBox_68] : Decreased breath sounds over lower left lung field

## 2022-05-08 NOTE — REASON FOR VISIT
[Follow-Up] : a follow-up visit [Shortness of Breath] : shortness of breath [Abnormal CXR/ Chest CT] : an abnormal CXR/ chest CT [TextBox_44] : s/p left VATS with pleural biopsy/left Pleurx catheter placement for large left pleural effusion.

## 2022-05-08 NOTE — PROCEDURE
[FreeTextEntry1] : \par  Pathology             Final\par \par No Documents Attached\par \par \par \par \par   [facility] Accession Number : [id number]\par Patient:   DUSTY REYES\par \par \par Accession:                             [id number]\par \par Collected Date/Time:                   4/29/2022 17:43 EDT\par Received Date/Time:                    4/30/2022 14:18 EDT\par \par Surgical Pathology Report - Auth (Verified)\par \par Specimen(s) Submitted\par 1-Pleural peel\par 2-Pleural biopsy\par \par Final Diagnosis\par 1. 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 See [id number]\par from omental tumor and IHC results.\par \par \par 2. 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 Received in formalin labeled   "pleural peel"  are multiple, mucinous\par well organized tissue fragments ranging from 1.0 cm to 1.5 cm in greatest\par dimension. Entirely in four cassettes: 1A-1D.\par \par 2. Received in formalin labeled   "pleural biopsy"  are multiple, tan-\par pink, soft tissue fragments ranging from 0.8 cm to 0.9 cm in greatest\par dimension. Entirely in one cassette: 2A.\par \par Jacinta Dumont 05/01/2022 06:03 PM\par \par Disclaimer\par In addition to other data that may appear on the specimen containers, all\par labels have been inspected to confirm the presence of the patient's name\par and date of birth.\par \par Histological Processing Performed at [facility],\par Department of Pathology, [address] [place] [address].\par \par  \par \par  Ordered by: ROBERT GILL       Collected/Examined: 29Apr2022 05:43PM       \par Verification Required       Stage: Final       \par  Performed at: [facility]       Resulted: 03May2022 04:04PM       Last Updated: 03May2022 04:04PM       Accession: [id number]

## 2022-05-08 NOTE — HISTORY OF PRESENT ILLNESS
[TextBox_4] : Patient seen in office for follow-up for shortness of breath.\par She has slight cough with clear mucus.\par She complains of pain on pleurx catheter site with breathing.\par Patient states a nurse comes in to drain Pleurx every other day, Monday,Wednesday,Friday.\par Drained 100ml each,on past 2 visits.

## 2022-05-17 NOTE — HISTORY OF PRESENT ILLNESS
[FreeTextEntry1] : 67yo F with a history of cholecystectomy, tubal ligation and recently diagnosed peritoneal carcinomatosis s/p diagnostic lap, evacuation of ascites and omental biopsy (4/18/2022, Dr. Jordan). Pt recently admitted 4/22-4/24 with NV and found to have SBO on CT abdomen s/p 1L ascites drained and reduction of incarcerated hernia. Patient was then discharged home. 4/26 sent to ED by pulmonologist for large left pleural effusion which had increased in size since the previous day.  Pt to admitted to CTS service for drainage of pleural effusion.  Left pigtail placed on 4/27/22, drained 1.7L .  \par \par Now,  s/p Left VATS, pleurx cath placement, pleural biopsy and Mediport placement on 4/29/22. Path of pleural fluid positive for malignant neoplasm.  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 See [id number] from omental tumor and IHC results. 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 Cindy Albarran ([facility]).Started chemo. \par \par CXR today with stable, post op findings. \par \par Here today for follow up, accompanied by her son. Endorses dry cough since procedure. Today, patient denies worsening SOB, chest pain,hemoptysis, fever, chills, night sweats, lightheadedness or dizziness. PleurX cath draining 3x weekly. She cannot recall the output amounts. \par \par

## 2022-05-17 NOTE — PHYSICAL EXAM
[] : no respiratory distress [Respiration, Rhythm And Depth] : normal respiratory rhythm and effort [Exaggerated Use Of Accessory Muscles For Inspiration] : no accessory muscle use [Heart Rate And Rhythm] : heart rate was normal and rhythm regular [Examination Of The Chest] : the chest was normal in appearance [Chest Visual Inspection Thoracic Asymmetry] : no chest asymmetry [Diminished Respiratory Excursion] : normal chest expansion [2+] : left 2+ [Breast Appearance] : normal in appearance [Breast Palpation Mass] : no palpable masses [Bowel Sounds] : normal bowel sounds [Abdomen Soft] : soft [Abdomen Tenderness] : non-tender [Cervical Lymph Nodes Enlarged Posterior Bilaterally] : posterior cervical [Cervical Lymph Nodes Enlarged Anterior Bilaterally] : anterior cervical [Supraclavicular Lymph Nodes Enlarged Bilaterally] : supraclavicular [Skin Color & Pigmentation] : normal skin color and pigmentation [Oriented To Time, Place, And Person] : oriented to person, place, and time [FreeTextEntry1] : PleurX cath site clean, dry, intact.

## 2022-05-17 NOTE — ASSESSMENT
[FreeTextEntry1] : 65yo F with a history of cholecystectomy, tubal ligation and recently diagnosed peritoneal carcinomatosis s/p diagnostic lap, evacuation of ascites and omental biopsy (4/18/2022, Dr. Jordan). Pt recently admitted 4/22-4/24 with NV and found to have SBO on CT abdomen s/p 1L ascites drained and reduction of incarcerated hernia. Patient was then discharged home. 4/24 sent to ED by pulmonologist for large left pleural effusion which had increased in size since the previous day.  Pt to admitted to CTS service for drainage of pleural effusion.  Left pigtail placed on 4/27/22, drained 1.7L .  \par \par Now, s/p Left VATS, pleurx cath placement, pleural biopsy and Mediport placement on 4/29/22. Path of pleural fluid positive for malignant neoplasm.  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 See [id number] from omental tumor and IHC results. 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 Cindy Albarran ([facility]).Started chemo. \par \par I have independently reviewed the medical records and imaging at the time of this office consultation, and discussed the following interpretations with the patient:\par - Patient recovering well. No need for further thoracic surgery follow up at this time. Continue to follow with pulm and [facility] as per their recommendations. \par - Return to clinic as needed. \par \par Recommendations reviewed with patient during this office visit, and all questions answered; Patient instructed on the importance of follow up and verbalizes understanding.\par \par I personally performed the services described in the documentation, reviewed the documentation recorded by the scribe in my presence and it accurately and completely records my words and actions.\par \par I, CHANTE RodriguezC, am scribing for and the presence of ROBERT Johnston, the following sections HISTORY OF PRESENT ILLNESS, PAST MEDICAL/FAMILY/SOCIAL HISTORY; REVIEW OF SYSTEMS; VITAL SIGNS; PHYSICAL EXAM; DISPOSITION.\par \par \par

## 2022-05-24 PROBLEM — E03.9 HYPOTHYROIDISM, ADULT: Status: ACTIVE | Noted: 2022-01-01

## 2022-05-24 PROBLEM — E78.5 HYPERLIPIDEMIA: Status: ACTIVE | Noted: 2022-01-01

## 2022-05-24 PROBLEM — Z01.818 PRE-OP TESTING: Status: RESOLVED | Noted: 2022-01-01 | Resolved: 2022-01-01

## 2022-05-24 PROBLEM — J90 PLEURAL EFFUSION ON LEFT: Noted: 2022-01-01

## 2022-05-24 PROBLEM — M17.11 ARTHRITIS OF KNEE, RIGHT: Status: ACTIVE | Noted: 2022-01-01

## 2022-05-24 PROBLEM — J98.4 RESTRICTIVE LUNG DISEASE: Status: ACTIVE | Noted: 2022-01-01

## 2022-05-24 NOTE — COUNSELING
[Overweight - ( BMI 25.1 - 29.9 )] : overweight -  ( BMI 25.1 - 29.9 ) [Continue diet as tolerated] : continue diet as tolerated based on goals of care [Non - Smoker] : non-smoker [Smoke/CO Detectors] : smoke/CO detectors [Use assistive device to avoid falls] : use assistive device to avoid falls [Remove clutter and unsafe carpeting to avoid falls] : remove clutter and unsafe carpeting to avoid falls [Maintain functional ability] : maintain functional ability [Date: ___] : colonoscopy completed on [unfilled]

## 2022-05-26 NOTE — REVIEW OF SYSTEMS
[Fatigue] : fatigue [Recent Change In Weight] : ~T recent weight change [Cough] : cough [SOB on Exertion] : shortness of breath during exertion [Abdominal Pain] : abdominal pain [Difficulty Walking] : difficulty walking [Negative] : Allergic/Immunologic

## 2022-05-27 PROBLEM — R06.00 DYSPNEA: Status: ACTIVE | Noted: 2022-01-01

## 2022-05-27 NOTE — REASON FOR VISIT
[Follow-Up] : a follow-up visit [Abnormal CXR/ Chest CT] : an abnormal CXR/ chest CT [Shortness of Breath] : shortness of breath [TextBox_44] : s/p left VATS with pleural biopsy/left Pleurx catheter placement for large left pleural effusion.

## 2022-05-27 NOTE — DISCUSSION/SUMMARY
[FreeTextEntry1] : A large malignant left pleural effusion, secondary to metastatic adenocarcinoma, status post left Pleurx catheter placement.

## 2022-05-27 NOTE — ASSESSMENT
[FreeTextEntry1] : Continue left Pleurx catheter drainage.  \par Thoracic surgery follow-up.\par Hematology/oncology follow-up regarding chemo treatment.\par GYN oncology follow-up.  \par Return for pulmonary follow-up in 1 month.\par

## 2022-05-27 NOTE — PROCEDURE
[FreeTextEntry1] : \par  Xray Chest 2 Views PA/Lat             Final\par \par No Documents Attached\par \par \par \par \par   \par Chest x-ray PA and lateral views performed in my office today showed s/p left [id number]/Pleurx catheter, small loculated left pleural effusion along the left lateral chest wall, no evidence of infiltrates or pneumothorax. \par \par \par  Ordered by: VARINDER REYNOSO       Collected/Examined: 27May2022 09:48AM       \par Verification Required       Stage: Final       \par  Performed at: In Office       Performed by: VARINDER REYNOSO       Resulted: [id number] 09:48AM       Last Updated: 27May2022 09:50AM

## 2022-05-27 NOTE — HISTORY OF PRESENT ILLNESS
[TextBox_4] : Patient seen in office for follow-up for shortness of breath.\par On chemo every 2 weeks now.\par She complains of pain on pleurx catheter site with breathing.\par Patient states a nurse comes in to drain Pleurx every other day, Monday,Wednesday,Friday.\par Drained 5 ml each,on past 2 visits.

## 2022-05-30 NOTE — HISTORY OF PRESENT ILLNESS
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 \par \par In February 2022, patient noted the development of epigastric discomfort x 2 weeks, decreased appetite, abdominal bloating. She initially presented to [facility] on 2/10/22 and her work-up revealed the following: elevated liver enzymes, CT abd/pel showed diffuse abdominal/pelvic ascites, right adnexal cyst, pleural based nodule in the left lower lobe. She was discharged with appropriate follow-ups but returned to the hospital on 2/15/22 due to persistent symptoms. She underwent paracentesis twice on 2/1/22 but it was unsuccessful. GYN was consulted and had pt set up for further imaging with pelvic + transvaginal ultrasound in outpatient setting. \par \par Patient was readmitted on 2/28/22 due to worsening abdominal pain, distention and shortness of breath at rest. Repeat CT CAP was done on 2/28/22 and it demonstrated heterogenous, lobular, complex cystic and soft tissue masses within the pelvic inseparable from the region of the uterus, cervix and adnexa, multiple suspicious nodular peritoneal, omental, hepatic and splenic lesions worrisome for metastatic disease. Patient underwent paracentesis on 3/1/22 (4 Liters removed); pathology confirmed high grade adenocarcinoma- exact etiology cannot be determined. \par \par CT Chest from 3/2/22 revealed no intrathoracic metastasis abnormal ascites with enhancing peritoneal nodules likely due to metastatic disease. Hypodensity in the spleen measuring 1.8 x 1.7 cm. On 3/4/22, she underwent EGD/Colonoscopy; biopsies were negative. Her PET/CT from 3/9/22 showed the following: increased uptake in the region of the KATHY suggests neoplasm/metastasis, FDG active in bilateral adnexa to suggest neoplasm/metastasis, numerous metabolically active foci in the abdomen/pelvis as seen\par Metabolically active foci localizing to the pleura in the left mid lower lung fields for pleural metastasis. Mild avid left pleural effusion likely malignant. \par \par Pt presented to the [facility] ED for worsening abdominal pain and no flatus on 4/22/22. Since 4/19 has had n/v/abdominal pain; she denies abdominal distention, no reported hunger. Has not kept down any PO including liquids since 4/19. At the time of the ED visit, pt was POD#4 diagnostic laparoscopy with biopsies and drainage of ascites for a pelvic mass (frozen = carcinoma). Patient found to have an Small Bowel Obstruction with incarcerated hernia through umbilical incision. Pt was taken to the OR emergently. On 4/22/2022 pt underwent Laparoscopy, diagnostic, Reduction, hernia, internal ( Incarcerated incisional hernia). Patient's hospital course was uncomplicated. She had an NGT for decompression with minimal output. NGT was removed on POD#2 and she tolerated clear liquid diet. She was passing flatus and having bowel movements. Pt was advanced to low residue diet, which she was able to tolerate. Was d/c on 4/24/22.\par \par Edith was readmitted from 4/26-4/30/22 to [facility] for worsening KLEIN and sob. Found to have a large left pleural effusion. Pt to admitted to CTS service for drainage of pleural effusion. Left pigtail placed on 4/27/22, drained 1.7L. Patient s/p L vats, pleurx cath placement, pleural biopsy and Mediport placement on 4/29/22. \par \par Referred to medical oncology for further management. \par \par 5/12-5/26/22: C1-C2 Gemzar/Abraxane\par  [de-identified] : mucinous moderately differentiated adenocarcinoma [de-identified] : Patient was seen at the infusion room while receiving treatment and currently reported the following updates: \par \par - fatigue remains unchanged, able to complete all self-care ADLs independently at this time.\par - abdominal pain/distention (generalized; believed to be gassy in nature; uses Oxycodone on PRN basis but it makes her sleepy/dizzy); rated 4/10 today.\par - decreased appetite is present (makes conscious effort to eat, tolerating small meals), occasional nausea. \par - bowel movements -soft, 1-2 times daily and semi-solid consistency\par - left chest Pleurx catheter in place, drainage continues every other day(managed by home care team); mild improvement with SOB.\par

## 2022-06-07 NOTE — ED ADULT NURSE NOTE - TEMPLATE
----- Message from Lázaro Greer sent at 3/1/2021  9:50 AM EST -----  Subject: Refill Request    QUESTIONS  Name of Medication? HYDROcodone-acetaminophen (NORCO)  MG per tablet  Patient-reported dosage and instructions? 4 times a day   How many days do you have left? 8  Preferred Pharmacy? [facility]  Pharmacy phone number (if available)? [phone]  Additional Information for Provider? Pt is requesting 1 month refill.  ---------------------------------------------------------------------------  --------------  CALL BACK INFO  What is the best way for the office to contact you? OK to leave message on   voicemail  Preferred Call Back Phone Number?  [phone]
LOV 11/27/20  NOV 3/11/21    norco  2/1/21 #120
General

## 2022-06-07 NOTE — ED PROVIDER NOTE - ATTENDING APP SHARED VISIT CONTRIBUTION OF CARE
I have personally performed a face to face medical and diagnostic evaluation of the patient. I have discussed with and reviewed the SIMONA's note and agree with the History, ROS, Physical Exam and MDM unless otherwise indicated. A brief summary of my personal evaluation and impression can be found below.    66F cholecystectomy, tubal ligation and recently diagnosed peritoneal carcinomatosis s/p diagnostic lap, evacuation of ascites and omental biopsy (4/18/2022, Dr. Jordan). Pt recently admitted 4/22-4/24 with NV and found to have SBO on CT abdomen now s/p 1L ascites drained and reduction of incarcerated hernia presents with a cc of diffuse upper abdominal discomfort a/w nausea and some discomfort while eating, no fever, no bowel or bladder changes, passing gas. No rashes. Notes gets drain from L chest drained a few times a week. No meds prior to ED arrival.     All other ROS negative, except as above and as per HPI and ROS section.    VITALS: Initial triage and subsequent vitals have been reviewed by me.  GEN APPEARANCE: WDWN, alert, non-toxic, NAD  HEAD: Atraumatic.  EYES: PERRLa, EOMI, vision grossly intact.   NECK: Supple  CV: RRR, S1S2, no c/r/m/g. Cap refill <2 seconds. No bruits.   LUNGS: CTAB. No abnormal breath sounds. L chest wall appears c/d/i   ABDOMEN: Soft, NTND. No guarding or rebound.   MSK/EXT: No spinal or extremity point tenderness. No CVA ttp. Pelvis stable. No peripheral edema.  NEURO: Alert, follows commands. Weight bearing normal. Speech normal. Sensation and motor normal x4 extremities.   SKIN: Warm, dry and intact. No rash.  PSYCH: Appropriate      Plan/MDM: 66F cholecystectomy, tubal ligation and recently diagnosed peritoneal carcinomatosis s/p diagnostic lap, evacuation of ascites and omental biopsy (4/18/2022, Dr. Jordan). Pt recently admitted 4/22-4/24 with NV and found to have SBO on CT abdomen now s/p 1L ascites drained and reduction of incarcerated hernia presents with a cc of diffuse upper abdominal discomfort a/w nausea and some discomfort while eating, no fever, no bowel or bladder changes, passing gas.  exam vss non toxic PE as above ddx c/f hx of malignancy and prior obstructions will check labs cxr ctap meds as needed ekg is reassuring low c/f acs, reassess thereafter.

## 2022-06-07 NOTE — ED ADULT NURSE NOTE - OBJECTIVE STATEMENT
Pt. is a 66 y.o female who presents w/ c/o abdominal pain. Pt. A&Ox4 and ambulatory at baseline. Pmhx chest tube, HLD, ascites, GI Ca. Pt. endorsing abdominal discomfort and nausea x1day. Pt. states this has happened in th past and she has to get fluid drained form her abdomen. Abdomen soft, distended, mildly tender to palpation. Dressing noted to lt. lateral side from chest tube removal 1 months ago. Dressing clean, dry and intact. Pt. received chemo, unsure of date of last session- lt. chest wall port noted. Resp. equal and unlabored b/l. VSS. NAD. Denies SOB/CP/fevers/chills/diarrhea/vomiting. #20g IV established to LAC, labs sent. Medicated per MD orders.

## 2022-06-07 NOTE — ED PROVIDER NOTE - CLINICAL SUMMARY MEDICAL DECISION MAKING FREE TEXT BOX
67 y/o female pmh peritoneal CA, s/p ascites drainage on 4/18, incarcerated hernia reduction 1 month  ago, p/w c/o diffuse abdominal pain, nausea x 1 day, states that she gets fluid drained from left side of lungs 3 times/week, deneis any fevers, chills, coughing, vomitting, diarrhea, chest paain, sob, urinary symptoms.   lives with son sari- [phone]  exam : abd snt nd nbs  will ge labs, CT abdomen/pelvis, CXR, meds, reasses

## 2022-06-07 NOTE — ED ADULT TRIAGE NOTE - CHIEF COMPLAINT QUOTE
Pt s/p lung surgery 4 weeks ago ; last chemo last Thursday c/o  abd pain and nausea.  Pt poor historian.  Denies chills, chest pain

## 2022-06-07 NOTE — ED PROVIDER NOTE - PROGRESS NOTE DETAILS
lena nunes: ekg shows no sign of heart strain, ct abdomen/pelvis shows right lower lobe Pe, will get heparin drip, hospitalist dr moura requesting CTPA, will order

## 2022-06-07 NOTE — ED CLERICAL - NS ED CLERK NOTE PRE-ARRIVAL INFORMATION; ADDITIONAL PRE-ARRIVAL INFORMATION

## 2022-06-07 NOTE — ED PROVIDER NOTE - OBJECTIVE STATEMENT
65 y/o female pmh peritoneal CA, s/p ascites drainage on 4/18, incarcerated hernia reduction 1 month  ago, p/w c/o diffuse abdominal pain, nausea x 1 day, states that she gets fluid drained from left side of lungs 3 times/week, deneis any fevers, chills, coughing, vomitting, diarrhea, chest paain, sob, urinary symptoms.   lives with son sari- [phone]

## 2022-06-08 NOTE — DISCHARGE NOTE PROVIDER - CARE PROVIDER_API CALL
Cindy Albarran (DO)  HematologyOncology; Internal Medicine  [address] [place] [address]  Phone: [phone]  Fax: [phone]  Follow Up Time:    Cindy Albarran (DO)  HematologyOncology; Internal Medicine  [address] [place] [address]  Phone: [phone]  Fax: [phone]  Follow Up Time:     Primary care provider,   Phone: (   )    -  Fax: (   )    -  Follow Up Time: 1 week

## 2022-06-08 NOTE — H&P ADULT - NSHPPHYSICALEXAM_GEN_ALL_CORE
GENERAL: NAD, well-developed woman   HEAD:  Atraumatic, Normocephalic  EYES: EOMI, PERRLA, conjunctiva and sclera clear  NECK: Supple, No JVD  CHEST/LUNG: Clear to auscultation bilaterally; No wheeze  HEART: Regular rate and rhythm; No murmurs, rubs, or gallops  ABDOMEN: Soft, distended, mild bilateral TTP; Bowel sounds present  EXTREMITIES:  2+ Peripheral Pulses, RLE >LLE swelling; No clubbing, cyanosis  PSYCH: AAOx3  NEUROLOGY: non-focal  SKIN: No rashes or lesions

## 2022-06-08 NOTE — H&P ADULT - PROBLEM SELECTOR PLAN 3
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 On Gemzar/Abraxane Q2 weeks as an OP, follows with Dr. Cindy Albarran. C/b malignant pleural effusion s/p PleurX placement.   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 On Gemzar/Abraxane Q2 weeks as an OP, follows with Dr. Cindy Albarran. C/b malignant pleural effusion s/p PleurX placement.   - Due for chemo on Thursday, will make Oncology aware patient is hospitalized   - Cont Oxycodone PRN pain  - Cont PleurX drainage MWF  [ ] Nutrition c/s

## 2022-06-08 NOTE — H&P ADULT - NSICDXFAMILYHX_GEN_ALL_CORE_FT
FAMILY HISTORY:  Father  Still living? Unknown  FH: hypertension, Age at diagnosis: Age Unknown    Mother  Still living? Unknown  FH: hypertension, Age at diagnosis: Age Unknown    Grandparent  Still living? No  FH: stroke, Age at diagnosis: Age Unknown

## 2022-06-08 NOTE — H&P ADULT - NSICDXPASTSURGICALHX_GEN_ALL_CORE_FT
PAST SURGICAL HISTORY:  History of lung surgery VATS w/ pleurX April 2022    Incarcerated hernia     S/P bunionectomy left foot    S/P cholecystectomy     S/P tubal ligation

## 2022-06-08 NOTE — DISCHARGE NOTE PROVIDER - CARE PROVIDERS DIRECT ADDRESSES
[email or web address]

## 2022-06-08 NOTE — CONSULT NOTE ADULT - ASSESSMENT
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     -AC, patient will need to be on AC indefinitely as long as there are no contraindications.   -No plans for inpatient chemo, will need to follow up with Dr Albarran as outpatient to resume chemo  -Unable to  response to current chemotherapy regimen after only 2 cycles of chemotherapy  -Symptom control, pain management  -Follow Ucx, UA ?+  -Supportive care, pain control, Nutrition, PT, DVT ppx  -Outpatient oncology f/u    Will follow. Please do not hesitate to call back with questions.     Sangita Black MD  Medical Oncology Attending  C: [phone]

## 2022-06-08 NOTE — H&P ADULT - HISTORY OF PRESENT ILLNESS
66 y.o. F w/ a hx of recently dx peritoneal cancer w/ recent admission (4/22-4/24) for SBO and incarcerated hernia reduction, PleurX placement (4/29) p/w abdominal pain, nausea x 1 day.  Patient was admitted from 4/22-4/26 for N/V and found to have an SBO- course notable for 1L ascites drainage and reduction of incarcerated hernia. Two days later, patient was sent in from her Pulmonologist's office w/ KLEIN and found to have a large L pleural effusion. Patient underwent L VATS, PleurX placement, pleural bx and Mediport placement on 4/29 and was discharge [facility].       donna guo- [phone] 66 y.o. F w/ a hx of recently dx peritoneal cancer w/ recent admission (4/22-4/24) for SBO and incarcerated hernia reduction, PleurX placement (4/29) p/w abdominal pain, nausea x 1 day.  Patient was admitted from 4/22-4/26 for N/V and found to have an SBO- course notable for 1L ascites drainage and reduction of incarcerated hernia. Two days later, patient was sent in from her Paleontologist's office w/ KLEIN and found to have a large L pleural effusion. Patient underwent L VATS, PleurX placement, pleural bx and Mediport placement on 4/29.   Patient has been receiving Gemzar & Abraxane infusions every 2 weeks. Patient notes chronic abdominal discomfort since diagnosis of cancer. Two nights ago, abdominal pain was worse- 5/10 pressure was located over bilateral flanks, patient tried to manage at home but was unable to tolerate pain so she presented to the hospital. Patient also notes one day of pleuritic lower chest pain. Denies any SOB, lightheadedness, syncope.  PleurX is drained MWF, last drainage was on Monday.   In the ED, patient underwent CT A/P which demonstrated RLL PE and possible infarct, peritoneal carcinomatosis and possible main portal vein narrowing c/f thrombus. Subsequent CTA Chest demonstrated lobar segmental and subsegmental pulmonary thromboemboli in RLL.   donna guo- [phone]

## 2022-06-08 NOTE — DISCHARGE NOTE PROVIDER - PROVIDER TOKENS
PROVIDER:[TOKEN:[[id number]]] PROVIDER:[TOKEN:[[id number]]],FREE:[LAST:[Primary care provider],PHONE:[(   )    -],FAX:[(   )    -],FOLLOWUP:[1 week]]

## 2022-06-08 NOTE — DISCHARGE NOTE PROVIDER - NSDCCPCAREPLAN_GEN_ALL_CORE_FT
PRINCIPAL DISCHARGE DIAGNOSIS  Diagnosis: Pulmonary embolism  Assessment and Plan of Treatment: continue with eliquis as prescribed and follow up with primary care doctor within 1-2 weeks      SECONDARY DISCHARGE DIAGNOSES  Diagnosis: Deep vein thrombosis (DVT) of right lower extremity  Assessment and Plan of Treatment: continue with eliquis as prescribed and follow up with primary care doctor within 1-2 weeks    Diagnosis: Peritoneal carcinomatosis  Assessment and Plan of Treatment: follow up with oncologist regarding chemo  Cont PleurX drainage as scheduled    Diagnosis: HLD (hyperlipidemia)  Assessment and Plan of Treatment: continue with statin    Diagnosis: Hypothyroidism  Assessment and Plan of Treatment: continue synthroid     PRINCIPAL DISCHARGE DIAGNOSIS  Diagnosis: Pulmonary embolism  Assessment and Plan of Treatment: Found to have pulmonary embolism and right lower extremity DVT.  - TTE: Technically very difficult study.  1. Endocardium not well visualized; grossly normal left  ventricular systolic function.  Endocardial visualization  enhanced with intravenous injection of echo contrast  (Definity).  2. The right ventricle is not well visualized.  3. Unable to accurately evaluate valvular function.  Continue with eliquis as prescribed and follow up with primary care doctor within 1-2 weeks      SECONDARY DISCHARGE DIAGNOSES  Diagnosis: Deep vein thrombosis (DVT) of right lower extremity  Assessment and Plan of Treatment: Continue with eliquis as prescribed and follow up with primary care doctor within 1-2 weeks    Diagnosis: Peritoneal carcinomatosis  Assessment and Plan of Treatment: - Right upper quadrant ultrasound: No evidence of portal venous thrombosis, as clinically questioned. Moderate amount of ascites. Peritoneal carcinomatosis.  - Follow up with oncologist outpatient.  - Cont PleurX drainage as scheduled    Diagnosis: Hypothyroidism  Assessment and Plan of Treatment: continue synthroid    Diagnosis: HLD (hyperlipidemia)  Assessment and Plan of Treatment: continue with statin

## 2022-06-08 NOTE — H&P ADULT - NSHPLABSRESULTS_GEN_ALL_CORE
LABS:                        10.8   9.46  )-----------( 446      ( 07 Jun 2022 20:37 )             36.7     07 Jun 2022 20:37    136    |  99     |  7      ----------------------------<  92     3.6     |  23     |  0.77     Ca    9.1        07 Jun 2022 20:37    TPro  7.1    /  Alb  3.3    /  TBili  0.4    /  DBili  x      /  AST  28     /  ALT  7      /  AlkPhos  92     07 Jun 2022 20:37    PT/INR - ( 08 Jun 2022 02:24 )   PT: 13.0 sec;   INR: 1.12 ratio         PTT - ( 08 Jun 2022 02:24 )  PTT:28.9 sec    EKG: NSR

## 2022-06-08 NOTE — DISCHARGE NOTE PROVIDER - NSDCFUADDAPPT_GEN_ALL_CORE_FT
Please follow up with your primary care provider within 1 week of discharge from the hospital. If you do not have a primary care provider please follow up with the [facility], call [phone]

## 2022-06-08 NOTE — ED ADULT NURSE REASSESSMENT NOTE - NS ED NURSE REASSESS COMMENT FT1
Pt received from AKANKSHA Clark. Pt resting on strecher, NAD noted, denies CP/SOB. Assisted to restroom. Heparin running as ordered. Call bell in reach.

## 2022-06-08 NOTE — DISCHARGE NOTE PROVIDER - NSDCMRMEDTOKEN_GEN_ALL_CORE_FT
acetaminophen 325 mg oral tablet: 2 tab(s) orally every 6 hours, As needed, Mild Pain (1 - 3)  Eliquis Starter Pack for Treatment of DVT and PE 5 mg oral tablet: take as directed   check insurance coverage  spectra [id number]  MiraLax oral powder for reconstitution: 17 grams orally once a day, As Needed -for constipation MDD:17g   oxyCODONE 5 mg oral tablet: 1 tab orally every 6 hours, As Needed -for moderate pain MDD:4 tabs   Protonix 40 mg oral delayed release tablet: 1 tab(s) orally once a day  senna oral tablet: 2 tabs orally once a day (at bedtime), As needed, Constipation MDD:2 tabs  Synthroid 125 mcg (0.125 mg) oral tablet: 1 tab(s) orally once a day  Vitamin C: 1 tab(s) orally once a day  Vitamin D3: 1 tab(s) orally once a day  Zocor 40 mg oral tablet: 1 tab(s) orally once a day (at bedtime)   acetaminophen 325 mg oral tablet: 2 tab(s) orally every 6 hours, As needed, Mild Pain (1 - 3)  Eliquis Starter Pack for Treatment of DVT and PE 5 mg oral tablet: take as directed   check insurance coverage  spectra [phone]  melatonin 3 mg oral tablet: 1 tab(s) orally once a day (at bedtime), As needed, Insomnia  MiraLax oral powder for reconstitution: 17 grams orally once a day, As Needed -for constipation MDD:17g   oxyCODONE 5 mg oral tablet: 1 tab orally every 6 hours, As Needed -for moderate pain MDD:4 tabs   Protonix 40 mg oral delayed release tablet: 1 tab(s) orally once a day  senna oral tablet: 2 tabs orally once a day (at bedtime), As needed, Constipation MDD:2 tabs  Synthroid 125 mcg (0.125 mg) oral tablet: 1 tab(s) orally once a day  Vitamin C: 1 tab(s) orally once a day  Vitamin D3: 1 tab(s) orally once a day  Zocor 40 mg oral tablet: 1 tab(s) orally once a day (at bedtime)

## 2022-06-08 NOTE — DISCHARGE NOTE PROVIDER - NSDCFUSCHEDAPPT_GEN_ALL_CORE_FT
[facility]  Scheduled Appointment: 06/09/2022    [facility]  Scheduled Appointment: 06/23/2022    Cindy Albarran  [facility]  Scheduled Appointment: 06/23/2022    Nighat Copeland  [facility]  [address]  Scheduled Appointment: 06/24/2022    [facility]  [place] [facility]  Scheduled Appointment: 06/28/2022

## 2022-06-08 NOTE — H&P ADULT - NSHPREVIEWOFSYSTEMS_GEN_ALL_CORE
ROS:    Constitutional: [ ] fevers [ ] chills [ ] weight loss [ ] weight gain  HEENT: [ ] dry eyes [ ] eye irritation [ ] postnasal drip [ ] nasal congestion  CV: [ ] chest pain [ ] orthopnea [ ] palpitations [ ] murmur  Resp: [ ] cough [ ] shortness of breath [ ] dyspnea [ ] wheezing [ ] sputum [ ] hemoptysis  GI: [ ] nausea [ ] vomiting [ ] diarrhea [ ] constipation [x ] abd pain [ ] dysphagia   : [ ] dysuria [ ] nocturia [ ] hematuria [ ] increased urinary frequency  Musculoskeletal: [ ] back pain [ ] myalgias [ ] arthralgias [ ] fracture  Skin: [ ] rash [ ] itch  Neurological: [ ] headache [ ] dizziness [ ] syncope [ ] weakness [ ] numbness  Psychiatric: [ ] anxiety [ ] depression  Endocrine: [ ] diabetes [ x] thyroid problem  Hematologic/Lymphatic: [ ] anemia [ ] bleeding problem  Allergic/Immunologic: [ ] itchy eyes [ ] nasal discharge [ ] hives [ ] angioedema  [ x] All other systems negative  [ ] Unable to assess ROS because ________

## 2022-06-08 NOTE — CONSULT NOTE ADULT - SUBJECTIVE AND OBJECTIVE BOX
Patient is a 66y old  Female who presents with a chief complaint of Abdominal pain (08 Jun 2022 08:30)      HPI:  66 y.o. F w/ a hx of recently dx peritoneal cancer w/ recent admission (4/22-4/24) for SBO and incarcerated hernia reduction, PleurX placement (4/29) p/w abdominal pain, nausea x 1 day.  Patient was admitted from 4/22-4/26 for N/V and found to have an SBO- course notable for 1L ascites drainage and reduction of incarcerated hernia. Two days later, patient was sent in from her Paleontologist's office w/ KLEIN and found to have a large L pleural effusion. Patient underwent L VATS, PleurX placement, pleural bx and Mediport placement on 4/29.   Patient has been receiving Gemzar & Abraxane infusions every 2 weeks. Patient notes chronic abdominal discomfort since diagnosis of cancer. Two nights ago, abdominal pain was worse- 5/10 pressure was located over bilateral flanks, patient tried to manage at home but was unable to tolerate pain so she presented to the hospital. Patient also notes one day of pleuritic lower chest pain. Denies any SOB, lightheadedness, syncope.  PleurX is drained MWF, last drainage was on Monday.   In the ED, patient underwent CT A/P which demonstrated RLL PE and possible infarct, peritoneal carcinomatosis and possible main portal vein narrowing c/f thrombus. Subsequent CTA Chest demonstrated lobar segmental and subsegmental pulmonary thromboemboli in RLL.   donna guo- [phone] (08 Jun 2022 08:30)     ROS: as above     PAST MEDICAL & SURGICAL HISTORY:  Ascites      Hypothyroidism      GERD (gastroesophageal reflux disease)      Arthritis      HLD (hyperlipidemia)      Peritoneal carcinomatosis      S/P cholecystectomy      S/P tubal ligation      S/P bunionectomy  left foot      Incarcerated hernia      History of lung surgery  VATS w/ pleurX April 2022          SOCIAL HISTORY:    FAMILY HISTORY:  FH: stroke (Grandparent)    FH: hypertension (Father, Mother)        MEDICATIONS  (STANDING):  heparin  Infusion.  Unit(s)/Hr (11 mL/Hr) IV Continuous <Continuous>  levothyroxine 125 MICROGram(s) Oral daily  pantoprazole    Tablet 40 milliGRAM(s) Oral before breakfast  polyethylene glycol 3350 17 Gram(s) Oral daily  simvastatin 40 milliGRAM(s) Oral at bedtime    MEDICATIONS  (PRN):  acetaminophen     Tablet .. 650 milliGRAM(s) Oral every 6 hours PRN Temp greater or equal to 38C (100.4F), Mild Pain (1 - 3)  aluminum hydroxide/magnesium hydroxide/simethicone Suspension 30 milliLiter(s) Oral every 4 hours PRN Dyspepsia  heparin   Injectable 5000 Unit(s) IV Push every 6 hours PRN For aPTT less than 40  heparin   Injectable 2500 Unit(s) IV Push every 6 hours PRN For aPTT between 40 - 57  melatonin 3 milliGRAM(s) Oral at bedtime PRN Insomnia  ondansetron Injectable 4 milliGRAM(s) IV Push every 8 hours PRN Nausea and/or Vomiting  oxyCODONE    IR 5 milliGRAM(s) Oral every 4 hours PRN Severe Pain (7 - 10)  senna 2 Tablet(s) Oral at bedtime PRN Constipation      Allergies    Nuts (Anaphylaxis)  penicillin (Angioedema; Urticaria)  sulfa drugs (Hives; Urticaria)    Intolerances        Vital Signs Last 24 Hrs  T(C): 37 (08 Jun 2022 11:01), Max: 37 (08 Jun 2022 11:01)  T(F): 98.6 (08 Jun 2022 11:01), Max: 98.6 (08 Jun 2022 11:01)  HR: 97 (08 Jun 2022 11:01) (90 - 102)  BP: 103/72 (08 Jun 2022 11:01) (103/72 - 114/83)  BP(mean): --  RR: 21 (08 Jun 2022 11:01) (16 - 24)  SpO2: 99% (08 Jun 2022 11:01) (98% - 100%)    PHYSICAL EXAM  General: adult in NAD  HEENT: clear oropharynx, anicteric sclera, pink conjunctiva  Neck: supple  CV: normal S1/S2 with no murmur rubs or gallops  Lungs: positive air movement b/l ant lungs, clear to auscultation, no wheezes, no rales  Abdomen: soft non-tender non-distended, no hepatosplenomegaly  Ext: no clubbing cyanosis or edema  Skin: no rashes and no petechiae  Neuro: alert and oriented X 3, none focal    LABS:                          11.2   10.37 )-----------( 456      ( 08 Jun 2022 10:16 )             38.2         Mean Cell Volume : 72.3 fL  Mean Cell Hemoglobin : 21.2 pg  Mean Cell Hemoglobin Concentration : 29.3 gm/dL  Auto Neutrophil # : x  Auto Lymphocyte # : x  Auto Monocyte # : x  Auto Eosinophil # : x  Auto Basophil # : x  Auto Neutrophil % : x  Auto Lymphocyte % : x  Auto Monocyte % : x  Auto Eosinophil % : x  Auto Basophil % : x      Serial CBC's  06-08 @ 10:16  Hct-38.2 / Hgb-11.2 / Plat-456 / RBC-5.28 / WBC-10.37  Serial CBC's  06-07 @ 20:37  Hct-36.7 / Hgb-10.8 / Plat-446 / RBC-5.04 / WBC-9.46      06-07    136  |  99  |  7   ----------------------------<  92  3.6   |  23  |  0.77    Ca    9.1      07 Jun 2022 20:37    TPro  7.1  /  Alb  3.3  /  TBili  0.4  /  DBili  x   /  AST  28  /  ALT  7   /  AlkPhos  92  06-07      PT/INR - ( 08 Jun 2022 02:24 )   PT: 13.0 sec;   INR: 1.12 ratio         PTT - ( 08 Jun 2022 10:16 )  PTT:110.1 sec          RADIOLOGY & ADDITIONAL STUDIES:  CT a/p   IMPRESSION:    Right lower lobe pulmonary emboli. Right lower lobe opacity, which may   represent pulmonary infarct in this setting. Follow-up chest CTA would be   helpful for further evaluation. Dr. Gilbert discussed with Dr. Granda at   6/7/2022 11:32 PM with read back confirmation.    Known extensive peritoneal carcinomatosis as described. Mildly increased,   moderate ascites.    Question slight narrowing of the main portal vein, which is suboptimally   evaluated on this study. Thrombosis cannot be excluded. Follow-up may be   obtained for further evaluation.    Decreased, loculated left pleural effusion status post Interval left   chest tube placement.    Mediastinal/hilar lymphadenopathy.    Additional findings as described.        CTA  IMPRESSION:    1.  Lobar segmental and subsegmental pulmonary thromboemboli in the right   lower lobe. RV to LV ratio is less than 1.    2.  Decreased loculated exudative left pleural effusion, now   small-moderate with Pleurx in place    3.  Mediastinal and hilar lymphadenopathy. Peritoneal carcinomatosis and   upper abdominal implants.

## 2022-06-08 NOTE — H&P ADULT - ASSESSMENT
66 y.o. F w/ a hx of recently dx peritoneal cancer w/ recent admission (4/22-4/24) for SBO and incarcerated hernia reduction, PleurX placement (4/29) p/w abdominal pain, nausea x 1 day found to have RLL PE w/ infarct.

## 2022-06-08 NOTE — H&P ADULT - NSICDXPASTMEDICALHX_GEN_ALL_CORE_FT
PAST MEDICAL HISTORY:  Arthritis     Ascites     GERD (gastroesophageal reflux disease)     HLD (hyperlipidemia)     Hypothyroidism     Peritoneal carcinomatosis

## 2022-06-08 NOTE — DISCHARGE NOTE PROVIDER - HOSPITAL COURSE
65 y/o female pmh peritoneal CA, s/p ascites drainage on 4/18, incarcerated hernia reduction 1 month  ago, p/w c/o diffuse abdominal pain, nausea x 1 day, states that she gets fluid drained from left side of lungs 3 times/week, denies any fevers, chills, coughing, vomitting, diarrhea, chest paain, sob, urinary syPulmonary embolism/RLE DVT  1 day of pleuritic CP, abdominal pressure found to have RLL segmental and subsegmental PE with possible infarct. Provoked in setting of abdominal malignancy.   - Cont Heparin drip for now   ECHO-----------------------------------------------  6/8 heparin drip dc'd and started on eliquis starter pack   US b/l LE- Acute deep venous thrombosis of the right popliteal, soleal and posterior tibial veins. Acute deep venous thrombosis, above and below the knee.    Abdominal pain.   2 days of abdominal pressure, now improved without intervention, similar to chronic discomfort. CT A/P notable for possible main portal vein narrowing.   US abd- No evidence of portal venous thrombosis, as clinically questioned. Moderate amount of ascites. Peritoneal carcinomatosis.  - Tylenol, Oxycodone PRN pain   - Moderate ascites on exam and pain improved, will defer paracentesis for now.    Peritoneal carcinomatosis.   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 On Gemzar/Abraxane Q2 weeks as an OP, follows with Dr. Cindy Albarran. C/b malignant pleural effusion s/p PleurX placement.   - Due for chemo on Thursday, will make Oncology aware patient is hospitalized   - Cont Oxycodone PRN pain  - Cont PleurX drainage MWF    Hypothyroidism.   Cont Synthroid.    HLD  Cont statin.    GERD  Cont PPI.  mptoms. 66F Peritoneal CA (Presumed GI) c/b SBO, L malignant pleural effusion s/p VATS, Pleurx to L lung on 4/29, on chemo, p/w new RLE DVT and RLL PE    Pulmonary embolism.   -  with Rt LE DVT  - patient placed on Eliquis with good response  - TTE: Technically very difficult study.  1. Endocardium not well visualized; grossly normal left  ventricular systolic function.  Endocardial visualization  enhanced with intravenous injection of echo contrast  (Definity).  2. The right ventricle is not well visualized.  3. Unable to accurately evaluate valvular function.   - no RV strain on TTE  - likely due to ongoing malignancy.    Peritoneal carcinomatosis.   - Malignancy with GI etiology c/b malignant pleural effusion  - Patient missed Wednesday session of pleurx draining - drained today  - No plan for inpatient chemo  - Right upper quadrant ultrasound: No evidence of portal venous thrombosis, as clinically questioned. Moderate amount of ascites. Peritoneal carcinomatosis.  - Follow up with oncologist outpatient.    Hypothyroidism.   - Continue synthroid.    GERD  - Continue Protonix.    Discussed case with Dr. Reaves on 6/9/2022, patient medically stable for discharge to home today.

## 2022-06-08 NOTE — H&P ADULT - PROBLEM SELECTOR PLAN 1
1 day of pleuritic CP, abdominal pressure found to have RLL segmental and subsegmental PE with possible infarct. Provoked in setting of abdominal malignancy.   - Cont Heparin drip for now   [ ] [employer] insurance coverage   [ ] TTE

## 2022-06-08 NOTE — H&P ADULT - PROBLEM SELECTOR PLAN 2
2 days of abdominal pressure, now improved without intervention, similar to chronic discomfort. CT A/P notable for possible main portal vein narrowing.   [ ]Abd US w/ Doppler to eval for PVT  - Tylenol, Oxycodone PRN pain   - Moderate ascites on exam and pain improved, will defer paracentesis for now

## 2022-06-09 PROBLEM — C78.6 SECONDARY MALIGNANT NEOPLASM OF RETROPERITONEUM AND PERITONEUM: Chronic | Status: ACTIVE | Noted: 2022-01-01

## 2022-06-09 NOTE — PROGRESS NOTE ADULT - PROBLEM SELECTOR PLAN 1
with Rt LE DVT  - patient placed on Eliquis with good response  - TTE reviewed - no RV strain  - likely due to ongoing malignancy

## 2022-06-09 NOTE — DIETITIAN INITIAL EVALUATION ADULT - PERTINENT MEDS FT
Aluminum hydroxide/magnesium hydroxide/simethicone Suspension (PRN), Zofran (PRN), Protonix, Miralax, Senna,

## 2022-06-09 NOTE — PROGRESS NOTE ADULT - NSPROGADDITIONALINFOA_GEN_ALL_CORE
Discussed with daughter Roberta on phone for 20 minutes on 6/9. Answered all the questions.  Patient medically optimized for discharge.  Discharge planning 40 minutes - discussed with patient and consultants.

## 2022-06-09 NOTE — PROGRESS NOTE ADULT - PROBLEM SELECTOR PLAN 2
Malignancy with GI etiology c/b malignant pleural effusion  - Patient missed Wednesday session of pleurx draining - drained today  - No plan for inpatient chemo  - follow up as outpatient.  - will need CM to reinstate home nursing care prior to discharge.

## 2022-06-09 NOTE — DIETITIAN INITIAL EVALUATION ADULT - ADD RECOMMEND
1. Encourage & assist Pt with meals; Monitor PO diet tolerance;   2. Honor food preferences;  3. Add appetite stimulant to boost Pt's PO intake/appetite;  4. Add Multivitamins 1 tab daily for micronutrient coverage;   5. Monitor labs, hydration status;

## 2022-06-09 NOTE — DISCHARGE NOTE NURSING/CASE MANAGEMENT/SOCIAL WORK - NSDCFUADDAPPT_GEN_ALL_CORE_FT
Please follow up with your primary care provider within 1 week of discharge from the hospital. If you do not have a primary care provider please follow up with the [facility], call [phone]

## 2022-06-09 NOTE — DIETITIAN INITIAL EVALUATION ADULT - WEIGHT (LBS)
c/o witnessed trip and fall on kitchen floor, hit chin, laceration to chin, denies LOC, denies vomiting after episode, patient ambulating in triage, pt at mental baseline 145

## 2022-06-09 NOTE — DISCHARGE NOTE NURSING/CASE MANAGEMENT/SOCIAL WORK - PATIENT PORTAL LINK FT
You can access the FollowMyHealth Patient Portal offered by [facility] by registering at the following website: [email or web address]. By joining [facility]’s FollowMyHealth portal, you will also be able to view your health information using other applications (apps) compatible with our system.

## 2022-06-09 NOTE — DIETITIAN INITIAL EVALUATION ADULT - ETIOLOGY
Pt meets criteria for severe malnutrition in the context of acute in chronic illness  Pt meets criteria for severe malnutrition in the context of chronic illness

## 2022-06-09 NOTE — DIETITIAN INITIAL EVALUATION ADULT - PERTINENT LABORATORY DATA
06-09    136  |  101  |  8   ----------------------------<  94  3.8   |  22  |  0.72    Ca    8.6      09 Jun 2022 05:30    TPro  6.3  /  Alb  2.9<L>  /  TBili  0.4  /  DBili  x   /  AST  26  /  ALT  7   /  AlkPhos  85  06-09

## 2022-06-09 NOTE — DISCHARGE NOTE NURSING/CASE MANAGEMENT/SOCIAL WORK - NSDCPEFALRISK_GEN_ALL_CORE
For information on Fall & Injury Prevention, visit: [email or web address] OR  [email or web address] OR  https://www.cdc.gov/steadi/patient.html

## 2022-06-09 NOTE — PROGRESS NOTE ADULT - ASSESSMENT
66F Peritoneal CA (Presumed GI) c/b SBO, L malignant pleural effusion s/p VATS, Pleurx to L lung on 4/29, on chemo, p/w new RLE DVT and RLL PE

## 2022-06-09 NOTE — DIETITIAN INITIAL EVALUATION ADULT - OTHER INFO
Pt 67 yo female with PMHx of Peritoneal CA (Presumed GI) c/b SBO, L malignant pleural effusion s/p VATS, Pleurx to L lung on 4/29, on chemo presented with new RLE DVT and RLL PE - per chart review.     At time of visit, Pt awake, alert but weak. Per Pt her appetite not well for past few months. Pt also stated she lost weight ~20#/6 months. Food preferences discussed. RDN offered variety of PO supplements, but Pt declined them all. No report of chewing or swallowing difficulty; no report of nausea, vomiting or diarrhea @ this time; but Pt C/O abdominal pain at time of visit. +BM (6/9) per flow sheet.     Per Pt her height: 4'11" & her weight: 145#, now. Nutrition focused physical exam conducted, Pt found with signs of moderate subcutaneous fat loss: [x] Orbital fat pads region, [x] Buccal fat region and moderate muscle wasting: [x] Temples region, [x] Clavicle region. Rec to add appetite stimulant to boost Pt's PO intake/appetite. RDN answered concerns related to diet. RDN remains available, Pt made aware.

## 2022-06-09 NOTE — PROGRESS NOTE ADULT - SUBJECTIVE AND OBJECTIVE BOX
[facility] Division of Hospital Medicine  Reji Reaves MD  Pager (M-F, 8A-5P): [phone]  Other Times:  [phone]    Patient is a 66y old  Female who presents with a chief complaint of Abdominal pain (2022 15:16)    SUBJECTIVE / OVERNIGHT EVENTS:  Patient is getting his pleural effusion drained via pleurx. Offers no new complaints.  No F/C, N/V, CP, SOB, Cough, lightheadedness, dizziness, abdominal pain, diarrhea, dysuria.    MEDICATIONS  (STANDING):  apixaban 10 milliGRAM(s) Oral every 12 hours  levothyroxine 125 MICROGram(s) Oral daily  pantoprazole    Tablet 40 milliGRAM(s) Oral before breakfast  polyethylene glycol 3350 17 Gram(s) Oral daily  simvastatin 40 milliGRAM(s) Oral at bedtime    MEDICATIONS  (PRN):  acetaminophen     Tablet .. 650 milliGRAM(s) Oral every 6 hours PRN Temp greater or equal to 38C (100.4F), Mild Pain (1 - 3)  aluminum hydroxide/magnesium hydroxide/simethicone Suspension 30 milliLiter(s) Oral every 4 hours PRN Dyspepsia  melatonin 3 milliGRAM(s) Oral at bedtime PRN Insomnia  ondansetron Injectable 4 milliGRAM(s) IV Push every 8 hours PRN Nausea and/or Vomiting  oxyCODONE    IR 5 milliGRAM(s) Oral every 4 hours PRN Severe Pain (7 - 10)  senna 2 Tablet(s) Oral at bedtime PRN Constipation      Vital Signs Last 24 Hrs  T(C): 36.8 (2022 12:07), Max: 37.1 (2022 14:45)  T(F): 98.2 (2022 12:07), Max: 98.8 (2022 14:45)  HR: 97 (2022 12:07) (96 - 101)  BP: 113/70 (2022 12:07) (113/70 - 131/70)  BP(mean): --  RR: 18 (2022 12:07) (18 - 20)  SpO2: 99% (2022 12:07) (96% - 100%)  CAPILLARY BLOOD GLUCOSE        I&O's Summary      PHYSICAL EXAM:  CONSTITUTIONAL: NAD, well-groomed  EYES: PERRLA; conjunctiva and sclera clear  ENMT: Moist oral mucosa, no pharyngeal injection or exudates; normal dentition  NECK: Supple, no palpable masses; no thyromegaly  RESPIRATORY: Normal respiratory effort; decreased BS on the left; Pleurx drain in place in Left lung - draining clear yellow fluid.  CARDIOVASCULAR: Regular rate and rhythm, normal S1 and S2, no murmur/rub/gallop; No lower extremity edema; Peripheral pulses are 2+ bilaterally  ABDOMEN: MIld to palpation, normoactive bowel sounds, no rebound/guarding; No hepatosplenomegaly; mild distension noted.  MUSCULOSKELETAL:  Did not assess gait; no clubbing or cyanosis of digits; no joint swelling or tenderness to palpation  PSYCH: A+O to person, place, and time; affect appropriate  NEUROLOGY: CN 2-12 are intact and symmetric; no gross sensory deficits   SKIN: No rashes; no palpable lesions    LABS:                        10.4   9.60  )-----------( 494      ( 2022 05:30 )             34.5         136  |  101  |  8   ----------------------------<  94  3.8   |  22  |  0.72    Ca    8.6      2022 05:30    TPro  6.3  /  Alb  2.9<L>  /  TBili  0.4  /  DBili  x   /  AST  26  /  ALT  7   /  AlkPhos  85  -    PT/INR - ( 2022 02:24 )   PT: 13.0 sec;   INR: 1.12 ratio         PTT - ( 2022 05:30 )  PTT:32.1 sec      Urinalysis Basic - ( 2022 22:33 )    Color: Anitha / Appearance: Clear / S.027 / pH: x  Gluc: x / Ketone: Negative  / Bili: Negative / Urobili: <2 mg/dL   Blood: x / Protein: Trace / Nitrite: Negative   Leuk Esterase: Large / RBC: 2 /HPF / WBC 19 /HPF   Sq Epi: x / Non Sq Epi: 1 /HPF / Bacteria: Negative        RADIOLOGY & ADDITIONAL TESTS:  < from: TTE with Doppler (w/Cont) (22 @ 17:08) >  ------------------------------------------------------------------------  CONCLUSIONS:  Technically very difficult study.  1. Endocardium not well visualized; grossly normal left  ventricular systolic function.  Endocardial visualization  enhanced with intravenous injection of echo contrast  (Definity).  2. The right ventricle is not well visualized.  3. Unable to accurately evaluate valvular function.  ------------------------------------------------------------------------    < end of copied text >  < from: US Abdomen Doppler (22 @ 14:16) >  IMPRESSION:    No evidence of portal venous thrombosis, as clinically questioned.    Moderate amount of ascites. Peritoneal carcinomatosis.    < end of copied text >  < from: US Duplex Venous Lower Ext Complete, Bilateral (22 @ 14:15) >  IMPRESSION:  Acute deep venous thrombosis of the right popliteal, soleal and posterior   tibial veins.    Acute deep venous thrombosis, above and below the knee.    These findings were discussed with Dr Pedro at2022 2:56 PM by Dr. Mckeon.    --- End of Report ---    < end of copied text >    Imaging Personally Reviewed:    Care Discussed with Consultants/Other Providers:

## 2022-06-17 PROBLEM — R19.00 INTRA-ABDOMINAL AND PELVIC SWELLING, MASS AND LUMP, UNSPECIFIED SITE: Chronic | Status: INACTIVE | Noted: 2022-01-01 | Resolved: 2022-01-01

## 2022-06-18 PROBLEM — K59.00 CONSTIPATION, UNSPECIFIED CONSTIPATION TYPE: Status: ACTIVE | Noted: 2022-01-01

## 2022-06-18 NOTE — HISTORY OF PRESENT ILLNESS
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 \par \par In February 2022, patient noted the development of epigastric discomfort x 2 weeks, decreased appetite, abdominal bloating. She initially presented to [facility] on 2/10/22 and her work-up revealed the following: elevated liver enzymes, CT abd/pel showed diffuse abdominal/pelvic ascites, right adnexal cyst, pleural based nodule in the left lower lobe. She was discharged with appropriate follow-ups but returned to the hospital on 2/15/22 due to persistent symptoms. She underwent paracentesis twice on 2/1/22 but it was unsuccessful. GYN was consulted and had pt set up for further imaging with pelvic + transvaginal ultrasound in outpatient setting. \par \par Patient was readmitted on 2/28/22 due to worsening abdominal pain, distention and shortness of breath at rest. Repeat CT CAP was done on 2/28/22 and it demonstrated heterogenous, lobular, complex cystic and soft tissue masses within the pelvic inseparable from the region of the uterus, cervix and adnexa, multiple suspicious nodular peritoneal, omental, hepatic and splenic lesions worrisome for metastatic disease. Patient underwent paracentesis on 3/1/22 (4 Liters removed); pathology confirmed high grade adenocarcinoma- exact etiology cannot be determined. \par \par CT Chest from 3/2/22 revealed no intrathoracic metastasis abnormal ascites with enhancing peritoneal nodules likely due to metastatic disease. Hypodensity in the spleen measuring 1.8 x 1.7 cm. On 3/4/22, she underwent EGD/Colonoscopy; biopsies were negative. Her PET/CT from 3/9/22 showed the following: increased uptake in the region of the KATHY suggests neoplasm/metastasis, FDG active in bilateral adnexa to suggest neoplasm/metastasis, numerous metabolically active foci in the abdomen/pelvis as seen\par Metabolically active foci localizing to the pleura in the left mid lower lung fields for pleural metastasis. Mild avid left pleural effusion likely malignant. \par \par Pt presented to the [facility] ED for worsening abdominal pain and no flatus on 4/22/22. Since 4/19 has had n/v/abdominal pain; she denies abdominal distention, no reported hunger. Has not kept down any PO including liquids since 4/19. At the time of the ED visit, pt was POD#4 diagnostic laparoscopy with biopsies and drainage of ascites for a pelvic mass (frozen = carcinoma). Patient found to have an Small Bowel Obstruction with incarcerated hernia through umbilical incision. Pt was taken to the OR emergently. On 4/22/2022 pt underwent Laparoscopy, diagnostic, Reduction, hernia, internal ( Incarcerated incisional hernia). Patient's hospital course was uncomplicated. She had an NGT for decompression with minimal output. NGT was removed on POD#2 and she tolerated clear liquid diet. She was passing flatus and having bowel movements. Pt was advanced to low residue diet, which she was able to tolerate. Was d/c on 4/24/22.\par \par Edith was readmitted from 4/26-4/30/22 to [facility] for worsening KLEIN and sob. Found to have a large left pleural effusion. Pt to admitted to CTS service for drainage of pleural effusion. Left pigtail placed on 4/27/22, drained 1.7L. Patient s/p L vats, pleurx cath placement, pleural biopsy and Mediport placement on 4/29/22. \par \par Referred to medical oncology for further management. \par \par 5/12-5/26/22: C1-C2 Gemzar/Abraxane\par 6/8-6/9/22: [facility] hospitalization due to RLL PE + RLE DVT\par 6/17/22: C3 Gemzar/Abraxane\par  [de-identified] : mucinous moderately differentiated adenocarcinoma [de-identified] : Patient was seen at the infusion room while receiving treatment and currently reported the following updates: \par \par - recently started on Eliquis anticoagulation due to DVT + PE diagnosis.\par - increased fatigue noted, able to complete some self-care ADLs independently with assistance from family as needed.\par - worsening abdominal pain/distention (generalized, uses Oxycodone 5 mg on PRN basis but it provides minimal relief).\par - decreased appetite (makes conscious effort to eat, tolerating small meals and can drink up to 3 boost cans daily), uncontrolled nausea despite recently adjusting her antiemetics (metoclopramide d/c'ed due to reported diarrhea, started Zofran, denied vomiting). \par - admitted to regular bowel movements at this time (described as soft and solid in consistency, 1-2 times daily frequency, yellowish-brown in color).\par - left chest Pleurx catheter in place, drainage continues 3x/week (managed by home care team, pt reported 2 L drained earlier this AM); mild improvement with SOB.

## 2022-06-20 PROBLEM — R11.2 CHEMOTHERAPY-INDUCED NAUSEA AND VOMITING: Status: ACTIVE | Noted: 2022-01-01

## 2022-06-20 PROBLEM — Z86.711 HISTORY OF PULMONARY EMBOLUS (PE): Status: ACTIVE | Noted: 2022-01-01

## 2022-06-20 PROBLEM — C79.9 ADENOCARCINOMA, METASTATIC: Status: ACTIVE | Noted: 2022-01-01

## 2022-06-20 PROBLEM — J90 PLEURAL EFFUSION, LEFT: Status: ACTIVE | Noted: 2022-01-01

## 2022-06-20 PROBLEM — Z23 ENCOUNTER FOR IMMUNIZATION: Status: ACTIVE | Noted: 2022-01-01

## 2022-06-20 NOTE — PHYSICAL EXAM
[No Acute Distress] : no acute distress [Well Nourished] : well nourished [Normal Sclera/Conjunctiva] : normal sclera/conjunctiva [Normal Outer Ear/Nose] : the ears and nose were normal in appearance [No JVD] : no jugular venous distention [Supple] : the neck was supple [No Respiratory Distress] : no respiratory distress [Clear to Auscultation] : lungs were clear to auscultation bilaterally [Normal Rate] : heart rate was normal  [Regular Rhythm] : with a regular rhythm [Normal Bowel Sounds] : normal bowel sounds [Non Tender] : non-tender [Normal Gait] : normal gait [No Rash] : no rash [No Skin Lesions] : no skin lesions [No Motor Deficits] : the motor exam was normal [Oriented x3] : oriented to person, place, and time [Normal Affect] : the affect was normal [de-identified] : pleasant and cooperative

## 2022-06-20 NOTE — HISTORY OF PRESENT ILLNESS
[Patient] : patient [FreeTextEntry2] : 67yo F with hypothyroid, high cholesterol pelvic mass and recently diagnosed peritoneal carcinomatosis.\par CCY 2018\par \par Pt admitted 4/22-4/24 for SBO. was treated with paracentesis of ascites and incarcerated hernia reduced. Readmitted 4/26-4/30 for pleural effusion. Pleurex catheter and mediport placed.\par MWF RN comes to drain pleurX.\par \par Metastatic cancer unknown primary. dx at [facility]. \par normal weight 170>>> 140. Eating less\par on chemo. has metoclopromide PRN nausea. \par \par Hypothryroid- on synthroid 125\par high chol- on simvastatin 40\par ___________________________________________\par

## 2022-06-20 NOTE — HISTORY OF PRESENT ILLNESS
[Patient] : patient [FreeTextEntry2] : 67yo F with hypothyroid, high cholesterol, pelvic mass, peritoneal carcinomatosis, SBO, ascites and pleural effusion with Pleurex catheter. CCY 2018\par \par Pt admitted 6/8-6/9 for right leg DVT and PE. started on eliquis.. no bleeding noted.\par \par Pleural effusion-Pleurex catheter. RN comes to drain pleurX.MWF. removing 300-500 ml each time\par Metastatic cancer unknown primary. dx at [facility]. Likely Pancreatic hepatobiliary source. \par normal weight 170>>> 140. Eating less\par on chemo. has compazine PRN nausea-- has not started using yet. Pharmacy said she could not take toggether with zyprexa (pharmacy did not fill zyprexa rx). \par Hypothryroid- on synthroid 125\par high chol- on simvastatin 40\par Due for 2nd covid booster.\par Regular BM's \par ___________________________________________\par

## 2022-06-20 NOTE — PHYSICAL EXAM
[No Acute Distress] : no acute distress [Well Nourished] : well nourished [Normal Sclera/Conjunctiva] : normal sclera/conjunctiva [Normal Outer Ear/Nose] : the ears and nose were normal in appearance [No JVD] : no jugular venous distention [Supple] : the neck was supple [No Respiratory Distress] : no respiratory distress [Clear to Auscultation] : lungs were clear to auscultation bilaterally [Normal Rate] : heart rate was normal  [Regular Rhythm] : with a regular rhythm [Normal Bowel Sounds] : normal bowel sounds [Non Tender] : non-tender [Normal Gait] : normal gait [No Rash] : no rash [No Skin Lesions] : no skin lesions [No Motor Deficits] : the motor exam was normal [Oriented x3] : oriented to person, place, and time [Normal Affect] : the affect was normal [de-identified] : pleasant and cooperative

## 2022-06-20 NOTE — HEALTH RISK ASSESSMENT
[No falls in past year] : Patient reported no falls in the past year [No] : The patient does not have visual impairment [TimeGetUpGo] : 10

## 2022-06-20 NOTE — REASON FOR VISIT
[Initial Evaluation] : an initial evaluation [Pre-Visit Preparation] : pre-visit preparation was done [Intercurrent Specialty/Sub-specialty Visits] : the patient has intercurrent specialty/sub-specialty visits [FreeTextEntry2] : chart reviewed

## 2022-06-20 NOTE — COUNSELING
[Overweight - ( BMI 25.1 - 29.9 )] : overweight -  ( BMI 25.1 - 29.9 ) [Continue diet as tolerated] : continue diet as tolerated based on goals of care [Non - Smoker] : non-smoker [Smoke/CO Detectors] : smoke/CO detectors [Use assistive device to avoid falls] : use assistive device to avoid falls [Remove clutter and unsafe carpeting to avoid falls] : remove clutter and unsafe carpeting to avoid falls [Date: ___] : colonoscopy completed on [unfilled] [Maintain functional ability] : maintain functional ability

## 2022-06-28 PROBLEM — F43.23 ADJUSTMENT DISORDER WITH MIXED ANXIETY AND DEPRESSED MOOD: Status: ACTIVE | Noted: 2022-01-01

## 2022-06-29 NOTE — PROGRESS NOTE ADULT - PROBLEM/PLAN-1
Pt. Here today for maintenance split dose, will get 0.25 ml in left arm wait 30 minutes , then in lower left  arm will  given 0.25 ml and wait additional 30 minutes.    First half of split dose  SQ-LT UPPER ARM  ALLERGY injection red  vial # 1/1 Fire ant/ 0.25 ml  given,tolerated well. Pt waited 30 minutes, no local reaction noted        second half of split dose  SQ-LT LOWER ARM  allergy injection  red vial #1/1 Fire ant / 0.25 ml  given,tolerated well. Pt. Waited 30 minutes, no local reaction noted  Patient waited a total of 1 hour in clinic for split dose      DISPLAY PLAN FREE TEXT

## 2022-07-12 PROBLEM — C80.1 CARCINOMA OF UNKNOWN PRIMARY: Status: ACTIVE | Noted: 2022-01-01

## 2022-07-12 NOTE — ASSESSMENT
Diabetes and Preventing Falls: Care Instructions Your Care Instructions If you are an older adult who has diabetes, you may have a higher risk of falling. Complications of diabetessuch as nerve damage, foot problems, and reduced visionmay increase your risk of a fall. Some of your medicines also may add to your risk. By making your home safer, you can lower your risk of falling. Doing things to prevent diabetes complications may also help to lower your risk. You can make your home safer with a few simple measures. Follow-up care is a key part of your treatment and safety. Be sure to make and go to all appointments, and call your doctor if you are having problems. It's also a good idea to know your test results and keep a list of the medicines you take. How can you care for yourself at home? Taking care of yourself · Keep your blood sugar at a target level (which you set with your doctor). · Exercise regularly to improve your strength, muscle tone, and balance. Walk if you can. Swimming may be a good choice if you cannot walk easily. · Have your vision checked as often as your doctor recommends. It is usually once a year or more often if you have eye problems. · Know the side effects of the medicines you take. Ask your doctor or pharmacist whether the medicines you take can affect your balance. Sleeping pills or sedatives can affect your balance. · Limit the amount of alcohol you drink. Alcohol can impair your balance and other senses. · Have your doctor check your feet during each visit. If you have a foot problem, see your doctor. Preventing falls at home · Remove raised doorway thresholds, throw rugs, and clutter. Repair loose carpet or raised areas in the floor. · Move furniture and electrical cords to keep them out of walking paths. · Use nonskid floor wax, and wipe up spills right away, especially on ceramic tile floors. [Palliative] : Goals of care discussed with patient: Palliative [Palliative Care Plan] : not applicable at this time · If you use a walker or cane, put rubber tips on it. If you use crutches, clean the bottoms of them regularly with an abrasive pad, such as steel wool. · Keep your house well lit, especially [place], and outside walkways. Use night-lights in areas such as hallways and bathrooms. Add extra light switches or use remote switches (such as switches that go on or off when you clap your hands) to make it easier to turn lights on if you have to get up during the night. · Install sturdy handrails on stairways. Put grab bars near your shower, bathtub, and toilet. · Store household items on low shelves so that you do not have to climb or reach high. Or use a reaching device that you can get at a medical supply store. If you have to climb for something, use a step stool with handrails, or ask someone to get it for you. · Keep a cordless phone and a flashlight with new batteries by your bed. If possible, put a phone in each of the main rooms of your house, or carry a cell phone in case you fall and cannot reach a phone. Or you can wear a device around your neck or wrist. You push a button that sends a signal for help. · Wear low-heeled shoes that fit well and give your feet good support. Use footwear with nonskid soles. Check the heels and soles of your shoes for wear. Repair or replace worn heels or soles. · Do not wear socks without shoes on wood floors. · Walk on the grass when the sidewalks are slippery. If you live in an area that gets snow and ice in the winter, sprinkle salt on slippery steps and sidewalks. Where can you learn more? Go to http://cira-corona.info/. Enter S347 in the search box to learn more about \"Diabetes and Preventing Falls: Care Instructions. \" Current as of: November 7, 2018 Content Version: 12.2 © 1617-8147 [employer].  Care instructions adapted under license by [employer] (which disclaims liability or warranty [FreeTextEntry1] : Pt clinically ill appearing, hypotensive with multiple complaints. Unable to ambulate \par extensive DVT, possible POD of PE due to being off AC\par Will send to [facility] ER to eval SOB, manage symptoms, needs PT eval and home care referral for aid \par Consider repeat CTA \par Cont AC, may switch to Lovenox in patient \par Recommend palliative to see her for symptom management and GOC. Depending on improvement in PS may or may not be able to receive further DMT \par \par  for this information). If you have questions about a medical condition or this instruction, always ask your healthcare professional. [address] any warranty or liability for your use of this information. Keep up the great job with your sugars, a1c today down to 7.2.

## 2022-07-12 NOTE — PHYSICAL EXAM
[Capable of only limited self care, confined to bed or chair more than 50% of waking hours] : Status 3- Capable of only limited self care, confined to bed or chair more than 50% of waking hours [Normal] : affect appropriate [de-identified] : ill appearing  [de-identified] : + b/l LE edema, R >L  [de-identified] : RUQ pain

## 2022-07-12 NOTE — REVIEW OF SYSTEMS
[Fatigue] : fatigue [Recent Change In Weight] : ~T recent weight change [Lower Ext Edema] : lower extremity edema [Cough] : cough [SOB on Exertion] : shortness of breath during exertion [Abdominal Pain] : abdominal pain [Difficulty Walking] : difficulty walking [Negative] : Allergic/Immunologic

## 2022-07-12 NOTE — ED PROVIDER NOTE - PHYSICAL EXAMINATION
CONSTITUTIONAL: chronically ill appearing, mild-mod distress  SKIN: Warm dry  HEAD: NCAT  EYES: NL inspection  ENT: dry oral mucosa  NECK: Supple; non tender  CARD: RRR  RESP: CTAB, fine crackles in bases noted  ABD: mildly distended, TTP, R site of para/L pleurex dressing  EXT: pitting edema R>L TTP of R calf janene w/ asymmetry, no erythema  NEURO: Grossly unremarkable  PSYCH: Cooperative, appropriate.

## 2022-07-12 NOTE — ED ADULT TRIAGE NOTE - CHIEF COMPLAINT QUOTE
Pt brought in by EMS from [facility] for bilateral DVT. Pt has a L sided L side Pleurx. Pt complaining of decrease PO intake. Pt denies chest pain, n/v/d, fever or chills.

## 2022-07-12 NOTE — ED PROVIDER NOTE - CLINICAL SUMMARY MEDICAL DECISION MAKING FREE TEXT BOX
Cesar PGY2: 67yo F with stage IV CA, known DVT/PE on eliquis was off briefly for para and didn't go back on it right away - now having 3-4d of worsening LE edema R>L and pain with SOB. Likely PE, hold CTA given CHECO. Hypotensive here but mentating well and will plan to admit. Will order IVF, US of abd, labs and plan to admit to hospitalist.

## 2022-07-12 NOTE — ED PROVIDER NOTE - PROGRESS NOTE DETAILS
Cesar PGY2: per chart note suggested CT c/a/p but given severe CHECO will instead just place pt on heparin drip and plan to admit after labs performed. BP remains soft, 500cc ordered as pt clinically dry despite edema of legs/abd - likely intravasc depletion. Patient has adrenal insufficiency as per labs - patient was hyponatremic, hyperkalemic, hypoglycemic. Received D50 push. EKG sinus tach. Deferred starting steroids to hospitalist who recommended holding off for now. Patient started on heparin for known DVTs. Admitted to hospitalist in stable condition.     Ramona Kearney MD, PGY2

## 2022-07-12 NOTE — ED PROVIDER NOTE - ATTENDING CONTRIBUTION TO CARE
I have personally performed a face to face medical and diagnostic evaluation of the patient. I have discussed with and reviewed the Resident's note and agree with the History, ROS, Physical Exam and MDM unless otherwise indicated. A brief summary of my personal evaluation and impression can be found below.    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      Initial lab work sent off my triage MD in [facility], +leukocytosis with neutrophilic shift, possible hyperkalemia (hemolyzed). Given known DVT will plan for anticoagulation. Will hold off PE study, new renal failure with low GFR.     On exam, hypotensive and tachycardic, low temp. No reproducible abdominal tenderness. Will obtain rpt labs to recheck lytes. Plan for broad spectrum antibiotics and heparin. Reassess and admit.     Gi Mendoza, ED Attending

## 2022-07-12 NOTE — ED PROVIDER NOTE - NS ED ROS FT
Constitutional:  See HPI, generalized weakness  ENMT: No neck pain or stiffness  Cardiac:  No chest pain  Respiratory:  No cough ; +subjective SOB  GI:  No nausea, vomiting, diarrhea ; +abdominal pain. +abd distension  :  No urinary sxs.  MS:  generalized MSK pains, LE edema R>L and pain janene on walking  Neuro:  No headache   Skin:  No skin rash  Except as documented in the HPI,  all other systems are negative

## 2022-07-12 NOTE — ED ADULT NURSE NOTE - CHIEF COMPLAINT QUOTE
Pt brought in by EMS from [facility] for bilateral DVT. Pt has a L sided L side Pleurx. Pt complaining of decrease PO intake. Pt denies chest pain, n/v/d, fever or chills.

## 2022-07-12 NOTE — CHART NOTE - NSCHARTNOTEFT_GEN_A_CORE
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 Pt had a paracentesis last week and was off AC for 10 days (misunderstood instructions), developed worsening LE edema, SOB. Dopplers today with extensive DVT. Pt with inability to walk due to pain in LE as well as FTT. Not eating, drinking. Hypotensive in the office, in a wheelchair, could not step on the scale. + nausea and pain of  metastatic malignancy poorly controlled.    Recommendation   - repeat CTA, CT A/P to eval disease response. I suspect she is progressing.   - consider Lovenox treatment while in patient in case pt needs procedures in the hospital. She did not fail Eliquis so can be transitioned back to this upon d/c   - admit to medicine   - palliative consult for symptom management: Reglan nor Compazine work for pt. Zofran cannot be given 5 days after chemo due to interaction with antiemetic given during therapy. ZYprexa was prescribed but pt is not sure that she ever took it. Oxycodone 10 mg is not sufficient to control pain. Need titration and evaluation of meds   - PT eval   - case management consultation for home care. If functional status remains poor, may not be a candidate for further DMT.   - house onc will follow

## 2022-07-12 NOTE — ED PROVIDER NOTE - RAPID ASSESSMENT
Dr. Garces: 65 yo female with peritoneal carcinomatosis, on chemo (last 2 weeks ago), with ascites s/p paracentesis 3 weeks ago, on Eliquis but has not taken since before a recent paracentesis, hypothyroid, in ED sent from [facility] due to bilateral LE DVTs.  Pt has no LE complaints but states that she would have come to the hospital anyway because she has been having decreased PO and worsening generalized fatigue for 3 weeks.  She endorses chronic abdominal pain, no worse now than baseline.  She has nausea but no vomiting.  Will order labs and imaging and pt will receive further eval with next provider.    Edit: Spoke to pt's oncologist Dr. Cindy Albarran, states that pt stopped Eliquis prior to recent paracentesis, but did not restart after procedure as planned.  Pt found to have DVT as outpatient, but there is concern for PE now in light of pt with SOB and off of Eliquis in the setting of malignancy.  I added on PE study.    Edit: Pt with CHECO, Cr 0.79-->1.95 in 2 weeks.  Will cancel CT PE study.  Per discussion with Dr. Albarran, plan to start empiric heparin to treat DVT and possible PE. Dr. Garces: 65 yo female with peritoneal carcinomatosis, on chemo (last 2 weeks ago), with ascites s/p paracentesis 3 weeks ago, on Eliquis but has not taken since before a recent paracentesis, hypothyroid, in ED sent from [facility] due to bilateral LE DVTs.  Pt has no LE complaints but states that she would have come to the hospital anyway because she has been having decreased PO and worsening generalized fatigue for 3 weeks.  She endorses chronic abdominal pain, no worse now than baseline.  She has nausea but no vomiting.  Will order labs and imaging and pt will receive further eval with next provider.    Edit: Spoke to pt's oncologist Dr. Cindy Albarran, states that pt stopped Eliquis prior to recent paracentesis, but did not restart after procedure as planned.  Pt found to have DVT as outpatient, but there is concern for PE now in light of pt with SOB and off of Eliquis in the setting of malignancy.  I added on PE study.    Edit: Pt with CHECO, Cr 0.79-->1.95 in 2 weeks.  Will cancel CT PE study.  Per discussion with Dr. Albarran, plan to start empiric heparin to treat DVT and possible PE.  Signed this out to Dr. Mendoza.

## 2022-07-12 NOTE — ED PROVIDER NOTE - OBJECTIVE STATEMENT
Dr. Garces: 65 yo female with peritoneal carcinomatosis, on chemo (last 2 weeks ago), with ascites s/p paracentesis 3 weeks ago, on Eliquis, hypothyroid, in ED sent from [facility] due to bilateral LE DVTs.  Pt has no LE complaints but states that she would have come to the hospital anyway because she has been having decreased PO and worsening generalized fatigue for 3 weeks.  She endorses chronic abdominal pain, no worse now than baseline.  She has nausea but no vomiting.  Will order labs and imaging and pt will receive further eval with next provider. Dr. Garces: 67 yo female with peritoneal carcinomatosis, on chemo (last 2 weeks ago), with ascites s/p paracentesis 3 weeks ago, on Eliquis but has not taken since before a recent paracentesis, hypothyroid, in ED sent from [facility] due to bilateral LE DVTs.  Pt has no LE complaints but states that she would have come to the hospital anyway because she has been having decreased PO and worsening generalized fatigue for 3 weeks.  She endorses chronic abdominal pain, no worse now than baseline.  She has nausea but no vomiting.  Will order labs and imaging and pt will receive further eval with next provider. Dr. Garces: 65 yo female with peritoneal carcinomatosis, on chemo (last 2 weeks ago), with ascites s/p paracentesis 3 weeks ago, on Eliquis but has not taken since before a recent paracentesis, hypothyroid, in ED sent from [facility] due to bilateral LE DVTs.  Pt has no LE complaints but states that she would have come to the hospital anyway because she has been having decreased PO and worsening generalized fatigue for 3 weeks.  She endorses chronic abdominal pain, no worse now than baseline.  She has nausea but no vomiting.  Will order labs and imaging and pt will receive further eval with next provider.    Edit: Spoke to pt's oncologist Dr. Cindy Albarran, states that pt stopped Eliquis prior to recent paracentesis, but did not restart after procedure as planned.  Pt found to have DVT as outpatient, but there is concern for PE now in light of pt with SOB and off of Eliquis in the setting of malignancy.  I added on PE study. Dr. Garces: SEE RAPID ASSESSMENT NOTE ABOVE FOR IMPORTANT INFORMATION.  PT REQUIRES HEPARIN AND CANNOT GET CTPE STUDY DUE TO CHECO.  SEE DR. JURADO'S SEPARATE CHART NOTE FOR IMPORTANT INFORMATION. Dr. Garces: SEE RAPID ASSESSMENT NOTE ABOVE FOR IMPORTANT INFORMATION.  PT REQUIRES HEPARIN AND CANNOT GET CTPE STUDY DUE TO CHECO.  SEE DR. JURADO'S SEPARATE CHART NOTE FOR IMPORTANT INFORMATION.    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 Pt had a paracentesis last week and was off AC for 10 days (misunderstood instructions), developed worsening LE edema, SOB. Dopplers 7/12 with extensive DVT. Pt now unable to walk, not eating/drinking. Pain and nausea/vomiting uncontrolled. Patient feels weak and just generally lives at home alone and is unable to take care of herself.

## 2022-07-13 NOTE — PATIENT PROFILE ADULT - VISION (WITH CORRECTIVE LENSES IF THE PATIENT USUALLY WEARS THEM):
wears glasse/Partially impaired: cannot see medication labels or newsprint, but can see obstacles in path, and the surrounding layout; can count fingers at arm's length

## 2022-07-13 NOTE — H&P ADULT - PROBLEM SELECTOR PLAN 4
-meets sepsis criteria  -c/w ceftriaxone, follow IVF  -may require ICU eval if more aggressive fluid resuscitation required that may further drop Na

## 2022-07-13 NOTE — PROGRESS NOTE ADULT - PROBLEM SELECTOR PLAN 6
-meets sepsis criteria  -c/w ceftriaxone for now  -ID consulted, f/u consult recs  -f/u blood and urine culture

## 2022-07-13 NOTE — CONSULT NOTE ADULT - ASSESSMENT
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    UTI  - UA with large LE, 64 WBC  - lactate 2.8  - f/u cultures  - ABX per primary team    Bilateral DVT  - found to have RLE DVT (popliteal, soleal, posterior tibial veins) 06/2022  - started on eliquis but held for 10d prior to paracentesis  - repeat duplex with extensive bilateral LE DVT 07/12/22  - suspect worsening of DVT in setting of being off AC  - on hep gtt    Electrolyte derangements  Failure to thrive  CHECO  - albumin 1.6  - hypoglycemia to 50s  - nutrition consult  - TSH 20.96, send free T4    Metastatic cancer of unknown primary  - soft tissue masses within pelvic inseparable from region of uterus, cervix, adnexa, peritoneal/omental lesions, hepatic and splenic lesions, pleural effusions); CK7, CK19, CDX2+; PAX8, TTF1, CD10, GATA3-; 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 Albarran at [facility]   65yo F w/ metastatic CUP (suggestive of pancreatiobilliary, on gem/abraxane last tx 06/30), recent PE/DVT (06/2022 on Eliquis) who presents with worsening bilateral extensive DVT, leukocytosis + hypotension, electrolyte derangement failure to thrive.    UTI  - UA with large LE, 64 WBC  - lactate 2.8  - f/u cultures  - ABX per primary team    Bilateral DVT  - found to have RLE DVT (popliteal, soleal, posterior tibial veins) 06/2022  - started on eliquis but held for 10d prior to paracentesis  - repeat duplex with extensive bilateral LE DVT 07/12/22  - suspect worsening of DVT in setting of being off AC  - on hep gtt    Electrolyte derangements  Failure to thrive  CHECO  - albumin 1.6  - hypoglycemia to 50s  - nutrition consult  - TSH 20.96, send free T4    Metastatic cancer of unknown primary  - soft tissue masses within pelvic inseparable from region of uterus, cervix, adnexa, peritoneal/omental lesions, hepatic and splenic lesions, pleural effusions); CK7, CK19, CDX2+; PAX8, TTF1, CD10, GATA3-; 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 Albarran at [facility]   67yo F w/ metastatic CUP (suggestive of pancreatiobilliary, on gem/abraxane last tx 06/30), recent PE/DVT (06/2022 on Eliquis) who presents with worsening bilateral extensive DVT, leukocytosis + hypotension, electrolyte derangement failure to thrive.    UTI  - UA with large LE, 64 WBC  - lactate 2.8  - f/u cultures  - ABX per primary team    Bilateral DVT  - found to have RLE DVT (popliteal, soleal, posterior tibial veins) 06/2022  - started on eliquis but held for 10d prior to paracentesis  - repeat duplex with extensive bilateral LE DVT 07/12/22  - suspect worsening of DVT in setting of being off AC  - on hep gtt    Electrolyte derangements  Failure to thrive  CHECO  - albumin 1.6  - hypoglycemia to 50s  - nutrition consult  - TSH 20.96, send free T4    Metastatic cancer of unknown primary  - soft tissue masses within pelvic inseparable from region of uterus, cervix, adnexa, peritoneal/omental lesions, hepatic and splenic lesions, pleural effusions); CK7, CK19, CDX2+; PAX8, TTF1, CD10, GATA3-; 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 Albarran at [facility]

## 2022-07-13 NOTE — PROGRESS NOTE ADULT - PROBLEM SELECTOR PLAN 2
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

## 2022-07-13 NOTE — H&P ADULT - NSHPLABSRESULTS_GEN_ALL_CORE
10.8   30.04 )-----------( 468      ( 2022 14:54 )             34.7     07-12    123<L>  |  88<L>  |  47<H>  ----------------------------<  96  5.9<H>   |  20<L>  |  1.72<H>    Ca    11.2<H>      2022 23:10    TPro  5.5<L>  /  Alb  1.9<L>  /  TBili  <0.2  /  DBili  x   /  AST  57<H>  /  ALT  15  /  AlkPhos  171<H>  07-12    CAPILLARY BLOOD GLUCOSE      POCT Blood Glucose.: 74 mg/dL (2022 21:02)    PT/INR - ( 2022 14:54 )   PT: 20.6 sec;   INR: 1.77 ratio         PTT - ( 2022 14:54 )  PTT:29.4 sec  Urinalysis Basic - ( 2022 20:10 )    Color: Yellow / Appearance: Slightly Turbid / S.031 / pH: x  Gluc: x / Ketone: Negative  / Bili: Negative / Urobili: 3 mg/dL   Blood: x / Protein: 30 mg/dL / Nitrite: Negative   Leuk Esterase: Large / RBC: 11 /HPF / WBC 64 /HPF   Sq Epi: x / Non Sq Epi: 8 /HPF / Bacteria: Few      Vital Signs Last 24 Hrs  T(C): 36.2 (2022 22:49), Max: 36.2 (2022 22:49)  T(F): 97.2 (2022 22:49), Max: 97.2 (2022 22:49)  HR: 100 (2022 22:49) (100 - 104)  BP: 99/66 (2022 22:49) (89/64 - 127/96)  BP(mean): --  RR: 17 (2022 22:49) (16 - 17)  SpO2: 100% (2022 22:49) (99% - 100%)    Parameters below as of 2022 22:49  Patient On (Oxygen Delivery Method): room air

## 2022-07-13 NOTE — H&P ADULT - NSHPPHYSICALEXAM_GEN_ALL_CORE
PHYSICAL EXAM:      Constitutional: NAD, well-groomed, well-developed  HEENT:  EOMI, Normal Hearing  Neck: No LAD, No JVD  Back: Normal spine flexure, No CVA tenderness  Respiratory: CTAB  Cardiovascular: S1 and S2, RRR  Gastrointestinal: BS+, soft, NT/ND  Extremities: edematous legs, right>>left  Vascular: 2+ peripheral pulses  Neurological: A/O x 3  Psychiatric: Normal mood, normal affect  Musculoskeletal: 4/5 strength b/l upper and 3-4/5 lower extremities driven by leg heaviness/pain  Skin: No rashes

## 2022-07-13 NOTE — CONSULT NOTE ADULT - ATTENDING COMMENTS
Pt. with hypo-osmolar hyponatremia and CHECO. Assessment and plan for hyponatremia and CHECO as outlined above. Pt. seen and examined today (7/14/22). Pt. awake, alert. Pt. reports nausea and decreased appetite. No fever, CP or SOB during rounds today. Pt. with significant B/L LE pitting edema on exam today. SNa decreased to 120 and Scr elevated/stable at 1.57 today (7/14/22). Pt. clinically hypervolemic. Recommend IV Bumex 2 mg once today. Continue fluid restriction (<1L/day). Monitor SNa closely. Avoid overcorrection of SNa. Monitor labs and urine output. Avoid any potential nephrotoxins. Dose medications as per eGFR.

## 2022-07-13 NOTE — H&P ADULT - PROBLEM SELECTOR PLAN 1
-heparin gtt  -unable to perform CTA currently 2/2 booker, but if BP drops, may have to consider performing CTA to determine if PE lysis required; c/w tele, TTE ordered  -Unclear at this time if soft BP stemming from sepsis or pulmonary clot burden or poor po intake /volume depletion

## 2022-07-13 NOTE — PROGRESS NOTE ADULT - ASSESSMENT
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

## 2022-07-13 NOTE — CONSULT NOTE ADULT - PROBLEM SELECTOR RECOMMENDATION 2
Pt indicates that nausea/vomiting usually lingers after tx  Believes it was exacerbated after oxy  Would recommend:  - Start Zofran 4mg IV q6hrs PRN for nausea/vomitng PRN can consider atc if nausea/vomiting is persistent

## 2022-07-13 NOTE — PATIENT PROFILE ADULT - FALL HARM RISK - DEVICES
Pt called in wanting to know about the white blood cells as the response from  confused him. Pt was advised they are good white blood cells and wanted to know how he could tell the difference.  I advised pt elevated white blood cells is the body's im
Cane

## 2022-07-13 NOTE — H&P ADULT - PROBLEM SELECTOR PLAN 2
-Pleurx care  -consider abdominal Pleurx placement  -pain/palliative for more optimal nausea and pain control

## 2022-07-13 NOTE — PROGRESS NOTE ADULT - PROBLEM SELECTOR PLAN 4
-meets sepsis criteria  -c/w ceftriaxone, follow IVF  -may require ICU eval if more aggressive fluid resuscitation required that may further drop Na Pt was hypoglycemic earlier, cont to monitor glucose closely  encourage po intake

## 2022-07-13 NOTE — PATIENT PROFILE ADULT - FALL HARM RISK - HARM RISK INTERVENTIONS

## 2022-07-13 NOTE — CONSULT NOTE ADULT - PROBLEM SELECTOR RECOMMENDATION 3
Stage IV  Unknown primary  Follows with Dr. Albarran at [facility]  Oncology following  Pending recs

## 2022-07-13 NOTE — PROGRESS NOTE ADULT - SUBJECTIVE AND OBJECTIVE BOX
Patient is a 66y old  Female who presents with a chief complaint of dvt, booker, electrolyte disorder (2022 12:50)      SUBJECTIVE / OVERNIGHT EVENTS: Pt seen and examined at 12:20pm, no overnight events, pt reports sob on exertion, b/l leg pain, no chest pain or any other complaints. No other new issues reported.    MEDICATIONS  (STANDING):  ascorbic acid 500 milliGRAM(s) Oral daily  cefTRIAXone   IVPB 1000 milliGRAM(s) IV Intermittent every 24 hours  cholecalciferol 1000 Unit(s) Oral daily  heparin  Infusion.  Unit(s)/Hr (11 mL/Hr) IV Continuous <Continuous>  levothyroxine 112 MICROGram(s) Oral daily  multivitamin 1 Tablet(s) Oral daily  pantoprazole    Tablet 40 milliGRAM(s) Oral before breakfast  simvastatin 40 milliGRAM(s) Oral at bedtime    MEDICATIONS  (PRN):  acetaminophen     Tablet .. 650 milliGRAM(s) Oral every 6 hours PRN Mild Pain (1 - 3), Moderate Pain (4 - 6)  heparin   Injectable 5000 Unit(s) IV Push every 6 hours PRN For aPTT less than 40  heparin   Injectable 2500 Unit(s) IV Push every 6 hours PRN For aPTT between 40 - 57  polyethylene glycol 3350 17 Gram(s) Oral daily PRN Constipation  senna 2 Tablet(s) Oral at bedtime PRN Constipation      Vital Signs Last 24 Hrs  T(C): 36.8 (2022 12:52), Max: 36.8 (2022 12:52)  T(F): 98.3 (2022 12:52), Max: 98.3 (2022 12:52)  HR: 105 (2022 12:52) (100 - 105)  BP: 94/67 (2022 12:52) (94/67 - 127/96)  BP(mean): --  RR: 24 (2022 12:52) (17 - 24)  SpO2: 100% (2022 12:52) (100% - 100%)    Parameters below as of 2022 12:52  Patient On (Oxygen Delivery Method): room air      CAPILLARY BLOOD GLUCOSE      POCT Blood Glucose.: 101 mg/dL (2022 12:25)  POCT Blood Glucose.: 110 mg/dL (2022 09:48)  POCT Blood Glucose.: 82 mg/dL (2022 09:10)  POCT Blood Glucose.: 67 mg/dL (2022 07:23)  POCT Blood Glucose.: 74 mg/dL (2022 21:02)    I&O's Summary      PHYSICAL EXAM:  GENERAL: NAD, thinly built female  CHEST/LUNG: left mid lung crackles, base with decreased bs, rt clear to ausculatation, +pleurex cath  HEART: Tachycardic at 110/mt  ABDOMEN: Soft, Nontender, Nondistended  EXTREMITIES:  2+ Peripheral Pulses, No clubbing, cyanosis, or edema  PSYCH: AAOx3  NEUROLOGY: non-focal  SKIN: No rashes or lesions    LABS:                        8.7    29.71 )-----------( 494      ( 2022 04:45 )             27.7     07-13    131<L>  |  100  |  39<H>  ----------------------------<  53<LL>  3.7   |  15<L>  |  1.28    Ca    6.3<LL>      2022 07:20    TPro  4.7<L>  /  Alb  1.6<L>  /  TBili  0.3  /  DBili  x   /  AST  28  /  ALT  12  /  AlkPhos  145<H>  07-13    PT/INR - ( 2022 14:54 )   PT: 20.6 sec;   INR: 1.77 ratio         PTT - ( 2022 04:45 )  PTT:172.5 sec      Urinalysis Basic - ( 2022 20:10 )    Color: Yellow / Appearance: Slightly Turbid / S.031 / pH: x  Gluc: x / Ketone: Negative  / Bili: Negative / Urobili: 3 mg/dL   Blood: x / Protein: 30 mg/dL / Nitrite: Negative   Leuk Esterase: Large / RBC: 11 /HPF / WBC 64 /HPF   Sq Epi: x / Non Sq Epi: 8 /HPF / Bacteria: Few        RADIOLOGY & ADDITIONAL TESTS:    Imaging Personally Reviewed:    Consultant(s) Notes Reviewed:      Care Discussed with Consultants/Other Providers:   Patient is a 66y old  Female who presents with a chief complaint of dvt, booker, electrolyte disorder (2022 12:50)      SUBJECTIVE / OVERNIGHT EVENTS: Pt seen and examined at 12:20pm, no overnight events, was hypoglycemic earlier today, pt reports sob on exertion, b/l leg pain, no chest pain or any other complaints. No other new issues reported.    MEDICATIONS  (STANDING):  ascorbic acid 500 milliGRAM(s) Oral daily  cefTRIAXone   IVPB 1000 milliGRAM(s) IV Intermittent every 24 hours  cholecalciferol 1000 Unit(s) Oral daily  heparin  Infusion.  Unit(s)/Hr (11 mL/Hr) IV Continuous <Continuous>  levothyroxine 112 MICROGram(s) Oral daily  multivitamin 1 Tablet(s) Oral daily  pantoprazole    Tablet 40 milliGRAM(s) Oral before breakfast  simvastatin 40 milliGRAM(s) Oral at bedtime    MEDICATIONS  (PRN):  acetaminophen     Tablet .. 650 milliGRAM(s) Oral every 6 hours PRN Mild Pain (1 - 3), Moderate Pain (4 - 6)  heparin   Injectable 5000 Unit(s) IV Push every 6 hours PRN For aPTT less than 40  heparin   Injectable 2500 Unit(s) IV Push every 6 hours PRN For aPTT between 40 - 57  polyethylene glycol 3350 17 Gram(s) Oral daily PRN Constipation  senna 2 Tablet(s) Oral at bedtime PRN Constipation      Vital Signs Last 24 Hrs  T(C): 36.8 (2022 12:52), Max: 36.8 (2022 12:52)  T(F): 98.3 (2022 12:52), Max: 98.3 (2022 12:52)  HR: 105 (2022 12:52) (100 - 105)  BP: 94/67 (2022 12:52) (94/67 - 127/96)  BP(mean): --  RR: 24 (2022 12:52) (17 - 24)  SpO2: 100% (2022 12:52) (100% - 100%)    Parameters below as of 2022 12:52  Patient On (Oxygen Delivery Method): room air      CAPILLARY BLOOD GLUCOSE      POCT Blood Glucose.: 101 mg/dL (2022 12:25)  POCT Blood Glucose.: 110 mg/dL (2022 09:48)  POCT Blood Glucose.: 82 mg/dL (2022 09:10)  POCT Blood Glucose.: 67 mg/dL (2022 07:23)  POCT Blood Glucose.: 74 mg/dL (2022 21:02)    I&O's Summary      PHYSICAL EXAM:  GENERAL: NAD, thinly built female  CHEST/LUNG: left mid lung crackles, base with decreased bs, rt clear to ausculatation, +pleurex cath  HEART: Tachycardic at 110/mt  ABDOMEN: Soft, Nontender, + distention  EXTREMITIES: b/l LE edema R>L  PSYCH: Calm  NEUROLOGY: AA answers all questions  SKIN: No rashes or lesions    LABS:                        8.7    29.71 )-----------( 494      ( 2022 04:45 )             27.7     07-13    131<L>  |  100  |  39<H>  ----------------------------<  53<LL>  3.7   |  15<L>  |  1.28    Ca    6.3<LL>      2022 07:20    TPro  4.7<L>  /  Alb  1.6<L>  /  TBili  0.3  /  DBili  x   /  AST  28  /  ALT  12  /  AlkPhos  145<H>  07-13    PT/INR - ( 2022 14:54 )   PT: 20.6 sec;   INR: 1.77 ratio         PTT - ( 2022 04:45 )  PTT:172.5 sec      Urinalysis Basic - ( 2022 20:10 )    Color: Yellow / Appearance: Slightly Turbid / S.031 / pH: x  Gluc: x / Ketone: Negative  / Bili: Negative / Urobili: 3 mg/dL   Blood: x / Protein: 30 mg/dL / Nitrite: Negative   Leuk Esterase: Large / RBC: 11 /HPF / WBC 64 /HPF   Sq Epi: x / Non Sq Epi: 8 /HPF / Bacteria: Few        RADIOLOGY & ADDITIONAL TESTS:    Imaging Personally Reviewed:    Consultant(s) Notes Reviewed:      Care Discussed with Consultants/Other Providers:

## 2022-07-13 NOTE — PROGRESS NOTE ADULT - PROBLEM SELECTOR PLAN 5
pt denies change in urinary habits  improving with IVF, continue to monitor - s/p albumin infusion  -has received 1.5 lit fluid in the ED  -cont to monitor Na closely  -await urine Na, will fluid restrict for now to 1200 cc daily, trend Na  - will get Renal consult, f/u consult recs

## 2022-07-13 NOTE — CONSULT NOTE ADULT - PROBLEM SELECTOR RECOMMENDATION 9
Some POD seen on imaging as above  Could be the source of pain  Pt was taking PO oxy PRN at home (5mg) w/o relief, also indicates that it made her nauseous. She does not like to use morphine, discussed option of Dilaudid which pt agreed to try  Would recommend:  - Start Dilaudid 0.2mg IV q3-4hrs PRN for severe pain, room to taper up, started low dose as pt c/o nausea induced by opioids, taper up as tolerated to lowest effective dose.  - Bowel regimen to prevent opioid induced constipation

## 2022-07-13 NOTE — CONSULT NOTE ADULT - PROBLEM SELECTOR RECOMMENDATION 2
Pt with CHECO in the setting of hypotension, decreased PO intake, and volume overload. As per Ayah/Jose CEVALLOS, pt had a Scr of 0.79 on 6/30/22. Scr on admission was 1.95 on 7/12, improved to 1.28 on 7/13, and then increased to 1.60 later in the day on 7/13. UOP not documented although pt reports making urine. Of note, pt was hypotensive on admission and received 1.5L of IVF. UA done on 7/12 showing proteinuria and possible UTI. Abdominal US done on 7/12 showing no evidence of hydronephrosis in the R kidney, no comment on L kidney. Recommend urinary catheter placement, as pt with increased intraabdominal pressure due to ascites and symptoms of urinary retention. Repeat UA. Obtain urine sodium and urine creatinine for FeNa calculation. Obtain renal US. Avoid nephrotoxins. Monitor labs and urine output. Dose medications as per eGFR. Pt with CHECO in the setting of hypotension, decreased PO intake, and volume overload. As per Ayah/Jose CEVALLOS, pt had a Scr of 0.79 on 6/30/22. Scr on admission was 1.95 on 7/12, improved to 1.28 on 7/13, and then increased to 1.60 later in the day on 7/13. UOP not documented although pt reports making urine. Of note, pt was hypotensive on admission and received 1.5L of IVF. UA done on 7/12 showing proteinuria and possible UTI. Abdominal US done on 7/12 showing no evidence of hydronephrosis in the R kidney, no comment on L kidney. CHECO likely secondary to obstruction with a component of pre-renal etiology. Recommend urinary catheter placement, as pt with increased intraabdominal pressure due to ascites and symptoms of urinary retention. Repeat UA. Obtain urine sodium and urine creatinine for FeNa calculation. Obtain renal US. Avoid nephrotoxins. Monitor labs and urine output. Dose medications as per eGFR.    Plan discussed with on call attending.    If you have any questions, please feel free to contact me  Randi Jackson  Nephrology Fellow  [phone] / [employer] Teams(Preferred)  (After 5pm or on weekends please page the on-call fellow) Pt with CHECO in the setting of hypotension, decreased PO intake, and volume overload. As per Ayah/Jose CEVALLOS, pt had a Scr of 0.79 on 6/30/22. Scr on admission was 1.95 on 7/12, improved to 1.28 on 7/13, and then increased to 1.60 later in the day on 7/13. UOP not documented although pt reports making urine. Of note, pt was hypotensive on admission and received 1.5L of IVF. UA done on 7/12 showing proteinuria and possible UTI. Abdominal US done on 7/12 showing no evidence of hydronephrosis in the R kidney, no comment on L kidney. CHECO likely secondary to ischemic ATN with a possible component of obstruction. Recommend urinary catheter placement, as pt with increased intraabdominal pressure due to ascites and symptoms of urinary retention. Repeat UA. Obtain urine sodium and urine creatinine for FeNa calculation. Obtain renal US. Avoid nephrotoxins. Monitor labs and urine output. Dose medications as per eGFR.    Plan discussed with on call attending.    If you have any questions, please feel free to contact me  Randi Jackson  Nephrology Fellow  [phone] / [employer] Teams(Preferred)  (After 5pm or on weekends please page the on-call fellow) Pt. with CHECO in the setting of hypotension, decreased PO intake, and volume overload. As per Ayah/Jose CEVALLOS, pt had a Scr of 0.79 on 6/30/22. Scr on admission was 1.95 on 7/12, improved to 1.28 on 7/13, and then increased to 1.60 later in the day on 7/13. UOP not documented although pt reports making urine. Of note, pt was hypotensive on admission and received 1.5L of IVF. UA done on 7/12 showing proteinuria and possible UTI. Abdominal US done on 7/12 showing no evidence of hydronephrosis in the R kidney, no comment on L kidney. CHECO likely hemodynamically mediated with a possible component of obstruction. Recommend urinary catheter placement, as pt with increased intraabdominal pressure due to ascites and symptoms of urinary retention. Repeat UA. Obtain urine sodium and urine creatinine for FeNa calculation. Obtain renal US. Avoid nephrotoxins. Monitor labs and urine output. Dose medications as per eGFR.    Plan discussed with on call attending.    If you have any questions, please feel free to contact me  Randi Jackson  Nephrology Fellow  [phone] / [employer] Teams(Preferred)  (After 5pm or on weekends please page the on-call fellow)

## 2022-07-13 NOTE — H&P ADULT - PROBLEM SELECTOR PLAN 3
-suspect 2/2 intravascular depletion in setting of chronic 3rd spacing and suspected sepsis. will order albumin (1.9)  -await urine Na, will fluid restrict for now to 1200 cc daily, trend Na -suspect 2/2 intravascular depletion in setting of chronic 3rd spacing and anorexia. will order albumin (1.9)  -await urine Na, will fluid restrict for now to 1200 cc daily, trend Na

## 2022-07-13 NOTE — CONSULT NOTE ADULT - PROBLEM SELECTOR RECOMMENDATION 9
Pt with hyponatremia in the setting of volume overload, ?obstruction, malignancy, nausea, and decreased PO intake. As per Ayah/Jose CEVALLOS, pt has h/o mild hyponatremia. Serum sodium during this admission was initially 121 (7/12), improved to 131 on 7/13, and then decreased to 124 later in the day on 7/13. Recommend fluid restriction. Recheck BMP. Obtain serum, urine sodium, and urine osm. Monitor labs. Pt. with hypo-osmolar hyponatremia in the setting of malignancy, volume overload, nausea, decreased PO intake and ? urine retention. As per Ayah/Jose CEVALLOS, pt. noted to have hyponatremia in the past. SNa was low at 121 on admission (7/12), improved to 131 on 7/13, and then decreased to 124 later in the day on 7/13. Recommend fluid restriction. Recheck BMP. Obtain serum, urine sodium, and urine osm. Monitor labs.

## 2022-07-13 NOTE — CONSULT NOTE ADULT - ASSESSMENT
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 Palliative Care consulted for complex symptom management in the setting of advanced malignancy.

## 2022-07-13 NOTE — PROGRESS NOTE ADULT - PROBLEM SELECTOR PLAN 3
-suspect 2/2 intravascular depletion in setting of chronic 3rd spacing and anorexia. will order albumin (1.9)  -await urine Na, will fluid restrict for now to 1200 cc daily, trend Na -Pleurx care  -pt reports pleurex is drained 3 times a week due to be drained today  -palliative care consult for pain control

## 2022-07-13 NOTE — CHART NOTE - NSCHARTNOTEFT_GEN_A_CORE
Patient with sepsis in setting of positive UA. not resolving on CTX. ID consulted. Recommending to discontinue ceftriaxone; replacing with vancomycin 1g q24h and cefepime 1g q12h. Patient with CHECO at this time. Discussed with pharmacy. Will start patient on Cefepime 1g q24hr and Vancomycin 1g q24h (with trough level to be drawn before 2nd dose). Discussed with Dr. Irizarry. Will continue to monitor patient.    Xavier Lewis PA-C  Department of Medicine  Pager #[phone]

## 2022-07-13 NOTE — CONSULT NOTE ADULT - PROBLEM SELECTOR RECOMMENDATION 9
meeting SIRS criteria (HR > 90, WBC ~ 30, RR>20); bandemia also present  positive UA though no urinary symptoms  not convinced that UTI is leading to sepsis-picture but we will investigate  would like to broaden coverage in the meantime    -f/u BCs and UC  -f/u on CT c/a/p once CHECO resolves  -discontinue ceftriaxone; recommend vancomycin 1g q24h and cefepime 1g q12h -> if imaging, BCs, UC negative for infection will consider d/cing abx  -Monitor temp and leukocytosis meeting SIRS criteria (HR > 90, WBC ~ 30, RR>20); bandemia also present  positive UA though no urinary symptoms  not convinced that UTI is leading to sepsis-picture but we will investigate  would like to broaden coverage in the meantime    -f/u BCs and UC  -f/u on CT c/a/p once CHECO resolves to evaluate for infection  -discontinue ceftriaxone; recommend replacing with vancomycin 1g q24h and cefepime 1g q12h  -Monitor temp and leukocytosis  -if imaging, BCs, UC negative for infection will consider d/cing abx

## 2022-07-13 NOTE — CONSULT NOTE ADULT - ATTENDING COMMENTS
This is a 66 year old female with a past medical history of w/ Stage IV carcinoma of unknown primary, malignant ascites and pleural effusion s/p pleural pleurix catheter, recent dx of PE/DVT on Eliquis admitted with concern for progression of PE, hypotension and FTT. Given a positive UA, SIRS criteria being met (HR > 90, RR > 20, elevated WBC), and low BP, ID was consulted with c/f sepsis.      meeting SIRS criteria (HR > 90, WBC ~ 30, RR>20); bandemia also present  positive UA though no urinary symptoms  not convinced that UTI is leading to sepsis, given pt asymptomatic-picture but we will investigate  would like to broaden coverage in the meantime    Overall leucocytosis, tachycardia, SIRS/Sepsis, abnormal U/A, bandemia, allergy to PCN, lactic acidosis.       PLAN:   -f/u BCs and UC  -f/u on CT c/a/p with contrast once CHECO resolves to evaluate for infection  -discontinue ceftriaxone; recommend replacing with vancomycin 1g q24h and cefepime 1g q12h, tolerated ceftriaxone well.   - monitor vancomycin trough and creatinine to avoid nephrotoxicity and ensure efficacy   -Monitor leukocytosis, elevated significantly  -if imaging, BCs, UC negative for infection will consider d/cing abx    Plan discussed with consulting team.     Yasmin Abarca  Please contact through MS Teams   If no response or past 5 pm/weekend call [phone].

## 2022-07-13 NOTE — H&P ADULT - HISTORY OF PRESENT ILLNESS
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 Pt had a paracentesis last week and was off AC for 10 days (misunderstood instructions), developed worsening LE edema, SOB. Dopplers today with extensive DVT. Pt with inability to walk due to pain in LE as well as FTT. Not eating, drinking; acute on chronic abd pain, passing gas. US abd + for mild/moderate ascites. SBP 99 which is lower end of baseline in comparison to prior vitals while hospitalized. CHECO (SCr 2.0 compared to 0.8 last month. Na 123, most recen. K on VBG wnl. 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 Pt had a paracentesis last week and was off AC for 10 days (misunderstood instructions), developed worsening LE edema, SOB. Dopplers today with extensive DVT. Pt with inability to walk due to pain in LE as well as FTT. Not eating, drinking; acute on chronic abd pain, passing gas. US abd + for mild/moderate ascites. SBP 99 which is lower end of baseline in comparison to prior vitals while hospitalized. CHECO (SCr 2.0 compared to 0.8 last month. Na 123, most recent K on VBG wnl. WBC 30K, UA +

## 2022-07-13 NOTE — PROGRESS NOTE ADULT - PROBLEM SELECTOR PLAN 1
-heparin gtt  -unable to perform CTA currently 2/2 booker, but if BP drops, may have to consider performing CTA to determine if PE lysis required; c/w tele, TTE ordered  -Unclear at this time if soft BP stemming from sepsis or pulmonary clot burden or poor po intake /volume depletion -heparin gtt, will check ct angio and ct a/p once renal function improves; c/w tele, TTE ordered  -f/u onc recs

## 2022-07-13 NOTE — CONSULT NOTE ADULT - SUBJECTIVE AND OBJECTIVE BOX
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 Nephrology consulted for CHECO and hyponatremia.    [facility] has h/o mild hyponatremia. Serum sodium during this admission was initially 121 (7/12), improved to 131 on 7/13, and then decreased to 124 later in the day on 7/13. [facility], pt had a Scr of 0.79 on 6/30/22. Scr on admission was 1.95 on 7/12, improved to 1.28 on 7/13, and then increased to 1.60 later in the day on 7/13. Of note, pt was hypotensive on admission and received 1.5L of IVF.    Pt seen and examined bedside. Pt denies any prior h/o kidney disease. Reports decreased appetite and nausea due to chemotherapy over the past 2 weeks. Pt reports that she is making urine but feels that she is not completely emptying her bladder. She feels as if her legs are swollen but unsure for how long. Pt endorses recurrent ascites for which has gotten paracentesis 4 times thus far. Denies any headache, fevers/chills, chest pain, SOB, and abdominal pain.      PAST HISTORY  --------------------------------------------------------------------------------  PAST MEDICAL & SURGICAL HISTORY:  Ascites  Hypothyroidism  GERD (gastroesophageal reflux disease)  Arthritis  HLD (hyperlipidemia)  Peritoneal carcinomatosis    S/P cholecystectomy  S/P tubal ligation  S/P bunionectomy, left foot  Incarcerated hernia  History of lung surgery  VATS w/ pleurX April 2022      FAMILY HISTORY:  FH: stroke (Grandparent)    FH: hypertension (Father, Mother)      PAST SOCIAL HISTORY:    ALLERGIES & MEDICATIONS  --------------------------------------------------------------------------------  Allergies    Nuts (Anaphylaxis)  penicillin (Angioedema; Urticaria)  sulfa drugs (Hives; Urticaria)    Intolerances      Standing Inpatient Medications  ascorbic acid 500 milliGRAM(s) Oral daily  cefepime   IVPB      cholecalciferol 1000 Unit(s) Oral daily  heparin  Infusion.  Unit(s)/Hr IV Continuous <Continuous>  levothyroxine 112 MICROGram(s) Oral daily  multivitamin 1 Tablet(s) Oral daily  pantoprazole    Tablet 40 milliGRAM(s) Oral before breakfast  simvastatin 40 milliGRAM(s) Oral at bedtime    PRN Inpatient Medications  acetaminophen     Tablet .. 650 milliGRAM(s) Oral every 6 hours PRN  heparin   Injectable 5000 Unit(s) IV Push every 6 hours PRN  heparin   Injectable 2500 Unit(s) IV Push every 6 hours PRN  HYDROmorphone  Injectable 0.2 milliGRAM(s) IV Push every 3 hours PRN  ondansetron Injectable 4 milliGRAM(s) IV Push every 6 hours PRN  polyethylene glycol 3350 17 Gram(s) Oral daily PRN  senna 2 Tablet(s) Oral at bedtime PRN      REVIEW OF SYSTEMS  --------------------------------------------------------------------------------  Gen: No fevers/chills  Skin: No rashes  Head/Eyes/Ears: Normal hearing   Respiratory: No dyspnea, cough  CV: No chest pain  GI: Positive for decreased appetite, and nausea  : Positive for incomplete bladder emptying  MSK: Positive for B/L LE edema  Heme: No easy bruising or bleeding  Psych: No significant depression    All other systems were reviewed and are negative, except as noted.    VITALS/PHYSICAL EXAM  --------------------------------------------------------------------------------  T(C): 36.6 (07-13-22 @ 17:19), Max: 36.8 (07-13-22 @ 12:52)  HR: 105 (07-13-22 @ 17:19) (100 - 105)  BP: 108/66 (07-13-22 @ 17:19) (94/67 - 127/96)  RR: 18 (07-13-22 @ 17:19) (17 - 24)  SpO2: 100% (07-13-22 @ 17:19) (100% - 100%)  Wt(kg): --  Height (cm): 149.9 (07-12-22 @ 14:11)  Weight (kg): 61 (07-12-22 @ 22:24)  BMI (kg/m2): 27.1 (07-12-22 @ 22:24)  BSA (m2): 1.56 (07-12-22 @ 22:24)      Physical Exam:  Gen: NAD  HEENT: MMM  Pulm: CTA B/L. Pleurex catheter in place  CV: S1S2  Abd: Slightly distended and tense. BS+   Ext: B/L LE edema  Neuro: Awake  Skin: Warm and dry    LABS/STUDIES  --------------------------------------------------------------------------------              10.3   30.25 >-----------<  532      [07-13-22 @ 14:00]              34.0     124  |  88  |  49  ----------------------------<  86      [07-13-22 @ 14:00]  5.2   |  17  |  1.60        Ca     8.8     [07-13-22 @ 14:00]    TPro  6.6  /  Alb  2.4  /  TBili  0.3  /  DBili  x   /  AST  43  /  ALT  19  /  AlkPhos  218  [07-13-22 @ 14:00]    PT/INR: PT 20.6 , INR 1.77       [07-12-22 @ 14:54]  PTT: 70.4       [07-13-22 @ 14:00]    Uric acid 6.8      [07-13-22 @ 07:20]  Serum Osmolality 273      [07-13-22 @ 07:20]    Creatinine Trend:  SCr 1.60 [07-13 @ 14:00]  SCr 1.28 [07-13 @ 07:20]  SCr 1.72 [07-12 @ 23:10]  SCr 2.00 [07-12 @ 18:30]  SCr 1.95 [07-12 @ 14:54]    Urinalysis - [07-12-22 @ 20:10]      Color Yellow / Appearance Slightly Turbid / SG 1.031 / pH 5.5      Gluc Negative / Ketone Negative  / Bili Negative / Urobili 3 mg/dL       Blood Trace / Protein 30 mg/dL / Leuk Est Large / Nitrite Negative      RBC 11 / WBC 64 / Hyaline 2 / Gran  / Sq Epi  / Non Sq Epi 8 / Bacteria Few      TSH 20.96      [07-12-22 @ 23:10]  Lipid: chol 101, , HDL 38, LDL --      [07-13-22 @ 14:00]    HBsAb Nonreact      [05-12-22 @ 12:23]  HBsAg Nonreact      [05-12-22 @ 12:23]  HBcAb Nonreact      [05-12-22 @ 12:23]  HCV 0.11, Nonreact      [05-12-22 @ 12:23] 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 Nephrology consulted for HCECO and hyponatremia.    [facility] [place] pt has h/o mild hyponatremia. Serum sodium was 131 on 4/30/22. Serum sodium during this admission was initially 121 (7/12), improved to 131 on 7/13, and then decreased to 124 later in the day on 7/13. As per [place], pt had a Scr of 0.79 on 6/30/22. Scr on admission was 1.95 on 7/12, improved to 1.28 on 7/13, and then increased to 1.60 later in the day on 7/13. Of note, pt was hypotensive on admission and received 1.5L of IVF.    Pt seen and examined bedside. Pt denies any prior h/o kidney disease. Reports decreased appetite and nausea due to chemotherapy over the past 2 weeks. Pt reports that she is making urine but feels that she is not completely emptying her bladder. She feels as if her legs are swollen but unsure for how long. Pt endorses recurrent ascites for which has gotten paracentesis 4 times thus far. Denies any headache, fevers/chills, chest pain, SOB, and abdominal pain.      PAST HISTORY  --------------------------------------------------------------------------------  PAST MEDICAL & SURGICAL HISTORY:  Ascites  Hypothyroidism  GERD (gastroesophageal reflux disease)  Arthritis  HLD (hyperlipidemia)  Peritoneal carcinomatosis    S/P cholecystectomy  S/P tubal ligation  S/P bunionectomy, left foot  Incarcerated hernia  History of lung surgery  VATS w/ pleurX April 2022      FAMILY HISTORY:  FH: stroke (Grandparent)    FH: hypertension (Father, Mother)      PAST SOCIAL HISTORY:    ALLERGIES & MEDICATIONS  --------------------------------------------------------------------------------  Allergies    Nuts (Anaphylaxis)  penicillin (Angioedema; Urticaria)  sulfa drugs (Hives; Urticaria)    Intolerances      Standing Inpatient Medications  ascorbic acid 500 milliGRAM(s) Oral daily  cefepime   IVPB      cholecalciferol 1000 Unit(s) Oral daily  heparin  Infusion.  Unit(s)/Hr IV Continuous <Continuous>  levothyroxine 112 MICROGram(s) Oral daily  multivitamin 1 Tablet(s) Oral daily  pantoprazole    Tablet 40 milliGRAM(s) Oral before breakfast  simvastatin 40 milliGRAM(s) Oral at bedtime    PRN Inpatient Medications  acetaminophen     Tablet .. 650 milliGRAM(s) Oral every 6 hours PRN  heparin   Injectable 5000 Unit(s) IV Push every 6 hours PRN  heparin   Injectable 2500 Unit(s) IV Push every 6 hours PRN  HYDROmorphone  Injectable 0.2 milliGRAM(s) IV Push every 3 hours PRN  ondansetron Injectable 4 milliGRAM(s) IV Push every 6 hours PRN  polyethylene glycol 3350 17 Gram(s) Oral daily PRN  senna 2 Tablet(s) Oral at bedtime PRN      REVIEW OF SYSTEMS  --------------------------------------------------------------------------------  Gen: No fevers/chills  Skin: No rashes  Head/Eyes/Ears: Normal hearing   Respiratory: No dyspnea, cough  CV: No chest pain  GI: Positive for decreased appetite, and nausea  : Positive for incomplete bladder emptying  MSK: Positive for B/L LE edema  Heme: No easy bruising or bleeding  Psych: No significant depression    All other systems were reviewed and are negative, except as noted.    VITALS/PHYSICAL EXAM  --------------------------------------------------------------------------------  T(C): 36.6 (07-13-22 @ 17:19), Max: 36.8 (07-13-22 @ 12:52)  HR: 105 (07-13-22 @ 17:19) (100 - 105)  BP: 108/66 (07-13-22 @ 17:19) (94/67 - 127/96)  RR: 18 (07-13-22 @ 17:19) (17 - 24)  SpO2: 100% (07-13-22 @ 17:19) (100% - 100%)  Wt(kg): --  Height (cm): 149.9 (07-12-22 @ 14:11)  Weight (kg): 61 (07-12-22 @ 22:24)  BMI (kg/m2): 27.1 (07-12-22 @ 22:24)  BSA (m2): 1.56 (07-12-22 @ 22:24)      Physical Exam:  Gen: NAD  HEENT: MMM  Pulm: CTA B/L. Pleurex catheter in place  CV: S1S2  Abd: Slightly distended and tense. BS+   Ext: B/L LE edema  Neuro: Awake  Skin: Warm and dry    LABS/STUDIES  --------------------------------------------------------------------------------              10.3   30.25 >-----------<  532      [07-13-22 @ 14:00]              34.0     124  |  88  |  49  ----------------------------<  86      [07-13-22 @ 14:00]  5.2   |  17  |  1.60        Ca     8.8     [07-13-22 @ 14:00]    TPro  6.6  /  Alb  2.4  /  TBili  0.3  /  DBili  x   /  AST  43  /  ALT  19  /  AlkPhos  218  [07-13-22 @ 14:00]    PT/INR: PT 20.6 , INR 1.77       [07-12-22 @ 14:54]  PTT: 70.4       [07-13-22 @ 14:00]    Uric acid 6.8      [07-13-22 @ 07:20]  Serum Osmolality 273      [07-13-22 @ 07:20]    Creatinine Trend:  SCr 1.60 [07-13 @ 14:00]  SCr 1.28 [07-13 @ 07:20]  SCr 1.72 [07-12 @ 23:10]  SCr 2.00 [07-12 @ 18:30]  SCr 1.95 [07-12 @ 14:54]    Urinalysis - [07-12-22 @ 20:10]      Color Yellow / Appearance Slightly Turbid / SG 1.031 / pH 5.5      Gluc Negative / Ketone Negative  / Bili Negative / Urobili 3 mg/dL       Blood Trace / Protein 30 mg/dL / Leuk Est Large / Nitrite Negative      RBC 11 / WBC 64 / Hyaline 2 / Gran  / Sq Epi  / Non Sq Epi 8 / Bacteria Few      TSH 20.96      [07-12-22 @ 23:10]  Lipid: chol 101, , HDL 38, LDL --      [07-13-22 @ 14:00]    HBsAb Nonreact      [05-12-22 @ 12:23]  HBsAg Nonreact      [05-12-22 @ 12:23]  HBcAb Nonreact      [05-12-22 @ 12:23]  HCV 0.11, Nonreact      [05-12-22 @ 12:23] [facility] DIVISION OF KIDNEY DISEASES AND HYPERTENSION -- [phone]  -- INITIAL CONSULT NOTE  --------------------------------------------------------------------------------  HPI: 67 yo F with history of 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 Nephrology consulted for CHECO and hyponatremia.    As per [place], pt noted to have hyponatremia in the past. SNa was low at 131 on 4/30/22. SNa during this admission was initially low at 121 (7/12), improved to 131 on 7/13, and then decreased to 124 later in the day on 7/13. As per [place], pt had a Scr of 0.79 on 6/30/22. Scr on admission was 1.95 on 7/12, improved to 1.28 on 7/13, and then increased to 1.60 later in the day on 7/13. Of note, pt was hypotensive on admission and received 1.5L of IVF.    Pt seen and examined bedside. Pt denies any prior history of kidney disease. Reports decreased appetite and nausea due to chemotherapy over the past 2 weeks. Pt reports that she is making urine but feels that she is not completely emptying her bladder. She feels as if her legs are swollen but unsure for how long. Pt endorses recurrent ascites for which has gotten paracentesis 4 times thus far. Denies any headache, fevers/chills, chest pain, SOB, and abdominal pain.    PAST HISTORY  --------------------------------------------------------------------------------  PAST MEDICAL & SURGICAL HISTORY:  Ascites  Hypothyroidism  GERD (gastroesophageal reflux disease)  Arthritis  HLD (hyperlipidemia)  Peritoneal carcinomatosis    S/P cholecystectomy  S/P tubal ligation  S/P bunionectomy, left foot  Incarcerated hernia  History of lung surgery  VATS w/ pleurX April 2022    FAMILY HISTORY:  FH: stroke (Grandparent)    FH: hypertension (Father, Mother)    PAST SOCIAL HISTORY:    ALLERGIES & MEDICATIONS  --------------------------------------------------------------------------------  Allergies    Nuts (Anaphylaxis)  penicillin (Angioedema; Urticaria)  sulfa drugs (Hives; Urticaria)    Intolerances    Standing Inpatient Medications  ascorbic acid 500 milliGRAM(s) Oral daily  cefepime   IVPB      cholecalciferol 1000 Unit(s) Oral daily  heparin  Infusion.  Unit(s)/Hr IV Continuous <Continuous>  levothyroxine 112 MICROGram(s) Oral daily  multivitamin 1 Tablet(s) Oral daily  pantoprazole    Tablet 40 milliGRAM(s) Oral before breakfast  simvastatin 40 milliGRAM(s) Oral at bedtime    PRN Inpatient Medications  acetaminophen     Tablet .. 650 milliGRAM(s) Oral every 6 hours PRN  heparin   Injectable 5000 Unit(s) IV Push every 6 hours PRN  heparin   Injectable 2500 Unit(s) IV Push every 6 hours PRN  HYDROmorphone  Injectable 0.2 milliGRAM(s) IV Push every 3 hours PRN  ondansetron Injectable 4 milliGRAM(s) IV Push every 6 hours PRN  polyethylene glycol 3350 17 Gram(s) Oral daily PRN  senna 2 Tablet(s) Oral at bedtime PRN    REVIEW OF SYSTEMS  --------------------------------------------------------------------------------  Gen: No fevers/chills  Skin: No rashes  Head/Eyes/Ears: Normal hearing   Respiratory: No dyspnea, cough  CV: No chest pain  GI: Positive for decreased appetite, and nausea  : Positive for incomplete bladder emptying  MSK: Positive for B/L LE edema  Heme: No easy bruising or bleeding  Psych: No significant depression    All other systems were reviewed and are negative, except as noted.    VITALS/PHYSICAL EXAM  --------------------------------------------------------------------------------  T(C): 36.6 (07-13-22 @ 17:19), Max: 36.8 (07-13-22 @ 12:52)  HR: 105 (07-13-22 @ 17:19) (100 - 105)  BP: 108/66 (07-13-22 @ 17:19) (94/67 - 127/96)  RR: 18 (07-13-22 @ 17:19) (17 - 24)  SpO2: 100% (07-13-22 @ 17:19) (100% - 100%)  Wt(kg): --  Height (cm): 149.9 (07-12-22 @ 14:11)  Weight (kg): 61 (07-12-22 @ 22:24)  BMI (kg/m2): 27.1 (07-12-22 @ 22:24)  BSA (m2): 1.56 (07-12-22 @ 22:24)    Physical Exam:  Gen: NAD  HEENT: MMM  Pulm: CTA B/L. Pleurex catheter in place  CV: S1S2  Abd: Slightly distended and tense. BS+   Ext: B/L LE edema  Neuro: Awake  Skin: Warm and dry    LABS/STUDIES  --------------------------------------------------------------------------------              10.3   30.25 >-----------<  532      [07-13-22 @ 14:00]              34.0     124  |  88  |  49  ----------------------------<  86      [07-13-22 @ 14:00]  5.2   |  17  |  1.60        Ca     8.8     [07-13-22 @ 14:00]    TPro  6.6  /  Alb  2.4  /  TBili  0.3  /  DBili  x   /  AST  43  /  ALT  19  /  AlkPhos  218  [07-13-22 @ 14:00]    07-14    120<LL>  |  85<L>  |  51<H>  ----------------------------<  89  4.9   |  20<L>  |  1.57<H>    Ca    8.2<L>      14 Jul 2022 00:14    TPro  6.6  /  Alb  2.4<L>  /  TBili  0.3  /  DBili  x   /  AST  43<H>  /  ALT  19  /  AlkPhos  218<H>  07-13    Uric acid 6.8      [07-13-22 @ 07:20]  Serum Osmolality 273      [07-13-22 @ 07:20]    Creatinine Trend:  SCr 1.60 [07-13 @ 14:00]  SCr 1.28 [07-13 @ 07:20]  SCr 1.72 [07-12 @ 23:10]  SCr 2.00 [07-12 @ 18:30]  SCr 1.95 [07-12 @ 14:54]    Urinalysis - [07-12-22 @ 20:10]      Color Yellow / Appearance Slightly Turbid / SG 1.031 / pH 5.5      Gluc Negative / Ketone Negative  / Bili Negative / Urobili 3 mg/dL       Blood Trace / Protein 30 mg/dL / Leuk Est Large / Nitrite Negative      RBC 11 / WBC 64 / Hyaline 2 / Gran  / Sq Epi  / Non Sq Epi 8 / Bacteria Few    TSH 20.96      [07-12-22 @ 23:10]  Lipid: chol 101, , HDL 38, LDL --      [07-13-22 @ 14:00]    HBsAb Nonreact      [05-12-22 @ 12:23]  HBsAg Nonreact      [05-12-22 @ 12:23]  HBcAb Nonreact      [05-12-22 @ 12:23]  HCV 0.11, Nonreact      [05-12-22 @ 12:23]

## 2022-07-13 NOTE — H&P ADULT - NSHPREVIEWOFSYSTEMS_GEN_ALL_CORE
Review of Systems:   CONSTITUTIONAL: No fever, weight loss, or fatigue  EYES: No eye pain, visual disturbances, or discharge  ENMT:  No difficulty hearing, tinnitus, vertigo; No sinus or throat pain  NECK: No pain or stiffness  BREASTS: No pain, masses, or nipple discharge  RESPIRATORY: No cough, wheezing, chills or hemoptysis; No shortness of breath  CARDIOVASCULAR: No chest pain, palpitations, dizziness, or leg swelling  GASTROINTESTINAL: No abdominal or epigastric pain. No nausea, vomiting, or hematemesis; No diarrhea or constipation. No melena or hematochezia.  GENITOURINARY: No dysuria, frequency, hematuria, or incontinence  NEUROLOGICAL: No headaches, memory loss, loss of strength, numbness, or tremors  SKIN: No itching, burning, rashes, or lesions   LYMPH NODES: No enlarged glands  ENDOCRINE: No heat or cold intolerance; No hair loss  MUSCULOSKELETAL: No joint pain or swelling; No muscle, back, or extremity pain  PSYCHIATRIC: No depression, anxiety, mood swings, or difficulty sleeping  HEME/LYMPH: No easy bruising, or bleeding gums  ALLERY AND IMMUNOLOGIC: No hives or eczema Review of Systems:   CONSTITUTIONAL: No fever; + fatigue  EYES: No eye pain, visual disturbances, or discharge  ENMT:  No difficulty hearing, tinnitus, vertigo; No sinus or throat pain  NECK: No pain or stiffness  RESPIRATORY: No cough, wheezing, chills or hemoptysis; No shortness of breath  CARDIOVASCULAR: No chest pain, palpitations, dizziness, + leg swelling  GASTROINTESTINAL: + abdominal pain. intermittent  nausea; No diarrhea or constipation. No melena or hematochezia.  GENITOURINARY: No dysuria, frequency, hematuria, or incontinence  NEUROLOGICAL: No headaches, memory loss, loss of strength, numbness, or tremors  MUSCULOSKELETAL: No joint pain or swelling; No muscle, back, or extremity pain

## 2022-07-13 NOTE — CONSULT NOTE ADULT - SUBJECTIVE AND OBJECTIVE BOX
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  Pt had a paracentesis last week and was off AC for 10 days (misunderstood instructions), developed worsening LE edema, SOB. Dopplers today with extensive DVT. Pt with inability to walk due to pain in LE as well as FTT. Not eating, drinking; acute on chronic abd pain, passing gas. US abd + for mild/moderate ascites. SBP 99 which is lower end of baseline in comparison to prior vitals while hospitalized. CHECO (SCr 2.0 compared to 0.8 last month. Na 123, most recent K on VBG wnl. WBC 30K, UA + (2022 02:24)    The patient was told to come in to hospital due to new b/l extensive DVT concerning for progression of PE. She denied shortness of breath but stated that her breathing has felt more heavy ever since she had her paracentesis. In addition, she notes that she has had increased swelling of her right leg, though denies that her left leg has had increased swelling. The pain in her legs has improved since coming to the hospital. She describes decreased appetite, more fatigue, as well as looser stools since beginning chemotherapy. Her abdominal pain and nausea is chronic and intermittent in nature, worst in the epigastric region. She has had a runny nose, a dry cough, and some fullness of the ears b/l that started two days ago. While her U/A is positive, she denies any urinary symptoms including increased frequency, dysuria, suprapubic pain, or hematuria. Furthermore, she also denies chest pain, palpitations, headache, weakness, and tingling. She describes recent numbness of her right thenar region. Her only past history of infection is of a UTI treated outpatient without complications in 2016. Notably, in her last admission in  she had a positive UA but UC was negative.     prior hospital charts reviewed [ x  ]  primary team notes reviewed [ x ]  other consultant notes reviewed [ x ]    PAST MEDICAL & SURGICAL HISTORY:  Ascites      Hypothyroidism      GERD (gastroesophageal reflux disease)      Arthritis      HLD (hyperlipidemia)      Peritoneal carcinomatosis      S/P cholecystectomy      S/P tubal ligation      S/P bunionectomy  left foot      Incarcerated hernia      History of lung surgery  VATS w/ pleurX 2022          Allergies  Nuts (Anaphylaxis)  penicillin (Angioedema; Urticaria)  sulfa drugs (Hives; Urticaria)    ANTIMICROBIALS (past 90 days)  MEDICATIONS  (STANDING):    cefTRIAXone   IVPB   100 mL/Hr IV Intermittent (22 @ 21:40)    vancomycin  IVPB.   250 mL/Hr IV Intermittent (22 @ 22:16)        cefTRIAXone   IVPB 1000 every 24 hours    MEDICATIONS  (STANDING):  acetaminophen     Tablet .. 650 every 6 hours PRN  heparin   Injectable 5000 every 6 hours PRN  heparin   Injectable 2500 every 6 hours PRN  heparin  Infusion.  <Continuous>  levothyroxine 112 daily  pantoprazole    Tablet 40 before breakfast  polyethylene glycol 3350 17 daily PRN  senna 2 at bedtime PRN  simvastatin 40 at bedtime    SOCIAL HISTORY:     no tobacco  lives with children  has macaw at home    FAMILY HISTORY:  FH: stroke (Grandparent)    FH: hypertension (Father, Mother)      REVIEW OF SYSTEMS  [  ] ROS unobtainable because:    [  ] All other systems negative except as noted below:	    Constitutional:  [ ] fever [ ] chills  [ ] weight loss  [ ] weakness [ x ] fatigue  Skin:  [ ] rash [ ] phlebitis	  Eyes: [ ] icterus [ ] pain  [ ] discharge	  ENMT: [ ] sore throat  [ ] thrush [ ] ulcers [ ] exudates [ x ] rhinorrhea  Respiratory: [ ] dyspnea [ ] hemoptysis [ x ] cough [ ] sputum	  Cardiovascular:  [ ] chest pain [ ] palpitations [ ] edema	  Gastrointestinal:  [ x ] nausea [ ] vomiting [ ] diarrhea [ ] constipation [ x ] pain	  Genitourinary:  [ ] dysuria [ ] frequency [ ] hematuria [ ] discharge [ ] flank pain  [ ] incontinence  Musculoskeletal:  [ ] myalgias [ ] arthralgias [ ] arthritis  [ ] back pain  Neurological:  [ ] headache [ ] seizures  [ ] confusion/altered mental status  Psychiatric:  [ ] anxiety [ ] depression	  Hematology/Lymphatics:  [ ] lymphadenopathy  Endocrine:  [ ] adrenal [ ] thyroid  Allergic/Immunologic:	 [ ] transplant [ ] seasonal    Vital Signs Last 24 Hrs  T(F): 98.1 (22 @ 09:00), Max: 98.1 (22 @ 09:00)  Vital Signs Last 24 Hrs  HR: 100 (22 @ 09:00) (100 - 104)  BP: 107/82 (22 @ 09:00) (89/64 - 127/96)  RR: 22 (22 @ 09:00)  SpO2: 100% (22 @ 09:00) (99% - 100%)  Wt(kg): --    PHYSICAL EXAM:  Constitutional: non-toxic, no distress  HEAD/EYES: anicteric, no conjunctival injection  ENT:  supple, no thrush  Cardiovascular:   tachycardic, normal S1, S2, no murmur. significant non-pitting edema of both legs right > left  Respiratory:  clear BS bilaterally perhaps slightly decreased in left lower lung base, no wheezes, no rales; dressing over pleurex site, dry  GI:  distended and tender to palpation throughout most significantly in epigastric region, normal bowel sounds; dressing over paracentesis site dry  :  no haddad, no CVA tenderness  Musculoskeletal:  no synovitis, normal ROM  Neurologic: awake and alert, normal strength, no focal findings  Skin:  no rash, no erythema surrounding IV sites, no phlebitis  Heme/Onc: no lymphadenopathy   Psychiatric:  awake, alert, appropriate mood                            8.7    29.71 )-----------( 494      ( 2022 04:45 )             27.7   07-13    131<L>  |  100  |  39<H>  ----------------------------<  53<LL>  3.7   |  15<L>  |  1.28    Ca    6.3<LL>      2022 07:20    TPro  4.7<L>  /  Alb  1.6<L>  /  TBili  0.3  /  DBili  x   /  AST  28  /  ALT  12  /  AlkPhos  145<H>  07-13    Urinalysis Basic - ( 2022 20:10 )    Color: Yellow / Appearance: Slightly Turbid / S.031 / pH: x  Gluc: x / Ketone: Negative  / Bili: Negative / Urobili: 3 mg/dL   Blood: x / Protein: 30 mg/dL / Nitrite: Negative   Leuk Esterase: Large / RBC: 11 /HPF / WBC 64 /HPF   Sq Epi: x / Non Sq Epi: 8 /HPF / Bacteria: Few    MICROBIOLOGY:          Rapid RVP Result: NotDetec ( @ 16:23)      RADIOLOGY:  imaging below personally reviewed and agree with findings Patient is a 66y old  Female who presents with a chief complaint of dvt, checo, electrolyte disorder (2022 11:26)    HPI:  66 F w/ 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  Pt had a paracentesis last week and was off AC for 10 days (misunderstood instructions), developed worsening LE edema, SOB. Dopplers today with extensive DVT. Pt with inability to walk due to pain in LE as well as FTT. Not eating, drinking; acute on chronic abd pain, passing gas. US abd + for mild/moderate ascites. SBP 99 which is lower end of baseline in comparison to prior vitals while hospitalized. CHECO (SCr 2.0 compared to 0.8 last month. Na 123, most recent K on VBG wnl. WBC 30K, UA + (2022 02:24)    The patient was told to come in to hospital due to new b/l extensive DVT concerning for progression of PE. She denied shortness of breath but stated that her breathing has felt more heavy ever since she had her paracentesis. In addition, she notes that she has had increased swelling of her right leg, though denies that her left leg has had increased swelling. The pain in her legs has improved since coming to the hospital. She describes decreased appetite, more fatigue, as well as looser stools since beginning chemotherapy. Her abdominal pain and nausea is chronic and intermittent in nature, worst in the epigastric region. She has had a runny nose, a dry cough, and some fullness of the ears b/l that started two days ago. While her U/A is positive, she denies any urinary symptoms including increased frequency, dysuria, suprapubic pain, or hematuria. Furthermore, she also denies chest pain, palpitations, headache, weakness, and tingling. She describes recent numbness of her right thenar region. Her only past history of infection is of a UTI treated outpatient without complications in 2016. Notably, in her last admission in  she had a positive UA but UC was negative.     prior hospital charts reviewed [ x  ]  primary team notes reviewed [ x ]  other consultant notes reviewed [ x ]    PAST MEDICAL & SURGICAL HISTORY:  Ascites      Hypothyroidism      GERD (gastroesophageal reflux disease)      Arthritis      HLD (hyperlipidemia)      Peritoneal carcinomatosis      S/P cholecystectomy      S/P tubal ligation      S/P bunionectomy  left foot      Incarcerated hernia      History of lung surgery  VATS w/ pleurX 2022          Allergies  Nuts (Anaphylaxis)  penicillin (Angioedema; Urticaria)  sulfa drugs (Hives; Urticaria)    ANTIMICROBIALS (past 90 days)  MEDICATIONS  (STANDING):    cefTRIAXone   IVPB   100 mL/Hr IV Intermittent (22 @ 21:40)    vancomycin  IVPB.   250 mL/Hr IV Intermittent (22 @ 22:16)        cefTRIAXone   IVPB 1000 every 24 hours    MEDICATIONS  (STANDING):  acetaminophen     Tablet .. 650 every 6 hours PRN  heparin   Injectable 5000 every 6 hours PRN  heparin   Injectable 2500 every 6 hours PRN  heparin  Infusion.  <Continuous>  levothyroxine 112 daily  pantoprazole    Tablet 40 before breakfast  polyethylene glycol 3350 17 daily PRN  senna 2 at bedtime PRN  simvastatin 40 at bedtime    SOCIAL HISTORY:     no tobacco  lives with children  has macaw at home    FAMILY HISTORY:  FH: stroke (Grandparent)    FH: hypertension (Father, Mother)      REVIEW OF SYSTEMS  [  ] ROS unobtainable because:    [  ] All other systems negative except as noted below:	    Constitutional:  [ ] fever [ ] chills  [ ] weight loss  [ ] weakness [ x ] fatigue  Skin:  [ ] rash [ ] phlebitis	  Eyes: [ ] icterus [ ] pain  [ ] discharge	  ENMT: [ ] sore throat  [ ] thrush [ ] ulcers [ ] exudates [ x ] rhinorrhea  Respiratory: [ ] dyspnea [ ] hemoptysis [ x ] cough [ ] sputum	  Cardiovascular:  [ ] chest pain [ ] palpitations [ ] edema	  Gastrointestinal:  [ x ] nausea [ ] vomiting [ ] diarrhea [ ] constipation [ x ] pain	  Genitourinary:  [ ] dysuria [ ] frequency [ ] hematuria [ ] discharge [ ] flank pain  [ ] incontinence  Musculoskeletal:  [ ] myalgias [ ] arthralgias [ ] arthritis  [ ] back pain  Neurological:  [ ] headache [ ] seizures  [ ] confusion/altered mental status  Psychiatric:  [ ] anxiety [ ] depression	  Hematology/Lymphatics:  [ ] lymphadenopathy  Endocrine:  [ ] adrenal [ ] thyroid  Allergic/Immunologic:	 [ ] transplant [ ] seasonal    Vital Signs Last 24 Hrs  T(F): 98.1 (22 @ 09:00), Max: 98.1 (22 @ 09:00)  Vital Signs Last 24 Hrs  HR: 100 (22 @ 09:00) (100 - 104)  BP: 107/82 (22 @ 09:00) (89/64 - 127/96)  RR: 22 (22 @ 09:00)  SpO2: 100% (22 @ 09:00) (99% - 100%)  Wt(kg): --    PHYSICAL EXAM:  Constitutional: non-toxic, no distress  HEAD/EYES: anicteric, no conjunctival injection  ENT:  supple, no thrush  Cardiovascular:   tachycardic, normal S1, S2, no murmur. significant non-pitting edema of both legs right > left  Respiratory:  clear BS bilaterally perhaps slightly decreased in left lower lung base, no wheezes, no rales; dressing over pleurex site (clean), dry; port site on left clean  GI:  distended and tender to palpation throughout most significantly in epigastric region, normal bowel sounds; dressing over paracentesis site dry  :  no haddad, no CVA tenderness  Musculoskeletal:  no synovitis, normal ROM  Neurologic: awake and alert, normal strength, no focal findings  Skin:  no rash, no erythema surrounding IV sites, no phlebitis  Heme/Onc: no lymphadenopathy   Psychiatric:  awake, alert, appropriate mood                            8.7    29.71 )-----------( 494      ( 2022 04:45 )             27.7   07-    131<L>  |  100  |  39<H>  ----------------------------<  53<LL>  3.7   |  15<L>  |  1.28    Ca    6.3<LL>      2022 07:20    TPro  4.7<L>  /  Alb  1.6<L>  /  TBili  0.3  /  DBili  x   /  AST  28  /  ALT  12  /  AlkPhos  145<H>      Urinalysis Basic - ( 2022 20:10 )    Color: Yellow / Appearance: Slightly Turbid / S.031 / pH: x  Gluc: x / Ketone: Negative  / Bili: Negative / Urobili: 3 mg/dL   Blood: x / Protein: 30 mg/dL / Nitrite: Negative   Leuk Esterase: Large / RBC: 11 /HPF / WBC 64 /HPF   Sq Epi: x / Non Sq Epi: 8 /HPF / Bacteria: Few    MICROBIOLOGY:          Rapid RVP Result: [place] ( @ 16:23)      RADIOLOGY:  imaging below personally reviewed and agree with findings Patient is a 66y old  Female who presents with a chief complaint of dvt, checo, electrolyte disorder (2022 11:26)    HPI:  66 F w/ 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  Pt had a paracentesis last week and was off AC for 10 days (misunderstood instructions), developed worsening LE edema, SOB. Dopplers today with extensive DVT. Pt with inability to walk due to pain in LE as well as FTT. Not eating, drinking; acute on chronic abd pain, passing gas. US abd + for mild/moderate ascites. SBP 99 which is lower end of baseline in comparison to prior vitals while hospitalized. CHECO (SCr 2.0 compared to 0.8 last month. Na 123, most recent K on VBG wnl. WBC 30K, UA + (2022 02:24)    The patient was told to come in to hospital due to new b/l extensive DVT concerning for progression of PE. She denied shortness of breath but stated that her breathing has felt more heavy ever since she had her paracentesis. In addition, she notes that she has had increased swelling of her right leg, though denies that her left leg has had increased swelling. The pain in her legs has improved since coming to the hospital. She describes decreased appetite, more fatigue, as well as looser stools since beginning chemotherapy. Her abdominal pain and nausea is chronic and intermittent in nature, worst in the epigastric region. She has had a runny nose, a dry cough, and some fullness of the ears b/l that started two days ago. While her U/A is positive, she denies any urinary symptoms including increased frequency, dysuria, suprapubic pain, or hematuria. Furthermore, she also denies chest pain, palpitations, headache, weakness, and tingling. She describes recent numbness of her right thenar region. Her only past history of infection is of a UTI treated outpatient without complications in 2016. Notably, in her last admission in  she had a positive UA but UC was negative.     prior hospital charts reviewed [ x  ]  primary team notes reviewed [ x ]  other consultant notes reviewed [ x ]    PAST MEDICAL & SURGICAL HISTORY:  Ascites      Hypothyroidism      GERD (gastroesophageal reflux disease)      Arthritis      HLD (hyperlipidemia)      Peritoneal carcinomatosis      S/P cholecystectomy      S/P tubal ligation      S/P bunionectomy  left foot      Incarcerated hernia      History of lung surgery  VATS w/ pleurX 2022          Allergies  Nuts (Anaphylaxis)  penicillin (Angioedema; Urticaria)  sulfa drugs (Hives; Urticaria)    ANTIMICROBIALS (past 90 days)  MEDICATIONS  (STANDING):    cefTRIAXone   IVPB   100 mL/Hr IV Intermittent (22 @ 21:40)    vancomycin  IVPB.   250 mL/Hr IV Intermittent (22 @ 22:16)        cefTRIAXone   IVPB 1000 every 24 hours    MEDICATIONS  (STANDING):  acetaminophen     Tablet .. 650 every 6 hours PRN  heparin   Injectable 5000 every 6 hours PRN  heparin   Injectable 2500 every 6 hours PRN  heparin  Infusion.  <Continuous>  levothyroxine 112 daily  pantoprazole    Tablet 40 before breakfast  polyethylene glycol 3350 17 daily PRN  senna 2 at bedtime PRN  simvastatin 40 at bedtime      SOCIAL HISTORY:     no tobacco  lives with children  has macaw at home      FAMILY HISTORY:  FH: stroke (Grandparent)    FH: hypertension (Father, Mother)      REVIEW OF SYSTEMS  [  ] ROS unobtainable because:    [x  ] All other systems negative except as noted below:	    Constitutional:  [ ] fever [ ] chills  [ ] weight loss  [ ] weakness [ x ] fatigue  Skin:  [ ] rash [ ] phlebitis	  Eyes: [ ] icterus [ ] pain  [ ] discharge	  ENMT: [ ] sore throat  [ ] thrush [ ] ulcers [ x ] rhinorrhea  Respiratory: [ ] dyspnea [ x ] cough [ ] sputum	  Cardiovascular:  [ ] chest pain   Gastrointestinal:  [ x ] nausea [ ] vomiting [ ] diarrhea [ ] constipation [ x ] pain	  Genitourinary:  [ ] dysuria [ ] frequency   Musculoskeletal:  [ ] arthritis  [ x] back pain, chronic, unchanged  Neurological: [ ] confusion/altered mental status  Psychiatric:  [ ] anxiety   Endocrine:  [ ] adrenal [ ] thyroid  Allergic/Immunologic:	 [ ] transplant [ ] seasonal      Vital Signs Last 24 Hrs  T(F): 98.1 (22 @ 09:00), Max: 98.1 (22 @ 09:00)  Vital Signs Last 24 Hrs  HR: 100 (22 @ 09:00) (100 - 104)  BP: 107/82 (22 @ 09:00) (89/64 - 127/96)  RR: 22 (22 @ 09:00)  SpO2: 100% (22 @ 09:00) (99% - 100%)  Wt(kg): --    PHYSICAL EXAM:  Constitutional: non-toxic, no distress  HEAD/EYES: anicteric, no conjunctival injection  ENT:  supple, no thrush  Neck: lt chest port  Cardiovascular:   tachycardic, normal S1, S2, no murmur.   Respiratory:  clear BS bilaterally perhaps slightly decreased in left lower lung base, no wheezes, no rales; dressing over pleurex site (clean), dry;   GI:  distended and tender to palpation throughout most significantly in epigastric region, dressing over paracentesis site dry  :  no haddad,   Musculoskeletal:  no joint swelling  Neurologic: awake and alert, no new focal findings  Skin:  no rash,   Extremities: significant non-pitting edema of both legs right > left  Psychiatric:  awake, alert, appropriate mood                            8.7    29.71 )-----------( 494      ( 2022 04:45 )             27.7       131<L>  |  100  |  39<H>  ----------------------------<  53<LL>  3.7   |  15<L>  |  1.28    Ca    6.3<LL>      2022 07:20    TPro  4.7<L>  /  Alb  1.6<L>  /  TBili  0.3  /  DBili  x   /  AST  28  /  ALT  12  /  AlkPhos  145<H>      Urinalysis Basic - ( 2022 20:10 )    Color: Yellow / Appearance: Slightly Turbid / S.031 / pH: x  Gluc: x / Ketone: Negative  / Bili: Negative / Urobili: 3 mg/dL   Blood: x / Protein: 30 mg/dL / Nitrite: Negative   Leuk Esterase: Large / RBC: 11 /HPF / WBC 64 /HPF   Sq Epi: x / Non Sq Epi: 8 /HPF / Bacteria: Few    MICROBIOLOGY:      Rapid RVP Result: NotDetec ( @ 16:23)      RADIOLOGY:    < from: US Duplex Venous Lower Ext Complete, Bilateral (22 @ 11:40) >  IMPRESSION:  Extensive bilateral leg deep venous thrombosis. Previously described was   only localized to the right popliteal and calf veins.  Acute deep venous thrombosis: above and below the knee.      < from: US Abdomen Upper Quadrant Right (22 @ 19:44) >  IMPRESSION:  Moderate volume, complex ascites.    Peritoneal carcinomatosis.

## 2022-07-13 NOTE — CONSULT NOTE ADULT - SUBJECTIVE AND OBJECTIVE BOX
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 Pt had a paracentesis last week and was off AC for 10 days (misunderstood instructions), developed worsening LE edema, SOB. Dopplers today with extensive DVT. Pt with inability to walk due to pain in LE as well as FTT. Not eating, drinking; acute on chronic abd pain, passing gas. US abd + for mild/moderate ascites. SBP 99 which is lower end of baseline in comparison to prior vitals while hospitalized. CHECO (SCr 2.0 compared to 0.8 last month. Na 123, most recent K on VBG wnl. WBC 30K, UA + (2022 02:24)    PERTINENT PM/SXH:   Ascites    Intra-abdominal and pelvic swelling of mass or lump    Hypothyroidism    GERD (gastroesophageal reflux disease)    Arthritis    HLD (hyperlipidemia)    Peritoneal carcinomatosis    S/P cholecystectomy    S/P tubal ligation    S/P bunionectomy    Incarcerated hernia    History of lung surgery    FAMILY HISTORY:  FH: stroke (Grandparent)    FH: hypertension (Father, Mother)    Family Hx substance abuse [ ]yes [ ]no  ITEMS NOT CHECKED ARE NOT PRESENT    SOCIAL HISTORY:   Significant other/partner[ ]  Children[x ]  Samaritan/Spirituality:  Substance hx:  [ ]   Tobacco hx:  [ ]   Alcohol hx: [ ]   Home Opioid hx:  [ ] I-Stop Reference No:  Living Situation: [ x]Home  [ ]Long term care  [ ]Rehab [ ]Other    ADVANCE DIRECTIVES:    DNR/MOLST  [ ]  Living Will  [ ]   DECISION MAKER(s):  [ ] Health Care Proxy(s)  [x ] Surrogate(s)  [ ] Guardian           Name(s): Phone Number(s):  Son Feliberto Escudero    BASELINE (I)ADL(s) (prior to admission):  [place]: [x ]Total  [ ] Moderate [ ]Dependent    Allergies    Nuts (Anaphylaxis)  penicillin (Angioedema; Urticaria)  sulfa drugs (Hives; Urticaria)    Intolerances    MEDICATIONS  (STANDING):  ascorbic acid 500 milliGRAM(s) Oral daily  cefTRIAXone   IVPB 1000 milliGRAM(s) IV Intermittent every 24 hours  cholecalciferol 1000 Unit(s) Oral daily  heparin  Infusion.  Unit(s)/Hr (11 mL/Hr) IV Continuous <Continuous>  levothyroxine 112 MICROGram(s) Oral daily  multivitamin 1 Tablet(s) Oral daily  pantoprazole    Tablet 40 milliGRAM(s) Oral before breakfast  simvastatin 40 milliGRAM(s) Oral at bedtime    MEDICATIONS  (PRN):  acetaminophen     Tablet .. 650 milliGRAM(s) Oral every 6 hours PRN Mild Pain (1 - 3), Moderate Pain (4 - 6)  heparin   Injectable 5000 Unit(s) IV Push every 6 hours PRN For aPTT less than 40  heparin   Injectable 2500 Unit(s) IV Push every 6 hours PRN For aPTT between 40 - 57  polyethylene glycol 3350 17 Gram(s) Oral daily PRN Constipation  senna 2 Tablet(s) Oral at bedtime PRN Constipation    PRESENT SYMPTOMS: [ ]Unable to self-report  [ ] CPOT [ ] PAINADs [ ] RDOS  Source if other than patient:  [ ]Family   [ ]Team     Pain: [x ]yes [ ]no  QOL impact - difficulty lying down   Location - abdomen                    Aggravating factors -  Quality - aching  Radiation -   Timing- constant  Severity (0-10 scale): 4-5/10  Minimal acceptable level (0-10 scale): 2-3/10    CPOT:    https://www.[facility].org/getattachment/pkm85p64-5r3m-3d0d-2l8q-0016d1516f6d/Critical-Care-Pain-Observation-Tool-(CPOT)    PAIN AD Score:   http://geriatrictoolkit.[email or web address]/painad.pdf (press ctrl +  left click to view)    Dyspnea:                           [ ]Mild [ ]Moderate [ ]Severe      RDOS:  0 to 2  minimal or no respiratory distress   3  mild distress  4 to 6 moderate distress  >7 severe distress  https://[email or web address].[email or web address]/handouts/hen/Respiratory_Distress_Observation_Scale.pdf (Ctrl +  left click to view)     Anxiety:                             [ ]Mild [ ]Moderate [ ]Severe  Fatigue:                             [ x]Mild [ ]Moderate [ ]Severe  Nausea:                             [ ]Mild [x ]Moderate [ ]Severe  Loss of appetite:              [ ]Mild [x ]Moderate [ ]Severe  Constipation:                    [ ]Mild [ ]Moderate [ ]Severe    PCSSQ[Palliative Care Spiritual Screening Question]   Severity (0-10):  Score of 4 or > indicate consideration of Chaplaincy referral.    Chaplaincy Referral: [ ] yes [ ] refused [ ] following    Other Symptoms:  [ x]All other review of systems negative     Palliative Performance Status Version 2: 50 %    http://[email or web address]/files/news/palliative_performance_scale_[email or web address].pdf    PHYSICAL EXAM:  Vital Signs Last 24 Hrs  T(C): 36.8 (2022 12:52), Max: 36.8 (2022 12:52)  T(F): 98.3 (2022 12:52), Max: 98.3 (2022 12:52)  HR: 105 (2022 12:52) (100 - 105)  BP: 94/67 (2022 12:52) (94/67 - 127/96)  BP(mean): --  RR: 24 (2022 12:52) (17 - 24)  SpO2: 100% (2022 12:52) (100% - 100%)    Parameters below as of 2022 12:52  Patient On (Oxygen Delivery Method): room air     I&O's Summary    GENERAL: [ ]Cachexia    [x ]Alert  [x ]Oriented x 4  [ ]Lethargic  [ ]Unarousable  [x]Verbal  [ ]Non-Verbal  Behavioral:   [ ] Anxiety  [ ] Delirium [ ] Agitation [ ] Other  HEENT:  [ ]Normal   [x ]Dry mouth   [ ]ET Tube/Trach  [ ]Oral lesions  PULMONARY:   [ ]Clear [ ]Tachypnea  [ ]Audible excessive secretions   [ x]Rhonchi        [ ]Right [ ]Left [ ]Bilateral  [ ]Crackles        [ ]Right [ ]Left [ ]Bilateral  [ ]Wheezing     [ ]Right [ ]Left [ ]Bilateral  [ ]Diminished breath sounds [ ]right [ ]left [ ]bilateral  CARDIOVASCULAR:    [ ]Regular [ ]Irregular [x]Tachy  [ ]Rico [ ]Murmur [ ]Other  GASTROINTESTINAL:  [ ]Soft  [ x]Distended   [x ]+BS  [ ]Non tender [x ]Tender  [ ]Other [ ]PEG [ ]OGT/ NGT  Last BM:  GENITOURINARY:  [ x]Normal [ ] Incontinent   [ ]Oliguria/Anuria   [ ]Powers  MUSCULOSKELETAL:   [ ]Normal   [x ]Weakness  [ ]Bed/Wheelchair bound [ ]Edema  NEUROLOGIC:   [x ]No focal deficits  [ ]Cognitive impairment  [ ]Dysphagia [ ]Dysarthria [ ]Paresis [ ]Other   SKIN:   [x ]Normal  [ ]Rash  [ ]Other  [ ]Pressure ulcer(s)       Present on admission [ ]y [ ]n    CRITICAL CARE:  [ ] Shock Present  [ ]Septic [ ]Cardiogenic [ ]Neurologic [ ]Hypovolemic  [ ]  Vasopressors [ ]  Inotropes   [ ]Respiratory failure present [ ]Mechanical ventilation [ ]Non-invasive ventilatory support [ ]High flow    [ ]Acute  [ ]Chronic [ ]Hypoxic  [ ]Hypercarbic [ ]Other  [ ]Other organ failure     LABS:                        10.3   30.25 )-----------( 532      ( 2022 14:00 )             34.0   07-    124<L>  |  88<L>  |  49<H>  ----------------------------<  86  5.2   |  17<L>  |  1.60<H>    Ca    8.8      2022 14:00    TPro  6.6  /  Alb  2.4<L>  /  TBili  0.3  /  DBili  x   /  AST  43<H>  /  ALT  19  /  AlkPhos  218<H>  07-13  PT/INR - ( 2022 14:54 )   PT: 20.6 sec;   INR: 1.77 ratio       PTT - ( 2022 14:00 )  PTT:70.4 sec    Urinalysis Basic - ( 2022 20:10 )    Color: Yellow / Appearance: Slightly Turbid / S.031 / pH: x  Gluc: x / Ketone: Negative  / Bili: Negative / Urobili: 3 mg/dL   Blood: x / Protein: 30 mg/dL / Nitrite: Negative   Leuk Esterase: Large / RBC: 11 /HPF / WBC 64 /HPF   Sq Epi: x / Non Sq Epi: 8 /HPF / Bacteria: Few    RADIOLOGY & ADDITIONAL STUDIES:  < from: CT Abdomen and Pelvis w/ IV Cont (22 @ 23:23) >  IMPRESSION:  Right lower lobe pulmonary emboli. Right lower lobe opacity, which may   represent pulmonary infarct in this setting. Follow-up chest CTA would be   helpful for further evaluation. Dr. Gilbert discussed with Dr. Granda at   2022 11:32 PM with read back confirmation.  Known extensive peritoneal carcinomatosis as described. Mildly increased,   moderate ascites.  Question slight narrowing of the main portal vein, which is suboptimally   evaluated on this study. Thrombosis cannot be excluded. Follow-up may be   obtained for further evaluation.  Decreased, loculated left pleural effusion status post Interval left   chest tube placement.  Mediastinal/hilar lymphadenopathy.  Additional findings as described.    PROTEIN CALORIE MALNUTRITION PRESENT: [ ]mild [ ]moderate [ ]severe [ ]underweight [ ]morbid obesity  https://www.andeal.org/vault/2440/web/files/ONC/Table_Clinical%20Characteristics%20to%20Document%20Malnutrition-White%20JV%20et%20al%2020.pdf    Height (cm): 149.9 (22 @ 14:11), 145 (22 @ 09:24), 149.9 (22 @ 18:09)  Weight (kg): 61 (22 @ 22:24), 63.1 (22 @ 09:24), 63.503 (22 @ 02:03)  BMI (kg/m2): 27.1 (22 @ 22:24), 28.1 (22 @ 14:11), 30 (22 @ 09:24)    [ ]PPSV2 < or = to 30% [ ]significant weight loss  [ ]poor nutritional intake  [ ]anasarca[ ]Artificial Nutrition      Other REFERRALS:  [ ]Hospice  [ ]Child Life  [ ]Social Work  [ ]Case management [ ]Holistic Therapy     Goals of Care Document:

## 2022-07-13 NOTE — CONSULT NOTE ADULT - ASSESSMENT
This is a 66 year old female with a past medical history of w/ Stage IV carcinoma of unknown primary, malignant ascites and pleural effusion s/p pleural pleurix catheter, recent dx of PE/DVT on Eliquis admitted with concern for progression of PE, hypotension and FTT. Given a positive UA, SIRS criteria being met (HR > 90, RR > 20, elevated WBC), and low BP, ID was consulted with c/f sepsis.

## 2022-07-13 NOTE — CONSULT NOTE ADULT - SUBJECTIVE AND OBJECTIVE BOX
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 Pt had a paracentesis last week and was off AC for 10 days (misunderstood instructions), developed worsening LE edema, SOB. Dopplers today with extensive DVT. Pt with inability to walk due to pain in LE as well as FTT. Not eating, drinking; acute on chronic abd pain, passing gas. US abd + for mild/moderate ascites. SBP 99 which is lower end of baseline in comparison to prior vitals while hospitalized. CHECO (SCr 2.0 compared to 0.8 last month. Na 123, most recent K on VBG wnl. WBC 30K, UA + (13 Jul 2022 02:24)      PAST MEDICAL & SURGICAL HISTORY:  Ascites      Hypothyroidism      GERD (gastroesophageal reflux disease)      Arthritis      HLD (hyperlipidemia)      Peritoneal carcinomatosis      S/P cholecystectomy      S/P tubal ligation      S/P bunionectomy  left foot      Incarcerated hernia      History of lung surgery  VATS w/ pleurX April 2022          FAMILY HISTORY:  FH: stroke (Grandparent)    FH: hypertension (Father, Mother)        MEDICATIONS  (STANDING):  ascorbic acid 500 milliGRAM(s) Oral daily  cefTRIAXone   IVPB 1000 milliGRAM(s) IV Intermittent every 24 hours  cholecalciferol 1000 Unit(s) Oral daily  heparin  Infusion.  Unit(s)/Hr (11 mL/Hr) IV Continuous <Continuous>  levothyroxine 112 MICROGram(s) Oral daily  multivitamin 1 Tablet(s) Oral daily  pantoprazole    Tablet 40 milliGRAM(s) Oral before breakfast  simvastatin 40 milliGRAM(s) Oral at bedtime    MEDICATIONS  (PRN):  acetaminophen     Tablet .. 650 milliGRAM(s) Oral every 6 hours PRN Mild Pain (1 - 3), Moderate Pain (4 - 6)  heparin   Injectable 5000 Unit(s) IV Push every 6 hours PRN For aPTT less than 40  heparin   Injectable 2500 Unit(s) IV Push every 6 hours PRN For aPTT between 40 - 57  polyethylene glycol 3350 17 Gram(s) Oral daily PRN Constipation  senna 2 Tablet(s) Oral at bedtime PRN Constipation      Allergies    Nuts (Anaphylaxis)  penicillin (Angioedema; Urticaria)  sulfa drugs (Hives; Urticaria)    Intolerances        SOCIAL HISTORY: No EtOH, no tobacco    Review of Systems:  General: denies fevers/chills  Respiratory: denies cough, shortness of breath  Cardiovascular: denies chest pain, palpitations  Gastrointestinal: denies nausea, vomiting, abdominal pain, constipation, diarrhea, melena, hematochezia  MSK: denies joint pain or muscle pain  Neuro: denies headache, weakness, or parasthesias  Skin: denies rash, petichiae, echymoses  Psych: denies anxiety or sleep disturbances    Height (cm): 149.9 (07-12 @ 14:11)  Weight (kg): 61 (07-12 @ 22:24)  BMI (kg/m2): 27.1 (07-12 @ 22:24)  BSA (m2): 1.56 (07-12 @ 22:24)    T(F): 98.1 (07-13-22 @ 09:00), Max: 98.1 (07-13-22 @ 09:00)  HR: 100 (07-13-22 @ 09:00)  BP: 107/82 (07-13-22 @ 09:00)  RR: 22 (07-13-22 @ 09:00)  SpO2: 100% (07-13-22 @ 09:00)  Wt(kg): --    PHYSICAL EXAM:    Constitutional: NAD  Respiratory: CTA b/l, symmetric chest rise, with normal respiratory effort  Cardiovascular: RRR  Gastrointestinal: soft, NTND  Extremities:  no edema  MSK: no obvious abnormalities  Neurological: Grossly intact  Skin: no rash, no echymoses, no petichiae  Psych: normal affect                          8.7    29.71 )-----------( 494      ( 13 Jul 2022 04:45 )             27.7       07-13    131<L>  |  100  |  39<H>  ----------------------------<  53<LL>  3.7   |  15<L>  |  1.28    Ca    6.3<LL>      13 Jul 2022 07:20    TPro  4.7<L>  /  Alb  1.6<L>  /  TBili  0.3  /  DBili  x   /  AST  28  /  ALT  12  /  AlkPhos  145<H>  07-13      Uric Acid, Serum: 6.8 mg/dL (07-13 @ 07:20)       Hematology Oncology Consult Note    HPI:  66 F w/ 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 Pt had a paracentesis last week and was off AC for 10 days (misunderstood instructions), developed worsening LE edema, SOB. Dopplers today with extensive DVT. Pt with inability to walk due to pain in LE as well as FTT. Not eating, drinking; acute on chronic abd pain, passing gas. US abd + for mild/moderate ascites. SBP 99 which is lower end of baseline in comparison to prior vitals while hospitalized. CHECO (SCr 2.0 compared to 0.8 last month. Na 123, most recent K on VBG wnl. WBC 30K, UA + (13 Jul 2022 02:24)      PAST MEDICAL & SURGICAL HISTORY:  Ascites      Hypothyroidism      GERD (gastroesophageal reflux disease)      Arthritis      HLD (hyperlipidemia)      Peritoneal carcinomatosis      S/P cholecystectomy      S/P tubal ligation      S/P bunionectomy  left foot      Incarcerated hernia      History of lung surgery  VATS w/ pleurX April 2022          FAMILY HISTORY:  FH: stroke (Grandparent)    FH: hypertension (Father, Mother)        MEDICATIONS  (STANDING):  ascorbic acid 500 milliGRAM(s) Oral daily  cefTRIAXone   IVPB 1000 milliGRAM(s) IV Intermittent every 24 hours  cholecalciferol 1000 Unit(s) Oral daily  heparin  Infusion.  Unit(s)/Hr (11 mL/Hr) IV Continuous <Continuous>  levothyroxine 112 MICROGram(s) Oral daily  multivitamin 1 Tablet(s) Oral daily  pantoprazole    Tablet 40 milliGRAM(s) Oral before breakfast  simvastatin 40 milliGRAM(s) Oral at bedtime    MEDICATIONS  (PRN):  acetaminophen     Tablet .. 650 milliGRAM(s) Oral every 6 hours PRN Mild Pain (1 - 3), Moderate Pain (4 - 6)  heparin   Injectable 5000 Unit(s) IV Push every 6 hours PRN For aPTT less than 40  heparin   Injectable 2500 Unit(s) IV Push every 6 hours PRN For aPTT between 40 - 57  polyethylene glycol 3350 17 Gram(s) Oral daily PRN Constipation  senna 2 Tablet(s) Oral at bedtime PRN Constipation      Allergies    Nuts (Anaphylaxis)  penicillin (Angioedema; Urticaria)  sulfa drugs (Hives; Urticaria)    Intolerances        SOCIAL HISTORY: No EtOH, no tobacco    Review of Systems:  General: denies fevers/chills  Respiratory: denies cough, shortness of breath  Cardiovascular: denies chest pain, palpitations  Gastrointestinal: +abd distension  MSK: denies joint pain or muscle pain  Neuro: generalized weakness  Skin: denies rash, petichiae, echymoses  Psych: denies anxiety or sleep disturbances    Height (cm): 149.9 (07-12 @ 14:11)  Weight (kg): 61 (07-12 @ 22:24)  BMI (kg/m2): 27.1 (07-12 @ 22:24)  BSA (m2): 1.56 (07-12 @ 22:24)    T(F): 98.1 (07-13-22 @ 09:00), Max: 98.1 (07-13-22 @ 09:00)  HR: 100 (07-13-22 @ 09:00)  BP: 107/82 (07-13-22 @ 09:00)  RR: 22 (07-13-22 @ 09:00)  SpO2: 100% (07-13-22 @ 09:00)  Wt(kg): --    PHYSICAL EXAM:    Constitutional: NAD  Respiratory: symmetric chest rise, with normal respiratory effort  Cardiovascular: RRR  Gastrointestinal: tense  Extremities:  no edema  MSK: no obvious abnormalities  Neurological: AOx3  Skin: no rash, no echymoses, no petichiae  Psych: normal affect                          8.7    29.71 )-----------( 494      ( 13 Jul 2022 04:45 )             27.7       07-13    131<L>  |  100  |  39<H>  ----------------------------<  53<LL>  3.7   |  15<L>  |  1.28    Ca    6.3<LL>      13 Jul 2022 07:20    TPro  4.7<L>  /  Alb  1.6<L>  /  TBili  0.3  /  DBili  x   /  AST  28  /  ALT  12  /  AlkPhos  145<H>  07-13      Uric Acid, Serum: 6.8 mg/dL (07-13 @ 07:20)

## 2022-07-14 NOTE — DIETITIAN INITIAL EVALUATION ADULT - OTHER INFO
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    Patient seen at bed side with untouched lunch tray, patient reported being too fatigued to consume lunch today. Amenable to consume ensure enlive (350 kcal, 20 g pro). Noted patient w/ vomiting episodes per hospitalist note (7/14), on ondansetron per chart. No chewing/swallowing difficulties reported. 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 Unable to obtain UBW at this time. Per HIE, noted with weight 66.2 kg (4/12/22). ABW is 61 kg (7/12) per chart indicating a -7.9% weight loss x 3 months. Noted with +2 L/R leg edema and no pressure injuries per RN flow sheet.    Diet education deferred at this time, notified patient RD remains available as needed.

## 2022-07-14 NOTE — DIETITIAN INITIAL EVALUATION ADULT - REASON
unable to get consent at this time, however noted with overt signs of severe muscle loss in temporal region and severe fat loss in orbital/buccal region per observation.

## 2022-07-14 NOTE — PROGRESS NOTE ADULT - PROBLEM SELECTOR PLAN 7
creatinine at 1.5 today, renal follg, renao sono showed Normal appearance of the kidneys. No hydronephrosis.  Redemonstration of known malignant ascites.  cont to monitor renal function  Renal consulted and follg  Plan discussed with ACP  son Luis updated pt's clinical condition and plan of care
creatinine at 1.6, pt has received 1.5 lit fluid  cont to monitor renal function  Renal consulted, f/u consult recs  Plan discussed with ACP

## 2022-07-14 NOTE — DIETITIAN INITIAL EVALUATION ADULT - PERTINENT MEDS FT
MEDICATIONS  (STANDING):  ascorbic acid 500 milliGRAM(s) Oral daily  cefepime   IVPB      cefepime   IVPB 1000 milliGRAM(s) IV Intermittent every 24 hours  cholecalciferol 1000 Unit(s) Oral daily  heparin  Infusion.  Unit(s)/Hr (11 mL/Hr) IV Continuous <Continuous>  levothyroxine 112 MICROGram(s) Oral daily  multivitamin 1 Tablet(s) Oral daily  ondansetron Injectable 4 milliGRAM(s) IV Push every 8 hours  pantoprazole    Tablet 40 milliGRAM(s) Oral before breakfast  simvastatin 40 milliGRAM(s) Oral at bedtime    MEDICATIONS  (PRN):  acetaminophen     Tablet .. 650 milliGRAM(s) Oral every 6 hours PRN Mild Pain (1 - 3), Moderate Pain (4 - 6)  heparin   Injectable 5000 Unit(s) IV Push every 6 hours PRN For aPTT less than 40  heparin   Injectable 2500 Unit(s) IV Push every 6 hours PRN For aPTT between 40 - 57  HYDROmorphone  Injectable 0.2 milliGRAM(s) IV Push every 3 hours PRN Severe Pain (7 - 10)  ondansetron Injectable 4 milliGRAM(s) IV Push every 6 hours PRN Nausea and/or Vomiting  polyethylene glycol 3350 17 Gram(s) Oral daily PRN Constipation  senna 2 Tablet(s) Oral at bedtime PRN Constipation

## 2022-07-14 NOTE — PROGRESS NOTE ADULT - SUBJECTIVE AND OBJECTIVE BOX
66y old  Female who presents with a chief complaint of dvt, booker, electrolyte disorder (13 Jul 2022 18:05)      Interval history:  Afebrile,     Allergies:   Nuts (Anaphylaxis)  penicillin (Angioedema; Urticaria)  sulfa drugs (Hives; Urticaria)      Antimicrobials:  cefepime   IVPB      cefepime   IVPB 1000 milliGRAM(s) IV Intermittent every 24 hours      REVIEW OF SYSTEMS:  No chest pain   No N/V  No rash.       Vital Signs Last 24 Hrs  T(C): 36.7 (07-14-22 @ 11:42), Max: 37.1 (07-13-22 @ 21:19)  T(F): 98 (07-14-22 @ 11:42), Max: 98.7 (07-13-22 @ 21:19)  HR: 96 (07-14-22 @ 11:42) (96 - 105)  BP: 110/- (07-14-22 @ 11:42) (94/67 - 110/69)  BP(mean): --  RR: 18 (07-14-22 @ 05:53) (18 - 24)  SpO2: 99% (07-14-22 @ 05:53) (99% - 100%)      PHYSICAL EXAM:  Pt in no acute distress, alert, awake.   distended abdomen  + edema LE   no phlebitis                             10.3   30.25 )-----------( 532      ( 13 Jul 2022 14:00 )             34.0   07-14    120<LL>  |  85<L>  |  51<H>  ----------------------------<  89  4.9   |  20<L>  |  1.57<H>    Ca    8.2<L>      14 Jul 2022 00:14    TPro  6.6  /  Alb  2.4<L>  /  TBili  0.3  /  DBili  x   /  AST  43<H>  /  ALT  19  /  AlkPhos  218<H>  07-13      LIVER FUNCTIONS - ( 13 Jul 2022 14:00 )  Alb: 2.4 g/dL / Pro: 6.6 g/dL / ALK PHOS: 218 U/L / ALT: 19 U/L / AST: 43 U/L / GGT: x               Culture - Blood (collected 12 Jul 2022 19:00)  Source: .Blood Blood  Preliminary Report (14 Jul 2022 01:02):    No growth to date.    Culture - Blood (collected 12 Jul 2022 18:40)  Source: .Blood Blood  Preliminary Report (14 Jul 2022 01:02):    No growth to date.           66y old  Female who presents with a chief complaint of dvt, booker, electrolyte disorder (13 Jul 2022 18:05)      Interval history:  Afebrile, abdominal pain unchanged.       Allergies:   Nuts (Anaphylaxis)  penicillin (Angioedema; Urticaria)  sulfa drugs (Hives; Urticaria)      Antimicrobials:  cefepime   IVPB      cefepime   IVPB 1000 milliGRAM(s) IV Intermittent every 24 hours      REVIEW OF SYSTEMS:  No chest pain   No cough  No rash.       Vital Signs Last 24 Hrs  T(C): 36.7 (07-14-22 @ 11:42), Max: 37.1 (07-13-22 @ 21:19)  T(F): 98 (07-14-22 @ 11:42), Max: 98.7 (07-13-22 @ 21:19)  HR: 96 (07-14-22 @ 11:42) (96 - 105)  BP: 110/- (07-14-22 @ 11:42) (94/67 - 110/69)  BP(mean): --  RR: 18 (07-14-22 @ 05:53) (18 - 24)  SpO2: 99% (07-14-22 @ 05:53) (99% - 100%)      PHYSICAL EXAM:  Pt in no acute distress, alert, awake.   + port  lt sided pleurex.   distended abdomen  + edema LE, rt>>>lt   no phlebitis                             10.3   30.25 )-----------( 532      ( 13 Jul 2022 14:00 )             34.0   07-14    120<LL>  |  85<L>  |  51<H>  ----------------------------<  89  4.9   |  20<L>  |  1.57<H>    Ca    8.2<L>      14 Jul 2022 00:14    TPro  6.6  /  Alb  2.4<L>  /  TBili  0.3  /  DBili  x   /  AST  43<H>  /  ALT  19  /  AlkPhos  218<H>  07-13      LIVER FUNCTIONS - ( 13 Jul 2022 14:00 )  Alb: 2.4 g/dL / Pro: 6.6 g/dL / ALK PHOS: 218 U/L / ALT: 19 U/L / AST: 43 U/L / GGT: x               Culture - Blood (collected 12 Jul 2022 19:00)  Source: .Blood Blood  Preliminary Report (14 Jul 2022 01:02):    No growth to date.    Culture - Blood (collected 12 Jul 2022 18:40)  Source: .Blood Blood  Preliminary Report (14 Jul 2022 01:02):    No growth to date.

## 2022-07-14 NOTE — PROGRESS NOTE ADULT - PROBLEM SELECTOR PLAN 1
-heparin gtt, will check ct angio and ct a/p pending, c/w tele,   -TTE showed Endocardium not well visualized; grossly normal left  ventricular systolic function. Mild diastolic dysfunction (Stage  -appreciate onc recs, may need paracentesis per onc

## 2022-07-14 NOTE — PROGRESS NOTE ADULT - PROBLEM SELECTOR PLAN 6
-meets sirs criteria per ID doubt UTI  -c/w cefepime and vanco for now  -ID consult appreciated, f/u recs  -f/u ct c/ a/p  f/u today's cbc  -blood culture neg to date, f/u urine culture

## 2022-07-14 NOTE — DIETITIAN INITIAL EVALUATION ADULT - ORAL INTAKE PTA/DIET HISTORY
Patient seen for assessment. Patient fatigued during assessment, limited information obtained at this time. Reports poor appetite PTA. Per previous RD note (6/9), patient reported poor PO intake x a few months.

## 2022-07-14 NOTE — PROGRESS NOTE ADULT - PROBLEM SELECTOR PLAN 2
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

## 2022-07-14 NOTE — DIETITIAN INITIAL EVALUATION ADULT - PERTINENT LABORATORY DATA
07-14    122<L>  |  86<L>  |  52<H>  ----------------------------<  89  5.0   |  19<L>  |  1.48<H>    Ca    8.3<L>      14 Jul 2022 12:15  Phos  3.8     07-14  Mg     2.10     07-14    TPro  6.6  /  Alb  2.4<L>  /  TBili  0.3  /  DBili  x   /  AST  43<H>  /  ALT  19  /  AlkPhos  218<H>  07-13  POCT Blood Glucose.: 101 mg/dL (07-14-22 @ 12:55)

## 2022-07-14 NOTE — PROGRESS NOTE ADULT - SUBJECTIVE AND OBJECTIVE BOX
Patient is a 66y old  Female who presents with a chief complaint of dvt, booker, electrolyte disorder (2022 12:50)      SUBJECTIVE / OVERNIGHT EVENTS: Pt seen and examined at 12:50pm, no overnight events, reports that she vomited last night, per nsg had vomited once again this am, has leg pain and abdominal pain, no chest pain or any other complaints. No other new issues reported. Son Luis at bedside.      MEDICATIONS  (STANDING):  ascorbic acid 500 milliGRAM(s) Oral daily  cefepime   IVPB      cefepime   IVPB 1000 milliGRAM(s) IV Intermittent every 24 hours  cholecalciferol 1000 Unit(s) Oral daily  heparin  Infusion.  Unit(s)/Hr (11 mL/Hr) IV Continuous <Continuous>  levothyroxine 112 MICROGram(s) Oral daily  multivitamin 1 Tablet(s) Oral daily  ondansetron Injectable 4 milliGRAM(s) IV Push every 8 hours  pantoprazole    Tablet 40 milliGRAM(s) Oral before breakfast  simvastatin 40 milliGRAM(s) Oral at bedtime    MEDICATIONS  (PRN):  acetaminophen     Tablet .. 650 milliGRAM(s) Oral every 6 hours PRN Mild Pain (1 - 3), Moderate Pain (4 - 6)  heparin   Injectable 5000 Unit(s) IV Push every 6 hours PRN For aPTT less than 40  heparin   Injectable 2500 Unit(s) IV Push every 6 hours PRN For aPTT between 40 - 57  HYDROmorphone  Injectable 0.2 milliGRAM(s) IV Push every 3 hours PRN Severe Pain (7 - 10)  ondansetron Injectable 4 milliGRAM(s) IV Push every 6 hours PRN Nausea and/or Vomiting  polyethylene glycol 3350 17 Gram(s) Oral daily PRN Constipation  senna 2 Tablet(s) Oral at bedtime PRN Constipation      Vital Signs Last 24 Hrs  T(C): 36.7 (2022 11:42), Max: 37.1 (2022 21:19)  T(F): 98 (2022 11:42), Max: 98.7 (2022 21:19)  HR: 96 (2022 11:42) (96 - 105)  BP: 110/- (2022 11:42) (102/60 - 110/69)  BP(mean): --  RR: 18 (2022 05:53) (18 - 18)  SpO2: 99% (2022 05:53) (99% - 100%)    Parameters below as of 2022 05:53  Patient On (Oxygen Delivery Method): room air      CAPILLARY BLOOD GLUCOSE      POCT Blood Glucose.: 101 mg/dL (2022 12:55)  POCT Blood Glucose.: 109 mg/dL (2022 08:22)  POCT Blood Glucose.: 122 mg/dL (2022 23:37)    I&O's Summary    2022 07:01  -  2022 07:00  --------------------------------------------------------  IN: 0 mL / OUT: 1000 mL / NET: -1000 mL        PHYSICAL EXAM:  GENERAL: NAD, thinly built female  CHEST/LUNG: left lung base with decreased bs, rt clear to auscultation, +pleurex cath  HEART: Regular rate and rhythm s1s2  ABDOMEN: Soft, Nontender, + distention  EXTREMITIES: b/l LE edema R>L  : + brownish urine in the bag  PSYCH: Calm  NEUROLOGY: AA answers all questions  SKIN: No rashes or lesions      LABS:                        9.3    26.20 )-----------( 551      ( 2022 13:23 )             30.2     07-14    120<LL>  |  85<L>  |  51<H>  ----------------------------<  89  4.9   |  20<L>  |  1.57<H>    Ca    8.2<L>      2022 00:14    TPro  6.6  /  Alb  2.4<L>  /  TBili  0.3  /  DBili  x   /  AST  43<H>  /  ALT  19  /  AlkPhos  218<H>      PT/INR - ( 2022 14:54 )   PT: 20.6 sec;   INR: 1.77 ratio         PTT - ( 2022 00:14 )  PTT:59.3 sec      Urinalysis Basic - ( 2022 20:10 )    Color: Yellow / Appearance: Slightly Turbid / S.031 / pH: x  Gluc: x / Ketone: Negative  / Bili: Negative / Urobili: 3 mg/dL   Blood: x / Protein: 30 mg/dL / Nitrite: Negative   Leuk Esterase: Large / RBC: 11 /HPF / WBC 64 /HPF   Sq Epi: x / Non Sq Epi: 8 /HPF / Bacteria: Few        RADIOLOGY & ADDITIONAL TESTS:  < from: Transthoracic Echocardiogram (22 @ 08:23) >  1. Mitral annular calcification, otherwise normal mitral  valve. Minimal mitral regurgitation.  2. Normal left ventricular internal dimensions and wall  thicknesses.  3. Endocardium not well visualized; grossly normal left  ventricular systolic function.  4. Mild diastolic dysfunction (Stage I).  5. The right ventricle is not well visualized; grossly  normal right ventricular systolic function.    < end of copied text >    Imaging Personally Reviewed:  < from: US Kidney and Bladder (22 @ 10:42) >  US KIDNEYS AND BLADDER                            < end of copied text >  < from: US Kidney and Bladder (22 @ 10:42) >  Normal appearance of the kidneys. No hydronephrosis.    Redemonstration of known malignant ascites.      < end of copied text >    Consultant(s) Notes Reviewed:      Care Discussed with Consultants/Other Providers:

## 2022-07-14 NOTE — PHYSICAL THERAPY INITIAL EVALUATION ADULT - LEVEL OF INDEPENDENCE: GAIT, REHAB EVAL
Pt deferring additional functional activity due to fatigue.  Will follow up if time permits/unable to perform

## 2022-07-14 NOTE — PROGRESS NOTE ADULT - ASSESSMENT
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

## 2022-07-14 NOTE — PROGRESS NOTE ADULT - ASSESSMENT
This is a 66 year old female with a past medical history of w/ Stage IV carcinoma of unknown primary, malignant ascites and pleural effusion s/p pleural pleurix catheter, recent dx of PE/DVT on Eliquis admitted with concern for progression of PE, hypotension and FTT. Given a positive UA, SIRS criteria being met (HR > 90, RR > 20, elevated WBC), and low BP, ID was consulted with c/f sepsis.      meeting SIRS criteria (HR > 90, WBC ~ 30, RR>20); bandemia also present  positive UA though no urinary symptoms  not convinced that UTI is leading to sepsis, given pt asymptomatic-picture but we will investigate  would like to broaden coverage in the meantime    Overall leucocytosis, tachycardia, SIRS/Sepsis, abnormal U/A, bandemia, allergy to PCN, lactic acidosis.       PLAN:   -f/u BCs  NTD and UCx pending.   -f/u on CT c/a/p with contrast once CHECO resolves to evaluate for infection  -c/w vancomycin 1g q24h and cefepime 1g q12h,   -monitor vancomycin trough and creatinine to avoid nephrotoxicity and ensure efficacy   -Monitor leukocytosis, persistently elevated.        Yasmin Abarca  Please contact through MS Teams   If no response or past 5 pm/weekend call [phone].    This is a 66 year old female with a past medical history of w/ Stage IV carcinoma of unknown primary, malignant ascites and pleural effusion s/p pleural pleurix catheter, recent dx of PE/DVT on Eliquis admitted with concern for progression of PE, hypotension and FTT. Given a positive UA, SIRS criteria being met (HR > 90, RR > 20, elevated WBC), and low BP, ID was consulted with c/f sepsis.      meeting SIRS criteria (HR > 90, WBC ~ 30, RR>20); bandemia also present  positive UA though no urinary symptoms  not convinced that UTI is leading to sepsis, given pt asymptomatic-picture but we will investigate  would like to broaden coverage in the meantime    Overall leucocytosis, tachycardia, SIRS/Sepsis, abnormal U/A, bandemia, allergy to PCN, lactic acidosis.       PLAN:   -f/u BCs  NTD and UCx pending.   -f/u on CT c/a/p with contrast once CHECO resolves to evaluate for infection  -c/w vancomycin 1g q24h and cefepime 1g q12h, based o CrCl.   -monitor vancomycin trough and creatinine to avoid nephrotoxicity and ensure efficacy   -Monitor leukocytosis, persistently elevated.      Plan discussed with Medicine Attending.     Yasmin Abarca  Please contact through MS Teams   If no response or past 5 pm/weekend call [phone].

## 2022-07-14 NOTE — DIETITIAN INITIAL EVALUATION ADULT - ADD RECOMMEND
Continue diet as ordered. Continue micronutrient supplementation. Document PO intake to monitor trend.

## 2022-07-14 NOTE — PROGRESS NOTE ADULT - PROBLEM SELECTOR PLAN 5
- s/p albumin infusion  -has received 1.5 lit fluid in the ED  -cont to monitor Na closely  -await urine N <20 but pt with ascites, renal follg cont  fluid restriction, bumex 2mg x1 dose today per renal discussed with Nephrology  -  Renal consult appreciated, f/u renal recs  -rpt bmp q6h

## 2022-07-14 NOTE — PHYSICAL THERAPY INITIAL EVALUATION ADULT - ADDITIONAL COMMENTS
Pt reports living with her  in a private home ~ 3 steps to enter.  Pt reports owning a walker.  Pt denies any falls.  Pt reports staying on the first floor in the home.

## 2022-07-14 NOTE — PHYSICAL THERAPY INITIAL EVALUATION ADULT - DISCHARGE DISPOSITION, PT EVAL
unable to make recommendation at this time.   Pt deferring functional activity due to fatigue.  Will follow up if time permits.

## 2022-07-14 NOTE — PROGRESS NOTE ADULT - PROBLEM SELECTOR PLAN 3
-Pleurx care, drain three times a week  -palliative care consult appreciated, cont pain meds per pall care recs

## 2022-07-15 NOTE — PROGRESS NOTE ADULT - PROBLEM SELECTOR PLAN 1
Pt. with hypo-osmolar hyponatremia in the setting of malignancy, volume overload, nausea, and decreased PO intake. As per Ayah/Jose HIE, pt. noted to have hyponatremia in the past. SNa was low at 121 on admission (7/12), improved to 131 on 7/13, and then decreased to 124 later in the day on 7/13. Pt received Bumex 1 mg PO once yesterday (7/14). SNa is 122 today. Urine sodium is <20. Serum osm is 273. Recommend Bumex 1 mg IV once today. Continue with fluid restriction. Monitor labs. Pt. with hypo-osmolar hyponatremia in the setting of malignancy, volume overload, nausea, and decreased PO intake. As per Ayah/Jose HIE, pt. noted to have hyponatremia in the past. SNa was low at 121 on admission (7/12), improved to 131 on 7/13, and then decreased to 124 later in the day on 7/13. Pt received Bumex 1 mg PO once yesterday (7/14). SNa low at 122 today. Gregory is <20. SOsm is 273. Recommend Bumex 1 mg IV once today. Continue with fluid restriction. Monitor labs. Pt. with hypo-osmolar hyponatremia in the setting of malignancy, volume overload, nausea, and decreased PO intake. As per Ayah/Jose CEVALLOS, pt. noted to have hyponatremia in the past. SNa was low at 121 on admission (7/12), improved to 131 on 7/13, and then decreased to 124 later in the day on 7/13. Pt received Bumex 1 mg PO once on 7/14. SNa low but stable at 122 today. Gregory is <20. SOsm is 273. Pt with B/L LE edema on exam. Recommend Bumex 1 mg IV once today if BP permits. Continue with fluid restriction. Monitor labs.

## 2022-07-15 NOTE — PROGRESS NOTE ADULT - SUBJECTIVE AND OBJECTIVE BOX
Patient is a 66y old  Female who presents with a chief complaint of dvt, booker, electrolyte disorder (15 Jul 2022 11:55)      SUBJECTIVE / OVERNIGHT EVENTS: Pt seen and examined at 10:55am, no overnight events, denies any abdominal pain today, no sob, chest pain or any other complaints. No other new issues reported. Still waiting for ct.       MEDICATIONS  (STANDING):  ascorbic acid 500 milliGRAM(s) Oral daily  buMETAnide Injectable 1 milliGRAM(s) IV Push once  cefepime   IVPB 1000 milliGRAM(s) IV Intermittent every 12 hours  cholecalciferol 1000 Unit(s) Oral daily  heparin  Infusion.  Unit(s)/Hr (11 mL/Hr) IV Continuous <Continuous>  levothyroxine 112 MICROGram(s) Oral daily  multivitamin 1 Tablet(s) Oral daily  ondansetron Injectable 4 milliGRAM(s) IV Push every 8 hours  pantoprazole    Tablet 40 milliGRAM(s) Oral before breakfast  simvastatin 40 milliGRAM(s) Oral at bedtime  vancomycin  IVPB 1000 milliGRAM(s) IV Intermittent every 24 hours    MEDICATIONS  (PRN):  acetaminophen     Tablet .. 650 milliGRAM(s) Oral every 6 hours PRN Mild Pain (1 - 3), Moderate Pain (4 - 6)  heparin   Injectable 5000 Unit(s) IV Push every 6 hours PRN For aPTT less than 40  heparin   Injectable 2500 Unit(s) IV Push every 6 hours PRN For aPTT between 40 - 57  HYDROmorphone  Injectable 0.2 milliGRAM(s) IV Push every 3 hours PRN Severe Pain (7 - 10)  ondansetron Injectable 4 milliGRAM(s) IV Push every 6 hours PRN Nausea and/or Vomiting  polyethylene glycol 3350 17 Gram(s) Oral daily PRN Constipation  senna 2 Tablet(s) Oral at bedtime PRN Constipation      Vital Signs Last 24 Hrs  T(C): 36.4 (15 Jul 2022 06:00), Max: 36.4 (14 Jul 2022 22:08)  T(F): 97.6 (15 Jul 2022 06:00), Max: 97.6 (15 Jul 2022 06:00)  HR: 94 (15 Jul 2022 06:00) (94 - 97)  BP: 98/65 (15 Jul 2022 06:00) (98/65 - 101/53)  BP(mean): --  RR: 17 (15 Jul 2022 06:00) (17 - 17)  SpO2: 99% (15 Jul 2022 06:00) (99% - 100%)    Parameters below as of 15 Jul 2022 06:00  Patient On (Oxygen Delivery Method): room air      CAPILLARY BLOOD GLUCOSE      POCT Blood Glucose.: 119 mg/dL (14 Jul 2022 21:38)  POCT Blood Glucose.: 113 mg/dL (14 Jul 2022 17:08)    I&O's Summary    14 Jul 2022 07:01  -  15 Jul 2022 07:00  --------------------------------------------------------  IN: 240 mL / OUT: 450 mL / NET: -210 mL        PHYSICAL EXAM:  GENERAL: NAD, thinly built female  CHEST/LUNG: left lung base with decreased bs, rt clear to auscultation, +pleurex cath  HEART: Regular rate and rhythm s1s2  ABDOMEN: Soft, Nontender, + distention  EXTREMITIES: b/l LE edema R>L  : + haddad  PSYCH: Calm  NEUROLOGY: AA Ox3  SKIN: No rashes or lesions    LABS:                        8.6    25.91 )-----------( 514      ( 15 Jul 2022 07:17 )             27.4     07-15    122<L>  |  90<L>  |  44<H>  ----------------------------<  91  4.7   |  22  |  1.29    Ca    8.0<L>      15 Jul 2022 07:17  Phos  3.8     07-14  Mg     2.10     07-14    TPro  6.6  /  Alb  2.4<L>  /  TBili  0.3  /  DBili  x   /  AST  43<H>  /  ALT  19  /  AlkPhos  218<H>  07-13    PTT - ( 15 Jul 2022 07:17 )  PTT:54.9 sec          RADIOLOGY & ADDITIONAL TESTS:    Imaging Personally Reviewed:    Consultant(s) Notes Reviewed:      Care Discussed with Consultants/Other Providers:

## 2022-07-15 NOTE — PROGRESS NOTE ADULT - ASSESSMENT
This is a 66 year old female with a past medical history of w/ Stage IV carcinoma of unknown primary, malignant ascites and pleural effusion s/p pleural pleurix catheter, recent dx of PE/DVT on Eliquis admitted with concern for progression of PE, hypotension and FTT. Given a positive UA, SIRS criteria being met (HR > 90, RR > 20, elevated WBC), and low BP, ID was consulted with c/f sepsis.      meeting SIRS criteria (HR > 90, WBC ~ 30, RR>20); bandemia also present  positive UA though no urinary symptoms  not convinced that UTI is leading to sepsis, given pt asymptomatic-picture but we will investigate  would like to broaden coverage in the meantime    Overall leucocytosis, tachycardia, SIRS/Sepsis, abnormal U/A, bandemia, allergy to PCN, lactic acidosis.       PLAN:   -f/u BCs NTD and UCx negative too.    -f/u on CT c/a/p with contrast when feasible.   -c/w vancomycin 1g q24h and cefepime 1g q12h, based o CrCl.   -monitor vancomycin trough and creatinine to avoid nephrotoxicity and ensure efficacy   -Monitor leukocytosis, persistently elevated even after abx, if CT with no source of infection, can stop all abx as all cx NTD.         Yasmin Abarca  Please contact through MS Teams   If no response or past 5 pm/weekend call [phone].       My colleague will cover starting 7/16.

## 2022-07-15 NOTE — PROGRESS NOTE ADULT - SUBJECTIVE AND OBJECTIVE BOX
66y old  Female who presents with a chief complaint of dvt, booker, electrolyte disorder (15 Jul 2022 09:57)      Interval history:  Afebrile, abdominal pain controlled.       Allergies:   Nuts (Anaphylaxis)  penicillin (Angioedema; Urticaria)  sulfa drugs (Hives; Urticaria)      Antimicrobials:  cefepime   IVPB 1000 milliGRAM(s) IV Intermittent every 12 hours  vancomycin  IVPB 1000 milliGRAM(s) IV Intermittent every 24 hours      REVIEW OF SYSTEMS:  No chest pain   No SOB  No N/V  No rash.       Vital Signs Last 24 Hrs  T(C): 36.4 (07-15-22 @ 06:00), Max: 36.4 (07-14-22 @ 22:08)  T(F): 97.6 (07-15-22 @ 06:00), Max: 97.6 (07-15-22 @ 06:00)  HR: 94 (07-15-22 @ 06:00) (94 - 97)  BP: 98/65 (07-15-22 @ 06:00) (98/65 - 101/53)  BP(mean): --  RR: 17 (07-15-22 @ 06:00) (17 - 17)  SpO2: 99% (07-15-22 @ 06:00) (99% - 100%)      PHYSICAL EXAM:  Pt in no acute distress, alert, awake.   + port  lt sided pleurex.   distended abdomen  + edema LE, rt>>>lt   no phlebitis                               8.6    25.91 )-----------( 514      ( 15 Jul 2022 07:17 )             27.4   07-15    122<L>  |  90<L>  |  44<H>  ----------------------------<  91  4.7   |  22  |  1.29    Ca    8.0<L>      15 Jul 2022 07:17  Phos  3.8     07-14  Mg     2.10     07-14    TPro  6.6  /  Alb  2.4<L>  /  TBili  0.3  /  DBili  x   /  AST  43<H>  /  ALT  19  /  AlkPhos  218<H>  07-13      LIVER FUNCTIONS - ( 13 Jul 2022 14:00 )  Alb: 2.4 g/dL / Pro: 6.6 g/dL / ALK PHOS: 218 U/L / ALT: 19 U/L / AST: 43 U/L / GGT: x               Culture - Urine (collected 12 Jul 2022 20:37)  Source: Clean Catch Clean Catch (Midstream)  Final Report (14 Jul 2022 15:29):    Normal Urogenital ashley present    Culture - Blood (collected 12 Jul 2022 19:00)  Source: .Blood Blood  Preliminary Report (14 Jul 2022 01:02):    No growth to date.    Culture - Blood (collected 12 Jul 2022 18:40)  Source: .Blood Blood  Preliminary Report (14 Jul 2022 01:02):    No growth to date.        Radiology:  < from: US Kidney and Bladder (07.14.22 @ 10:42) >  IMPRESSION:  Normal appearance of the kidneys. No hydronephrosis.    Redemonstration of known malignant ascites.           66y old  Female who presents with a chief complaint of dvt, booker, electrolyte disorder (15 Jul 2022 09:57)      Interval history:  Afebrile, abdominal pain better. No nausea today.       Allergies:   Nuts (Anaphylaxis)  penicillin (Angioedema; Urticaria)  sulfa drugs (Hives; Urticaria)      Antimicrobials:  cefepime   IVPB 1000 milliGRAM(s) IV Intermittent every 12 hours  vancomycin  IVPB 1000 milliGRAM(s) IV Intermittent every 24 hours      REVIEW OF SYSTEMS:  No chest pain   No SOB  No rash.       Vital Signs Last 24 Hrs  T(C): 36.4 (07-15-22 @ 06:00), Max: 36.4 (07-14-22 @ 22:08)  T(F): 97.6 (07-15-22 @ 06:00), Max: 97.6 (07-15-22 @ 06:00)  HR: 94 (07-15-22 @ 06:00) (94 - 97)  BP: 98/65 (07-15-22 @ 06:00) (98/65 - 101/53)  BP(mean): --  RR: 17 (07-15-22 @ 06:00) (17 - 17)  SpO2: 99% (07-15-22 @ 06:00) (99% - 100%)      PHYSICAL EXAM:  Pt in no acute distress, alert, awake.   + port  lt sided pleurex.   distended abdomen  + edema LE, rt>>>lt   no phlebitis   + haddad                               8.6    25.91 )-----------( 514      ( 15 Jul 2022 07:17 )             27.4   07-15    122<L>  |  90<L>  |  44<H>  ----------------------------<  91  4.7   |  22  |  1.29    Ca    8.0<L>      15 Jul 2022 07:17  Phos  3.8     07-14  Mg     2.10     07-14    TPro  6.6  /  Alb  2.4<L>  /  TBili  0.3  /  DBili  x   /  AST  43<H>  /  ALT  19  /  AlkPhos  218<H>  07-13      LIVER FUNCTIONS - ( 13 Jul 2022 14:00 )  Alb: 2.4 g/dL / Pro: 6.6 g/dL / ALK PHOS: 218 U/L / ALT: 19 U/L / AST: 43 U/L / GGT: x               Culture - Urine (collected 12 Jul 2022 20:37)  Source: Clean Catch Clean Catch (Midstream)  Final Report (14 Jul 2022 15:29):    Normal Urogenital ashley present    Culture - Blood (collected 12 Jul 2022 19:00)  Source: .Blood Blood  Preliminary Report (14 Jul 2022 01:02):    No growth to date.    Culture - Blood (collected 12 Jul 2022 18:40)  Source: .Blood Blood  Preliminary Report (14 Jul 2022 01:02):    No growth to date.        Radiology:  < from: US Kidney and Bladder (07.14.22 @ 10:42) >  IMPRESSION:  Normal appearance of the kidneys. No hydronephrosis.    Redemonstration of known malignant ascites.

## 2022-07-15 NOTE — PROGRESS NOTE ADULT - ASSESSMENT
65yo F w/ metastatic CUP (suggestive of pancreatiobilliary, on gem/abraxane last tx 06/30), recent PE/DVT (06/2022 on Eliquis) who presents with worsening bilateral extensive DVT, leukocytosis + hypotension, electrolyte derangement failure to thrive.    +UA  - large LE, 64 WBC  - lactate 2.8  - f/u cultures  - ABX per primary team    Bilateral DVT  - found to have RLE DVT (popliteal, soleal, posterior tibial veins) 06/2022  - started on eliquis but held for 10d prior to paracentesis  - repeat duplex with extensive bilateral LE DVT 07/12/22  - suspect worsening of DVT in setting of being off AC  - on hep gtt    Electrolyte derangements  Failure to thrive  CHECO  - albumin 1.6  - hypoglycemia to 50s  - nutrition consult  - TSH 20.96, send free T4  - nephro consulted, f/u recs  - recommend PT consult    Metastatic cancer of unknown primary  - soft tissue masses within pelvic inseparable from region of uterus, cervix, adnexa, peritoneal/omental lesions, hepatic and splenic lesions, pleural effusions); CK7, CK19, CDX2+; PAX8, TTF1, CD10, GATA3-; 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 Albarran at [facility]

## 2022-07-15 NOTE — PROGRESS NOTE ADULT - SUBJECTIVE AND OBJECTIVE BOX
Hematology Oncology Follow-up    INTERVAL HPI/OVERNIGHT EVENTS:  No o/n events, patient resting comfortably. No complaints at this time. Patient specifically denies fever, chills, dizziness, weakness, CP, palpitations, SOB, cough, N/V/D/C, dysuria, changes in bowel movements, LE edema.    VITAL SIGNS:  T(F): 97.6 (07-15-22 @ 06:00)  HR: 94 (07-15-22 @ 06:00)  BP: 98/65 (07-15-22 @ 06:00)  RR: 17 (07-15-22 @ 06:00)  SpO2: 99% (07-15-22 @ 06:00)  Wt(kg): --    07-14-22 @ 07:01  -  07-15-22 @ 07:00  --------------------------------------------------------  IN: 240 mL / OUT: 450 mL / NET: -210 mL          Review of Systems:  General: denies fevers/chills  Respiratory: denies cough, shortness of breath  Cardiovascular: denies chest pain, palpitations  Gastrointestinal: denies nausea, vomiting, abdominal pain, constipation, diarrhea, melena, hematochezia  MSK: denies joint pain or muscle pain  Neuro: denies headache, weakness, or parasthesias  Skin: denies rash, petichiae, echymoses  Psych: denies anxiety or sleep disturbances    PHYSICAL EXAM:  Constitutional: NAD  Respiratory: CTA b/l, symmetric chest rise, with normal respiratory effort  Cardiovascular: RRR  Gastrointestinal: soft, NTND  Extremities:  no edema  MSK: no obvious abnormalities  Neurological: Grossly intact  Skin: no rash, no echymoses, no petichiae  Psych: normal affect    MEDICATIONS  (STANDING):  ascorbic acid 500 milliGRAM(s) Oral daily  cefepime   IVPB 1000 milliGRAM(s) IV Intermittent every 12 hours  cholecalciferol 1000 Unit(s) Oral daily  heparin  Infusion.  Unit(s)/Hr (11 mL/Hr) IV Continuous <Continuous>  levothyroxine 112 MICROGram(s) Oral daily  multivitamin 1 Tablet(s) Oral daily  ondansetron Injectable 4 milliGRAM(s) IV Push every 8 hours  pantoprazole    Tablet 40 milliGRAM(s) Oral before breakfast  simvastatin 40 milliGRAM(s) Oral at bedtime  vancomycin  IVPB 1000 milliGRAM(s) IV Intermittent every 24 hours    MEDICATIONS  (PRN):  acetaminophen     Tablet .. 650 milliGRAM(s) Oral every 6 hours PRN Mild Pain (1 - 3), Moderate Pain (4 - 6)  heparin   Injectable 5000 Unit(s) IV Push every 6 hours PRN For aPTT less than 40  heparin   Injectable 2500 Unit(s) IV Push every 6 hours PRN For aPTT between 40 - 57  HYDROmorphone  Injectable 0.2 milliGRAM(s) IV Push every 3 hours PRN Severe Pain (7 - 10)  ondansetron Injectable 4 milliGRAM(s) IV Push every 6 hours PRN Nausea and/or Vomiting  polyethylene glycol 3350 17 Gram(s) Oral daily PRN Constipation  senna 2 Tablet(s) Oral at bedtime PRN Constipation      Nuts (Anaphylaxis)  penicillin (Angioedema; Urticaria)  sulfa drugs (Hives; Urticaria)      LABS:                        8.6    25.91 )-----------( 514      ( 15 Jul 2022 07:17 )             27.4     07-15    122<L>  |  90<L>  |  44<H>  ----------------------------<  91  4.7   |  22  |  1.29    Ca    8.0<L>      15 Jul 2022 07:17  Phos  3.8     07-14  Mg     2.10     07-14    TPro  6.6  /  Alb  2.4<L>  /  TBili  0.3  /  DBili  x   /  AST  43<H>  /  ALT  19  /  AlkPhos  218<H>  07-13    PTT - ( 15 Jul 2022 07:17 )  PTT:54.9 sec                 RADIOLOGY & ADDITIONAL TESTS:  Studies reviewed. Hematology Oncology Follow-up    INTERVAL HPI/OVERNIGHT EVENTS:  No o/n events. Patient seen and examined at bedside. Has some buttocks pain which she attributes to the laying in bed all day.     VITAL SIGNS:  T(F): 97.6 (07-15-22 @ 06:00)  HR: 94 (07-15-22 @ 06:00)  BP: 98/65 (07-15-22 @ 06:00)  RR: 17 (07-15-22 @ 06:00)  SpO2: 99% (07-15-22 @ 06:00)  Wt(kg): --    07-14-22 @ 07:01  -  07-15-22 @ 07:00  --------------------------------------------------------  IN: 240 mL / OUT: 450 mL / NET: -210 mL          Review of Systems:  General: denies fevers/chills  Respiratory: denies cough, shortness of breath  Cardiovascular: denies chest pain, palpitations  Gastrointestinal: +abd discomfort  MSK: denies joint pain or muscle pain  Neuro: denies headache, weakness, or parasthesias  Skin: denies rash, petichiae, echymoses  Psych: denies anxiety or sleep disturbances    PHYSICAL EXAM:  Constitutional: NAD  Respiratory: symmetric chest rise, with normal respiratory effort  Cardiovascular: RRR  Gastrointestinal: soft, NTND  Extremities:  R>L LE edema  MSK: no obvious abnormalities  Neurological: Grossly intact  Skin: no rash, no echymoses, no petichiae  Psych: normal affect    MEDICATIONS  (STANDING):  ascorbic acid 500 milliGRAM(s) Oral daily  cefepime   IVPB 1000 milliGRAM(s) IV Intermittent every 12 hours  cholecalciferol 1000 Unit(s) Oral daily  heparin  Infusion.  Unit(s)/Hr (11 mL/Hr) IV Continuous <Continuous>  levothyroxine 112 MICROGram(s) Oral daily  multivitamin 1 Tablet(s) Oral daily  ondansetron Injectable 4 milliGRAM(s) IV Push every 8 hours  pantoprazole    Tablet 40 milliGRAM(s) Oral before breakfast  simvastatin 40 milliGRAM(s) Oral at bedtime  vancomycin  IVPB 1000 milliGRAM(s) IV Intermittent every 24 hours    MEDICATIONS  (PRN):  acetaminophen     Tablet .. 650 milliGRAM(s) Oral every 6 hours PRN Mild Pain (1 - 3), Moderate Pain (4 - 6)  heparin   Injectable 5000 Unit(s) IV Push every 6 hours PRN For aPTT less than 40  heparin   Injectable 2500 Unit(s) IV Push every 6 hours PRN For aPTT between 40 - 57  HYDROmorphone  Injectable 0.2 milliGRAM(s) IV Push every 3 hours PRN Severe Pain (7 - 10)  ondansetron Injectable 4 milliGRAM(s) IV Push every 6 hours PRN Nausea and/or Vomiting  polyethylene glycol 3350 17 Gram(s) Oral daily PRN Constipation  senna 2 Tablet(s) Oral at bedtime PRN Constipation      Nuts (Anaphylaxis)  penicillin (Angioedema; Urticaria)  sulfa drugs (Hives; Urticaria)      LABS:                        8.6    25.91 )-----------( 514      ( 15 Jul 2022 07:17 )             27.4     07-15    122<L>  |  90<L>  |  44<H>  ----------------------------<  91  4.7   |  22  |  1.29    Ca    8.0<L>      15 Jul 2022 07:17  Phos  3.8     07-14  Mg     2.10     07-14    TPro  6.6  /  Alb  2.4<L>  /  TBili  0.3  /  DBili  x   /  AST  43<H>  /  ALT  19  /  AlkPhos  218<H>  07-13    PTT - ( 15 Jul 2022 07:17 )  PTT:54.9 sec                 RADIOLOGY & ADDITIONAL TESTS:  Studies reviewed.

## 2022-07-15 NOTE — PROGRESS NOTE ADULT - PROBLEM SELECTOR PLAN 6
creatinine at 1.2 today, renal follg, renal sono showed Normal appearance of the kidneys. No hydronephrosis.  Redemonstration of known malignant ascites.  cont to monitor renal function  Renal consulted and follg  Plan discussed with ACP  son Luis updated pt's clinical condition and plan of care on 7/14    PT consult creatinine at 1.2 today, renal follg, renal sono showed Normal appearance of the kidneys. No hydronephrosis.  Redemonstration of known malignant ascites.  cont to monitor renal function  Renal consulted and follg  Plan discussed with ACP  son Luis updated pt's clinical condition and plan of care on 7/14    PT eval ongoing

## 2022-07-15 NOTE — PROGRESS NOTE ADULT - SUBJECTIVE AND OBJECTIVE BOX
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 Nephrology consulted for CHECO and hyponatremia.    [facility] [place] noted to have hyponatremia in the past. SNa was low at 131 on 4/30/22. SNa during this admission was initially low at 121 (7/12), improved to 131 on 7/13, and then decreased to 124 later in the day on 7/13. Pt was given Bumex 1 mg PO yesterday (7/14). SNa is 122 today.    [facility] [place], pt had a Scr of 0.79 on 6/30/22. Scr on admission was 1.95 on 7/12, improved to 1.28 on 7/13, and then increased to 1.60 later in the day on 7/13. Of note, pt was hypotensive on admission and received 1.5L of IVF. Pt was given Bumex 1 mg PO yesterday (7/14). Scr is 1.29 today. Documented urine output is 450 cc over the past 24 hours.    Pt seen and examined bedside. She reports mild abdominal pain. Denies any rash, itchiness, headaches, fevers/chills, chest pain, palpitations, and SOB.    PAST HISTORY  --------------------------------------------------------------------------------  No significant changes to PMH, PSH, FHx, SHx, unless otherwise noted    ALLERGIES & MEDICATIONS  --------------------------------------------------------------------------------  Allergies    Nuts (Anaphylaxis)  penicillin (Angioedema; Urticaria)  sulfa drugs (Hives; Urticaria)    Intolerances    Standing Inpatient Medications  ascorbic acid 500 milliGRAM(s) Oral daily  cefepime   IVPB 1000 milliGRAM(s) IV Intermittent every 12 hours  cholecalciferol 1000 Unit(s) Oral daily  heparin  Infusion.  Unit(s)/Hr IV Continuous <Continuous>  levothyroxine 112 MICROGram(s) Oral daily  multivitamin 1 Tablet(s) Oral daily  ondansetron Injectable 4 milliGRAM(s) IV Push every 8 hours  pantoprazole    Tablet 40 milliGRAM(s) Oral before breakfast  simvastatin 40 milliGRAM(s) Oral at bedtime  vancomycin  IVPB 1000 milliGRAM(s) IV Intermittent every 24 hours    PRN Inpatient Medications  acetaminophen     Tablet .. 650 milliGRAM(s) Oral every 6 hours PRN  heparin   Injectable 5000 Unit(s) IV Push every 6 hours PRN  heparin   Injectable 2500 Unit(s) IV Push every 6 hours PRN  HYDROmorphone  Injectable 0.2 milliGRAM(s) IV Push every 3 hours PRN  ondansetron Injectable 4 milliGRAM(s) IV Push every 6 hours PRN  polyethylene glycol 3350 17 Gram(s) Oral daily PRN  senna 2 Tablet(s) Oral at bedtime PRN      REVIEW OF SYSTEMS  --------------------------------------------------------------------------------  Gen: No fevers/chills  Skin: No rashes  Respiratory: No dyspnea, cough  CV: No chest pain  GI: Positive for mild abdominal pain  MSK: Positive for B/L LE edema  Heme: No easy bruising or bleeding  Psych: No significant depression    All other systems were reviewed and are negative, except as noted.    VITALS/PHYSICAL EXAM  --------------------------------------------------------------------------------  T(C): 36.4 (07-15-22 @ 06:00), Max: 36.7 (07-14-22 @ 11:42)  HR: 94 (07-15-22 @ 06:00) (94 - 97)  BP: 98/65 (07-15-22 @ 06:00) (98/65 - 110/65)  RR: 17 (07-15-22 @ 06:00) (17 - 18)  SpO2: 99% (07-15-22 @ 06:00) (99% - 100%)  Wt(kg): --      07-14-22 @ 07:01  -  07-15-22 @ 07:00  --------------------------------------------------------  IN: 240 mL / OUT: 450 mL / NET: -210 mL      Physical Exam:  Gen: NAD  HEENT: MMM  Pulm: CTA B/L. Pleurex catheter in place  CV: S1S2  Abd: Slightly distended and tense. BS+   Ext: B/L LE edema  Neuro: Awake  Skin: Warm and dry    LABS/STUDIES  --------------------------------------------------------------------------------              8.6    25.91 >-----------<  514      [07-15-22 @ 07:17]              27.4     122  |  90  |  44  ----------------------------<  91      [07-15-22 @ 07:17]  4.7   |  22  |  1.29        Ca     8.0     [07-15-22 @ 07:17]      Mg     2.10     [07-14-22 @ 12:15]      Phos  3.8     [07-14-22 @ 12:15]    TPro  6.6  /  Alb  2.4  /  TBili  0.3  /  DBili  x   /  AST  43  /  ALT  19  /  AlkPhos  218  [07-13-22 @ 14:00]    PTT: 54.9       [07-15-22 @ 07:17]    Serum Osmolality 281      [07-13-22 @ 19:55]    Creatinine Trend:  SCr 1.29 [07-15 @ 07:17]  SCr 1.36 [07-14 @ 18:50]  SCr 1.48 [07-14 @ 12:15]  SCr 1.57 [07-14 @ 00:14]  SCr 1.60 [07-13 @ 14:00]    Urinalysis - [07-12-22 @ 20:10]      Color Yellow / Appearance Slightly Turbid / SG 1.031 / pH 5.5      Gluc Negative / Ketone Negative  / Bili Negative / Urobili 3 mg/dL       Blood Trace / Protein 30 mg/dL / Leuk Est Large / Nitrite Negative      RBC 11 / WBC 64 / Hyaline 2 / Gran  / Sq Epi  / Non Sq Epi 8 / Bacteria Few    Urine Sodium <20      [07-13-22 @ 20:15]  Urine Potassium 70.1      [07-13-22 @ 20:15]  Urine Chloride <20      [07-13-22 @ 20:15]    TSH 20.96      [07-12-22 @ 23:10]  Lipid: chol 101, , HDL 38, LDL --      [07-13-22 @ 14:00] [facility] Division of Kidney Diseases & Hypertension  FOLLOW UP NOTE  [phone]--------------------------------------------------------------------------------    HPI: 65 yo F with history of 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 Nephrology consulted for CHECO and hyponatremia.    [facility] [place] noted to have hyponatremia in the past. SNa was low at 131 on 4/30/22. SNa during this admission was initially low at 121 (7/12), improved to 131 on 7/13, and then decreased to 124 later in the day on 7/13. Pt was given Bumex 1 mg PO yesterday (7/14). SNa is 122 today.    [facility] [place] pt had a Scr of 0.79 on 6/30/22. Scr on admission was 1.95 on 7/12, improved to 1.28 on 7/13, and then increased to 1.60 later in the day on 7/13. Of note, pt was hypotensive on admission and received 1.5L of IVF. Scr decreased to 1.29 today. Documented urine output is 450 cc over the past 24 hours.    Pt. seen and examined bedside. She reports mild abdominal pain. Denies any rash, itchiness, headaches, fevers/chills, chest pain, palpitations, and SOB.    PAST HISTORY  --------------------------------------------------------------------------------  No significant changes to PMH, PSH, FHx, SHx, unless otherwise noted    ALLERGIES & MEDICATIONS  --------------------------------------------------------------------------------  Allergies    Nuts (Anaphylaxis)  penicillin (Angioedema; Urticaria)  sulfa drugs (Hives; Urticaria)    Intolerances    Standing Inpatient Medications  ascorbic acid 500 milliGRAM(s) Oral daily  cefepime   IVPB 1000 milliGRAM(s) IV Intermittent every 12 hours  cholecalciferol 1000 Unit(s) Oral daily  heparin  Infusion.  Unit(s)/Hr IV Continuous <Continuous>  levothyroxine 112 MICROGram(s) Oral daily  multivitamin 1 Tablet(s) Oral daily  ondansetron Injectable 4 milliGRAM(s) IV Push every 8 hours  pantoprazole    Tablet 40 milliGRAM(s) Oral before breakfast  simvastatin 40 milliGRAM(s) Oral at bedtime  vancomycin  IVPB 1000 milliGRAM(s) IV Intermittent every 24 hours    PRN Inpatient Medications  acetaminophen     Tablet .. 650 milliGRAM(s) Oral every 6 hours PRN  heparin   Injectable 5000 Unit(s) IV Push every 6 hours PRN  heparin   Injectable 2500 Unit(s) IV Push every 6 hours PRN  HYDROmorphone  Injectable 0.2 milliGRAM(s) IV Push every 3 hours PRN  ondansetron Injectable 4 milliGRAM(s) IV Push every 6 hours PRN  polyethylene glycol 3350 17 Gram(s) Oral daily PRN  senna 2 Tablet(s) Oral at bedtime PRN    REVIEW OF SYSTEMS  --------------------------------------------------------------------------------  Gen: No fevers/chills  Skin: No rashes  Respiratory: No dyspnea, cough  CV: No chest pain  GI: Positive for mild abdominal pain  MSK: Positive for B/L LE edema  Heme: No easy bruising or bleeding  Psych: No significant depression    All other systems were reviewed and are negative, except as noted.    VITALS/PHYSICAL EXAM  --------------------------------------------------------------------------------  T(C): 36.4 (07-15-22 @ 06:00), Max: 36.7 (07-14-22 @ 11:42)  HR: 94 (07-15-22 @ 06:00) (94 - 97)  BP: 98/65 (07-15-22 @ 06:00) (98/65 - 110/65)  RR: 17 (07-15-22 @ 06:00) (17 - 18)  SpO2: 99% (07-15-22 @ 06:00) (99% - 100%)  Wt(kg): --    07-14-22 @ 07:01  -  07-15-22 @ 07:00  --------------------------------------------------------  IN: 240 mL / OUT: 450 mL / NET: -210 mL    Physical Exam:  Gen: resting, NAD  HEENT: MMM  Pulm: CTA B/L. Pleurex catheter in place  CV: S1S2  Abd: Slightly distended and tense. BS+   Ext: B/L LE edema  Neuro: Awake, alert  Skin: Warm and dry    LABS/STUDIES  --------------------------------------------------------------------------------              8.6    25.91 >-----------<  514      [07-15-22 @ 07:17]              27.4     122  |  90  |  44  ----------------------------<  91      [07-15-22 @ 07:17]  4.7   |  22  |  1.29        Ca     8.0     [07-15-22 @ 07:17]      Mg     2.10     [07-14-22 @ 12:15]      Phos  3.8     [07-14-22 @ 12:15]    TPro  6.6  /  Alb  2.4  /  TBili  0.3  /  DBili  x   /  AST  43  /  ALT  19  /  AlkPhos  218  [07-13-22 @ 14:00]    Serum Osmolality 281      [07-13-22 @ 19:55]    Creatinine Trend:  SCr 1.29 [07-15 @ 07:17]  SCr 1.36 [07-14 @ 18:50]  SCr 1.48 [07-14 @ 12:15]  SCr 1.57 [07-14 @ 00:14]  SCr 1.60 [07-13 @ 14:00]    Urinalysis - [07-12-22 @ 20:10]      Color Yellow / Appearance Slightly Turbid / SG 1.031 / pH 5.5      Gluc Negative / Ketone Negative  / Bili Negative / Urobili 3 mg/dL       Blood Trace / Protein 30 mg/dL / Leuk Est Large / Nitrite Negative      RBC 11 / WBC 64 / Hyaline 2 / Gran  / Sq Epi  / Non Sq Epi 8 / Bacteria Few    Urine Sodium <20      [07-13-22 @ 20:15]  Urine Potassium 70.1      [07-13-22 @ 20:15]  Urine Chloride <20      [07-13-22 @ 20:15]

## 2022-07-15 NOTE — CHART NOTE - NSCHARTNOTEFT_GEN_A_CORE
SBP 90/61, . Pt seen at bedside. Pt w/o complaints (e.g. no headaches, dizziness, palpitations, shortness of breath, chest pain). Pt recommended for Bumex by Nephrology. Spoke with Nephrology, recommend to hold Bumex. Will continue to assess BP; if above SBP 110s - may give Bumex per Nephrology    ACP  [phone]

## 2022-07-15 NOTE — PROGRESS NOTE ADULT - PROBLEM SELECTOR PLAN 4
- s/p albumin infusion  -has received 1.5 lit fluid in the ED  -cont to monitor Na closely, Na at 122 today  -await urine N <20 but pt with ascites, renal follg cont fluid restriction, bumex 2mg po x1 on 7/14, 1mg bumex iv x1 today per renal discussed with Nephrology  -  Renal consult appreciated, f/u renal recs  -rpt bmp q6h

## 2022-07-15 NOTE — PROGRESS NOTE ADULT - PROBLEM SELECTOR PLAN 5
-meets sirs criteria per ID doubt UTI  -c/w cefepime and vanco for now, f/u vanco trough  -ID consult appreciated, f/u recs  -f/u ct c/ a/p  -blood culture neg to date,  urine culture normal ashley

## 2022-07-15 NOTE — PROGRESS NOTE ADULT - ASSESSMENT
66 F w/ 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 s/p pleural pleurix catheter, last chemotherapy 12 days ago with Gemzar/Abraxane, recent dx of PE/DVT on Eliquis sent in from [facility] due to new b/l extensive DVT, concerning for progression of PE, hypotension and FTT.

## 2022-07-15 NOTE — PROGRESS NOTE ADULT - PROBLEM SELECTOR PLAN 1
-cont heparin gtt, will check ct angio and ct a/p pending, c/w tele,   -TTE showed Endocardium not well visualized; grossly normal left  ventricular systolic function. Mild diastolic dysfunction (Stage  -appreciate onc recs  -f/u ct

## 2022-07-16 NOTE — PROGRESS NOTE ADULT - PROBLEM SELECTOR PLAN 1
Pt. with hypo-osmolar hyponatremia in the setting of malignancy, volume overload, nausea, and decreased PO intake. As per [place], pt. noted to have hyponatremia in the past. SNa was low at 121 on admission (7/12), improved to 131 on 7/13, and then decreased to 124 later in the day on 7/13. Pt received Bumex 1 mg PO once on 7/14. SNa low but stable at 122 today. Gregory is <20. SOsm is 273. Pt with B/L LE edema on exam. Recommend Bumex 1 mg IV once today if BP permits. Can also use albumin to expand intravascular space.  Continue with fluid restriction. Monitor labs.

## 2022-07-16 NOTE — PROGRESS NOTE ADULT - SUBJECTIVE AND OBJECTIVE BOX
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 Nephrology consulted for CHECO and hyponatremia.    [facility] noted to have hyponatremia in the past. SNa was low at 131 on 4/30/22. SNa during this admission was initially low at 121 (7/12), improved to 131 on 7/13, and then decreased to 124 later in the day on 7/13. Pt was given Bumex 1 mg PO yesterday (7/14). SNa is 122 today.    [facility], pt had a Scr of 0.79 on 6/30/22. Scr on admission was 1.95 on 7/12, improved to 1.28 on 7/13, and then increased to 1.60 later in the day on 7/13. Of note, pt was hypotensive on admission and received 1.5L of IVF. Scr decreased to 1.29 today. Documented urine output is 450 cc over the past 24 hours.    Pt. seen and examined bedside. She reports mild abdominal pain. Denies any rash, itchiness, headaches, fevers/chills, chest pain, palpitations, and SOB.  --------------------------------------------------------------------------------  No significant changes to PMH, PSH, FHx, SHx, unless otherwise noted    ALLERGIES & MEDICATIONS  --------------------------------------------------------------------------------  Allergies    Nuts (Anaphylaxis)  penicillin (Angioedema; Urticaria)  sulfa drugs (Hives; Urticaria)    Intolerances      Standing Inpatient Medications  ascorbic acid 500 milliGRAM(s) Oral daily  cefepime   IVPB 1000 milliGRAM(s) IV Intermittent every 12 hours  cholecalciferol 1000 Unit(s) Oral daily  heparin  Infusion.  Unit(s)/Hr IV Continuous <Continuous>  levothyroxine 112 MICROGram(s) Oral daily  multivitamin 1 Tablet(s) Oral daily  ondansetron Injectable 4 milliGRAM(s) IV Push every 8 hours  pantoprazole    Tablet 40 milliGRAM(s) Oral before breakfast  simvastatin 40 milliGRAM(s) Oral at bedtime  vancomycin  IVPB 1000 milliGRAM(s) IV Intermittent every 24 hours    PRN Inpatient Medications  acetaminophen     Tablet .. 650 milliGRAM(s) Oral every 6 hours PRN  heparin   Injectable 5000 Unit(s) IV Push every 6 hours PRN  heparin   Injectable 2500 Unit(s) IV Push every 6 hours PRN  HYDROmorphone  Injectable 0.2 milliGRAM(s) IV Push every 3 hours PRN  ondansetron Injectable 4 milliGRAM(s) IV Push every 6 hours PRN  polyethylene glycol 3350 17 Gram(s) Oral daily PRN  senna 2 Tablet(s) Oral at bedtime PRN      REVIEW OF SYSTEMS  --------------------------------------------------------------------------------  Gen: No  fevers/chills  Skin: No rashes  Head/Eyes/Ears/Mouth: No headache; Normal hearing; Normal vision w/o blurriness  Respiratory: No dyspnea, cough, wheezing, hemoptysis  CV: No chest pain, PND, orthopnea  GI: No abdominal pain, diarrhea, constipation, nausea, vomiting  : No increased frequency, dysuria, hematuria, nocturia  MSK: No joint pain/swelling; no back pain; no edema  Neuro: No dizziness/lightheadedness, weakness, seizures, numbness, tingling      All other systems were reviewed and are negative, except as noted.    VITALS/PHYSICAL EXAM  --------------------------------------------------------------------------------  T(C): 36.8 (07-16-22 @ 09:00), Max: 36.8 (07-15-22 @ 21:53)  HR: 109 (07-16-22 @ 09:00) (91 - 113)  BP: 96/67 (07-16-22 @ 09:00) (90/61 - 99/60)  RR: 18 (07-16-22 @ 09:00) (17 - 18)  SpO2: 100% (07-16-22 @ 09:00) (96% - 100%)  Wt(kg): --        Physical Exam:  	Gen: NAD, well-appearing  	HEENT: PERRL, supple neck, clear oropharynx  	Pulm: CTA B/L  	CV: RRR, S1S2;  	Back: No spinal or CVA tenderness  	Abd: +BS, soft, nontender/nondistended  	: No suprapubic tenderness                      Extremities: no bilateral LE edema noted.                       Neuro: No focal deficits, intact gait  	Skin: Warm, without rashes  	Vascular access:    LABS/STUDIES  --------------------------------------------------------------------------------              8.7    33.84 >-----------<  535      [07-16-22 @ 02:30]              28.0     122  |  87  |  43  ----------------------------<  78      [07-16-22 @ 02:30]  4.5   |  19  |  1.13        Ca     7.7     [07-16-22 @ 02:30]        PTT: 97.7       [07-16-22 @ 10:10]      Creatinine Trend:  SCr 1.13 [07-16 @ 02:30]  SCr 1.29 [07-15 @ 07:17]  SCr 1.36 [07-14 @ 18:50]  SCr 1.48 [07-14 @ 12:15]  SCr 1.57 [07-14 @ 00:14]    Urinalysis - [07-12-22 @ 20:10]      Color Yellow / Appearance Slightly Turbid / SG 1.031 / pH 5.5      Gluc Negative / Ketone Negative  / Bili Negative / Urobili 3 mg/dL       Blood Trace / Protein 30 mg/dL / Leuk Est Large / Nitrite Negative      RBC 11 / WBC 64 / Hyaline 2 / Gran  / Sq Epi  / Non Sq Epi 8 / Bacteria Few    Urine Sodium <20      [07-13-22 @ 20:15]  Urine Potassium 70.1      [07-13-22 @ 20:15]  Urine Chloride <20      [07-13-22 @ 20:15]    TSH 20.96      [07-12-22 @ 23:10]  Lipid: chol 101, , HDL 38, LDL --      [07-13-22 @ 14:00]       [facility] Division of Kidney Diseases & Hypertension  FOLLOW UP NOTE  [phone]--------------------------------------------------------------------------------  HPI: 67 yo F with history of 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 Nephrology consulted for CHECO and hyponatremia.    [facility] [place] noted to have hyponatremia in the past. SNa was low at 131 on 4/30/22. SNa during this admission was initially low at 121 (7/12), improved to 131 on 7/13, and then decreased to 124 later in the day on 7/13. Pt was given Bumex 1 mg PO yesterday (7/14). SNa is 122 today.    [facility] pt had a Scr of 0.79 on 6/30/22. Scr on admission was 1.95 on 7/12, improved to 1.28 on 7/13, and then increased to 1.60 later in the day on 7/13. Of note, pt was hypotensive on admission and received 1.5L of IVF. Scr decreased to 1.29 today. Documented urine output is 450 cc over the past 24 hours.    Pt. seen and examined bedside. She reports mild abdominal pain. Denies any rash, itchiness, headaches, fevers/chills, chest pain, palpitations, and SOB.  --------------------------------------------------------------------------------  No significant changes to PMH, PSH, FHx, SHx, unless otherwise noted    ALLERGIES & MEDICATIONS  --------------------------------------------------------------------------------  Allergies    Nuts (Anaphylaxis)  penicillin (Angioedema; Urticaria)  sulfa drugs (Hives; Urticaria)    Intolerances      Standing Inpatient Medications  ascorbic acid 500 milliGRAM(s) Oral daily  cefepime   IVPB 1000 milliGRAM(s) IV Intermittent every 12 hours  cholecalciferol 1000 Unit(s) Oral daily  heparin  Infusion.  Unit(s)/Hr IV Continuous <Continuous>  levothyroxine 112 MICROGram(s) Oral daily  multivitamin 1 Tablet(s) Oral daily  ondansetron Injectable 4 milliGRAM(s) IV Push every 8 hours  pantoprazole    Tablet 40 milliGRAM(s) Oral before breakfast  simvastatin 40 milliGRAM(s) Oral at bedtime  vancomycin  IVPB 1000 milliGRAM(s) IV Intermittent every 24 hours    PRN Inpatient Medications  acetaminophen     Tablet .. 650 milliGRAM(s) Oral every 6 hours PRN  heparin   Injectable 5000 Unit(s) IV Push every 6 hours PRN  heparin   Injectable 2500 Unit(s) IV Push every 6 hours PRN  HYDROmorphone  Injectable 0.2 milliGRAM(s) IV Push every 3 hours PRN  ondansetron Injectable 4 milliGRAM(s) IV Push every 6 hours PRN  polyethylene glycol 3350 17 Gram(s) Oral daily PRN  senna 2 Tablet(s) Oral at bedtime PRN      REVIEW OF SYSTEMS  --------------------------------------------------------------------------------  Gen: No  fevers/chills  Skin: No rashes  Head/Eyes/Ears/Mouth: No headache  Respiratory: No dyspnea, cough, wheezing, hemoptysis  CV: No chest pain, PND, orthopnea  GI: No abdominal pain, diarrhea, constipation, nausea, vomiting  : reports pain at haddad catheter    VITALS/PHYSICAL EXAM  --------------------------------------------------------------------------------  T(C): 36.8 (07-16-22 @ 09:00), Max: 36.8 (07-15-22 @ 21:53)  HR: 109 (07-16-22 @ 09:00) (91 - 113)  BP: 96/67 (07-16-22 @ 09:00) (90/61 - 99/60)  RR: 18 (07-16-22 @ 09:00) (17 - 18)  SpO2: 100% (07-16-22 @ 09:00) (96% - 100%)  Wt(kg): --        Physical Exam:  	Gen: NAD, well-appearing  	HEENT: anicteric  	Pulm: CTA B/L  	CV: RRR, S1S2;  	Abd: +BS, soft, nontender/nondistended  	: suprapubic tenderness haddad in place              Extremities: bilateral LE edema noted.   	Skin: Warm,    LABS/STUDIES  --------------------------------------------------------------------------------              8.7    33.84 >-----------<  535      [07-16-22 @ 02:30]              28.0     122  |  87  |  43  ----------------------------<  78      [07-16-22 @ 02:30]  4.5   |  19  |  1.13        Ca     7.7     [07-16-22 @ 02:30]        PTT: 97.7       [07-16-22 @ 10:10]      Creatinine Trend:  SCr 1.13 [07-16 @ 02:30]  SCr 1.29 [07-15 @ 07:17]  SCr 1.36 [07-14 @ 18:50]  SCr 1.48 [07-14 @ 12:15]  SCr 1.57 [07-14 @ 00:14]    Urinalysis - [07-12-22 @ 20:10]      Color Yellow / Appearance Slightly Turbid / SG 1.031 / pH 5.5      Gluc Negative / Ketone Negative  / Bili Negative / Urobili 3 mg/dL       Blood Trace / Protein 30 mg/dL / Leuk Est Large / Nitrite Negative      RBC 11 / WBC 64 / Hyaline 2 / Gran  / Sq Epi  / Non Sq Epi 8 / Bacteria Few    Urine Sodium <20      [07-13-22 @ 20:15]  Urine Potassium 70.1      [07-13-22 @ 20:15]  Urine Chloride <20      [07-13-22 @ 20:15]    TSH 20.96      [07-12-22 @ 23:10]  Lipid: chol 101, , HDL 38, LDL --      [07-13-22 @ 14:00]

## 2022-07-16 NOTE — PROGRESS NOTE ADULT - PROBLEM SELECTOR PLAN 2
Pt. with CHECO in the setting of hypotension, decreased PO intake, and volume overloadas well as retention. As per [facility], pt had a Scr of 0.79 on 6/30/22. Scr on admission was 1.95 on 7/12, improved to 1.28 on 7/13, and then increased to 1.60 later in the day on 7/13. Of note, pt was hypotensive on admission and received 1.5L of IVF. UA showing proteinuria and possible UTI. Abdominal US done on 7/12 showing no evidence of hydronephrosis in the R kidney, no comment on L kidney. Bladder scan showed retention of ~1.3L and urinary catheter was placed. UOP not documented. Scr improved to 1.13 today. Renal US showing no evidence of hydronephrosis. CHECO likely hemodynamically mediated with a component of obstruction. Recommend to obtain urine protein and urine creatinine. Avoid nephrotoxins. Monitor labs and urine output. Ok with trial of void. Dose medications as per eGFR.    If you have any questions, please feel free to contact me  Randi Jackson  Nephrology Fellow  [phone] / [employer] Teams(Preferred)  (After 5pm or on weekends please page the on-call fellow).

## 2022-07-17 NOTE — PROGRESS NOTE ADULT - PROBLEM SELECTOR PLAN 1
-cont heparin gtt  poss new PE in addition to know PE  -TTE showed Endocardium not well visualized; grossly normal left  ventricular systolic function. Mild diastolic dysfunction (Stage  -appreciate onc recs  -f/u ct

## 2022-07-17 NOTE — PROGRESS NOTE ADULT - SUBJECTIVE AND OBJECTIVE BOX
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 Nephrology consulted for CHECO and hyponatremia. [facility] [place] pt noted to have hyponatremia in the past. SNa was low at 131 on 4/30/22. SNa during this admission was initially low at 121 (7/12), improved to 131 on 7/13, and then decreased to 124 later in the day on 7/13. Pt was given Bumex 1 mg PO on 7/14. SNa was 122 no 7/16 with no lab results today. [facility] pt had a Scr of 0.79 on 6/30/22. Scr on admission was 1.95 on 7/12, improved to 1.28 on 7/13, and then increased to 1.60 later in the day on 7/13. Of note, pt was hypotensive on admission and received 1.5L of IVF. Scr decreased to 1.12 yesterday, no lab results today. Urine output is not documented.    Pt. seen and examined bedside. Pt reports fatigue and discomfort with urinary catheter. Denies any rash, itchiness, headaches, fevers/chills, chest pain, palpitations, and SOB.      PAST HISTORY  --------------------------------------------------------------------------------  No significant changes to PMH, PSH, FHx, SHx, unless otherwise noted    ALLERGIES & MEDICATIONS  --------------------------------------------------------------------------------  Allergies    Nuts (Anaphylaxis)  penicillin (Angioedema; Urticaria)  sulfa drugs (Hives; Urticaria)    Intolerances    Standing Inpatient Medications  ascorbic acid 500 milliGRAM(s) Oral daily  cefepime   IVPB 1000 milliGRAM(s) IV Intermittent every 12 hours  cholecalciferol 1000 Unit(s) Oral daily  heparin  Infusion.  Unit(s)/Hr IV Continuous <Continuous>  levothyroxine 112 MICROGram(s) Oral daily  multivitamin 1 Tablet(s) Oral daily  ondansetron Injectable 4 milliGRAM(s) IV Push every 8 hours  pantoprazole    Tablet 40 milliGRAM(s) Oral before breakfast  simvastatin 40 milliGRAM(s) Oral at bedtime  vancomycin  IVPB 1000 milliGRAM(s) IV Intermittent every 24 hours    PRN Inpatient Medications  acetaminophen     Tablet .. 650 milliGRAM(s) Oral every 6 hours PRN  heparin   Injectable 5000 Unit(s) IV Push every 6 hours PRN  heparin   Injectable 2500 Unit(s) IV Push every 6 hours PRN  HYDROmorphone  Injectable 0.2 milliGRAM(s) IV Push every 3 hours PRN  ondansetron Injectable 4 milliGRAM(s) IV Push every 6 hours PRN  polyethylene glycol 3350 17 Gram(s) Oral daily PRN  senna 2 Tablet(s) Oral at bedtime PRN      REVIEW OF SYSTEMS  --------------------------------------------------------------------------------  Gen: reports fatigue  Skin: No rashes  Head/Eyes/Ears/Mouth: No headache  Respiratory: No dyspnea, cough, wheezing, hemoptysis  CV: No chest pain, PND, orthopnea  GI: No abdominal pain, diarrhea, constipation, nausea, vomiting  : reports discomfort with urinary catheter    VITALS/PHYSICAL EXAM  --------------------------------------------------------------------------------  T(C): 36.6 (07-17-22 @ 10:59), Max: 36.6 (07-16-22 @ 19:30)  HR: 100 (07-17-22 @ 10:59) (78 - 104)  BP: 97/62 (07-17-22 @ 10:59) (89/65 - 105/69)  RR: 18 (07-17-22 @ 10:59) (18 - 18)  SpO2: 99% (07-17-22 @ 10:59) (96% - 100%)  Wt(kg): --      07-16-22 @ 07:01  -  07-17-22 @ 07:00  --------------------------------------------------------  IN: 0 mL / OUT: 275 mL / NET: -275 mL    Physical Exam:  Gen: NAD, well-appearing  HEENT: anicteric  Pulm: CTA B/L  CV: RRR, S1S2;  Abd: +BS, soft, nontender/nondistended  : suprapubic tenderness haddad in place  Extremities: bilateral LE edema noted.   Skin: Warm,    LABS/STUDIES  --------------------------------------------------------------------------------              8.8    28.20 >-----------<  532      [07-17-22 @ 06:30]              27.0     122  |  89  |  42  ----------------------------<  65      [07-16-22 @ 17:00]  4.4   |  20  |  1.12        Ca     7.7     [07-16-22 @ 17:00]      Mg     2.00     [07-16-22 @ 17:00]      Phos  3.0     [07-16-22 @ 17:00]        PTT: 88.8       [07-17-22 @ 06:30]      Creatinine Trend:  SCr 1.12 [07-16 @ 17:00]  SCr 1.13 [07-16 @ 02:30]  SCr 1.29 [07-15 @ 07:17]  SCr 1.36 [07-14 @ 18:50]  SCr 1.48 [07-14 @ 12:15]    Urinalysis - [07-12-22 @ 20:10]      Color Yellow / Appearance Slightly Turbid / SG 1.031 / pH 5.5      Gluc Negative / Ketone Negative  / Bili Negative / Urobili 3 mg/dL       Blood Trace / Protein 30 mg/dL / Leuk Est Large / Nitrite Negative      RBC 11 / WBC 64 / Hyaline 2 / Gran  / Sq Epi  / Non Sq Epi 8 / Bacteria Few    Urine Sodium <20      [07-13-22 @ 20:15]  Urine Potassium 70.1      [07-13-22 @ 20:15]  Urine Chloride <20      [07-13-22 @ 20:15]    TSH 20.96      [07-12-22 @ 23:10]  Lipid: chol 101, , HDL 38, LDL --      [07-13-22 @ 14:00]       [facility] Division of Kidney Diseases & Hypertension  FOLLOW UP NOTE  [phone]--------------------------------------------------------------------------------  HPI: 67 yo F with history of 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 Nephrology consulted for CHECO and hyponatremia. [facility] [place] pt noted to have hyponatremia in the past. SNa was low at 131 on 4/30/22. SNa during this admission was initially low at 121 (7/12), improved to 131 on 7/13, and then decreased to 124 later in the day on 7/13. Pt was given Bumex 1 mg PO on 7/14. SNa was 122 no 7/16 with no lab results today. [facility] [place] pt had a Scr of 0.79 on 6/30/22. Scr on admission was 1.95 on 7/12, improved to 1.28 on 7/13, and then increased to 1.60 later in the day on 7/13. Of note, pt was hypotensive on admission and received 1.5L of IVF. Scr decreased to 1.12 yesterday, no lab results today. Urine output is not documented.    Pt. seen and examined bedside. Pt reports fatigue and discomfort with urinary catheter. Denies any rash, itchiness, headaches, fevers/chills, chest pain, palpitations, and SOB.      PAST HISTORY  --------------------------------------------------------------------------------  No significant changes to PMH, PSH, FHx, SHx, unless otherwise noted    ALLERGIES & MEDICATIONS  --------------------------------------------------------------------------------  Allergies    Nuts (Anaphylaxis)  penicillin (Angioedema; Urticaria)  sulfa drugs (Hives; Urticaria)    Intolerances    Standing Inpatient Medications  ascorbic acid 500 milliGRAM(s) Oral daily  cefepime   IVPB 1000 milliGRAM(s) IV Intermittent every 12 hours  cholecalciferol 1000 Unit(s) Oral daily  heparin  Infusion.  Unit(s)/Hr IV Continuous <Continuous>  levothyroxine 112 MICROGram(s) Oral daily  multivitamin 1 Tablet(s) Oral daily  ondansetron Injectable 4 milliGRAM(s) IV Push every 8 hours  pantoprazole    Tablet 40 milliGRAM(s) Oral before breakfast  simvastatin 40 milliGRAM(s) Oral at bedtime  vancomycin  IVPB 1000 milliGRAM(s) IV Intermittent every 24 hours    PRN Inpatient Medications  acetaminophen     Tablet .. 650 milliGRAM(s) Oral every 6 hours PRN  heparin   Injectable 5000 Unit(s) IV Push every 6 hours PRN  heparin   Injectable 2500 Unit(s) IV Push every 6 hours PRN  HYDROmorphone  Injectable 0.2 milliGRAM(s) IV Push every 3 hours PRN  ondansetron Injectable 4 milliGRAM(s) IV Push every 6 hours PRN  polyethylene glycol 3350 17 Gram(s) Oral daily PRN  senna 2 Tablet(s) Oral at bedtime PRN      REVIEW OF SYSTEMS  --------------------------------------------------------------------------------  Gen: reports fatigue  Skin: No rashes  Head/Eyes/Ears/Mouth: No headache  Respiratory: No dyspnea, cough, wheezing, hemoptysis  CV: No chest pain, PND, orthopnea  GI: No abdominal pain, diarrhea, constipation, nausea, vomiting  : reports discomfort with urinary catheter    VITALS/PHYSICAL EXAM  --------------------------------------------------------------------------------  T(C): 36.6 (07-17-22 @ 10:59), Max: 36.6 (07-16-22 @ 19:30)  HR: 100 (07-17-22 @ 10:59) (78 - 104)  BP: 97/62 (07-17-22 @ 10:59) (89/65 - 105/69)  RR: 18 (07-17-22 @ 10:59) (18 - 18)  SpO2: 99% (07-17-22 @ 10:59) (96% - 100%)  Wt(kg): --      07-16-22 @ 07:01  -  07-17-22 @ 07:00  --------------------------------------------------------  IN: 0 mL / OUT: 275 mL / NET: -275 mL    Physical Exam:  Gen: NAD  HEENT: anicteric  Pulm: CTA B/L  CV: RRR, S1S2;  Abd: distended  : suprapubic tenderness haddad in place  Extremities: bilateral LE edema noted.   Skin: Warm,    LABS/STUDIES  --------------------------------------------------------------------------------              8.8    28.20 >-----------<  532      [07-17-22 @ 06:30]              27.0     122  |  89  |  42  ----------------------------<  65      [07-16-22 @ 17:00]  4.4   |  20  |  1.12        Ca     7.7     [07-16-22 @ 17:00]      Mg     2.00     [07-16-22 @ 17:00]      Phos  3.0     [07-16-22 @ 17:00]        PTT: 88.8       [07-17-22 @ 06:30]      Creatinine Trend:  SCr 1.12 [07-16 @ 17:00]  SCr 1.13 [07-16 @ 02:30]  SCr 1.29 [07-15 @ 07:17]  SCr 1.36 [07-14 @ 18:50]  SCr 1.48 [07-14 @ 12:15]    Urinalysis - [07-12-22 @ 20:10]      Color Yellow / Appearance Slightly Turbid / SG 1.031 / pH 5.5      Gluc Negative / Ketone Negative  / Bili Negative / Urobili 3 mg/dL       Blood Trace / Protein 30 mg/dL / Leuk Est Large / Nitrite Negative      RBC 11 / WBC 64 / Hyaline 2 / Gran  / Sq Epi  / Non Sq Epi 8 / Bacteria Few    Urine Sodium <20      [07-13-22 @ 20:15]  Urine Potassium 70.1      [07-13-22 @ 20:15]  Urine Chloride <20      [07-13-22 @ 20:15]    TSH 20.96      [07-12-22 @ 23:10]  Lipid: chol 101, , HDL 38, LDL --      [07-13-22 @ 14:00]

## 2022-07-17 NOTE — PROGRESS NOTE ADULT - PROBLEM SELECTOR PLAN 2
Pt. with CHECO in the setting of hypotension, decreased PO intake, and volume overload as well as retention. As per [facility], pt had a Scr of 0.79 on 6/30/22. Scr on admission was 1.95 on 7/12, improved to 1.28 on 7/13, and then increased to 1.60 later in the day on 7/13. Of note, pt was hypotensive on admission and received 1.5L of IVF. UA showing proteinuria and possible UTI. Abdominal US done on 7/12 showing no evidence of hydronephrosis in the R kidney, no comment on L kidney. Bladder scan showed retention of ~1.3L and urinary catheter was placed. UOP not documented. Scr improved to 1.13 yesterday, no lab results from today. Renal US showing no evidence of hydronephrosis. CHECO likely hemodynamically mediated with a component of obstruction. Recommend to repeat BMP today. Obtain urine protein and urine creatinine. Avoid nephrotoxins. Monitor labs and urine output. Ok with trial of void. Dose medications as per eGFR.    If you have any questions, please feel free to contact me  Randi Jackson  Nephrology Fellow  [phone] / [employer] Teams(Preferred)  (After 5pm or on weekends please page the on-call fellow)

## 2022-07-17 NOTE — PROGRESS NOTE ADULT - ASSESSMENT
66 F w/ 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 s/p pleural pleurix catheter, last chemotherapy 12 days ago with Gemzar/Abraxane, recent dx of PE/DVT on Eliquis sent in from [facility] due to new b/l extensive DVT, concerning for progression of PE, hypotension and FTT.

## 2022-07-17 NOTE — CHART NOTE - NSCHARTNOTEFT_GEN_A_CORE
Discussed blood pressure remains about the same post albumin, ok by renal to give bumex and continue to monitor.

## 2022-07-17 NOTE — PROGRESS NOTE ADULT - PROBLEM SELECTOR PLAN 1
Pt. with hypo-osmolar hyponatremia in the setting of malignancy, volume overload, nausea, and decreased PO intake. As per Ayah/Jose CEVALLOS, pt. noted to have hyponatremia in the past. SNa was low at 121 on admission (7/12), improved to 131 on 7/13, and then decreased to 124 later in the day on 7/13. Pt received Bumex 1 mg PO once on 7/14. SNa was low but stable at 122 yesterday. Gregory: <20. SOsm:  273. Pt with B/L LE edema on exam. Hyponatremia likely due to volume overload. Recommend to repeat BMP. Bumex 1 mg IV once today if BP permits. Can also use albumin to expand intravascular space. Obtain repeat urine sodium and urine osm. Continue with fluid restriction. Monitor labs. Pt. with hypo-osmolar hyponatremia in the setting of malignancy, volume overload, nausea, and decreased PO intake. As per Ayah/Jose CEVALLOS, pt. noted to have hyponatremia in the past. SNa was low at 121 on admission (7/12), improved to 131 on 7/13, and then decreased to 124 later in the day on 7/13. Pt received Bumex 1 mg PO once on 7/14. SNa was low but stable at 122 yesterday. Gregory: <20. SOsm:  273. Pt with B/L LE edema on exam. Hyponatremia likely due to volume overload. Recommend to repeat BMP. Bumex 1 mg IV once today if BP permits. Can also use albumin to expand intravascular space.  Also recommend call pharmacy to change carrier fluid from D5W to NS where possible. Obtain repeat urine sodium and urine osm. Continue with fluid restriction. Monitor labs.

## 2022-07-17 NOTE — CHART NOTE - NSCHARTNOTEFT_GEN_A_CORE
Notified by Rn patient was coughed up after drinking fluids. Discussed with Learner. Speech and swallow eval and thick liquid diet.   Will continue to monitor.

## 2022-07-17 NOTE — CONSULT NOTE ADULT - ASSESSMENT
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    Plan: IR to defer drainage and recommend contacting procedure team to evaluate for paracentesis  - please re-consult as needed  - discussed with primary team    Martín Gallegos MD  PGY-V, Interventional Radiology    For EMERGENT inquiries/questions:  [facility]-p.[phone]  [facility]-p.[phone] ([phone])    Available on [employer] TEAMS for all non-urgent questions  Non-emergent consults: Please place a sunrise order "Consult-Interventional Radiology" with an appropriate callback number.    For questions about scheduling during appropriate work hours, call IR :  [facility]: [phone]  [facility]: [phone]    For outpatient IR booking:  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    Plan: IR to defer drainage and recommend contacting procedure team to evaluate for paracentesis  - please re-consult as needed  - discussed with primary team    Martín Gallegos MD  PGY-V, Interventional Radiology    For EMERGENT inquiries/questions:  [facility]-p.[phone]  [facility]-p.[phone] ([phone])    Available on [employer] TEAMS for all non-urgent questions  Non-emergent consults: Please place a sunrise order "Consult-Interventional Radiology" with an appropriate callback number.    For questions about scheduling during appropriate work hours, call IR :  [facility]: [phone]  [facility]: [phone]    For outpatient IR booking:  [facility]: [phone]   [facility]: [phone]

## 2022-07-17 NOTE — CONSULT NOTE ADULT - SUBJECTIVE AND OBJECTIVE BOX
Vascular & Interventional Radiology Consult Note    Evaluate for Procedure: paracentesis    HPI: 66y Female with stage IV carcinoma of uncertain primary found to have large volume ascites with peritoneal enhancement. IR consulted for possible paracentesis.    Allergies: penicillin (Angioedema; Urticaria)  sulfa drugs (Hives; Urticaria)    Medications (Abx/Cardiac/Anticoagulation/Blood Products)    cefepime   IVPB: 100 mL/Hr IV Intermittent (07-17 @ 06:17)  heparin  Infusion.: 800 Unit(s)/Hr IV Continuous (07-17 @ 07:02)  vancomycin  IVPB: 250 mL/Hr IV Intermittent (07-16 @ 17:11)    Data:    T(C): 36.6  HR: 100  BP: 97/62  RR: 18  SpO2: 99%    -WBC 28.20 / HgB 8.8 / Hct 27.0 / Plt 532  -Na 122 / Cl 89 / BUN 42 / Glucose 65  -K 4.4 / CO2 20 / Cr 1.12  -ALT -- / Alk Phos -- / T.Bili --  -INR -- / PTT 88.8      Imaging: CT demonstrates large volume ascites with peritoneal enhancement

## 2022-07-17 NOTE — PROGRESS NOTE ADULT - PROBLEM SELECTOR PLAN 3
-Pleurx care, drain three times a week, drained on 7/13  -palliative care consult appreciated, cont pain meds per pall care recs

## 2022-07-17 NOTE — PROGRESS NOTE ADULT - PROBLEM SELECTOR PLAN 5
-meets sirs criteria per ID doubt UTI  -c/w cefepime and vanco for now, f/u vanco trough  -ID consult appreciated, f/u recs  CT abd pelvis as above  -blood culture neg to date,  urine culture normal ashley

## 2022-07-17 NOTE — PROGRESS NOTE ADULT - SUBJECTIVE AND OBJECTIVE BOX
[facility] Division of Hospital Medicine  Piedad Jimenez MD  Pager [phone]    Patient is a 66y old  Female who presents with a chief complaint of dvt, booker, electrolyte disorder      SUBJECTIVE / OVERNIGHT EVENTS: pt reports no BM for a few days, feeling uncomfortable      MEDICATIONS  (STANDING):  ascorbic acid 500 milliGRAM(s) Oral daily  cefepime   IVPB 1000 milliGRAM(s) IV Intermittent every 12 hours  cholecalciferol 1000 Unit(s) Oral daily  heparin  Infusion.  Unit(s)/Hr (11 mL/Hr) IV Continuous <Continuous>  levothyroxine 112 MICROGram(s) Oral daily  multivitamin 1 Tablet(s) Oral daily  ondansetron Injectable 4 milliGRAM(s) IV Push every 8 hours  pantoprazole    Tablet 40 milliGRAM(s) Oral before breakfast  simvastatin 40 milliGRAM(s) Oral at bedtime  vancomycin  IVPB 1000 milliGRAM(s) IV Intermittent every 24 hours    MEDICATIONS  (PRN):  acetaminophen     Tablet .. 650 milliGRAM(s) Oral every 6 hours PRN Mild Pain (1 - 3), Moderate Pain (4 - 6)  heparin   Injectable 5000 Unit(s) IV Push every 6 hours PRN For aPTT less than 40  heparin   Injectable 2500 Unit(s) IV Push every 6 hours PRN For aPTT between 40 - 57  HYDROmorphone  Injectable 0.2 milliGRAM(s) IV Push every 3 hours PRN Severe Pain (7 - 10)  ondansetron Injectable 4 milliGRAM(s) IV Push every 6 hours PRN Nausea and/or Vomiting  polyethylene glycol 3350 17 Gram(s) Oral daily PRN Constipation  senna 2 Tablet(s) Oral at bedtime PRN Constipation      PHYSICAL EXAM:  Vital Signs Last 24 Hrs  T(F): 97.8 (17 Jul 2022 10:59), Max: 97.8 (16 Jul 2022 19:30)  HR: 100 (17 Jul 2022 10:59) (78 - 104)  BP: 97/62 (17 Jul 2022 10:59) (89/65 - 105/69)  RR: 18 (17 Jul 2022 10:59) (18 - 18)  SpO2: 99% (17 Jul 2022 10:59) (96% - 100%)    Parameters below as of 17 Jul 2022 10:59  Patient On (Oxygen Delivery Method): room air        CONSTITUTIONAL: NAD, appears comfortable  EYES: PERRLA; conjunctiva and sclera clear  ENMT: Moist oral mucosa; normal dentition  RESPIRATORY: Normal respiratory effort; lungs are clear to auscultation bilaterally  CARDIOVASCULAR: Regular rate and rhythm; No lower extremity edema;   ABDOMEN: Nontender to palpation, normoactive bowel sounds  MUSCULOSKELETAL: no clubbing or cyanosis of digits; no joint swelling or tenderness to palpation  PSYCH: A+O to person, place, and time; affect appropriate  NEUROLOGY: CN 2-12 are intact and symmetric; no gross sensory deficits   : indwelling urinary cath in place draining yellow urine    LABS:                        8.8    28.20 )-----------( 532      ( 17 Jul 2022 06:30 )             27.0           PTT - ( 17 Jul 2022 06:30 )  PTT:88.8 sec

## 2022-07-17 NOTE — PROGRESS NOTE ADULT - PROBLEM SELECTOR PLAN 2
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

## 2022-07-17 NOTE — PROGRESS NOTE ADULT - PROBLEM SELECTOR PLAN 6
creatinine at 1.2 today, renal follg, renal sono showed Normal appearance of the kidneys. No hydronephrosis.  Redemonstration of known malignant ascites.  cont to monitor renal function  Renal consulted and follg  Cr stable, consider adjusting abx to reflect improved cr

## 2022-07-18 NOTE — PROGRESS NOTE ADULT - SUBJECTIVE AND OBJECTIVE BOX
Patient is a 66y old  Female who presents with a chief complaint of dvt, booker, electrolyte disorder (18 Jul 2022 15:10)      SUBJECTIVE / OVERNIGHT EVENTS:  Patient requesting to be kept comfortable and refusing any further procedures. She says she wants hospice and doesn't see a point for further aggressive care. Spoke to daughter sabine who is aware of situation.     MEDICATIONS  (STANDING):  ascorbic acid 500 milliGRAM(s) Oral daily  cefepime   IVPB 1000 milliGRAM(s) IV Intermittent every 12 hours  cholecalciferol 1000 Unit(s) Oral daily  heparin  Infusion.  Unit(s)/Hr (11 mL/Hr) IV Continuous <Continuous>  levothyroxine 112 MICROGram(s) Oral daily  multivitamin 1 Tablet(s) Oral daily  ondansetron Injectable 4 milliGRAM(s) IV Push every 8 hours  pantoprazole    Tablet 40 milliGRAM(s) Oral before breakfast  simvastatin 40 milliGRAM(s) Oral at bedtime  sodium chloride 1 Gram(s) Oral three times a day  vancomycin  IVPB 1000 milliGRAM(s) IV Intermittent every 24 hours    MEDICATIONS  (PRN):  acetaminophen     Tablet .. 650 milliGRAM(s) Oral every 6 hours PRN Mild Pain (1 - 3), Moderate Pain (4 - 6)  heparin   Injectable 5000 Unit(s) IV Push every 6 hours PRN For aPTT less than 40  heparin   Injectable 2500 Unit(s) IV Push every 6 hours PRN For aPTT between 40 - 57  HYDROmorphone  Injectable 0.2 milliGRAM(s) IV Push every 3 hours PRN Severe Pain (7 - 10)  ondansetron Injectable 4 milliGRAM(s) IV Push every 6 hours PRN Nausea and/or Vomiting  polyethylene glycol 3350 17 Gram(s) Oral daily PRN Constipation  senna 2 Tablet(s) Oral at bedtime PRN Constipation      Vital Signs Last 24 Hrs  T(C): 36.3 (18 Jul 2022 11:25), Max: 36.7 (17 Jul 2022 16:00)  T(F): 97.3 (18 Jul 2022 11:25), Max: 98.1 (17 Jul 2022 16:00)  HR: 105 (18 Jul 2022 11:25) (60 - 105)  BP: 85/60 (18 Jul 2022 11:25) (80/52 - 98/65)  BP(mean): --  RR: 18 (18 Jul 2022 11:25) (17 - 18)  SpO2: 96% (18 Jul 2022 11:25) (96% - 100%)    Parameters below as of 18 Jul 2022 11:25  Patient On (Oxygen Delivery Method): room air      CAPILLARY BLOOD GLUCOSE        I&O's Summary      PHYSICAL EXAM:  GENERAL: NAD, well-developed  HEAD:  Atraumatic, Normocephalic  EYES: EOMI, PERRLA, conjunctiva and sclera clear  NECK: Supple, No JVD  CHEST/LUNG: Clear to auscultation bilaterally; No wheeze  HEART: Regular rate and rhythm; No murmurs, rubs, or gallops  ABDOMEN: Soft, Nontender, Nondistended; Bowel sounds present  EXTREMITIES:  2+ Peripheral Pulses, No clubbing, cyanosis, or edema  PSYCH: AAOx3  NEUROLOGY: non-focal  SKIN: No rashes or lesions    LABS:                        9.0    29.73 )-----------( 448      ( 18 Jul 2022 09:00 )             28.3     07-18    117<LL>  |  85<L>  |  46<H>  ----------------------------<  69<L>  4.8   |  19<L>  |  1.91<H>    Ca    7.8<L>      18 Jul 2022 12:12  Phos  3.8     07-18  Mg     2.10     07-18      PTT - ( 18 Jul 2022 09:00 )  PTT:60.4 sec          RADIOLOGY & ADDITIONAL TESTS:    Imaging Personally Reviewed:    Consultant(s) Notes Reviewed:      Care Discussed with Consultants/Other Providers:   Patient is a 66y old  Female who presents with a chief complaint of dvt, booker, electrolyte disorder (18 Jul 2022 15:10)      SUBJECTIVE / OVERNIGHT EVENTS:  Patient requesting to be kept comfortable and refusing any further procedures. She says she wants hospice and doesn't see a point for further aggressive care. Spoke to daughter sabine who is aware of situation. Patient c/o Nausea, lack of appetite. denies cp, SOB    MEDICATIONS  (STANDING):  ascorbic acid 500 milliGRAM(s) Oral daily  cefepime   IVPB 1000 milliGRAM(s) IV Intermittent every 12 hours  cholecalciferol 1000 Unit(s) Oral daily  heparin  Infusion.  Unit(s)/Hr (11 mL/Hr) IV Continuous <Continuous>  levothyroxine 112 MICROGram(s) Oral daily  multivitamin 1 Tablet(s) Oral daily  ondansetron Injectable 4 milliGRAM(s) IV Push every 8 hours  pantoprazole    Tablet 40 milliGRAM(s) Oral before breakfast  simvastatin 40 milliGRAM(s) Oral at bedtime  sodium chloride 1 Gram(s) Oral three times a day  vancomycin  IVPB 1000 milliGRAM(s) IV Intermittent every 24 hours    MEDICATIONS  (PRN):  acetaminophen     Tablet .. 650 milliGRAM(s) Oral every 6 hours PRN Mild Pain (1 - 3), Moderate Pain (4 - 6)  heparin   Injectable 5000 Unit(s) IV Push every 6 hours PRN For aPTT less than 40  heparin   Injectable 2500 Unit(s) IV Push every 6 hours PRN For aPTT between 40 - 57  HYDROmorphone  Injectable 0.2 milliGRAM(s) IV Push every 3 hours PRN Severe Pain (7 - 10)  ondansetron Injectable 4 milliGRAM(s) IV Push every 6 hours PRN Nausea and/or Vomiting  polyethylene glycol 3350 17 Gram(s) Oral daily PRN Constipation  senna 2 Tablet(s) Oral at bedtime PRN Constipation      Vital Signs Last 24 Hrs  T(C): 36.3 (18 Jul 2022 11:25), Max: 36.7 (17 Jul 2022 16:00)  T(F): 97.3 (18 Jul 2022 11:25), Max: 98.1 (17 Jul 2022 16:00)  HR: 105 (18 Jul 2022 11:25) (60 - 105)  BP: 85/60 (18 Jul 2022 11:25) (80/52 - 98/65)  BP(mean): --  RR: 18 (18 Jul 2022 11:25) (17 - 18)  SpO2: 96% (18 Jul 2022 11:25) (96% - 100%)    Parameters below as of 18 Jul 2022 11:25  Patient On (Oxygen Delivery Method): room air      CAPILLARY BLOOD GLUCOSE        I&O's Summary      PHYSICAL EXAM:  GENERAL: NAD, well-developed  HEAD:  Atraumatic, Normocephalic  EYES: EOMI, PERRLA, conjunctiva and sclera clear  NECK: Supple, No JVD  CHEST/LUNG: Clear to auscultation bilaterally; No wheeze  HEART: Regular rate and rhythm; No murmurs, rubs, or gallops  ABDOMEN: Soft, Nontender, distended; +ascites. Bowel sounds present  EXTREMITIES:  1+ B/L LE edema R > L. 2+ Peripheral Pulses, No clubbing, or cyanosis  PSYCH: AAOx3  NEUROLOGY: non-focal  SKIN: No rashes or lesions    LABS:                        9.0    29.73 )-----------( 448      ( 18 Jul 2022 09:00 )             28.3     07-18    117<LL>  |  85<L>  |  46<H>  ----------------------------<  69<L>  4.8   |  19<L>  |  1.91<H>    Ca    7.8<L>      18 Jul 2022 12:12  Phos  3.8     07-18  Mg     2.10     07-18      PTT - ( 18 Jul 2022 09:00 )  PTT:60.4 sec          RADIOLOGY & ADDITIONAL TESTS:    Imaging Personally Reviewed:    Consultant(s) Notes Reviewed:      Care Discussed with Consultants/Other Providers:

## 2022-07-18 NOTE — PROGRESS NOTE ADULT - PROBLEM SELECTOR PLAN 1
Uncontrolled  Dilaudid had been held due to hypotension  After GOC discussion, please give Dilaudid PRN regardless of BP

## 2022-07-18 NOTE — PROGRESS NOTE ADULT - PROBLEM SELECTOR PLAN 3
Stage IV  Unknown primary  Follows with Dr. Albarran at [facility]  Oncology following  Pt has expressed that she wants to focus on not having pain, inquiring about hospice services

## 2022-07-18 NOTE — CHART NOTE - NSCHARTNOTEFT_GEN_A_CORE
Notified by RN patient Na is 117. Discussed with renal will start Na tablets. Discussed with , given Na 117 and change hypotension will call ICU consult.

## 2022-07-18 NOTE — PROGRESS NOTE ADULT - PROBLEM SELECTOR PLAN 2
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  Patient made DNR/DNI  Palliative consult appreciated.  Daughter sabine at bedside and aware.  Prognosis guarded.

## 2022-07-18 NOTE — PROGRESS NOTE ADULT - PROBLEM SELECTOR PLAN 4
Now DNR/DNI, MOLST form completed and in the chart  Surrogate is daughter  Please page for uncontrolled symptoms [phone]

## 2022-07-18 NOTE — PROGRESS NOTE ADULT - ASSESSMENT
This is a 66 year old female with a past medical history of  Stage IV carcinoma of unknown primary, malignant ascites and pleural effusion s/p pleural pleurix catheter, recent dx of PE/DVT on Eliquis admitted with concern for progression of PE, hypotension and FTT. Given a positive UA, SIRS criteria being met (HR > 90, RR > 20, elevated WBC), and low BP, ID was consulted with c/f sepsis.  started empiric broad spectrum antibiotics      meeting SIRS criteria (HR > 90, WBC ~ 30, RR>20); bandemia also present    CT chest/ abd increased in PEs, large abd collection enhancing - malignant ascites vs abscess    Blood cultures, urine cultures neg      Overall leucocytosis, SIRS/Sepsis, bandemia, large abdominal collection ?malignant,  allergy to PCN, lactic acidosis.     leucocytosis may be reactive to malignancy      suggest:  - aspiration abd collection with cultures, cytology  -c/w cefepime 1g q12h  -d/c vanco

## 2022-07-18 NOTE — PROGRESS NOTE ADULT - SUBJECTIVE AND OBJECTIVE BOX
Hematology Oncology Follow-up    INTERVAL HPI/OVERNIGHT EVENTS:  No o/n events, patient resting comfortably. No complaints at this time. Patient specifically denies fever, chills, dizziness, weakness, CP, palpitations, SOB, cough, N/V/D/C, dysuria, changes in bowel movements, LE edema.    VITAL SIGNS:  T(F): 97.3 (07-18-22 @ 11:25)  HR: 105 (07-18-22 @ 11:25)  BP: 85/60 (07-18-22 @ 11:25)  RR: 18 (07-18-22 @ 11:25)  SpO2: 96% (07-18-22 @ 11:25)  Wt(kg): --        Review of Systems:  General: denies fevers/chills  Respiratory: denies cough, shortness of breath  Cardiovascular: denies chest pain, palpitations  Gastrointestinal: denies nausea, vomiting, abdominal pain, constipation, diarrhea, melena, hematochezia  MSK: denies joint pain or muscle pain  Neuro: denies headache, weakness, or parasthesias  Skin: denies rash, petichiae, echymoses  Psych: denies anxiety or sleep disturbances    PHYSICAL EXAM:  Constitutional: NAD  Respiratory: CTA b/l, symmetric chest rise, with normal respiratory effort  Cardiovascular: RRR  Gastrointestinal: soft, NTND  Extremities:  no edema  MSK: no obvious abnormalities  Neurological: Grossly intact  Skin: no rash, no echymoses, no petichiae  Psych: normal affect    MEDICATIONS  (STANDING):  ascorbic acid 500 milliGRAM(s) Oral daily  cefepime   IVPB 1000 milliGRAM(s) IV Intermittent every 12 hours  cholecalciferol 1000 Unit(s) Oral daily  heparin  Infusion.  Unit(s)/Hr (11 mL/Hr) IV Continuous <Continuous>  levothyroxine 112 MICROGram(s) Oral daily  multivitamin 1 Tablet(s) Oral daily  ondansetron Injectable 4 milliGRAM(s) IV Push every 8 hours  pantoprazole    Tablet 40 milliGRAM(s) Oral before breakfast  simvastatin 40 milliGRAM(s) Oral at bedtime  sodium chloride 1 Gram(s) Oral three times a day  vancomycin  IVPB 1000 milliGRAM(s) IV Intermittent every 24 hours    MEDICATIONS  (PRN):  acetaminophen     Tablet .. 650 milliGRAM(s) Oral every 6 hours PRN Mild Pain (1 - 3), Moderate Pain (4 - 6)  heparin   Injectable 5000 Unit(s) IV Push every 6 hours PRN For aPTT less than 40  heparin   Injectable 2500 Unit(s) IV Push every 6 hours PRN For aPTT between 40 - 57  HYDROmorphone  Injectable 0.2 milliGRAM(s) IV Push every 3 hours PRN Severe Pain (7 - 10)  ondansetron Injectable 4 milliGRAM(s) IV Push every 6 hours PRN Nausea and/or Vomiting  polyethylene glycol 3350 17 Gram(s) Oral daily PRN Constipation  senna 2 Tablet(s) Oral at bedtime PRN Constipation      Nuts (Anaphylaxis)  penicillin (Angioedema; Urticaria)  sulfa drugs (Hives; Urticaria)      LABS:                        9.0    29.73 )-----------( 448      ( 18 Jul 2022 09:00 )             28.3     07-18    117<LL>  |  85<L>  |  46<H>  ----------------------------<  69<L>  4.8   |  19<L>  |  1.91<H>    Ca    7.8<L>      18 Jul 2022 12:12  Phos  3.8     07-18  Mg     2.10     07-18      PTT - ( 18 Jul 2022 09:00 )  PTT:60.4 sec                 RADIOLOGY & ADDITIONAL TESTS:  Studies reviewed. Hematology Oncology Follow-up    INTERVAL HPI/OVERNIGHT EVENTS:  Met with daughter at bedside. Patient would like to be comfortable on hospice and defers further chemotherapy    VITAL SIGNS:  T(F): 97.3 (07-18-22 @ 11:25)  HR: 105 (07-18-22 @ 11:25)  BP: 85/60 (07-18-22 @ 11:25)  RR: 18 (07-18-22 @ 11:25)  SpO2: 96% (07-18-22 @ 11:25)  Wt(kg): --        Review of Systems:  General: denies fevers/chills  Respiratory: denies cough, shortness of breath  Cardiovascular: denies chest pain, palpitations  Gastrointestinal: +abdominal pain  MSK: denies joint pain or muscle pain  Neuro: denies headache, weakness, or parasthesias  Skin: denies rash, petichiae, echymoses  Psych: denies anxiety or sleep disturbances    PHYSICAL EXAM:  Constitutional: NAD  Respiratory: symmetric chest rise, with normal respiratory effort  Cardiovascular: RRR  Gastrointestinal: distended  Extremities:  no edema  MSK: no obvious abnormalities  Neurological: Grossly intact  Skin: no rash, no echymoses, no petichiae  Psych: normal affect    MEDICATIONS  (STANDING):  ascorbic acid 500 milliGRAM(s) Oral daily  cefepime   IVPB 1000 milliGRAM(s) IV Intermittent every 12 hours  cholecalciferol 1000 Unit(s) Oral daily  heparin  Infusion.  Unit(s)/Hr (11 mL/Hr) IV Continuous <Continuous>  levothyroxine 112 MICROGram(s) Oral daily  multivitamin 1 Tablet(s) Oral daily  ondansetron Injectable 4 milliGRAM(s) IV Push every 8 hours  pantoprazole    Tablet 40 milliGRAM(s) Oral before breakfast  simvastatin 40 milliGRAM(s) Oral at bedtime  sodium chloride 1 Gram(s) Oral three times a day  vancomycin  IVPB 1000 milliGRAM(s) IV Intermittent every 24 hours    MEDICATIONS  (PRN):  acetaminophen     Tablet .. 650 milliGRAM(s) Oral every 6 hours PRN Mild Pain (1 - 3), Moderate Pain (4 - 6)  heparin   Injectable 5000 Unit(s) IV Push every 6 hours PRN For aPTT less than 40  heparin   Injectable 2500 Unit(s) IV Push every 6 hours PRN For aPTT between 40 - 57  HYDROmorphone  Injectable 0.2 milliGRAM(s) IV Push every 3 hours PRN Severe Pain (7 - 10)  ondansetron Injectable 4 milliGRAM(s) IV Push every 6 hours PRN Nausea and/or Vomiting  polyethylene glycol 3350 17 Gram(s) Oral daily PRN Constipation  senna 2 Tablet(s) Oral at bedtime PRN Constipation      Nuts (Anaphylaxis)  penicillin (Angioedema; Urticaria)  sulfa drugs (Hives; Urticaria)      LABS:                        9.0    29.73 )-----------( 448      ( 18 Jul 2022 09:00 )             28.3     07-18    117<LL>  |  85<L>  |  46<H>  ----------------------------<  69<L>  4.8   |  19<L>  |  1.91<H>    Ca    7.8<L>      18 Jul 2022 12:12  Phos  3.8     07-18  Mg     2.10     07-18      PTT - ( 18 Jul 2022 09:00 )  PTT:60.4 sec                 RADIOLOGY & ADDITIONAL TESTS:  Studies reviewed.

## 2022-07-18 NOTE — PROGRESS NOTE ADULT - ASSESSMENT
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 Palliative Care consulted for complex symptom management in the setting of advanced malignancy.

## 2022-07-18 NOTE — PROGRESS NOTE ADULT - PROBLEM SELECTOR PLAN 6
creatinine at 1.2 today, renal follg, renal sono showed Normal appearance of the kidneys. No hydronephrosis.  Redemonstration of known malignant ascites.  cont to monitor renal function  Renal consulted and follg  Cr stable, consider adjusting abx to reflect improved cr creatinine increased at 1.9 today, renal follg, renal sono showed Normal appearance of the kidneys. No hydronephrosis.  Redemonstration of known malignant ascites.  cont to monitor renal function  Renal consulted and follg  Cr stable, consider adjusting abx to reflect improved cr

## 2022-07-18 NOTE — PROGRESS NOTE ADULT - PROBLEM SELECTOR PLAN 1
Pt. with hypo-osmolar hyponatremia in the setting of malignancy, volume overload, nausea, and decreased PO intake. As per Ayah/Jose CEVALLOS, pt. noted to have hyponatremia in the past. SNa was low at 121 on admission (7/12), improved to 131 on 7/13, and then decreased to 124 later in the day on 7/13. Pt received Bumex 1 mg IV with albumin on 7/17. SNa today is low at 117. Gregory: <20. SOsm:  273. Pt with B/L LE edema on exam. Also recommend call pharmacy to change carrier fluid from D5W to NS where possible. Recommend to repeat BMP. Obtain repeat urine sodium and urine osm. Continue with fluid restriction. Monitor labs.

## 2022-07-18 NOTE — PROGRESS NOTE ADULT - SUBJECTIVE AND OBJECTIVE BOX
SUBJECTIVE AND OBJECTIVE:  Pt has pain and feeling very weak    Indication for Geriatrics and Palliative Care Services/INTERVAL HPI: symptom management, complex decision making     OVERNIGHT EVENTS: pt hypotensive    DNR on chart: Yes  Yes      Allergies    Nuts (Anaphylaxis)  penicillin (Angioedema; Urticaria)  sulfa drugs (Hives; Urticaria)    Intolerances    MEDICATIONS  (STANDING):  ascorbic acid 500 milliGRAM(s) Oral daily  cefepime   IVPB 1000 milliGRAM(s) IV Intermittent every 12 hours  cholecalciferol 1000 Unit(s) Oral daily  heparin  Infusion.  Unit(s)/Hr (11 mL/Hr) IV Continuous <Continuous>  levothyroxine 112 MICROGram(s) Oral daily  midodrine. 20 milliGRAM(s) Oral three times a day  multivitamin 1 Tablet(s) Oral daily  ondansetron    Tablet 2 milliGRAM(s) Oral once  ondansetron Injectable 4 milliGRAM(s) IV Push every 8 hours  pantoprazole    Tablet 40 milliGRAM(s) Oral before breakfast  simvastatin 40 milliGRAM(s) Oral at bedtime  sodium chloride 1 Gram(s) Oral three times a day  vancomycin  IVPB 1000 milliGRAM(s) IV Intermittent every 24 hours    MEDICATIONS  (PRN):  acetaminophen     Tablet .. 650 milliGRAM(s) Oral every 6 hours PRN Mild Pain (1 - 3), Moderate Pain (4 - 6)  heparin   Injectable 5000 Unit(s) IV Push every 6 hours PRN For aPTT less than 40  heparin   Injectable 2500 Unit(s) IV Push every 6 hours PRN For aPTT between 40 - 57  HYDROmorphone  Injectable 0.2 milliGRAM(s) IV Push every 3 hours PRN Severe Pain (7 - 10)  ondansetron Injectable 4 milliGRAM(s) IV Push every 6 hours PRN Nausea and/or Vomiting  polyethylene glycol 3350 17 Gram(s) Oral daily PRN Constipation  senna 2 Tablet(s) Oral at bedtime PRN Constipation    ITEMS UNCHECKED ARE NOT PRESENT    PRESENT SYMPTOMS: [ ]Unable to self-report - see [ ] CPOT [ ] PAINADS [ ] RDOS  Source if other than patient:  [ ]Family   [ ]Team     Pain:  [ x]yes [ ]no  QOL impact - uncomfortable, in pain all the time  Location -   diffuse abdominal pain                 Aggravating factors -   Quality - aching  Radiation -   Timing- constant  Severity (0-10 scale): 7-8/10, improves to 3/10 after dilaudid  Minimal acceptable level (0-10 scale): 2-3/10    CPOT:    https://www.[facility].org/getattachment/akn52d43-4i3u-5m1y-3d4o-7365e6128k5o/Critical-Care-Pain-Observation-Tool-(CPOT)    PAIN AD Score:	  http://geriatrictoolkit.[facility]/cog/painad.pdf (Ctrl + left click to view)    Dyspnea:                           [ ]Mild [ ]Moderate [ ]Severe    RDOS:  0 to 2  minimal or no respiratory distress   3  mild distress  4 to 6 moderate distress  >7 severe distress  https://homecareinformation.net/handouts/hen/Respiratory_Distress_Observation_Scale.pdf (Ctrl +  left click to view)     Anxiety:                             [ ]Mild [ ]Moderate [ ]Severe  Fatigue:                             [ ]Mild [x ]Moderate [ ]Severe  Nausea:                             [ ]Mild [ ]Moderate [ ]Severe  Loss of appetite:              [ ]Mild [ ]Moderate [ ]Severe  Constipation:                    [ ]Mild [ ]Moderate [ ]Severe    PCSSQ[Palliative Care Spiritual Screening Question]   Severity (0-10):  Score of 4 or > indicate consideration of Chaplaincy referral.    Chaplaincy Referral: [ ] yes [ ] refused [ ] following    Other Symptoms:  [ x]All other review of systems negative     Palliative Performance Status Version 2: 40 %      http://[place].org/files/news/palliative_performance_scale_ppsv2.pdf    PHYSICAL EXAM:  Vital Signs Last 24 Hrs  T(C): 36.4 (18 Jul 2022 15:48), Max: 36.7 (18 Jul 2022 08:55)  T(F): 97.5 (18 Jul 2022 15:48), Max: 98 (18 Jul 2022 08:55)  HR: 95 (18 Jul 2022 15:48) (60 - 105)  BP: 80/54 (18 Jul 2022 15:48) (80/52 - 98/65)  BP(mean): --  RR: 18 (18 Jul 2022 15:48) (17 - 18)  SpO2: 99% (18 Jul 2022 15:48) (96% - 100%)    Parameters below as of 18 Jul 2022 15:48  Patient On (Oxygen Delivery Method): room air     I&O's Summary       GENERAL: [ ]Cachexia    [x ]Alert  [x ]Oriented x 3  [ ]Lethargic  [ ]Unarousable  [x ]Verbal  [ ]Non-Verbal  Behavioral:   [ ]Anxiety  [ ]Delirium [ ]Agitation [ ]Other  HEENT:  [ ]Normal   [x ]Dry mouth   [ ]ET Tube/Trach  [ ]Oral lesions  PULMONARY:   [x ]Clear [ ]Tachypnea  [ ]Audible excessive secretions   [ ]Rhonchi        [ ]Right [ ]Left [ ]Bilateral  [ ]Crackles        [ ]Right [ ]Left [ ]Bilateral  [ ]Wheezing     [ ]Right [ ]Left [ ]Bilateral  [ ]Diminished BS [ ] Right [ ]Left [ ]Bilateral  CARDIOVASCULAR:    [ ]Regular [ ]Irregular [x ]Tachy  [ ]Rico [ ]Murmur [ ]Other  GASTROINTESTINAL:  [ ]Soft  [x ]Distended   [ ]x+BS  [ ]Non tender [ ]Tender  [ ]Other [ ]PEG [ ]OGT/ NGT   Last BM: 7/16  GENITOURINARY:  [ ]Normal [ ]Incontinent   [ ]Oliguria/Anuria   [ x]Powers  MUSCULOSKELETAL:   [ ]Normal   [ ]Weakness  [x ]Bed/Wheelchair bound [ ]Edema  NEUROLOGIC:   [x ]No focal deficits  [ ] Cognitive impairment  [ ] Dysphagia [ ]Dysarthria [ ] Paresis [ ]Other   SKIN:   [ x]Normal  [ ]Rash  [ ]Other  [ ]Pressure ulcer(s) [ ]y [ ]n present on admission    CRITICAL CARE:  [ ]Shock Present  [ ]Septic [ ]Cardiogenic [ ]Neurologic [ ]Hypovolemic  [ ]Vasopressors [ ]Inotropes  [ ]Respiratory failure present [ ]Mechanical Ventilation [ ]Non-invasive ventilatory support [ ]High-Flow   [ ]Acute  [ ]Chronic [ ]Hypoxic  [ ]Hypercarbic [ ]Other  [ ]Other organ failure     LABS:                        9.0    29.73 )-----------( 448      ( 18 Jul 2022 09:00 )             28.3   07-18    117<LL>  |  85<L>  |  46<H>  ----------------------------<  69<L>  4.8   |  19<L>  |  1.91<H>    Ca    7.8<L>      18 Jul 2022 12:12  Phos  3.8     07-18  Mg     2.10     07-18    PTT - ( 18 Jul 2022 09:00 )  PTT:60.4 sec    RADIOLOGY & ADDITIONAL STUDIES: reviewed    Protein Calorie Malnutrition Present: [ ]mild [ ]moderate [ ]severe [ ]underweight [ ]morbid obesity  https://www.andeal.org/vault/2440/web/files/ONC/Table_Clinical%20Characteristics%20to%20Document%20Malnutrition-White%20JV%20et%20al%202012.pdf    Height (cm): 149.9 (07-12-22 @ 14:11), 145 (06-30-22 @ 09:24), 149.9 (06-07-22 @ 18:09)  Weight (kg): 61 (07-12-22 @ 22:24), 63.1 (06-30-22 @ 09:24), 63.503 (06-08-22 @ 02:03)  BMI (kg/m2): 27.1 (07-12-22 @ 22:24), 28.1 (07-12-22 @ 14:11), 30 (06-30-22 @ 09:24)    [ ]PPSV2 < or = 30%  [ ]significant weight loss [ ]poor nutritional intake [ ]anasarca[ ]Artificial Nutrition    Other REFERRALS:  [ ]Hospice  [ ]Child Life  [ ]Social Work  [ ]Case management [ ]Holistic Therapy     Goals of Care Document:BENEDICT Savage (07-18-22 @ 16:08)  Goals of Care Conversation:   Participants:  · Participants  Patient; Family; Staff  · Child(anish)  Daughter - HCP  · Provider  Kita Martinez PA - primary team    Advance Directives:  · Caregiver:  no    Conversation Discussion:  · Conversation Details  Discussed with pts daughter pts prognosis and that pt has been expressing that she wants to be comfortable and DNR/DNI, and to go home with hospice services. Shared with daughter that given acute medical condition with advanced malignancy, pts prognosis is guarded and that intubation/resuscitation would likely not impact pts long term prognosis. Recommendation made to continue with IV pain medications regardless of BP (as they had been held due to hypotension) and to listen to pt and honor her wishes. Daughter in agreement with this recommendation, support provided.  Pt is now DNR/DNI, MOLST completed and in the chart. Goal is for pt to be comfortable, will give pain medications for symptom management regardless of BP.  Family requesting f/u from pts primary oncologist.    Personal Advance Directives Treatment Guidelines:   MOLST:  · Completed  18-Jul-2022    Location of Discussion:   Time Spent on Advance Care Planning:  I spent 45 (in minutes) on advance care planning services with the patient.  This time is separate and distinct from any other care management services provided on this date.    Location of Discussion:  · Location of discussion  Face to face      Electronic Signatures:  Mishel Chavarria)  (Signed 18-Jul-2022 16:13)  	Authored: Goals of Care Conversation, Personal Advance Directives Treatment Guidelines, Location of Discussion      Last Updated: 18-Jul-2022 16:13 by Mishel Chavarria)

## 2022-07-18 NOTE — PROGRESS NOTE ADULT - ASSESSMENT
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

## 2022-07-18 NOTE — PROGRESS NOTE ADULT - PROBLEM SELECTOR PLAN 4
await labs today  apprec nephrology f/u with recs of bumex and albumin Na lower today  NACL tabs started  apprec nephrology f/u

## 2022-07-18 NOTE — PROGRESS NOTE ADULT - SUBJECTIVE AND OBJECTIVE BOX
[facility] Division of Kidney Diseases & Hypertension  FOLLOW UP NOTE  [phone]--------------------------------------------------------------------------------    HPI: 65 yo F with history of 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 Nephrology consulted for CHECO and hyponatremia. [facility] pt noted to have hyponatremia in the past. SNa was low at 131 on 4/30/22. SNa during this admission was initially low at 121 (7/12), improved to 131 on 7/13, and then decreased to 124 later in the day on 7/13. Pt was given Bumex 1 mg PO on 7/14 and 1 mg bumex IV on 7/17. Serum sodium today is 117. As per McBain/Ioniajanessa CEVALLOS, pt had a Scr of 0.79 on 6/30/22. Scr on admission was 1.95 on 7/12, improved to 1.28 on 7/13, and then increased to 1.60 later in the day on 7/13. Scr increased to 1.91 today.    Pt. seen and examined bedside. Pt reports fatigue and poor appetite. Denies any rash, itchiness, headaches, fevers/chills, chest pain, palpitations, and SOB.      PAST HISTORY  --------------------------------------------------------------------------------  No significant changes to PMH, PSH, FHx, SHx, unless otherwise noted    ALLERGIES & MEDICATIONS  --------------------------------------------------------------------------------  Allergies    Nuts (Anaphylaxis)  penicillin (Angioedema; Urticaria)  sulfa drugs (Hives; Urticaria)    Intolerances      Standing Inpatient Medications  ascorbic acid 500 milliGRAM(s) Oral daily  cefepime   IVPB 1000 milliGRAM(s) IV Intermittent every 12 hours  cholecalciferol 1000 Unit(s) Oral daily  heparin  Infusion.  Unit(s)/Hr IV Continuous <Continuous>  levothyroxine 112 MICROGram(s) Oral daily  multivitamin 1 Tablet(s) Oral daily  ondansetron Injectable 4 milliGRAM(s) IV Push every 8 hours  pantoprazole    Tablet 40 milliGRAM(s) Oral before breakfast  simvastatin 40 milliGRAM(s) Oral at bedtime  vancomycin  IVPB 1000 milliGRAM(s) IV Intermittent every 24 hours    PRN Inpatient Medications  acetaminophen     Tablet .. 650 milliGRAM(s) Oral every 6 hours PRN  heparin   Injectable 5000 Unit(s) IV Push every 6 hours PRN  heparin   Injectable 2500 Unit(s) IV Push every 6 hours PRN  HYDROmorphone  Injectable 0.2 milliGRAM(s) IV Push every 3 hours PRN  ondansetron Injectable 4 milliGRAM(s) IV Push every 6 hours PRN  polyethylene glycol 3350 17 Gram(s) Oral daily PRN  senna 2 Tablet(s) Oral at bedtime PRN      REVIEW OF SYSTEMS  --------------------------------------------------------------------------------  Gen: reports fatigue  Skin: No rashes  Head/Eyes/Ears/Mouth: No headache  Respiratory: No dyspnea, cough, wheezing, hemoptysis  CV: No chest pain, PND, orthopnea  GI: Positive for decreased appetite  : reports discomfort with urinary catheter      VITALS/PHYSICAL EXAM  --------------------------------------------------------------------------------  T(C): 36.3 (07-18-22 @ 11:25), Max: 36.7 (07-17-22 @ 14:20)  HR: 105 (07-18-22 @ 11:25) (60 - 105)  BP: 85/60 (07-18-22 @ 11:25) (80/52 - 98/65)  RR: 18 (07-18-22 @ 11:25) (17 - 18)  SpO2: 96% (07-18-22 @ 11:25) (96% - 100%)  Wt(kg): --    Physical Exam:  Gen: Appears lethargic  HEENT: anicteric  Pulm: CTA B/L  CV: RRR, S1S2;  Abd: distended  : suprapubic tenderness haddad in place  Extremities: bilateral LE edema noted.   Skin: Warm,    LABS/STUDIES  --------------------------------------------------------------------------------              9.0    29.73 >-----------<  448      [07-18-22 @ 09:00]              28.3     117  |  85  |  46  ----------------------------<  69      [07-18-22 @ 12:12]  4.8   |  19  |  1.91        Ca     7.8     [07-18-22 @ 12:12]      Mg     2.10     [07-18-22 @ 12:12]      Phos  3.8     [07-18-22 @ 12:12]        PTT: 60.4       [07-18-22 @ 09:00]      Creatinine Trend:  SCr 1.91 [07-18 @ 12:12]  SCr 1.28 [07-17 @ 13:30]  SCr 1.12 [07-16 @ 17:00]  SCr 1.13 [07-16 @ 02:30]  SCr 1.29 [07-15 @ 07:17]    Urinalysis - [07-12-22 @ 20:10]      Color Yellow / Appearance Slightly Turbid / SG 1.031 / pH 5.5      Gluc Negative / Ketone Negative  / Bili Negative / Urobili 3 mg/dL       Blood Trace / Protein 30 mg/dL / Leuk Est Large / Nitrite Negative      RBC 11 / WBC 64 / Hyaline 2 / Gran  / Sq Epi  / Non Sq Epi 8 / Bacteria Few    Urine Sodium <20      [07-13-22 @ 20:15]  Urine Potassium 70.1      [07-13-22 @ 20:15]  Urine Chloride <20      [07-13-22 @ 20:15]    TSH 20.96      [07-12-22 @ 23:10]  Lipid: chol 101, , HDL 38, LDL --      [07-13-22 @ 14:00]       [facility] Division of Kidney Diseases & Hypertension  FOLLOW UP NOTE  [phone]--------------------------------------------------------------------------------    HPI: 67 yo F with history of 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 Nephrology consulted for CHECO and hyponatremia. [facility] pt noted to have hyponatremia in the past. SNa was low at 131 on 4/30/22. SNa during this admission was initially low at 121 (7/12), improved to 131 on 7/13, and then decreased to 124 later in the day on 7/13. Pt was given Bumex 1 mg PO on 7/14 and 1 mg bumex IV on 7/17. Serum sodium today is 117. As per [facility], pt had a Scr of 0.79 on 6/30/22. Scr on admission was 1.95 on 7/12, improved to 1.28 on 7/13, and then increased to 1.60 later in the day on 7/13. Scr increased to 1.91 today. UOP not recorded.    Pt. seen and examined bedside. Pt reports fatigue and poor appetite. Denies any rash, itchiness, headaches, fevers/chills, chest pain, palpitations, and SOB.      PAST HISTORY  --------------------------------------------------------------------------------  No significant changes to PMH, PSH, FHx, SHx, unless otherwise noted    ALLERGIES & MEDICATIONS  --------------------------------------------------------------------------------  Allergies    Nuts (Anaphylaxis)  penicillin (Angioedema; Urticaria)  sulfa drugs (Hives; Urticaria)    Intolerances      Standing Inpatient Medications  ascorbic acid 500 milliGRAM(s) Oral daily  cefepime   IVPB 1000 milliGRAM(s) IV Intermittent every 12 hours  cholecalciferol 1000 Unit(s) Oral daily  heparin  Infusion.  Unit(s)/Hr IV Continuous <Continuous>  levothyroxine 112 MICROGram(s) Oral daily  multivitamin 1 Tablet(s) Oral daily  ondansetron Injectable 4 milliGRAM(s) IV Push every 8 hours  pantoprazole    Tablet 40 milliGRAM(s) Oral before breakfast  simvastatin 40 milliGRAM(s) Oral at bedtime  vancomycin  IVPB 1000 milliGRAM(s) IV Intermittent every 24 hours    PRN Inpatient Medications  acetaminophen     Tablet .. 650 milliGRAM(s) Oral every 6 hours PRN  heparin   Injectable 5000 Unit(s) IV Push every 6 hours PRN  heparin   Injectable 2500 Unit(s) IV Push every 6 hours PRN  HYDROmorphone  Injectable 0.2 milliGRAM(s) IV Push every 3 hours PRN  ondansetron Injectable 4 milliGRAM(s) IV Push every 6 hours PRN  polyethylene glycol 3350 17 Gram(s) Oral daily PRN  senna 2 Tablet(s) Oral at bedtime PRN      REVIEW OF SYSTEMS  --------------------------------------------------------------------------------  Gen: reports fatigue  Skin: No rashes  Head/Eyes/Ears/Mouth: No headache  Respiratory: No dyspnea, cough, wheezing, hemoptysis  CV: No chest pain, PND, orthopnea  GI: Positive for decreased appetite  : reports discomfort with urinary catheter      VITALS/PHYSICAL EXAM  --------------------------------------------------------------------------------  T(C): 36.3 (07-18-22 @ 11:25), Max: 36.7 (07-17-22 @ 14:20)  HR: 105 (07-18-22 @ 11:25) (60 - 105)  BP: 85/60 (07-18-22 @ 11:25) (80/52 - 98/65)  RR: 18 (07-18-22 @ 11:25) (17 - 18)  SpO2: 96% (07-18-22 @ 11:25) (96% - 100%)  Wt(kg): --    Physical Exam:  Gen: Appears lethargic  HEENT: anicteric  Pulm: CTA B/L  CV: RRR, S1S2;  Abd: distended  : suprapubic tenderness haddad in place  Extremities: bilateral LE edema noted.   Skin: Warm,    LABS/STUDIES  --------------------------------------------------------------------------------              9.0    29.73 >-----------<  448      [07-18-22 @ 09:00]              28.3     117  |  85  |  46  ----------------------------<  69      [07-18-22 @ 12:12]  4.8   |  19  |  1.91        Ca     7.8     [07-18-22 @ 12:12]      Mg     2.10     [07-18-22 @ 12:12]      Phos  3.8     [07-18-22 @ 12:12]        PTT: 60.4       [07-18-22 @ 09:00]      Creatinine Trend:  SCr 1.91 [07-18 @ 12:12]  SCr 1.28 [07-17 @ 13:30]  SCr 1.12 [07-16 @ 17:00]  SCr 1.13 [07-16 @ 02:30]  SCr 1.29 [07-15 @ 07:17]    Urinalysis - [07-12-22 @ 20:10]      Color Yellow / Appearance Slightly Turbid / SG 1.031 / pH 5.5      Gluc Negative / Ketone Negative  / Bili Negative / Urobili 3 mg/dL       Blood Trace / Protein 30 mg/dL / Leuk Est Large / Nitrite Negative      RBC 11 / WBC 64 / Hyaline 2 / Gran  / Sq Epi  / Non Sq Epi 8 / Bacteria Few    Urine Sodium <20      [07-13-22 @ 20:15]  Urine Potassium 70.1      [07-13-22 @ 20:15]  Urine Chloride <20      [07-13-22 @ 20:15]    TSH 20.96      [07-12-22 @ 23:10]  Lipid: chol 101, , HDL 38, LDL --      [07-13-22 @ 14:00]

## 2022-07-18 NOTE — PROGRESS NOTE ADULT - PROBLEM SELECTOR PLAN 2
Pt with CHECO in the setting of hypotension and ascites. Scr was 1.28 on 7/17, increased to 1.91 today Pt with CHECO in the setting of hypotension and increased inta-abdominal pressures due to ascites. Scr was 1.28 on 7/17, increased to 1.91 today. UOP not documented. Recommend to obtain urine sodium and urine urea for FeUrea calculation. Check renal US and bladder scan. Monitor labs and urine output. Avoid nephrotoxins. Dose medications as per eGFR.     If you have any questions, please feel free to contact me  Randi Jackson  Nephrology Fellow  [phone] / [employer] Teams(Preferred)  (After 5pm or on weekends please page the on-call fellow)

## 2022-07-18 NOTE — CHART NOTE - NSCHARTNOTEFT_GEN_A_CORE
Discussed with ICU, pallitive, and .  Will start patient on 20 TID of midodrine.  zofran for nausea.  Okay to give diludid for pain despite of blood pressure.  Will continue to monitor.

## 2022-07-18 NOTE — CONSULT NOTE ADULT - SUBJECTIVE AND OBJECTIVE BOX
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 Pt had a paracentesis last week and was off AC for 10 days (misunderstood instructions), developed worsening LE edema, SOB. Dopplers today with extensive DVT. Pt with inability to walk due to pain in LE as well as FTT. Not eating, drinking; acute on chronic abd pain, passing gas. US abd + for mild/moderate ascites. SBP 99 which is lower end of baseline in comparison to prior vitals while hospitalized. CHECO (SCr 2.0 compared to 0.8 last month. Na 123, most recent K on VBG wnl. WBC 30K, UA +    While admitted, CT C/A/P obtained and found new emboli in RML segmental branches and possible new embolism in LLL segmental branches. Pleural effusion slightly decreased in size. Large intra-abd fluid collection with peripheral enhancement and foci of gas measuring [id number] possibly loculated malignant ascites.    Vanc/cefepime started empirically as per ID given bandemia. BCx without any growth of microbials. Pt intermittently with hypotension to SBP 90s and high 80s. She was given albumin 50% in 250cc x 2 (last dose 7/17) and bumex 1mg 7/17 for volume overload. Na downtrended to 117 on 7/18.     MICU consulted for both hyponatremia and hypotension.       PAST MEDICAL & SURGICAL HISTORY:  Ascites  Hypothyroidism  GERD (gastroesophageal reflux disease)  Arthritis  HLD (hyperlipidemia)  Peritoneal carcinomatosis  S/P cholecystectomy  S/P tubal ligation  S/P bunionectomy left foot  Incarcerated hernia    History of lung surgery VATS w/ pleurX April 2022    FAMILY HISTORY:  FH: stroke (Grandparent)  FH: hypertension (Father, Mother)    SOCIAL HISTORY:  Lives with son, daughter    Allergies  Nuts (Anaphylaxis)  penicillin (Angioedema; Urticaria)  sulfa drugs (Hives; Urticaria)  Intolerances    HOME MEDICATIONS:    REVIEW OF SYSTEMS:    CONSTITUTIONAL: (+)weakness, no fevers or chills (+) dizzy  EYES: no blurry vision or eye pain.   ENT: No throat pain. No dysphagia.    NECK: No pain or stiffness  RESPIRATORY: No cough, wheezing, hemoptysis; No shortness of breath  CARDIOVASCULAR: No chest pain or palpitations.  GASTROINTESTINAL: (+) abdominal pain. (+) nausea and vomiting; No diarrhea or constipation. No melena or hematochezia.  GENITOURINARY: No dysuria, frequency or hematuria  NEUROLOGICAL: No numbness or weakness. No dizziness or falls.   SKIN: No itching, burning, rashes, or lesions.   LYMPHATIC: No masses or swelling.   All other review of systems is negative unless indicated above.    OBJECTIVE:  ICU Vital Signs Last 24 Hrs  T(C): 36.3 (18 Jul 2022 11:25), Max: 36.7 (17 Jul 2022 16:00)  T(F): 97.3 (18 Jul 2022 11:25), Max: 98.1 (17 Jul 2022 16:00)  HR: 105 (18 Jul 2022 11:25) (60 - 105)  BP: 85/60 (18 Jul 2022 11:25) (80/52 - 98/65)  BP(mean): --  ABP: --  ABP(mean): --  RR: 18 (18 Jul 2022 11:25) (17 - 18)  SpO2: 96% (18 Jul 2022 11:25) (96% - 100%)    O2 Parameters below as of 18 Jul 2022 11:25  Patient On (Oxygen Delivery Method): room air    CAPILLARY BLOOD GLUCOSE    PHYSICAL EXAM:  General: cachectic, cheeks sunken in   HEENT: NCAT  Respiratory: reduced breath   Cardiovascular: S1S2 RRR   Abdomen: distended, non rigid, tympanic to percussion, peritoneal mets palpable, hypoactive bowel sounds   Extremities: RLE 1+ edema, larger then LLE. No edema   Neurological: A&O 3 no focal deficits       HOSPITAL MEDICATIONS:  MEDICATIONS  (STANDING):  ascorbic acid 500 milliGRAM(s) Oral daily  cefepime   IVPB 1000 milliGRAM(s) IV Intermittent every 12 hours  cholecalciferol 1000 Unit(s) Oral daily  heparin  Infusion.  Unit(s)/Hr (11 mL/Hr) IV Continuous <Continuous>  levothyroxine 112 MICROGram(s) Oral daily  multivitamin 1 Tablet(s) Oral daily  ondansetron Injectable 4 milliGRAM(s) IV Push every 8 hours  pantoprazole    Tablet 40 milliGRAM(s) Oral before breakfast  simvastatin 40 milliGRAM(s) Oral at bedtime  sodium chloride 1 Gram(s) Oral three times a day  vancomycin  IVPB 1000 milliGRAM(s) IV Intermittent every 24 hours    MEDICATIONS  (PRN):  acetaminophen     Tablet .. 650 milliGRAM(s) Oral every 6 hours PRN Mild Pain (1 - 3), Moderate Pain (4 - 6)  heparin   Injectable 5000 Unit(s) IV Push every 6 hours PRN For aPTT less than 40  heparin   Injectable 2500 Unit(s) IV Push every 6 hours PRN For aPTT between 40 - 57  HYDROmorphone  Injectable 0.2 milliGRAM(s) IV Push every 3 hours PRN Severe Pain (7 - 10)  ondansetron Injectable 4 milliGRAM(s) IV Push every 6 hours PRN Nausea and/or Vomiting  polyethylene glycol 3350 17 Gram(s) Oral daily PRN Constipation  senna 2 Tablet(s) Oral at bedtime PRN Constipation      LABS:                        9.0    29.73 )-----------( 448      ( 18 Jul 2022 09:00 )             28.3     07-18    117<LL>  |  85<L>  |  46<H>  ----------------------------<  69<L>  4.8   |  19<L>  |  1.91<H>    Ca    7.8<L>      18 Jul 2022 12:12  Phos  3.8     07-18  Mg     2.10     07-18      PTT - ( 18 Jul 2022 09:00 )  PTT:60.4 sec          MICROBIOLOGY:     RADIOLOGY:  [x] Reviewed and interpreted by me    EKG: CHIEF COMPLAINT: low bp    HPI:  66 F w/ 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 Pt had a paracentesis last week and was off AC for 10 days (misunderstood instructions), developed worsening LE edema, SOB. Dopplers today with extensive DVT. Pt with inability to walk due to pain in LE as well as FTT. Not eating, drinking; acute on chronic abd pain, passing gas. US abd + for mild/moderate ascites. SBP 99 which is lower end of baseline in comparison to prior vitals while hospitalized. CHECO (SCr 2.0 compared to 0.8 last month. Na 123, most recent K on VBG wnl. WBC 30K, UA +    While admitted, CT C/A/P obtained and found new emboli in RML segmental branches and possible new embolism in LLL segmental branches. Pleural effusion slightly decreased in size. Large intra-abd fluid collection with peripheral enhancement and foci of gas measuring [id number] possibly loculated malignant ascites.    Vanc/cefepime started empirically as per ID given bandemia. BCx without any growth of microbials. Pt intermittently with hypotension to SBP 90s and high 80s. She was given albumin 50% in 250cc x 2 (last dose 7/17) and bumex 1mg 7/17 for volume overload. Na downtrended to 117 on 7/18.     MICU consulted for both hyponatremia and hypotension.       PAST MEDICAL & SURGICAL HISTORY:  Ascites  Hypothyroidism  GERD (gastroesophageal reflux disease)  Arthritis  HLD (hyperlipidemia)  Peritoneal carcinomatosis  S/P cholecystectomy  S/P tubal ligation  S/P bunionectomy left foot  Incarcerated hernia    History of lung surgery VATS w/ pleurX April 2022    FAMILY HISTORY:  FH: stroke (Grandparent)  FH: hypertension (Father, Mother)    SOCIAL HISTORY:  Lives with son, daughter    Allergies  Nuts (Anaphylaxis)  penicillin (Angioedema; Urticaria)  sulfa drugs (Hives; Urticaria)  Intolerances    HOME MEDICATIONS:    REVIEW OF SYSTEMS:    CONSTITUTIONAL: (+)weakness, no fevers or chills (+) dizzy  EYES: no blurry vision or eye pain.   ENT: No throat pain. No dysphagia.    NECK: No pain or stiffness  RESPIRATORY: No cough, wheezing, hemoptysis; No shortness of breath  CARDIOVASCULAR: No chest pain or palpitations.  GASTROINTESTINAL: (+) abdominal pain. (+) nausea and vomiting; No diarrhea or constipation. No melena or hematochezia.  GENITOURINARY: No dysuria, frequency or hematuria  NEUROLOGICAL: No numbness or weakness. No dizziness or falls.   SKIN: No itching, burning, rashes, or lesions.   LYMPHATIC: No masses or swelling.   All other review of systems is negative unless indicated above.    OBJECTIVE:  ICU Vital Signs Last 24 Hrs  T(C): 36.3 (18 Jul 2022 11:25), Max: 36.7 (17 Jul 2022 16:00)  T(F): 97.3 (18 Jul 2022 11:25), Max: 98.1 (17 Jul 2022 16:00)  HR: 105 (18 Jul 2022 11:25) (60 - 105)  BP: 85/60 (18 Jul 2022 11:25) (80/52 - 98/65)  BP(mean): --  ABP: --  ABP(mean): --  RR: 18 (18 Jul 2022 11:25) (17 - 18)  SpO2: 96% (18 Jul 2022 11:25) (96% - 100%)    O2 Parameters below as of 18 Jul 2022 11:25  Patient On (Oxygen Delivery Method): room air    CAPILLARY BLOOD GLUCOSE    PHYSICAL EXAM:  General: cachectic, cheeks sunken in   HEENT: NCAT  Respiratory: reduced breath   Cardiovascular: S1S2 RRR   Abdomen: distended, non rigid, tympanic to percussion, peritoneal mets palpable, hypoactive bowel sounds   Extremities: RLE 1+ edema, larger then LLE. No edema   Neurological: A&O 3 no focal deficits       HOSPITAL MEDICATIONS:  MEDICATIONS  (STANDING):  ascorbic acid 500 milliGRAM(s) Oral daily  cefepime   IVPB 1000 milliGRAM(s) IV Intermittent every 12 hours  cholecalciferol 1000 Unit(s) Oral daily  heparin  Infusion.  Unit(s)/Hr (11 mL/Hr) IV Continuous <Continuous>  levothyroxine 112 MICROGram(s) Oral daily  multivitamin 1 Tablet(s) Oral daily  ondansetron Injectable 4 milliGRAM(s) IV Push every 8 hours  pantoprazole    Tablet 40 milliGRAM(s) Oral before breakfast  simvastatin 40 milliGRAM(s) Oral at bedtime  sodium chloride 1 Gram(s) Oral three times a day  vancomycin  IVPB 1000 milliGRAM(s) IV Intermittent every 24 hours    MEDICATIONS  (PRN):  acetaminophen     Tablet .. 650 milliGRAM(s) Oral every 6 hours PRN Mild Pain (1 - 3), Moderate Pain (4 - 6)  heparin   Injectable 5000 Unit(s) IV Push every 6 hours PRN For aPTT less than 40  heparin   Injectable 2500 Unit(s) IV Push every 6 hours PRN For aPTT between 40 - 57  HYDROmorphone  Injectable 0.2 milliGRAM(s) IV Push every 3 hours PRN Severe Pain (7 - 10)  ondansetron Injectable 4 milliGRAM(s) IV Push every 6 hours PRN Nausea and/or Vomiting  polyethylene glycol 3350 17 Gram(s) Oral daily PRN Constipation  senna 2 Tablet(s) Oral at bedtime PRN Constipation      LABS:                        9.0    29.73 )-----------( 448      ( 18 Jul 2022 09:00 )             28.3     07-18    117<LL>  |  85<L>  |  46<H>  ----------------------------<  69<L>  4.8   |  19<L>  |  1.91<H>    Ca    7.8<L>      18 Jul 2022 12:12  Phos  3.8     07-18  Mg     2.10     07-18      PTT - ( 18 Jul 2022 09:00 )  PTT:60.4 sec          MICROBIOLOGY:     RADIOLOGY:  [x] Reviewed and interpreted by me    EKG:

## 2022-07-18 NOTE — CONSULT NOTE ADULT - ATTENDING COMMENTS
patient with history sa above  now with hypotension and shock of unclear etiology, RV does not appear dilated on POCUS  could have SBP (given appearance of ascites on imaging) vs other septic shock  Patient very clear she does not want vasopressors or other invasive measures. She wants to be DNR/DNI with focus on comfort. She understands this.  would do midodrine 20 mg po q8h as HR allows (patient agreeable to this)  c/ broad abx for now  hyponatremia - hyposmolar, likely due to fluid overload, CHECO, mgmt as per renal    d/w daughter at bedside  Critically ill patient requiring frequent bedside visits with therapy changes with transient improvement in BP after midodrine  Please call back if GOC changes.

## 2022-07-18 NOTE — PROGRESS NOTE ADULT - ATTENDING COMMENTS
agree plan as stated above
67yo F w/ metastatic CUP with multi-organ dysfunction, progression of disease, elected to pursue hospice today 7/18, and plan for discharge (home hospice / nursing home hospice) will be discussed with palliative care team.  Aman Foy MD PhD  Oncology Attending
hypervolemic hyponatremia with low intravascular space.  can give albumin with loop diuretic as above.  monitor serum sodium.  monitor blood pressure.
Pt. with hypo-osmolar hyponatremia and CHECO. Assessment and plan for hyponatremia and CHECO as outlined above. Pt. seen and examined today (7/15/22). Pt. awake, alert. Pt. reports decreased appetite. No fever, CP or SOB during rounds today. Pt. with significant B/L LE pitting edema on exam today. SNa low at 122 and Scr decreased to 1.29 today (7/15/22). Pt. clinically hypervolemic. Recommend IV Bumex 1 mg once today. Continue fluid restriction (<1L/day). Monitor SNa closely. Avoid overcorrection of SNa. Monitor labs and urine output. Avoid any potential nephrotoxins. Dose medications as per eGFR.
hyponatremia worsening although no labs from today.  recommendations as above recommend repeat BMP today.
Hyponatremia  CHECO    Worsening of both conditions. Patient also hypotensive. Received NS IV today without significant improvement. Follow up goals of care. Patient may require higher level of care if worsens if she wants to continue aggressive measures.

## 2022-07-18 NOTE — PROGRESS NOTE ADULT - ASSESSMENT
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    Electrolyte derangements  Failure to thrive  CHECO  - albumin 1.6  - hypoglycemia to 50s  - nutrition consult  - TSH 20.96, send free T4  - nephro consulted, appreciate recs  - patient expressed desire to pursue hospice and defer further chemo 07/18    Bilateral DVT  - found to have RLE DVT (popliteal, soleal, posterior tibial veins) 06/2022  - started on eliquis but held for 10d prior to paracentesis  - repeat duplex with extensive bilateral LE DVT 07/12/22  - suspect worsening of DVT in setting of being off AC  - on hep gtt    Metastatic cancer of unknown primary  - soft tissue masses within pelvic inseparable from region of uterus, cervix, adnexa, peritoneal/omental lesions, hepatic and splenic lesions, pleural effusions); CK7, CK19, CDX2+; PAX8, TTF1, CD10, GATA3-; 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   - ELENO, KRAS G12D, MYC amplification  - started gem/abraxane 05/12/22  - last treatment 06/30/22  - last CT C/A/P 06/08/22 with RLL PE, small-moderate L pleural effusion with PleruX, new mediastinal and hilar LN, mildly increased peritoneal carcinomatosis  - repeat CT C/A/P with slightly more thrombus in R interlobar pulm artery and new PE in RML segmental branches, possible new PE in LLL segmental branches; moderate size multiloculated pleural effusion slightly decreased in size; large intra-abdominal fluid collection with peripheral enhancement and foci of gas 30 x 11 x 15cm (loculated malignant ascites vs abscess).   - patient would like to pursue hospice, defer chemo. No further workup/intervention  - follows with Dr. Albarran at [facility]

## 2022-07-18 NOTE — PROGRESS NOTE ADULT - PROBLEM SELECTOR PLAN 1
-cont heparin gtt  poss new PE in addition to know PE  -TTE showed Endocardium not well visualized; grossly normal left  ventricular systolic function. Mild diastolic dysfunction (Stage  -appreciate onc recs  -f/u ct -cont heparin gtt  poss new PE in addition to know PE  -TTE showed Endocardium not well visualized; grossly normal left  ventricular systolic function. Mild diastolic dysfunction (Stage  -appreciate onc recs

## 2022-07-18 NOTE — CONSULT NOTE ADULT - ASSESSMENT
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 Course c/b sepsis 2/2 loculated malignant ascites vs abscess, and hyponatremia multifactorial due to hypervolemic state, poor PO tolerance, and SIADH. MICU consulted for hypotension and hyponatremia to 117.       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 Course c/b sepsis 2/2 loculated malignant ascites vs abscess, and hyponatremia multifactorial due to hypervolemic state, poor PO tolerance, and malignancy. MICU consulted for hypotension and hyponatremia to 117.       # Hypotension and shock state   - Suspect infectious etiology given large loculated malignant ascites along with persistently elevated WBC. Lack of fever may not be reflective given likely immunocompromised state   - Also consider obstructive shock given acute on newly onset of PE   - Check proBNP, troponin, TTE, lactate   - Hold medications especially analgesics which may exacerbate hypotension (if in line with GOC)   - No signs of bleeding and h/h stable. Low suspicion for hemorrhage despite supratherapeutic pTT while on heparin gtt   - Would benefit from 250cc more IVF bolus  - Midodrine 10 q8, can uptitrate to 20 q8   - GOC discussion with patient at bedside, she stated she would NOT want to be reuscitated nor intubated. Addtionally she is repeatedly requesting hospice as she feels that she does not have much longer. She preferred prioritizing her level of comfort over going to ICU if needed.     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 Course c/b sepsis 2/2 loculated malignant ascites vs abscess, and hyponatremia multifactorial due to hypervolemic state, poor PO tolerance, and malignancy. MICU consulted for hypotension and hyponatremia to 117.       # Hypotension and shock state   - Suspect infectious etiology given large loculated malignant ascites along with persistently elevated WBC. Lack of fever may not be reflective given likely immunocompromised state   - Also consider obstructive shock given acute on newly onset of PE   - Check proBNP, troponin, TTE, lactate   - Hold medications especially analgesics which may exacerbate hypotension (if in line with GOC)   - No signs of bleeding and h/h stable. Low suspicion for hemorrhage despite supratherapeutic pTT while on heparin gtt   - Would benefit from 250cc more IVF bolus  - Midodrine 10 q8, can uptitrate to 20 q8   - GOC discussion with patient at bedside, she stated she would NOT want to be resuscitated nor intubated. Additionally she is repeatedly requesting hospice as she feels that she does not have much longer. She preferred prioritizing her level of comfort when asked if she would be ok going to ICU if needed.     # Hyponatremia  - On admission serum osm hypotonic, then normalized  - Clinically hypervolemic on exam   - Urine Na on admission suggestive of prerenal and consistent with poor oral intake   - Would repeat serum osm, urine Na, urine osm, urine urea nitrogen, and urine Cr now   - F/u nephro recs     PLAN NOT FINALIZED UNTIL ATTENDING ATTESTATION

## 2022-07-18 NOTE — PROGRESS NOTE ADULT - SUBJECTIVE AND OBJECTIVE BOX
66y old  Female who presents with a chief complaint of dvt, booker, electrolyte disorder (15 Jul 2022 09:57)      Interval history:    afebrile  hypotensive   paracentesis deferred        Allergies:   Nuts (Anaphylaxis)  penicillin (Angioedema; Urticaria)  sulfa drugs (Hives; Urticaria)      Antimicrobials:    cefepime   IVPB 1000 every 12 hours  vancomycin  IVPB 1000 every 24 hours      REVIEW OF SYSTEMS:  No chest pain   No SOB      Vital Signs Last 24 Hrs  T(F): 97.3 (07-18-22 @ 11:25), Max: 98.1 (07-17-22 @ 16:00)  HR: 105 (07-18-22 @ 11:25)  BP: 85/60 (07-18-22 @ 11:25)  RR: 18 (07-18-22 @ 11:25)  SpO2: 96% (07-18-22 @ 11:25) (96% - 100%)      PHYSICAL EXAM:  non toxic, weak  + port  lt sided pleurex.   slight distended abdomen  + haddad   peripheral iv bilat                            9.0    29.73 )-----------( 448      ( 18 Jul 2022 09:00 )             28.3 07-18    117  |  85  |  46  ----------------------------<  69  4.8   |  19  |  1.91  Ca    7.8      18 Jul 2022 12:12Phos  3.8     07-18Mg     2.10     07-18              Culture - Blood (collected 12 Jul 2022 19:00)  Source: .Blood Blood  Preliminary Report (14 Jul 2022 01:02):    No growth     Culture - Blood (collected 12 Jul 2022 18:40)  Source: .Blood Blood  Preliminary Report (14 Jul 2022 01:02):    No growth         Radiology:  < from: US Kidney and Bladder (07.14.22 @ 10:42) >  IMPRESSION:  Normal appearance of the kidneys. No hydronephrosis.    Redemonstration of known malignant ascites.

## 2022-07-18 NOTE — PROGRESS NOTE ADULT - PROBLEM SELECTOR PLAN 2
Pt indicates that nausea/vomiting usually lingers after tx  Believes it was exacerbated after oxy  Would recommend:  - Start Zofran 4mg IV q6hrs PRN for nausea/vomitng PRN can consider atc if nausea/vomiting is persistent.

## 2022-07-18 NOTE — GOALS OF CARE CONVERSATION - ADVANCED CARE PLANNING - CONVERSATION DETAILS
Discussed with pts daughter pts prognosis and that pt has been expressing that she wants to be comfortable and DNR/DNI, and to go home with hospice services. Shared with daughter that given acute medical condition with advanced malignancy, pts prognosis is guarded and that intubation/resuscitation would likely not impact pts long term prognosis. Recommendation made to continue with IV pain medications regardless of BP (as they had been held due to hypotension) and to listen to pt and honor her wishes. Daughter in agreement with this recommendation, support provided.  Pt is now DNR/DNI, MOLST completed and in the chart. Goal is for pt to be comfortable, will give pain medications for symptom management regardless of BP.  Family requesting f/u from pts primary oncologist.

## 2022-07-19 NOTE — PROGRESS NOTE ADULT - PROBLEM SELECTOR PLAN 2
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  c/w midodrine 20mg tid for hypotension  Patient made DNR/DNI  Palliative consult appreciated; managing pain  Daughter sabine and son at bedside on 7/19  Prognosis guarded.

## 2022-07-19 NOTE — PROGRESS NOTE ADULT - PROBLEM SELECTOR PLAN 3
Stage IV  Unknown primary  Follows with Dr. Albarran at [facility]  Oncology following  Pt has expressed that she wants to focus on not having pain, inquiring about hospice services

## 2022-07-19 NOTE — PROGRESS NOTE ADULT - PROBLEM SELECTOR PLAN 1
Improved with current regimen  Pt used 4 PRNs in the past 24hrs  Will continue to give pain medication regardless of BP  Would recommend:  - Continue with 0.2mg IV q3-4hrs PRN for severe pain/discomfort  - Bowel regimen to prevent opioid induced constipation

## 2022-07-19 NOTE — PROGRESS NOTE ADULT - PROBLEM SELECTOR PLAN 2
Continued nausea despite Zofran atc  Would recommend:  - Increase Zofran to 8mg IV q8hrs atc  - For refractory nausea can use haldol 0.25mg IV q6hrs PRN for nausea/vomiting or ativan 0.25-0.5mg IV q6hrs PRN for nausea/vomiting  - Can consider steroids if all above does not work

## 2022-07-19 NOTE — PROGRESS NOTE ADULT - ASSESSMENT
66 y.o.  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 due to new b/l extensive DVT, concerning for progression of PE, hypotension and FTT.

## 2022-07-19 NOTE — CHART NOTE - NSCHARTNOTEFT_GEN_A_CORE
WBC trending up. Blood cultures negative.  Paracentesis deferred.  Would empirically broaden cefepime to meropenem 1 gm iv q 12 h

## 2022-07-19 NOTE — PROGRESS NOTE ADULT - ASSESSMENT
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 Palliative Care consulted for complex symptom management in the setting of advanced malignancy.

## 2022-07-19 NOTE — PROGRESS NOTE ADULT - PROBLEM SELECTOR PLAN 4
slowly improving  Multifactorial from hypervolemia and intravascular volume depletion from poor PO intake  NACL tabs started  apprec nephrology f/u

## 2022-07-19 NOTE — PROGRESS NOTE ADULT - PROBLEM SELECTOR PLAN 4
Now DNR/DNI, MOLST form completed and in the chart  Surrogate is daughter  Spoke to pts daughter, they're struggling as a family given change in pts wishes. Reassured that pt has been clear about her wishes and at this point they're honoring what pt wants. Family has contact number to reach out if they have further questions.  Please page for uncontrolled symptoms [phone]

## 2022-07-19 NOTE — SWALLOW BEDSIDE ASSESSMENT ADULT - COMMENTS
Per Hospitalist Note 7/19/22: "66 y.o.  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 Son present at bedside who served as a reliable informant and reported patient has not eaten since admission. Patient declined to participate in evaluation despite maximum encouragement from SLP and son stating she is not hungry.

## 2022-07-19 NOTE — SWALLOW BEDSIDE ASSESSMENT ADULT - SWALLOW EVAL: DIAGNOSIS
unable to objectively assess oral and pharyngeal stage of swallow given patient refusal to participate in evaluation despite maximum encouragement from SLP and son at bedside.

## 2022-07-19 NOTE — PROGRESS NOTE ADULT - PROBLEM SELECTOR PLAN 1
-cont heparin gtt  poss new PE in addition to know PE  -TTE showed Endocardium not well visualized; grossly normal left  ventricular systolic function. Mild diastolic dysfunction (Stage  -appreciate onc recs

## 2022-07-19 NOTE — PROGRESS NOTE ADULT - PROBLEM SELECTOR PLAN 6
creatinine trending upward.  renal follg, renal sono showed Normal appearance of the kidneys. No hydronephrosis.  Redemonstration of known malignant ascites.  Started HCO3 gtt for AG metabolic acidosis  cont to monitor renal function  Renal consulted and follg  Cr stable, consider adjusting abx to reflect improved cr

## 2022-07-19 NOTE — PROGRESS NOTE ADULT - SUBJECTIVE AND OBJECTIVE BOX
SUBJECTIVE AND OBJECTIVE:  Pt with increased nausea  Pain controlled with Dilaudid IV  Used 4 PRNs in the past 24hrs    Indication for Geriatrics and Palliative Care Services/INTERVAL HPI: symptom management, complex decision making     OVERNIGHT EVENTS: pt hypotensive    DNR on chart: Yes  Yes      Allergies    Nuts (Anaphylaxis)  penicillin (Angioedema; Urticaria)  sulfa drugs (Hives; Urticaria)    Intolerances    MEDICATIONS  (STANDING):  ascorbic acid 500 milliGRAM(s) Oral daily  cefepime   IVPB 1000 milliGRAM(s) IV Intermittent every 12 hours  cholecalciferol 1000 Unit(s) Oral daily  heparin  Infusion.  Unit(s)/Hr (11 mL/Hr) IV Continuous <Continuous>  levothyroxine 112 MICROGram(s) Oral daily  midodrine. 20 milliGRAM(s) Oral three times a day  multivitamin 1 Tablet(s) Oral daily  ondansetron Injectable 4 milliGRAM(s) IV Push every 8 hours  pantoprazole    Tablet 40 milliGRAM(s) Oral before breakfast  simvastatin 40 milliGRAM(s) Oral at bedtime  sodium bicarbonate  Infusion 0.123 mEq/kG/Hr (100 mL/Hr) IV Continuous <Continuous>  sodium chloride 1 Gram(s) Oral three times a day    MEDICATIONS  (PRN):  acetaminophen     Tablet .. 650 milliGRAM(s) Oral every 6 hours PRN Mild Pain (1 - 3), Moderate Pain (4 - 6)  heparin   Injectable 5000 Unit(s) IV Push every 6 hours PRN For aPTT less than 40  heparin   Injectable 2500 Unit(s) IV Push every 6 hours PRN For aPTT between 40 - 57  HYDROmorphone  Injectable 0.2 milliGRAM(s) IV Push every 3 hours PRN Severe Pain (7 - 10)  ondansetron Injectable 4 milliGRAM(s) IV Push every 6 hours PRN Nausea and/or Vomiting  polyethylene glycol 3350 17 Gram(s) Oral daily PRN Constipation  senna 2 Tablet(s) Oral at bedtime PRN Constipation    ITEMS UNCHECKED ARE NOT PRESENT    PRESENT SYMPTOMS: [ ]Unable to self-report - see [ ] CPOT [ ] PAINADS [ ] RDOS  Source if other than patient:  [ ]Family   [ ]Team     Pain:  [ x]yes [ ]no  QOL impact - uncomfortable, in pain all the time  Location -   diffuse abdominal pain                 Aggravating factors -   Quality - aching  Radiation -   Timing- constant  Severity (0-10 scale): 7-8/10, improves to 3/10 after dilaudid  Minimal acceptable level (0-10 scale): 2-3/10    CPOT:    https://www.[facility].org/getattachment/[email or web address]/Critical-Care-Pain-Observation-Tool-(CPOT)    PAIN AD Score:	  http://geriatrictoolkit.[email or web address]/painad.pdf (Ctrl + left click to view)    Dyspnea:                           [ ]Mild [ ]Moderate [ ]Severe    RDOS:  0 to 2  minimal or no respiratory distress   3  mild distress  4 to 6 moderate distress  >7 severe distress  https://[email or web address]/handouts/hen/Respiratory_Distress_Observation_Scale.pdf (Ctrl +  left click to view)     Anxiety:                             [ ]Mild [ ]Moderate [ ]Severe  Fatigue:                             [ ]Mild [x ]Moderate [ ]Severe  Nausea:                             [ ]Mild [ ]Moderate [ ]Severe  Loss of appetite:              [ ]Mild [ ]Moderate [ ]Severe  Constipation:                    [ ]Mild [ ]Moderate [ ]Severe    PCSSQ[Palliative Care Spiritual Screening Question]   Severity (0-10):  Score of 4 or > indicate consideration of Chaplaincy referral.    Chaplaincy Referral: [ ] yes [ ] refused [ ] following    Other Symptoms:  [ x]All other review of systems negative     Palliative Performance Status Version 2: 40 %      [email or web address]://[email or web address]/[email or web address]_performance_scale_[email or web address]    PHYSICAL EXAM:  Vital Signs Last 24 Hrs  T(C): 36.4 (19 Jul 2022 14:34), Max: 37.1 (19 Jul 2022 05:00)  T(F): 97.6 (19 Jul 2022 14:34), Max: 98.7 (19 Jul 2022 05:00)  HR: 88 (19 Jul 2022 14:34) (78 - 99)  BP: 85/55 (19 Jul 2022 14:34) (79/51 - 99/56)  BP(mean): --  RR: 17 (19 Jul 2022 14:34) (15 - 17)  SpO2: 98% (19 Jul 2022 14:34) (98% - 100%)    Parameters below as of 19 Jul 2022 14:34  Patient On (Oxygen Delivery Method): room air     I&O's Summary    18 Jul 2022 07:01  -  19 Jul 2022 07:00  --------------------------------------------------------  IN: 0 mL / OUT: 200 mL / NET: -200 mL    19 Jul 2022 07:01  -  19 Jul 2022 16:12  --------------------------------------------------------  IN: 0 mL / OUT: 100 mL / NET: -100 mL    GENERAL: [ ]Cachexia    [x ]Alert  [x ]Oriented x 3  [ ]Lethargic  [ ]Unarousable  [x ]Verbal  [ ]Non-Verbal  Behavioral:   [ ]Anxiety  [ ]Delirium [ ]Agitation [ ]Other  HEENT:  [ ]Normal   [x ]Dry mouth   [ ]ET Tube/Trach  [ ]Oral lesions  PULMONARY:   [x ]Clear [ ]Tachypnea  [ ]Audible excessive secretions   [ ]Rhonchi        [ ]Right [ ]Left [ ]Bilateral  [ ]Crackles        [ ]Right [ ]Left [ ]Bilateral  [ ]Wheezing     [ ]Right [ ]Left [ ]Bilateral  [ ]Diminished BS [ ] Right [ ]Left [ ]Bilateral  CARDIOVASCULAR:    [ ]Regular [ ]Irregular [x ]Tachy  [ ]Rico [ ]Murmur [ ]Other  GASTROINTESTINAL:  [ ]Soft  [x ]Distended   [ ]x+BS  [ ]Non tender [ ]Tender  [ ]Other [ ]PEG [ ]OGT/ NGT   Last BM: 7/16  GENITOURINARY:  [ ]Normal [ ]Incontinent   [ ]Oliguria/Anuria   [ x]Powers  MUSCULOSKELETAL:   [ ]Normal   [ ]Weakness  [x ]Bed/Wheelchair bound [ ]Edema  NEUROLOGIC:   [x ]No focal deficits  [ ] Cognitive impairment  [ ] Dysphagia [ ]Dysarthria [ ] Paresis [ ]Other   SKIN:   [ x]Normal  [ ]Rash  [ ]Other  [ ]Pressure ulcer(s) [ ]y [ ]n present on admission    CRITICAL CARE:  [ ]Shock Present  [ ]Septic [ ]Cardiogenic [ ]Neurologic [ ]Hypovolemic  [ ]Vasopressors [ ]Inotropes  [ ]Respiratory failure present [ ]Mechanical Ventilation [ ]Non-invasive ventilatory support [ ]High-Flow   [ ]Acute  [ ]Chronic [ ]Hypoxic  [ ]Hypercarbic [ ]Other  [ ]Other organ failure     LABS:                        8.0    48.43 )-----------( 524      ( 19 Jul 2022 07:30 )             25.4     07-19    122<L>  |  87<L>  |  58<H>  ----------------------------<  32<LL>  5.3   |  15<L>  |  2.45<H>    Ca    7.8<L>      19 Jul 2022 06:00  Phos  4.7     07-19  Mg     2.20     07-19      PT/INR - ( 19 Jul 2022 11:49 )   PT: 14.4 sec;   INR: 1.24 ratio         PTT - ( 19 Jul 2022 11:49 )  PTT:80.0 sec      RADIOLOGY & ADDITIONAL STUDIES: reviewed    Protein Calorie Malnutrition Present: [ ]mild [ ]moderate [ ]severe [ ]underweight [ ]morbid obesity  https://www.andeal.org/vault/2440/web/files/ONC/Table_Clinical%20Characteristics%20to%20Document%20Malnutrition-White%20JV%20et%20al%202012.pdf    Height (cm): 149.9 (07-12-22 @ 14:11), 145 (06-30-22 @ 09:24), 149.9 (06-07-22 @ 18:09)  Weight (kg): 61 (07-12-22 @ 22:24), 63.1 (06-30-22 @ 09:24), 63.503 (06-08-22 @ 02:03)  BMI (kg/m2): 27.1 (07-12-22 @ 22:24), 28.1 (07-12-22 @ 14:11), 30 (06-30-22 @ 09:24)    [ ]PPSV2 < or = 30%  [ ]significant weight loss [ ]poor nutritional intake [ ]anasarca[ ]Artificial Nutrition    Other REFERRALS:  [ ]Hospice  [ ]Child Life  [ ]Social Work  [ ]Case management [ ]Holistic Therapy     Goals of Care Document:BENEDICT Savage (07-18-22 @ 16:08)  Goals of Care Conversation:   Participants:  · Participants  Patient; Family; Staff  · Child(anish)  Daughter - HCP  · Provider  Kita Martinez PA - primary team    Advance Directives:  · Caregiver:  no    Conversation Discussion:  · Conversation Details  Discussed with pts daughter pts prognosis and that pt has been expressing that she wants to be comfortable and DNR/DNI, and to go home with hospice services. Shared with daughter that given acute medical condition with advanced malignancy, pts prognosis is guarded and that intubation/resuscitation would likely not impact pts long term prognosis. Recommendation made to continue with IV pain medications regardless of BP (as they had been held due to hypotension) and to listen to pt and honor her wishes. Daughter in agreement with this recommendation, support provided.  Pt is now DNR/DNI, MOLST completed and in the chart. Goal is for pt to be comfortable, will give pain medications for symptom management regardless of BP.  Family requesting f/u from pts primary oncologist.    Personal Advance Directives Treatment Guidelines:   MOLST:  · Completed  18-Jul-2022    Location of Discussion:   Time Spent on Advance Care Planning:  I spent 45 (in minutes) on advance care planning services with the patient.  This time is separate and distinct from any other care management services provided on this date.    Location of Discussion:  · Location of discussion  Face to face      Electronic Signatures:  Mishel Chavarria)  (Signed 18-Jul-2022 16:13)  	Authored: Goals of Care Conversation, Personal Advance Directives Treatment Guidelines, Location of Discussion      Last Updated: 18-Jul-2022 16:13 by Mishel Chavarria)

## 2022-07-19 NOTE — CHART NOTE - NSCHARTNOTEFT_GEN_A_CORE
notified AM glucose is 32.   informed to repeat finger stick and give 1 amp state of D50.  patient is seen bedside is asymptomatic is at baseline.   Will continue to follow.

## 2022-07-19 NOTE — PROGRESS NOTE ADULT - SUBJECTIVE AND OBJECTIVE BOX
Patient requested a refill on medications: Levothyroxine and Lovastatin and states that he is all out of medication so wonders the status. Patient is going to have his labs done today. Patient would like to  at the pharmacy here today.    Please call patient to follow up.   Patient is a 66y old  Female who presents with a chief complaint of dvt, booker, electrolyte disorder (18 Jul 2022 16:13)      SUBJECTIVE / OVERNIGHT EVENTS:  Patient still c/o back and abdominal pain and nausea. She is still not eating or drinking food. Denies cp, or SOB    MEDICATIONS  (STANDING):  ascorbic acid 500 milliGRAM(s) Oral daily  cefepime   IVPB 1000 milliGRAM(s) IV Intermittent every 12 hours  cholecalciferol 1000 Unit(s) Oral daily  heparin  Infusion.  Unit(s)/Hr (11 mL/Hr) IV Continuous <Continuous>  levothyroxine 112 MICROGram(s) Oral daily  midodrine. 20 milliGRAM(s) Oral three times a day  multivitamin 1 Tablet(s) Oral daily  ondansetron Injectable 4 milliGRAM(s) IV Push every 8 hours  pantoprazole    Tablet 40 milliGRAM(s) Oral before breakfast  simvastatin 40 milliGRAM(s) Oral at bedtime  sodium bicarbonate  Infusion 0.123 mEq/kG/Hr (100 mL/Hr) IV Continuous <Continuous>  sodium chloride 1 Gram(s) Oral three times a day    MEDICATIONS  (PRN):  acetaminophen     Tablet .. 650 milliGRAM(s) Oral every 6 hours PRN Mild Pain (1 - 3), Moderate Pain (4 - 6)  heparin   Injectable 5000 Unit(s) IV Push every 6 hours PRN For aPTT less than 40  heparin   Injectable 2500 Unit(s) IV Push every 6 hours PRN For aPTT between 40 - 57  HYDROmorphone  Injectable 0.2 milliGRAM(s) IV Push every 3 hours PRN Severe Pain (7 - 10)  ondansetron Injectable 4 milliGRAM(s) IV Push every 6 hours PRN Nausea and/or Vomiting  polyethylene glycol 3350 17 Gram(s) Oral daily PRN Constipation  senna 2 Tablet(s) Oral at bedtime PRN Constipation      Vital Signs Last 24 Hrs  T(C): 36.9 (19 Jul 2022 11:49), Max: 37.1 (19 Jul 2022 05:00)  T(F): 98.5 (19 Jul 2022 11:49), Max: 98.7 (19 Jul 2022 05:00)  HR: 90 (19 Jul 2022 11:49) (78 - 99)  BP: 88/55 (19 Jul 2022 11:49) (79/51 - 99/56)  BP(mean): --  RR: 17 (19 Jul 2022 11:49) (15 - 18)  SpO2: 99% (19 Jul 2022 11:49) (98% - 100%)    Parameters below as of 19 Jul 2022 11:49  Patient On (Oxygen Delivery Method): room air      CAPILLARY BLOOD GLUCOSE      POCT Blood Glucose.: 296 mg/dL (19 Jul 2022 08:40)  POCT Blood Glucose.: 63 mg/dL (19 Jul 2022 08:20)  POCT Blood Glucose.: 69 mg/dL (19 Jul 2022 08:19)    I&O's Summary    18 Jul 2022 07:01  -  19 Jul 2022 07:00  --------------------------------------------------------  IN: 0 mL / OUT: 200 mL / NET: -200 mL        PHYSICAL EXAM:  GENERAL: NAD, well-developed  HEAD:  Atraumatic, Normocephalic  EYES: EOMI, PERRLA, conjunctiva and sclera clear  NECK: Supple, No JVD  CHEST/LUNG: Clear to auscultation bilaterally; No wheeze  HEART: Regular rate and rhythm; No murmurs, rubs, or gallops  ABDOMEN: Soft, Nontender, distended; +ascites. Bowel sounds present  EXTREMITIES:  1+ B/L LE edema R > L. 2+ Peripheral Pulses, No clubbing, or cyanosis  PSYCH: AAOx3  NEUROLOGY: non-focal  SKIN: No rashes or lesions    LABS:                        8.0    48.43 )-----------( 524      ( 19 Jul 2022 07:30 )             25.4     07-19    122<L>  |  87<L>  |  58<H>  ----------------------------<  32<LL>  5.3   |  15<L>  |  2.45<H>    Ca    7.8<L>      19 Jul 2022 06:00  Phos  4.7     07-19  Mg     2.20     07-19      PT/INR - ( 19 Jul 2022 11:49 )   PT: 14.4 sec;   INR: 1.24 ratio         PTT - ( 19 Jul 2022 11:49 )  PTT:80.0 sec          RADIOLOGY & ADDITIONAL TESTS:    Imaging Personally Reviewed:    Consultant(s) Notes Reviewed:      Care Discussed with Consultants/Other Providers:

## 2022-07-20 NOTE — PROGRESS NOTE ADULT - PROBLEM SELECTOR PLAN 4
Now DNR/DNI, MOLST form completed and in the chart  Surrogate is daughter  Spoke to pts daughter, they're struggling as a family given change in pts wishes. Reassured that pt has been clear about her wishes and at this point they're honoring what pt wants. Family has contact number to reach out if they have further questions.  Discussed symptom care plan with Daughter Andreea  Child life referral as pt has 2 young grandkids that are having a hard time (10 and 12yo)   Referral to patient centered care as well  Please page for uncontrolled symptoms [phone]

## 2022-07-20 NOTE — CONSULT NOTE ADULT - ASSESSMENT
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 Neurology consulted as code stroke for sudden change in mental status. Patient A&O 3 and interactive, with sudden change around 1447 when RRT called. Patient hypotensive, recently on Heparin but stopped 4am today for planned paracentesis. Patient also hyponatremic to 117 this AM, has CHECO with Cr of 2.6 and BUN of 68. CTH with no acute pathology, CTA/CTP deferred by family as not in line with GOC     Impression  Acute change in mental status (?catatonia) possibly 2/2 toxic metabolic etiology. r/o ischemic vascular etiology, r/o seizure.     Recommendations   - MR brain with and without contrast if in line with GOC  - vEEG if in line with GOC   - CBC, BMP, Mag, Phos, B12, Ammonia, Folate   - Management of other medical issues per primary team.   - Consider psych consult       Case to be discussed with neurology attending Dr. Serrato

## 2022-07-20 NOTE — CHART NOTE - NSCHARTNOTEFT_GEN_A_CORE
Notified by RN Na .  Discussed with renal fellow given recommendations are to determine GOC for the patient given patient has been hyponatremic during admission current symptoms unlikely to be from NA.

## 2022-07-20 NOTE — PROGRESS NOTE ADULT - PROBLEM SELECTOR PLAN 4
worsening  Multifactorial from hypervolemia and intravascular volume depletion from poor PO intake  c/w NACL tabs   Need paracentesis to improve.

## 2022-07-20 NOTE — PROGRESS NOTE ADULT - PROBLEM SELECTOR PLAN 2
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  Procedure team for aspiration and cx but patient was too hypotensive and unstable for paracentesis. Tried to get paracentesis today but prodecure team didn't personal to due it. Tried to call IR to do but also saying they are short on staff.   c/w midodrine 20mg tid for hypotension  Patient is DNR/DNI  Palliative consult appreciated; managing pain  Daughter sabine and son at bedside on 7/19  Prognosis guarded.

## 2022-07-20 NOTE — ED ADULT NURSE REASSESSMENT NOTE - NS ED NURSE REASSESS COMMENT FT1
IV obtained in [facility] with labs sent. PT awaiting bed assignment
Pt alert and awake, A&Ox4. ACP MD provider notified of Critical value reported as 6.3 MD states that "actual value is 8.3", low blood glucose reported to provider Pt asymptomatic, pt provided oral intake and blood sugar has increased to normal value.  No interventions ordered at this time.
Pt alert and awake, A&Ox4. Pt RR even and unlabored. Pt has a 20 gauge to R and L AC.
Stroke RN. Code stroke activated by RRT team. Pt. FELIXN 14:37.  stroke called for unresponsiveness.  Pt. with multiple medical condition on chemotherapy.  Pt. transported to CT scan.  labs were sent by RRT team.  unable to establish large sanya IV.  family consulted by primary team regarding the use of contrast.  no perfusion study done as per family request.  Pt. transported back to room. no TPA indicated. Primary RN at bedside. bp at 4pm 95/63 HR 88 sat 100%.
critical lab of aPTT came back as 172, GABRIELLE Hameed notified, nomogram followed, pt now on nomogram scale. per nomogram, rate is reduced by -2 to 9ml/hr. next aptt to generate as appropriate.
heparin nomogram in place, heparin now starting @ 9ml/hr. infusing with no signs of bleeding noted to site or pt.
pt attempting to use the bedpan had an episode and the bed was wet. pt cleaned and changed, bed changed. pt comfortable at this time.
pt noted to have low BGL of 66, an amp of dextrose given, pt weighed, heparin bolus and infusion started, abx given, pt on continuous cardiac monitor, occasionally tachy to 105bpm, BP noted to be low with MAP above 70.
Received pt from previous RN in bed awake and alert, breathing on Ra and in NAD. pt is to be weighed for heparin infusion.

## 2022-07-20 NOTE — PROGRESS NOTE ADULT - PROBLEM SELECTOR PLAN 1
Worsening  Used 5 PRNs in the past 24hrs  Will continue to give pain medication regardless of BP  Would recommend:  - Add 0.2mg IV q6hrs atc  - Add 0.5mg IV q3hrs PRN for severe pain/discomfort  - Bowel regimen to prevent opioid induced constipation

## 2022-07-20 NOTE — PROGRESS NOTE ADULT - ASSESSMENT
66 y.o.  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

## 2022-07-20 NOTE — RAPID RESPONSE TEAM SUMMARY - NSOTHERINTERVENTIONSRRT_GEN_ALL_CORE
- CBC, CMP, Coags, ABG with lactate, BCx2, UA, UCx  - Continue Meropenem  - CT Chest for possible aspiration pneumonia  - CT Abdomen/Pelvis to evaluate for worsening of abdominal collection vs ascites in abdomen  - Code Stroke (Neurology and Primary team to discuss with family if CT Contrast imaging in line with GOC given patient worsening renal function, DNR/DNI status and plans for hospice care) - CBC, CMP, Coags, ABG with lactate, BCx2, UA, UCx  - Continue Meropenem  - CT Chest for possible aspiration pneumonia  - CT Abdomen/Pelvis to evaluate for worsening of abdominal collection vs ascites in abdomen  - Code Stroke (Neurology and Primary team to discuss with family if CT Contrast imaging in line with GOC given patient worsening renal function, DNR/DNI status and patient's goals of preferring comfort over invasive interventions) - CBC, CMP, Coags, ABG with lactate, BCx2, UA, UCx  - Continue Meropenem  - CT Chest for possible aspiration pneumonia  - CT Abdomen/Pelvis to evaluate for worsening of abdominal collection vs ascites in abdomen  - Code Stroke (Neurology and Primary team to discuss with family if CT Contrast imaging in line with GOC given patient worsening renal function, DNR/DNI status and patient's goals of preferring comfort over invasive interventions)  - Video EEG  - Paracentesis if in line with GOC

## 2022-07-20 NOTE — PROGRESS NOTE ADULT - SUBJECTIVE AND OBJECTIVE BOX
66y old  Female who presents with a chief complaint of dvt, booker, electrolyte disorder (15 Jul 2022 09:57)      Interval history:    indicates having abd discomfort  asking to have bed adjusted      Allergies:   Nuts (Anaphylaxis)  penicillin (Angioedema; Urticaria)  sulfa drugs (Hives; Urticaria)      Antimicrobials:    meropenem  IVPB    meropenem  IVPB 1000 every 12 hours        REVIEW OF SYSTEMS:  No chest pain   No SOB    Vital Signs Last 24 Hrs  T(F): 97.7 (07-20-22 @ 15:42), Max: 98.4 (07-20-22 @ 05:32)  HR: 92 (07-20-22 @ 15:42)  BP: 97/67 (07-20-22 @ 15:42)  RR: 18 (07-20-22 @ 15:42)  SpO2: 100% (07-20-22 @ 15:42) (99% - 100%)    PHYSICAL EXAM:  non toxic, weak, alopecia  + port  lt sided pleurex.   slight distended abdomen  + haddad   peripheral iv right                          7.9    40.97 )-----------( 465      ( 20 Jul 2022 15:15 )             24.5 07-20    118  |  82  |  64  ----------------------------<  112  5.0   |  20  |  2.79  Ca    7.5      20 Jul 2022 15:15Phos  4.0     07-20Mg     2.00     07-20          blood culture 7/19 pending collection      Culture - Blood (collected 12 Jul 2022 19:00)  Source: .Blood Blood  Preliminary Report (14 Jul 2022 01:02):    No growth     Culture - Blood (collected 12 Jul 2022 18:40)  Source: .Blood Blood  Preliminary Report (14 Jul 2022 01:02):    No growth         Radiology:  < from: US Kidney and Bladder (07.14.22 @ 10:42) >  IMPRESSION:  Normal appearance of the kidneys. No hydronephrosis.    Redemonstration of known malignant ascites.

## 2022-07-20 NOTE — PROGRESS NOTE ADULT - PROBLEM SELECTOR PLAN 5
-meets sirs criteria per ID doubt UTI  -c/w meropenem  -ID consult appreciated, f/u recs  CT abd pelvis as above  -blood culture neg to date,  urine culture normal ashley

## 2022-07-20 NOTE — CHART NOTE - NSCHARTNOTEFT_GEN_A_CORE
Notified by RN patient is altered.   Patient was evaluated bedside.   Patient was noted with staring at the wall.  Non-responsive to verbal or pain stimuli.   RRT was called for changed in mental status. Baseline mental status is A+Ox3.  For more detail see RRT note . Notified by RN patient is altered.   Patient was evaluated bedside.   Patient was noted with staring at the wall.  Non-responsive to verbal or pain stimuli.   RRT was called for changed in mental status. Baseline mental status is A+Ox3.  For more detail see RRT note .     spoke to daughter.

## 2022-07-20 NOTE — STROKE CODE NOTE - MRS SCORE
(3) Moderate disability. Requires some help, but able to walk unassisted. (1) No significant disability. Able to carry out all usual activities, despite some symptoms.

## 2022-07-20 NOTE — PROGRESS NOTE ADULT - SUBJECTIVE AND OBJECTIVE BOX
SUBJECTIVE AND OBJECTIVE:  Pt with increased nausea  Pain worsening  Used 5 PRNs in the past 24hrs    Indication for Geriatrics and Palliative Care Services/INTERVAL HPI: symptom management, complex decision making     OVERNIGHT EVENTS: pt hypotensive    DNR on chart: Yes  Yes      Allergies    Nuts (Anaphylaxis)  penicillin (Angioedema; Urticaria)  sulfa drugs (Hives; Urticaria)    Intolerances    MEDICATIONS  (STANDING):  ascorbic acid 500 milliGRAM(s) Oral daily  cholecalciferol 1000 Unit(s) Oral daily  heparin  Infusion.  Unit(s)/Hr (11 mL/Hr) IV Continuous <Continuous>  levothyroxine 112 MICROGram(s) Oral daily  meropenem  IVPB      meropenem  IVPB 1000 milliGRAM(s) IV Intermittent every 12 hours  midodrine. 20 milliGRAM(s) Oral three times a day  multivitamin 1 Tablet(s) Oral daily  ondansetron Injectable 8 milliGRAM(s) IV Push every 8 hours  pantoprazole    Tablet 40 milliGRAM(s) Oral before breakfast  simvastatin 40 milliGRAM(s) Oral at bedtime  sodium bicarbonate  Infusion 0.123 mEq/kG/Hr (100 mL/Hr) IV Continuous <Continuous>  sodium chloride 1 Gram(s) Oral three times a day    MEDICATIONS  (PRN):  acetaminophen     Tablet .. 650 milliGRAM(s) Oral every 6 hours PRN Mild Pain (1 - 3), Moderate Pain (4 - 6)  haloperidol     Tablet 0.25 milliGRAM(s) Oral every 6 hours PRN nausea  heparin   Injectable 5000 Unit(s) IV Push every 6 hours PRN For aPTT less than 40  heparin   Injectable 2500 Unit(s) IV Push every 6 hours PRN For aPTT between 40 - 57  HYDROmorphone  Injectable 0.2 milliGRAM(s) IV Push every 3 hours PRN Severe Pain (7 - 10)  melatonin 3 milliGRAM(s) Oral at bedtime PRN Insomnia  polyethylene glycol 3350 17 Gram(s) Oral daily PRN Constipation  senna 2 Tablet(s) Oral at bedtime PRN Constipation    ITEMS UNCHECKED ARE NOT PRESENT    PRESENT SYMPTOMS: [ ]Unable to self-report - see [ ] CPOT [ ] PAINADS [ ] RDOS  Source if other than patient:  [ ]Family   [ ]Team     Pain:  [ x]yes [ ]no  QOL impact - uncomfortable, in pain all the time  Location -   diffuse abdominal pain                 Aggravating factors -   Quality - aching  Radiation -   Timing- constant  Severity (0-10 scale): 7-8/10, improves to 3/10 after dilaudid  Minimal acceptable level (0-10 scale): 2-3/10    CPOT:    https://www.[facility].org/getattachment/rbb44y41-8z5u-1p5c-3b4p-5649w8466o2f/Critical-Care-Pain-Observation-Tool-(CPOT)    PAIN AD Score:	  http://geriatrictoolkit.[email or web address]/cog/painad.pdf (Ctrl + left click to view)    Dyspnea:                           [ ]Mild [ ]Moderate [ ]Severe    RDOS:  0 to 2  minimal or no respiratory distress   3  mild distress  4 to 6 moderate distress  >7 severe distress  https://homecareinformation.net/handouts/hen/Respiratory_Distress_Observation_Scale.pdf (Ctrl +  left click to view)     Anxiety:                             [ ]Mild [ ]Moderate [ ]Severe  Fatigue:                             [ ]Mild [x ]Moderate [ ]Severe  Nausea:                             [ ]Mild [ ]Moderate [ ]Severe  Loss of appetite:              [ ]Mild [ ]Moderate [ ]Severe  Constipation:                    [ ]Mild [ ]Moderate [ ]Severe    PCSSQ[Palliative Care Spiritual Screening Question]   Severity (0-10):  Score of 4 or > indicate consideration of Chaplaincy referral.    Chaplaincy Referral: [ ] yes [ ] refused [ ] following    Other Symptoms:  [ x]All other review of systems negative     Palliative Performance Status Version 2: 40 %      http://npcrc.org/files/news/palliative_performance_scale_ppsv2.pdf    PHYSICAL EXAM:  Vital Signs Last 24 Hrs  T(C): 36.6 (20 Jul 2022 12:36), Max: 36.9 (20 Jul 2022 05:32)  T(F): 97.8 (20 Jul 2022 12:36), Max: 98.4 (20 Jul 2022 05:32)  HR: 78 (20 Jul 2022 12:36) (78 - 91)  BP: 93/61 (20 Jul 2022 12:36) (85/55 - 96/59)  BP(mean): --  RR: 18 (20 Jul 2022 12:36) (17 - 18)  SpO2: 100% (20 Jul 2022 12:36) (98% - 100%)    Parameters below as of 20 Jul 2022 12:36  Patient On (Oxygen Delivery Method): room air     I&O's Summary    19 Jul 2022 07:01  -  20 Jul 2022 07:00  --------------------------------------------------------  IN: 0 mL / OUT: 400 mL / NET: -400 mL    GENERAL: [ ]Cachexia    [x ]Alert  [x ]Oriented x 3  [ ]Lethargic  [ ]Unarousable  [x ]Verbal  [ ]Non-Verbal  Behavioral:   [ ]Anxiety  [ ]Delirium [ ]Agitation [ ]Other  HEENT:  [ ]Normal   [x ]Dry mouth   [ ]ET Tube/Trach  [ ]Oral lesions  PULMONARY:   [x ]Clear [ ]Tachypnea  [ ]Audible excessive secretions   [ ]Rhonchi        [ ]Right [ ]Left [ ]Bilateral  [ ]Crackles        [ ]Right [ ]Left [ ]Bilateral  [ ]Wheezing     [ ]Right [ ]Left [ ]Bilateral  [ ]Diminished BS [ ] Right [ ]Left [ ]Bilateral  CARDIOVASCULAR:    [ ]Regular [ ]Irregular [x ]Tachy  [ ]Rico [ ]Murmur [ ]Other  GASTROINTESTINAL:  [ ]Soft  [x ]Distended   [ ]x+BS  [ ]Non tender [ x]Tender  [ ]Other [ ]PEG [ ]OGT/ NGT   Last BM: 7/16  GENITOURINARY:  [ ]Normal [ ]Incontinent   [ ]Oliguria/Anuria   [ x]Powers  MUSCULOSKELETAL:   [ ]Normal   [ ]Weakness  [x ]Bed/Wheelchair bound [ ]Edema  NEUROLOGIC:   [x ]No focal deficits  [ ] Cognitive impairment  [ ] Dysphagia [ ]Dysarthria [ ] Paresis [ ]Other   SKIN:   [ x]Normal  [ ]Rash  [ ]Other  [ ]Pressure ulcer(s) [ ]y [ ]n present on admission    CRITICAL CARE:  [ ]Shock Present  [ ]Septic [ ]Cardiogenic [ ]Neurologic [ ]Hypovolemic  [ ]Vasopressors [ ]Inotropes  [ ]Respiratory failure present [ ]Mechanical Ventilation [ ]Non-invasive ventilatory support [ ]High-Flow   [ ]Acute  [ ]Chronic [ ]Hypoxic  [ ]Hypercarbic [ ]Other  [ ]Other organ failure     LABS:                        8.1    43.95 )-----------( 509      ( 20 Jul 2022 03:11 )             25.1     07-20    117<LL>  |  82<L>  |  62<H>  ----------------------------<  117<H>  5.2   |  18<L>  |  2.71<H>    Ca    7.4<L>      20 Jul 2022 03:11  Phos  3.9     07-20  Mg     2.10     07-20    PT/INR - ( 19 Jul 2022 11:49 )   PT: 14.4 sec;   INR: 1.24 ratio       PTT - ( 20 Jul 2022 10:00 )  PTT:45.2 sec    RADIOLOGY & ADDITIONAL STUDIES: reviewed    Protein Calorie Malnutrition Present: [ ]mild [ ]moderate [ ]severe [ ]underweight [ ]morbid obesity  https://www.andeal.org/vault/2440/web/files/ONC/Table_Clinical%20Characteristics%20to%20Document%20Malnutrition-White%20JV%20et%20al%202012.pdf    Height (cm): 149.9 (07-12-22 @ 14:11), 145 (06-30-22 @ 09:24), 149.9 (06-07-22 @ 18:09)  Weight (kg): 61 (07-12-22 @ 22:24), 63.1 (06-30-22 @ 09:24), 63.503 (06-08-22 @ 02:03)  BMI (kg/m2): 27.1 (07-12-22 @ 22:24), 28.1 (07-12-22 @ 14:11), 30 (06-30-22 @ 09:24)    [ x]PPSV2 < or = 30%  [ ]significant weight loss [ ]poor nutritional intake [ ]anasarca[ ]Artificial Nutrition    Other REFERRALS:  [x ]Hospice  [ ]Child Life  [ ]Social Work  [ ]Case management [ ]Holistic Therapy     Goals of Care Document:BENEDICT Savage (07-18-22 @ 16:08)  Goals of Care Conversation:   Participants:  · Participants  Patient; Family; Staff  · Child(anish)  Daughter - HCP  · Provider  Kita Martinez - primary team    Advance Directives:  · Caregiver:  no    Conversation Discussion:  · Conversation Details  Discussed with pts daughter pts prognosis and that pt has been expressing that she wants to be comfortable and DNR/DNI, and to go home with hospice services. Shared with daughter that given acute medical condition with advanced malignancy, pts prognosis is guarded and that intubation/resuscitation would likely not impact pts long term prognosis. Recommendation made to continue with IV pain medications regardless of BP (as they had been held due to hypotension) and to listen to pt and honor her wishes. Daughter in agreement with this recommendation, support provided.  Pt is now DNR/DNI, MOLST completed and in the chart. Goal is for pt to be comfortable, will give pain medications for symptom management regardless of BP.  Family requesting f/u from pts primary oncologist.    Personal Advance Directives Treatment Guidelines:   MOLST:  · Completed  18-Jul-2022    Location of Discussion:   Time Spent on Advance Care Planning:  I spent 45 (in minutes) on advance care planning services with the patient.  This time is separate and distinct from any other care management services provided on this date.    Location of Discussion:  · Location of discussion  Face to face      Electronic Signatures:  Mishel Chavarria)  (Signed 18-Jul-2022 16:13)  	Authored: Goals of Care Conversation, Personal Advance Directives Treatment Guidelines, Location of Discussion      Last Updated: 18-Jul-2022 16:13 by Mishel Chavarria)

## 2022-07-20 NOTE — CONSULT NOTE ADULT - ATTENDING COMMENTS
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 Neurology consulted 7/20 @ 2 pm as acute stroke code activation for acute unresponsiveness (eyes open, unresponsive to pain, no focality). STAT CT head with no acute pathology.   Hospital course notable for new CHECO, hypernatremia and significant leukocytosis (ID following, ? reactive to malignancy) on abx.  Another RRT was called on her overnight for similar unresponsiveness. No convulsions.   Of note, patient has been receiving hydromorphone PRN for pain.     Sons present at bedside. One son report she opened her eyes for him this morning (~ 8 am) and asked "how are you", however is now back to being unresponsive. Pt obtunded. Eyes closed. Does not open eyes to sternal rub. Minimal pain withdrawal in feet. PERRL. NO definite facial asymmetry (mouth open). Reduced tone in UE (flaccid).     Imp:- Altered mental status with fluctuating levels of responsiveness- suspect toxic metabolic encephalopathy +/- hypoactive delirium  Rec:-   Repeat CT head - given persistent obtunded state  VEEG to rule out subclinical seizures. No AED at this time.   MRI brain w/w/o (when stable and if within GOC)  Medical management as per primary team. Monitor whether patients unresponsiveness could be related to hydromorphone administration (in which case could consider Narcan to see if any clinical improvement).   Pt is on meropenem- which can lower seizure threshold and have neurotoxic effects causing encephalopathy. Consider switching abx, if possible. 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 Neurology consulted 7/20 @ 2 pm as acute stroke code activation for acute unresponsiveness (eyes open, unresponsive to pain, no focality). STAT CT head with no acute pathology.   Hospital course notable for new CHECO, hyponatremia and significant leukocytosis (ID following, ? reactive to malignancy) on abx.  Another RRT was called on her overnight for similar unresponsiveness. No convulsions.   Of note, patient has been receiving hydromorphone PRN for pain.     Sons present at bedside. One son report she opened her eyes for him this morning (~ 8 am) and asked "how are you", however is now back to being unresponsive. Pt obtunded. Eyes closed. Does not open eyes to sternal rub. Minimal pain withdrawal in feet. PERRL. NO definite facial asymmetry (mouth open). Reduced tone in UE (flaccid).     Imp:- Altered mental status with fluctuating levels of responsiveness- suspect toxic metabolic encephalopathy +/- hypoactive delirium  Rec:-   Repeat CT head - given persistent obtunded state  VEEG to rule out subclinical seizures. No AED at this time.   MRI brain w/w/o (when stable and if within GOC)  Medical management as per primary team. Monitor whether patients unresponsiveness could be related to hydromorphone administration (in which case could consider Narcan to see if any clinical improvement).   Pt is on meropenem- which can lower seizure threshold and have neurotoxic effects causing encephalopathy. Consider switching abx, if possible.

## 2022-07-20 NOTE — PROGRESS NOTE ADULT - PROBLEM SELECTOR PLAN 2
Continued nausea despite Zofran atc and increase in dose  Would recommend:  - Continue with Zofran to 8mg IV q8hrs atc  - Add haldol 0.25mg IV q6hrs PRN for nausea/vomiting can add ativan 0.25-0.5mg IV q6hrs PRN for nausea/vomiting if haldol ineffective   - Can consider steroids if all above does not work

## 2022-07-20 NOTE — CHART NOTE - NSCHARTNOTEFT_GEN_A_CORE
Discussed procedure team regarding a therapeutic paracentesis for patient yesterday. Informed unable to be done yesterday due to heparin drip, informed to hold it for 4 hours for a paracentesis tomorrow. Informed today paracentesis can not be performed today will have to be done tomorrow morning. Spoke to IR fellow to see if they are able to perform paracentesis today, however informed it is unlikely able to do performed today by IR. Discussed with Dr. Schneider.

## 2022-07-20 NOTE — CONSULT NOTE ADULT - SUBJECTIVE AND OBJECTIVE BOX
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 Neurology consulted as code stroke for sudden change in mental status. Patient A&O 3 and interactive, with sudden change around 1447 when RRT called. Patient hypotensive, recently on Heparin but stopped 4am today for planned paracentesis.     Rest of collateral per HPI as follows: Pt had a paracentesis last week and was off AC for 10 days (misunderstood instructions), developed worsening LE edema, SOB. Dopplers today with extensive DVT. Pt with inability to walk due to pain in LE as well as FTT. Not eating, drinking; acute on chronic abd pain, passing gas. US abd + for mild/moderate ascites. SBP 99 which is lower end of baseline in comparison to prior vitals while hospitalized. CHECO (SCr 2.0 compared to 0.8 last month. Na 123, most recent K on VBG wnl. WBC 30K, UA +.    LKN: 2:35pm 7/20/22  NIHSS 26, premrs indeterminate but presumably 1  Patient not a candidate for tpa as outside of window   Patient not a candidate for thrombectomy as no lvo on cta     ROS: A 10-system ROS was performed and is negative except for those items noted above and/or in the HPI.    PAST MEDICAL & SURGICAL HISTORY:  Ascites      Hypothyroidism      GERD (gastroesophageal reflux disease)      Arthritis      HLD (hyperlipidemia)      Peritoneal carcinomatosis      S/P cholecystectomy      S/P tubal ligation      S/P bunionectomy  left foot      Incarcerated hernia      History of lung surgery  VATS w/ pleurX April 2022        FAMILY HISTORY:  FH: stroke (Grandparent)    FH: hypertension (Father, Mother)        SOCIAL HISTORY: SOCIAL HISTORY:     Marital Status: (  )   (  ) Single  (  )   (  )      Occupation:      Lives: (  ) alone  (  ) with children   (  ) with spouse  (  ) with parents  (  ) other     Illicit Drug Use: (  ) never used  (  ) other _____     Tobacco Use:  (  ) never smoked  (  ) former smoker  (  ) current smoker  (  ) pack year  (  ) last cigarette date     Alcohol Use:      Sexual History:        MEDICATIONS  Home Medications:  acetaminophen 325 mg oral tablet: 2 tab(s) orally every 6 hours, As needed, Mild Pain (1 - 3) (08 Jun 2022 09:24)  Multiple Vitamins oral tablet: 1 tab(s) orally once a day (13 Jul 2022 03:15)  Synthroid 100 mcg (0.1 mg) oral tablet: 1 tab(s) orally once a day (13 Jul 2022 03:14)  Vitamin C: 1 tab(s) orally once a day (08 Jun 2022 09:24)  Vitamin D3: 1 tab(s) orally once a day (08 Jun 2022 09:24)  Zocor 40 mg oral tablet: 1 tab(s) orally once a day (at bedtime) (08 Jun 2022 09:24)      MEDICATIONS  (STANDING):  ascorbic acid 500 milliGRAM(s) Oral daily  cholecalciferol 1000 Unit(s) Oral daily  heparin  Infusion.  Unit(s)/Hr (11 mL/Hr) IV Continuous <Continuous>  HYDROmorphone  Injectable 0.2 milliGRAM(s) IV Push every 6 hours  levothyroxine 112 MICROGram(s) Oral daily  meropenem  IVPB      meropenem  IVPB 1000 milliGRAM(s) IV Intermittent every 12 hours  midodrine. 20 milliGRAM(s) Oral three times a day  multivitamin 1 Tablet(s) Oral daily  ondansetron Injectable 8 milliGRAM(s) IV Push every 8 hours  pantoprazole    Tablet 40 milliGRAM(s) Oral before breakfast  simvastatin 40 milliGRAM(s) Oral at bedtime  sodium bicarbonate  Infusion 0.123 mEq/kG/Hr (100 mL/Hr) IV Continuous <Continuous>  sodium chloride 1 Gram(s) Oral three times a day    MEDICATIONS  (PRN):  acetaminophen     Tablet .. 650 milliGRAM(s) Oral every 6 hours PRN Mild Pain (1 - 3), Moderate Pain (4 - 6)  haloperidol    Injectable 0.25 milliGRAM(s) IV Push every 6 hours PRN nausea/vomiting  heparin   Injectable 5000 Unit(s) IV Push every 6 hours PRN For aPTT less than 40  heparin   Injectable 2500 Unit(s) IV Push every 6 hours PRN For aPTT between 40 - 57  HYDROmorphone  Injectable 0.5 milliGRAM(s) IV Push every 3 hours PRN Severe Pain (7 - 10) / discomfort  melatonin 3 milliGRAM(s) Oral at bedtime PRN Insomnia  polyethylene glycol 3350 17 Gram(s) Oral daily PRN Constipation  senna 2 Tablet(s) Oral at bedtime PRN Constipation      ALLERGIES/INTOLERANCES:  Allergies  Nuts (Anaphylaxis)  penicillin (Angioedema; Urticaria)  sulfa drugs (Hives; Urticaria)    Intolerances      OBJECTIVE:  VITALS   Vital Signs Last 24 Hrs  T(C): 36.5 (20 Jul 2022 15:42), Max: 36.9 (20 Jul 2022 05:32)  T(F): 97.7 (20 Jul 2022 15:42), Max: 98.4 (20 Jul 2022 05:32)  HR: 92 (20 Jul 2022 15:42) (78 - 92)  BP: 97/67 (20 Jul 2022 15:42) (90/57 - 97/67)  BP(mean): --  RR: 18 (20 Jul 2022 15:42) (17 - 18)  SpO2: 100% (20 Jul 2022 15:42) (99% - 100%)    Parameters below as of 20 Jul 2022 15:42  Patient On (Oxygen Delivery Method): room air        PHYSICAL EXAM:  General: Well developed, in NAD   Head: atraumatic   Respiratory: non-laboured breathing, regular rate  GI: Nondistended   Integumentary: Warm and Dry   Psychiatric: Normal Affect     Neurological Exam:  Mental Status: Awake, not alert, does not attend to either side. Does not follow commands. No verbal output   Cranial Nerves: Pupils sluggish but reactive bilaterally. +BTT bilaterally, horizontal extraocular movements intact, looks spontaneously but not on prompting.    Motor:   Tone: flaccid  Motor: trace spontaneous movement of b/l feet otherwise no spontaneous movement   Pronator drift: unable to assess   Dysmetria: unable to assess   No truncal ataxia.    Tremor: No resting tremor, no myoclonus   Sensation: does not grimace to noxious stimuli,   Deep Tendon Reflexes: 2+ bilateral biceps, triceps, brachioradialis, knee and ankle  Toes flexor bilaterally  Gait: unable to assess      LABORATORY:  CBC                       8.1    43.95 )-----------( 509      ( 20 Jul 2022 03:11 )             25.1     Chem 07-20    117<LL>  |  82<L>  |  62<H>  ----------------------------<  117<H>  5.2   |  18<L>  |  2.71<H>    Ca    7.4<L>      20 Jul 2022 03:11  Phos  3.9     07-20  Mg     2.10     07-20      LFTs   Coagulopathy PT/INR - ( 19 Jul 2022 11:49 )   PT: 14.4 sec;   INR: 1.24 ratio         PTT - ( 20 Jul 2022 10:00 )  PTT:45.2 sec  Lipid Panel 07-13 Chol 101 LDL -- HDL 38<L> Trig 157<H>  A1c   Cardiac enzymes     U/A   CSF  Immunological  Other    STUDIES & IMAGING:  Studies (EKG, EEG, EMG, etc):       Radiology (XR, CT, MR, U/S, TTE/MARCY):     HPI:  Patient is 66 F w/ 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 due to new b/l extensive DVT, concerning for progression of PE, hypotension and FTT. Neurology consulted as code stroke for sudden change in mental status. Patient A&O 3 and interactive, with sudden change around 1447 when RRT called. Patient hypotensive, recently on Heparin but stopped 4am today for planned paracentesis.     Rest of collateral per HPI as follows: Pt had a paracentesis last week and was off AC for 10 days (misunderstood instructions), developed worsening LE edema, SOB. Dopplers today with extensive DVT. Pt with inability to walk due to pain in LE as well as FTT. Not eating, drinking; acute on chronic abd pain, passing gas. US abd + for mild/moderate ascites. SBP 99 which is lower end of baseline in comparison to prior vitals while hospitalized. CHECO (SCr 2.0 compared to 0.8 last month. Na 123, most recent K on VBG wnl. WBC 30K, UA +.    LKN: 2:35pm 7/20/22  NIHSS 26, premrs indeterminate but presumably 1  Patient not a candidate for tpa as outside of window   Patient not a candidate for thrombectomy as no lvo on cta     ROS: A 10-system ROS was performed and is negative except for those items noted above and/or in the HPI.    PAST MEDICAL & SURGICAL HISTORY:  Ascites      Hypothyroidism      GERD (gastroesophageal reflux disease)      Arthritis      HLD (hyperlipidemia)      Peritoneal carcinomatosis      S/P cholecystectomy      S/P tubal ligation      S/P bunionectomy  left foot      Incarcerated hernia      History of lung surgery  VATS w/ pleurX April 2022        FAMILY HISTORY:  FH: stroke (Grandparent)    FH: hypertension (Father, Mother)        SOCIAL HISTORY: SOCIAL HISTORY:     Marital Status: (  )   (  ) Single  (  )   (  )      Occupation:      Lives: (  ) alone  (  ) with children   (  ) with spouse  (  ) with parents  (  ) other     Illicit Drug Use: (  ) never used  (  ) other _____     Tobacco Use:  (  ) never smoked  (  ) former smoker  (  ) current smoker  (  ) pack year  (  ) last cigarette date     Alcohol Use:      Sexual History:        MEDICATIONS  Home Medications:  acetaminophen 325 mg oral tablet: 2 tab(s) orally every 6 hours, As needed, Mild Pain (1 - 3) (08 Jun 2022 09:24)  Multiple Vitamins oral tablet: 1 tab(s) orally once a day (13 Jul 2022 03:15)  Synthroid 100 mcg (0.1 mg) oral tablet: 1 tab(s) orally once a day (13 Jul 2022 03:14)  Vitamin C: 1 tab(s) orally once a day (08 Jun 2022 09:24)  Vitamin D3: 1 tab(s) orally once a day (08 Jun 2022 09:24)  Zocor 40 mg oral tablet: 1 tab(s) orally once a day (at bedtime) (08 Jun 2022 09:24)      MEDICATIONS  (STANDING):  ascorbic acid 500 milliGRAM(s) Oral daily  cholecalciferol 1000 Unit(s) Oral daily  heparin  Infusion.  Unit(s)/Hr (11 mL/Hr) IV Continuous <Continuous>  HYDROmorphone  Injectable 0.2 milliGRAM(s) IV Push every 6 hours  levothyroxine 112 MICROGram(s) Oral daily  meropenem  IVPB      meropenem  IVPB 1000 milliGRAM(s) IV Intermittent every 12 hours  midodrine. 20 milliGRAM(s) Oral three times a day  multivitamin 1 Tablet(s) Oral daily  ondansetron Injectable 8 milliGRAM(s) IV Push every 8 hours  pantoprazole    Tablet 40 milliGRAM(s) Oral before breakfast  simvastatin 40 milliGRAM(s) Oral at bedtime  sodium bicarbonate  Infusion 0.123 mEq/kG/Hr (100 mL/Hr) IV Continuous <Continuous>  sodium chloride 1 Gram(s) Oral three times a day    MEDICATIONS  (PRN):  acetaminophen     Tablet .. 650 milliGRAM(s) Oral every 6 hours PRN Mild Pain (1 - 3), Moderate Pain (4 - 6)  haloperidol    Injectable 0.25 milliGRAM(s) IV Push every 6 hours PRN nausea/vomiting  heparin   Injectable 5000 Unit(s) IV Push every 6 hours PRN For aPTT less than 40  heparin   Injectable 2500 Unit(s) IV Push every 6 hours PRN For aPTT between 40 - 57  HYDROmorphone  Injectable 0.5 milliGRAM(s) IV Push every 3 hours PRN Severe Pain (7 - 10) / discomfort  melatonin 3 milliGRAM(s) Oral at bedtime PRN Insomnia  polyethylene glycol 3350 17 Gram(s) Oral daily PRN Constipation  senna 2 Tablet(s) Oral at bedtime PRN Constipation      ALLERGIES/INTOLERANCES:  Allergies  Nuts (Anaphylaxis)  penicillin (Angioedema; Urticaria)  sulfa drugs (Hives; Urticaria)    Intolerances      OBJECTIVE:  VITALS   Vital Signs Last 24 Hrs  T(C): 36.5 (20 Jul 2022 15:42), Max: 36.9 (20 Jul 2022 05:32)  T(F): 97.7 (20 Jul 2022 15:42), Max: 98.4 (20 Jul 2022 05:32)  HR: 92 (20 Jul 2022 15:42) (78 - 92)  BP: 97/67 (20 Jul 2022 15:42) (90/57 - 97/67)  BP(mean): --  RR: 18 (20 Jul 2022 15:42) (17 - 18)  SpO2: 100% (20 Jul 2022 15:42) (99% - 100%)    Parameters below as of 20 Jul 2022 15:42  Patient On (Oxygen Delivery Method): room air        PHYSICAL EXAM:  General: Well developed, in NAD   Head: atraumatic   Respiratory: non-laboured breathing, regular rate  GI: Nondistended   Integumentary: Warm and Dry   Psychiatric: Normal Affect     Neurological Exam:  Mental Status: Awake, not alert, does not attend to either side. Does not follow commands. No verbal output   Cranial Nerves: Pupils sluggish but reactive bilaterally. +BTT bilaterally, horizontal extraocular movements intact, looks spontaneously but not on prompting.    Motor:   Tone: flaccid  Motor: trace spontaneous movement of b/l feet otherwise no spontaneous movement   Pronator drift: unable to assess   Dysmetria: unable to assess   No truncal ataxia.    Tremor: No resting tremor, no myoclonus   Sensation: does not grimace to noxious stimuli,   Gait: unable to assess      LABORATORY:  CBC                       8.1    43.95 )-----------( 509      ( 20 Jul 2022 03:11 )             25.1     Chem 07-20    117<LL>  |  82<L>  |  62<H>  ----------------------------<  117<H>  5.2   |  18<L>  |  2.71<H>    Ca    7.4<L>      20 Jul 2022 03:11  Phos  3.9     07-20  Mg     2.10     07-20      LFTs   Coagulopathy PT/INR - ( 19 Jul 2022 11:49 )   PT: 14.4 sec;   INR: 1.24 ratio         PTT - ( 20 Jul 2022 10:00 )  PTT:45.2 sec  Lipid Panel 07-13 Chol 101 LDL -- HDL 38<L> Trig 157<H>  A1c   Cardiac enzymes     U/A   CSF  Immunological  Other    STUDIES & IMAGING:  Studies (EKG, EEG, EMG, etc):       Radiology (XR, CT, MR, U/S, TTE/MARCY):     HPI:  Patient is 66 F w/ 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 due to new b/l extensive DVT, concerning for progression of PE, hypotension and FTT. Neurology consulted as code stroke for sudden change in mental status. Patient A&O 3 and interactive, with sudden change around 1447 when RRT called. Patient hypotensive, recently on Heparin but stopped 4am today for planned paracentesis.     Rest of collateral per HPI as follows: Pt had a paracentesis last week and was off AC for 10 days (misunderstood instructions), developed worsening LE edema, SOB. Dopplers today with extensive DVT. Pt with inability to walk due to pain in LE as well as FTT. Not eating, drinking; acute on chronic abd pain, passing gas. US abd + for mild/moderate ascites. SBP 99 which is lower end of baseline in comparison to prior vitals while hospitalized. CHECO (SCr 2.0 compared to 0.8 last month. Na 123, most recent K on VBG wnl. WBC 30K, UA +.    LKN: 2:35pm 7/20/22  NIHSS 26, premrs indeterminate but presumably 1  Patient not a candidate for tpa as low suspicion for stroke       ROS: A 10-system ROS was performed and is negative except for those items noted above and/or in the HPI.    PAST MEDICAL & SURGICAL HISTORY:  Ascites      Hypothyroidism      GERD (gastroesophageal reflux disease)      Arthritis      HLD (hyperlipidemia)      Peritoneal carcinomatosis      S/P cholecystectomy      S/P tubal ligation      S/P bunionectomy  left foot      Incarcerated hernia      History of lung surgery  VATS w/ pleurX April 2022        FAMILY HISTORY:  FH: stroke (Grandparent)    FH: hypertension (Father, Mother)        SOCIAL HISTORY: SOCIAL HISTORY:     Marital Status: (  )   (  ) Single  (  )   (  )      Occupation:      Lives: (  ) alone  (  ) with children   (  ) with spouse  (  ) with parents  (  ) other     Illicit Drug Use: (  ) never used  (  ) other _____     Tobacco Use:  (  ) never smoked  (  ) former smoker  (  ) current smoker  (  ) pack year  (  ) last cigarette date     Alcohol Use:      Sexual History:        MEDICATIONS  Home Medications:  acetaminophen 325 mg oral tablet: 2 tab(s) orally every 6 hours, As needed, Mild Pain (1 - 3) (08 Jun 2022 09:24)  Multiple Vitamins oral tablet: 1 tab(s) orally once a day (13 Jul 2022 03:15)  Synthroid 100 mcg (0.1 mg) oral tablet: 1 tab(s) orally once a day (13 Jul 2022 03:14)  Vitamin C: 1 tab(s) orally once a day (08 Jun 2022 09:24)  Vitamin D3: 1 tab(s) orally once a day (08 Jun 2022 09:24)  Zocor 40 mg oral tablet: 1 tab(s) orally once a day (at bedtime) (08 Jun 2022 09:24)      MEDICATIONS  (STANDING):  ascorbic acid 500 milliGRAM(s) Oral daily  cholecalciferol 1000 Unit(s) Oral daily  heparin  Infusion.  Unit(s)/Hr (11 mL/Hr) IV Continuous <Continuous>  HYDROmorphone  Injectable 0.2 milliGRAM(s) IV Push every 6 hours  levothyroxine 112 MICROGram(s) Oral daily  meropenem  IVPB      meropenem  IVPB 1000 milliGRAM(s) IV Intermittent every 12 hours  midodrine. 20 milliGRAM(s) Oral three times a day  multivitamin 1 Tablet(s) Oral daily  ondansetron Injectable 8 milliGRAM(s) IV Push every 8 hours  pantoprazole    Tablet 40 milliGRAM(s) Oral before breakfast  simvastatin 40 milliGRAM(s) Oral at bedtime  sodium bicarbonate  Infusion 0.123 mEq/kG/Hr (100 mL/Hr) IV Continuous <Continuous>  sodium chloride 1 Gram(s) Oral three times a day    MEDICATIONS  (PRN):  acetaminophen     Tablet .. 650 milliGRAM(s) Oral every 6 hours PRN Mild Pain (1 - 3), Moderate Pain (4 - 6)  haloperidol    Injectable 0.25 milliGRAM(s) IV Push every 6 hours PRN nausea/vomiting  heparin   Injectable 5000 Unit(s) IV Push every 6 hours PRN For aPTT less than 40  heparin   Injectable 2500 Unit(s) IV Push every 6 hours PRN For aPTT between 40 - 57  HYDROmorphone  Injectable 0.5 milliGRAM(s) IV Push every 3 hours PRN Severe Pain (7 - 10) / discomfort  melatonin 3 milliGRAM(s) Oral at bedtime PRN Insomnia  polyethylene glycol 3350 17 Gram(s) Oral daily PRN Constipation  senna 2 Tablet(s) Oral at bedtime PRN Constipation      ALLERGIES/INTOLERANCES:  Allergies  Nuts (Anaphylaxis)  penicillin (Angioedema; Urticaria)  sulfa drugs (Hives; Urticaria)    Intolerances      OBJECTIVE:  VITALS   Vital Signs Last 24 Hrs  T(C): 36.5 (20 Jul 2022 15:42), Max: 36.9 (20 Jul 2022 05:32)  T(F): 97.7 (20 Jul 2022 15:42), Max: 98.4 (20 Jul 2022 05:32)  HR: 92 (20 Jul 2022 15:42) (78 - 92)  BP: 97/67 (20 Jul 2022 15:42) (90/57 - 97/67)  BP(mean): --  RR: 18 (20 Jul 2022 15:42) (17 - 18)  SpO2: 100% (20 Jul 2022 15:42) (99% - 100%)    Parameters below as of 20 Jul 2022 15:42  Patient On (Oxygen Delivery Method): room air        PHYSICAL EXAM:  General: Well developed, in NAD   Head: atraumatic   Respiratory: non-laboured breathing, regular rate  GI: Nondistended   Integumentary: Warm and Dry   Psychiatric: Normal Affect     Neurological Exam:  Mental Status: Awake, not alert, does not attend to either side. Does not follow commands. No verbal output   Cranial Nerves: Pupils sluggish but reactive bilaterally. +BTT bilaterally, horizontal extraocular movements intact, looks spontaneously but not on prompting.    Motor:   Tone: flaccid  Motor: trace spontaneous movement of b/l feet otherwise no spontaneous movement   Pronator drift: unable to assess   Dysmetria: unable to assess   No truncal ataxia.    Tremor: No resting tremor, no myoclonus   Sensation: does not grimace to noxious stimuli,   Gait: unable to assess      LABORATORY:  CBC                       8.1    43.95 )-----------( 509      ( 20 Jul 2022 03:11 )             25.1     Chem 07-20    117<LL>  |  82<L>  |  62<H>  ----------------------------<  117<H>  5.2   |  18<L>  |  2.71<H>    Ca    7.4<L>      20 Jul 2022 03:11  Phos  3.9     07-20  Mg     2.10     07-20      LFTs   Coagulopathy PT/INR - ( 19 Jul 2022 11:49 )   PT: 14.4 sec;   INR: 1.24 ratio         PTT - ( 20 Jul 2022 10:00 )  PTT:45.2 sec  Lipid Panel 07-13 Chol 101 LDL -- HDL 38<L> Trig 157<H>  A1c   Cardiac enzymes     U/A   CSF  Immunological  Other    STUDIES & IMAGING:  Studies (EKG, EEG, EMG, etc):       Radiology (XR, CT, MR, U/S, TTE/MARCY):

## 2022-07-20 NOTE — CHART NOTE - NSCHARTNOTEFT_GEN_A_CORE
Notified by RN patient is nauseous and vomiting. Discussed with Palliative recommending starting Haldol 0.25 Q IVP Q6PRN for nausea.   Discussed with . (Late documentation)    Notified by RN patient is nauseous and vomiting. Discussed with Palliative recommending starting Haldol 0.25 Q IVP Q6PRN for nausea.   Discussed with .    Xray of the abdomen ordered to evaluated for SBO.

## 2022-07-20 NOTE — PROGRESS NOTE ADULT - SUBJECTIVE AND OBJECTIVE BOX
Patient is a 66y old  Female who presents with a chief complaint of dvt, booker, electrolyte disorder (19 Jul 2022 16:10)      SUBJECTIVE / OVERNIGHT EVENTS:  Patient with alot of nausea and vomiting overnight. Still having abdominal pain. No SOB.     MEDICATIONS  (STANDING):  ascorbic acid 500 milliGRAM(s) Oral daily  cholecalciferol 1000 Unit(s) Oral daily  heparin  Infusion.  Unit(s)/Hr (11 mL/Hr) IV Continuous <Continuous>  levothyroxine 112 MICROGram(s) Oral daily  meropenem  IVPB      meropenem  IVPB 1000 milliGRAM(s) IV Intermittent every 12 hours  midodrine. 20 milliGRAM(s) Oral three times a day  multivitamin 1 Tablet(s) Oral daily  ondansetron Injectable 8 milliGRAM(s) IV Push every 8 hours  pantoprazole    Tablet 40 milliGRAM(s) Oral before breakfast  simvastatin 40 milliGRAM(s) Oral at bedtime  sodium bicarbonate  Infusion 0.123 mEq/kG/Hr (100 mL/Hr) IV Continuous <Continuous>  sodium chloride 1 Gram(s) Oral three times a day    MEDICATIONS  (PRN):  acetaminophen     Tablet .. 650 milliGRAM(s) Oral every 6 hours PRN Mild Pain (1 - 3), Moderate Pain (4 - 6)  haloperidol     Tablet 0.25 milliGRAM(s) Oral every 6 hours PRN nausea  heparin   Injectable 5000 Unit(s) IV Push every 6 hours PRN For aPTT less than 40  heparin   Injectable 2500 Unit(s) IV Push every 6 hours PRN For aPTT between 40 - 57  HYDROmorphone  Injectable 0.2 milliGRAM(s) IV Push every 3 hours PRN Severe Pain (7 - 10)  melatonin 3 milliGRAM(s) Oral at bedtime PRN Insomnia  polyethylene glycol 3350 17 Gram(s) Oral daily PRN Constipation  senna 2 Tablet(s) Oral at bedtime PRN Constipation      Vital Signs Last 24 Hrs  T(C): 36.6 (20 Jul 2022 12:36), Max: 36.9 (20 Jul 2022 05:32)  T(F): 97.8 (20 Jul 2022 12:36), Max: 98.4 (20 Jul 2022 05:32)  HR: 78 (20 Jul 2022 12:36) (78 - 91)  BP: 93/61 (20 Jul 2022 12:36) (85/55 - 96/59)  BP(mean): --  RR: 18 (20 Jul 2022 12:36) (17 - 18)  SpO2: 100% (20 Jul 2022 12:36) (98% - 100%)    Parameters below as of 20 Jul 2022 12:36  Patient On (Oxygen Delivery Method): room air      CAPILLARY BLOOD GLUCOSE        I&O's Summary    19 Jul 2022 07:01  -  20 Jul 2022 07:00  --------------------------------------------------------  IN: 0 mL / OUT: 400 mL / NET: -400 mL        PHYSICAL EXAM:  GENERAL: NAD, well-developed  HEAD:  Atraumatic, Normocephalic  EYES: EOMI, PERRLA, conjunctiva and sclera clear  NECK: Supple, No JVD  CHEST/LUNG: Clear to auscultation bilaterally; No wheeze  HEART: Regular rate and rhythm; No murmurs, rubs, or gallops  ABDOMEN: Soft, Nontender, distended; Bowel sounds present  EXTREMITIES:  2+ Peripheral Pulses, No clubbing, cyanosis, or edema  PSYCH: AAOx3  NEUROLOGY: non-focal  SKIN: No rashes or lesions    LABS:                        8.1    43.95 )-----------( 509      ( 20 Jul 2022 03:11 )             25.1     07-20    117<LL>  |  82<L>  |  62<H>  ----------------------------<  117<H>  5.2   |  18<L>  |  2.71<H>    Ca    7.4<L>      20 Jul 2022 03:11  Phos  3.9     07-20  Mg     2.10     07-20      PT/INR - ( 19 Jul 2022 11:49 )   PT: 14.4 sec;   INR: 1.24 ratio         PTT - ( 20 Jul 2022 10:00 )  PTT:45.2 sec          RADIOLOGY & ADDITIONAL TESTS:    Imaging Personally Reviewed: < from: Xray Abdomen 1 View PORTABLE -Urgent (Xray Abdomen 1 View PORTABLE -Urgent .) (07.20.22 @ 12:02) >  IMPRESSION:    Nonobstructive bowel gas pattern.      < end of copied text >      Consultant(s) Notes Reviewed:      Care Discussed with Consultants/Other Providers:

## 2022-07-20 NOTE — PROGRESS NOTE ADULT - PROBLEM SELECTOR PLAN 3
Stage IV  Unknown primary  [place] [facility] [place]  [facility]  Pt has expressed that she wants to focus on not having pain, inquiring about hospice services

## 2022-07-20 NOTE — PROGRESS NOTE ADULT - ASSESSMENT
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 Palliative Care consulted for complex symptom management in the setting of advanced malignancy.

## 2022-07-20 NOTE — CHART NOTE - NSCHARTNOTEFT_GEN_A_CORE
CT abdomen and chest findings discussed with  informed not to call surgery. Informed to discuss CT findings with Family. family requested to speak to Attendings regarding CT findings and GOC, Attending informed.     Daughter informed will discuss with family and attending and will determine if they want to pursue comfort measures only

## 2022-07-20 NOTE — PROGRESS NOTE ADULT - PROBLEM SELECTOR PLAN 6
creatinine trending upward.  renal follg, renal sono showed Normal appearance of the kidneys. No hydronephrosis.  Redemonstration of known malignant ascites.  c/w HCO3 gtt for AG metabolic acidosis  cont to monitor renal function  Renal consulted and follg  Cr stable, consider adjusting abx to reflect improved cr

## 2022-07-21 NOTE — PROVIDER CONTACT NOTE (CRITICAL VALUE NOTIFICATION) - NAME OF MD/NP/PA/DO NOTIFIED:
Gloria Larson
Amelie Larson
[place]
GABRIELLE Caraballoissa pager #[phone]
ROSAMARIA Red
Sravanthi Scott
Tele KANIKA East
Gloria Larson
Kita
ROSAMARIA Wallace
ROSAMARIA Wallace
Kita
Tele KANIKA East

## 2022-07-21 NOTE — CHART NOTE - NSCHARTNOTEFT_GEN_A_CORE
Received call from 7N requesting Palliative Team discuss GOC with patient.  Pt had 2 RRTs yesterday with decreased mental status.  Family would like to discuss GOC.

## 2022-07-21 NOTE — PROGRESS NOTE ADULT - SUBJECTIVE AND OBJECTIVE BOX
66y old  Female who presents with a chief complaint of dvt, booker, electrolyte disorder (15 Jul 2022 09:57)      Interval history:    lethargic after pain medication  RN reports alert and oriented x 4 earlier      Allergies:   Nuts (Anaphylaxis)  penicillin (Angioedema; Urticaria)  sulfa drugs (Hives; Urticaria)      Antimicrobials:    meropenem  IVPB    meropenem  IVPB 1000 every 12 hours    Vital Signs Last 24 Hrs  T(F): 97.4 (07-21-22 @ 11:48), Max: 97.6 (07-21-22 @ 06:24)  HR: 95 (07-21-22 @ 13:34)  BP: 100/62 (07-21-22 @ 13:34)  RR: 18 (07-21-22 @ 13:34)  SpO2: 96% (07-21-22 @ 13:34) (96% - 100%)    PHYSICAL EXAM:  non toxic, weak, alopecia  + port  lt sided pleurex.   slight distended abdomen  + haddad                             7.8    38.75 )-----------( 447      ( 21 Jul 2022 00:07 )             24.3 07-21    116  |  80  |  63  ----------------------------<  109  4.7   |  22  |  2.71  Ca    7.4      21 Jul 2022 00:07Phos  4.0     07-20Mg     2.00     07-20  TPro  5.7  /  Alb  1.7  /  TBili  0.4  /  DBili  x   /  AST  38  /  ALT  18  /  AlkPhos  215  07-21          blood culture 7/19 pending collection      Culture - Blood (collected 12 Jul 2022 19:00)  Source: .Blood Blood  Preliminary Report (14 Jul 2022 01:02):    No growth     Culture - Blood (collected 12 Jul 2022 18:40)  Source: .Blood Blood  Preliminary Report (14 Jul 2022 01:02):    No growth         Radiology:  < from: US Kidney and Bladder (07.14.22 @ 10:42) >  IMPRESSION:  Normal appearance of the kidneys. No hydronephrosis.    Redemonstration of known malignant ascites.      r< from: CT Abdomen and Pelvis No Cont (07.20.22 @ 15:53) >  IMPRESSION:  1. Stable noncontrast CT appearance of the thorax with left pleural   implants, loculated malignant pleural fluid, and extensive mediastinal   and left hilar adenopathy.  2. Enlarging dominant loculated fluid collection in the anterior   peritoneal cavity with increasing internal mottled gas. These findings   are suspicious for complications of superinfection or small bowel   perforation and/or fistulization. Patient is scheduled for paracentesis.  3. Extensive peritoneal carcinomatosis with serosal implants of the liver   and spleen.    --- End of Report ---            RUIZ IBARRA MD; Attending Radiologist  This document has been electronically signed. Jul 20 2022  4:32PM    < end of copied text >

## 2022-07-21 NOTE — CHART NOTE - NSCHARTNOTEFT_GEN_A_CORE
Gabriel Barney MD (PGY-3): Made aware by primary team MD and ACP, no longer pursuing paracentesis at this time due to patient's worsening condition. Re-consult as needed.

## 2022-07-21 NOTE — PROVIDER CONTACT NOTE (OTHER) - REASON
Lethargic. PO Intake. Labs. Heparin Infusion. Pleurx Catheter.
pt complains of choking when drinking thin liquids
Lethargic.
patient not tolerating medication vomiting

## 2022-07-21 NOTE — PROVIDER CONTACT NOTE (OTHER) - RECOMMENDATIONS
Assess patient. Review patient's orders, labs, history & plan. Review all of patient's medications. Adjust Dilaudid order.
possible change in nausea medication.
Assess patient. Review patient's orders, labs, history & plan. Address all of patient's issues and adjust orders.
Speech and swallow consult

## 2022-07-21 NOTE — PROGRESS NOTE ADULT - PROBLEM SELECTOR PLAN 2
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  -CT A/P on 7/20 Enlarging dominant loculated fluid collection in the anterior peritoneal cavity with increasing internal mottled gas. These findings are suspicious for complications of superinfection or small bowel perforation and/or fistulization.   -Procedure team was to do aspiration and cx but patient now unstable with bowel perforation so cancelled paracentesis.   -c/w meropenem.   Patient is DNR/DNI  F/U Palliative care recs  Patient's 2 Sons at the bedside on 7/21 and aware of grave condition  Prognosis guarded and very poor.

## 2022-07-21 NOTE — PROVIDER CONTACT NOTE (OTHER) - ASSESSMENT
Patient is Alert and but Lethargic at times. As per Night Shift RN, patient has been Lethargic since last night and all PO Medications and PO intake has been held. Patient opens eyes spontaneously and to sound. Patient said hello and how are you to his son but did not answer any of the orientation questions. RNs, PCAs, and RN Manager attempted to draw patient's Labs as ordered several times but were unsuccessful. Patient has an active order for Heparin Infusion and an active provider to RN order to hold Heparin Infusion. Heparin Infusion is and has not been infusing. Patient has a Pleurx Catheter but no order of when and how much to drain.
Patient is Lethargic. Patient has an active DNR & DNI status order. Patient's Heart Rhythm is Normal Sinus Rhythm / Sinus Tachycardia and Heart Rate is 90's - 110 on the cardiac monitor. Patient's Vital Signs: Temperature 97.9 F Oral, Heart Rate 99, Blood Pressure 105/62, Respiratory Rate 18 and O2 Saturation 100% Room Air. Patient has active PO Medications ordered.
Pt is A&Ox4. Pt noted to have episode of coughing after consuming thin liquids and pt states, "I feel like im choking."
patient abdomen remains firm. patient will not communicate with staff describing her symptoms. patient complains of pain however, can not specify. patient vital stable however, remain soft.

## 2022-07-21 NOTE — CONSULT NOTE ADULT - CONSULT REQUESTED DATE/TIME
20-Jul-2022 15:44
17-Jul-2022 13:55
18-Jul-2022 14:34
13-Jul-2022
13-Jul-2022 12:51
21-Jul-2022 16:39
13-Jul-2022 15:55
13-Jul-2022 18:06

## 2022-07-21 NOTE — PROGRESS NOTE ADULT - PROBLEM SELECTOR PLAN 1
poss new PE in addition to know PE  -TTE showed Endocardium not well visualized; grossly normal left  ventricular systolic function. Mild diastolic dysfunction (Stage  --hold heparin gtt due to bowel perforation

## 2022-07-21 NOTE — PROGRESS NOTE ADULT - PROBLEM SELECTOR PLAN 5
creatinine trending upward.  renal follg, renal sono showed Normal appearance of the kidneys. No hydronephrosis.  Redemonstration of known malignant ascites.  c/w HCO3 gtt for AG metabolic acidosis  cont to monitor renal function  Renal consulted and follg

## 2022-07-21 NOTE — CONSULT NOTE ADULT - REASON FOR ADMISSION
dvt, booker, electrolyte disorder

## 2022-07-21 NOTE — PROGRESS NOTE ADULT - PROBLEM SELECTOR PLAN 4
worsening  Multifactorial from hypervolemia and intravascular volume depletion from poor PO intake  not tolerating NACL tabs b/c AMS  Will try hypertonic solution to improve mental status.  SPoke to nephrology on 7/21   can do paracentesis to improve Na due to bowel perf

## 2022-07-21 NOTE — PROGRESS NOTE ADULT - ASSESSMENT
66 y.o.  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 Patient was on Heparin gtt and was c/o abdominal pain believed to be due to worsened ascites. Pain control was difficult due to low SBPs and nausea/vomiting. Patient also has severe AG metabolic acidosis with worsening CHECO, hyponatremia ( Na 117) and lactate of 4. She was placed on a HCO3 gtt which improved the acidosis but hyponatremia and CHECO persists. Paracentesis was delayed due to lack of availability of IR or procedure team to do procedure and hemodynamic instability of patient. MICU was consulted but rejected transfer to ICU. On 7/20 patients mental status became altered suddenly where she remained unresponsive to verbal or pain stimuli but maintaining a blank stare. A RRT and stroke code was called. CT head was negative for CVA as per was on a heparin gtt. CT A/P however did show a bowel perforation. Patient not a surgical candidate. Spoke to Daughter Roberta and 2 sons and the want conservative management. Patient made DNR/DNI. Palliative care following.

## 2022-07-21 NOTE — PROVIDER CONTACT NOTE (OTHER) - BACKGROUND
Pt is 66 female sent in from [facility] for for b/l DVT, progression of PE, hypotension, and FTT. Pt has PMH of HLD, GERD, and ascites.
Patient admitted for Hypotension, Sepsis, Pain, Multiple Lesions of Metastatic Malignancy.
Patient admitted for Hypotension, Sepsis, Pain, Multiple Lesions of Metastatic Malignancy.

## 2022-07-21 NOTE — PROVIDER CONTACT NOTE (CRITICAL VALUE NOTIFICATION) - PERSON GIVING RESULT:
JONNY Maynard
Hematology JULIA Ledezma
Lab  - C. Cartagena
Lab, BENEDICT De Leon
ATNNER Andres, lab
MANINDER Cameron
 Y
Anny Phillips
Lab
Sodium 117
Alan, RAJENDRA Neff
Tiffani
DREA Reynoso

## 2022-07-21 NOTE — PROVIDER CONTACT NOTE (OTHER) - SITUATION
patient not tolerating medication patient vomiting within mins of administation of medication.
Patient noted to be Lethargic. Patient unable to tolerate PO intake. Unable to draw patient's Labs. Heparin Infusion ordered but not infusing due to provider RN order to hold Heparin Infusion.
Patient noted to be Lethargic again.
pt complains of choking when drinking thin liquids.

## 2022-07-21 NOTE — CHART NOTE - NSCHARTNOTEFT_GEN_A_CORE
Neurology provider called at bedside after RRT for similar unresponsive episode along with staring episode lasting over 10 minutes. Exams are as follows:    Extremities: LUE more edematous than R  MS: Eyes open spontaneously. Does not respond to verbal stimuli. Awake but not alert. Does not follow any commands  CN: PERRL 3mm b/l. Mild R ptosis but no lower face asymmetry. Has her mouth open. Eyes staring to pt's L side, but able to overcome spontaneously and look toward R. EOMI grossly inatct. BTT intact b/l. Corneal intact b/l. Unable to take out tongue upon command  Motor: All extremities fall to bed. Flaccid tone. Only moves L toes upon noxious stimuli but does not fully withdraw. Otherwise no extremities withdraw to noxious stimuli  Sensation: Does not grimace to noxious stimuli throughout  Reflex: 2 throughout b/l biceps. 1 on b/l BR. Absent patellar reflex b/l. Toes are upgoing b/l  Gait: Deferred    Impression: Similar unresponsive episode as prior RRT/code stroke at 14:37 on 7/21, change in exam only includes mild R ptosis, but otherwise similar findings. Etiology of unresponsiveness remain unclear, but could still be toxic metabolic vs less likely dilaudid given 0.2 mg IVP @ 23:22 hr on 7/21 vs r/o ischemic stroke vs r/o seizure (low suspicion given prolonged episode of staring episode, stable vital with baseline SBP ~100s).     Recommendations  [] F/u RRT labs   [] Repeat CTH w/o since contrast was deferred due to GOC  [] Monitor off AEDs at this time  [] MR brain with and without contrast if in line with GOC  [] vEEG if in line with GOC   [] Neurocheck and vital per unit protocol  [] Will follow up in AM  [] C/w toxic metabolic and infectious care per primary team    Dana Oquendo  Neurology PGY3

## 2022-07-21 NOTE — PROVIDER CONTACT NOTE (CRITICAL VALUE NOTIFICATION) - TEST AND RESULT REPORTED:
Sodium 116
Elio
sodium 117
Na 118
Blood Gas- Lactate 4.1 Sodium 117
WBC 47
WBC: 43.94
aptt 127.4
APTT >200
Sodium  serum 120
aPPT 146.9
Aptt 141.1
WBC 48.43, glucose 32

## 2022-07-21 NOTE — PROVIDER CONTACT NOTE (CRITICAL VALUE NOTIFICATION) - RECOMMENDATIONS
Follow heparin nomogram
follow heparin nomogram
Heparin gtt changed to heparin in sodium chloride and sodium chloride tablets ordered.
Hypoglycemia protocol.
0.9 NS 1000ml Bolus ordered to be administered to be administered upon return to unit from CT Scan
None  at this time
Provider made aware of Na level
Provider notified- No further interventions at this time, Pt currently on Meropenem IV Abx
Repeat aPPT to rule out error
Provider made aware of WBC count

## 2022-07-21 NOTE — PROGRESS NOTE ADULT - SUBJECTIVE AND OBJECTIVE BOX
Patient is a 66y old  Female who presents with a chief complaint of dvt, booker, electrolyte disorder (20 Jul 2022 16:37)      SUBJECTIVE / OVERNIGHT EVENTS:  Patient lethargic not following commands. retracts to pain stimuli.     MEDICATIONS  (STANDING):  ascorbic acid 500 milliGRAM(s) Oral daily  cholecalciferol 1000 Unit(s) Oral daily  heparin  Infusion.  Unit(s)/Hr (11 mL/Hr) IV Continuous <Continuous>  HYDROmorphone  Injectable 0.2 milliGRAM(s) IV Push every 6 hours  levothyroxine 112 MICROGram(s) Oral daily  meropenem  IVPB      meropenem  IVPB 1000 milliGRAM(s) IV Intermittent every 12 hours  midodrine. 20 milliGRAM(s) Oral three times a day  multivitamin 1 Tablet(s) Oral daily  ondansetron Injectable 8 milliGRAM(s) IV Push every 8 hours  pantoprazole    Tablet 40 milliGRAM(s) Oral before breakfast  simvastatin 40 milliGRAM(s) Oral at bedtime  sodium bicarbonate  Infusion 0.123 mEq/kG/Hr (100 mL/Hr) IV Continuous <Continuous>  sodium chloride 1 Gram(s) Oral three times a day    MEDICATIONS  (PRN):  acetaminophen     Tablet .. 650 milliGRAM(s) Oral every 6 hours PRN Mild Pain (1 - 3), Moderate Pain (4 - 6)  haloperidol    Injectable 0.25 milliGRAM(s) IV Push every 6 hours PRN nausea/vomiting  heparin   Injectable 5000 Unit(s) IV Push every 6 hours PRN For aPTT less than 40  heparin   Injectable 2500 Unit(s) IV Push every 6 hours PRN For aPTT between 40 - 57  HYDROmorphone  Injectable 0.5 milliGRAM(s) IV Push every 3 hours PRN Severe Pain (7 - 10) / discomfort  melatonin 3 milliGRAM(s) Oral at bedtime PRN Insomnia  polyethylene glycol 3350 17 Gram(s) Oral daily PRN Constipation  senna 2 Tablet(s) Oral at bedtime PRN Constipation      Vital Signs Last 24 Hrs  T(C): 36.4 (21 Jul 2022 06:24), Max: 36.6 (20 Jul 2022 12:36)  T(F): 97.6 (21 Jul 2022 06:24), Max: 97.8 (20 Jul 2022 12:36)  HR: 93 (21 Jul 2022 06:24) (72 - 97)  BP: 94/59 (21 Jul 2022 06:24) (93/61 - 102/65)  BP(mean): --  RR: 16 (21 Jul 2022 06:24) (16 - 20)  SpO2: 98% (21 Jul 2022 06:24) (98% - 100%)    Parameters below as of 21 Jul 2022 06:24  Patient On (Oxygen Delivery Method): room air      CAPILLARY BLOOD GLUCOSE      POCT Blood Glucose.: 126 mg/dL (20 Jul 2022 23:42)  POCT Blood Glucose.: 112 mg/dL (20 Jul 2022 14:47)    I&O's Summary    20 Jul 2022 07:01  -  21 Jul 2022 07:00  --------------------------------------------------------  IN: 2000 mL / OUT: 1000 mL / NET: 1000 mL        PHYSICAL EXAM:  GENERAL: NAD, well-developed  HEAD:  Atraumatic, Normocephalic  EYES: EOMI, PERRLA, conjunctiva and sclera clear  NECK: Supple, No JVD  CHEST/LUNG: Clear to auscultation bilaterally; No wheeze  HEART: Regular rate and rhythm; No murmurs, rubs, or gallops  ABDOMEN: Soft, Nontender, Nondistended; Bowel sounds present  EXTREMITIES:  2+ Peripheral Pulses, No clubbing, cyanosis, or edema  PSYCH: AAOx3  NEUROLOGY: non-focal  SKIN: No rashes or lesions    LABS:                        7.8    38.75 )-----------( 447      ( 21 Jul 2022 00:07 )             24.3     07-21    116<LL>  |  80<L>  |  63<H>  ----------------------------<  109<H>  4.7   |  22  |  2.71<H>    Ca    7.4<L>      21 Jul 2022 00:07  Phos  4.0     07-20  Mg     2.00     07-20    TPro  5.7<L>  /  Alb  1.7<L>  /  TBili  0.4  /  DBili  x   /  AST  38<H>  /  ALT  18  /  AlkPhos  215<H>  07-21    PT/INR - ( 20 Jul 2022 15:15 )   PT: 12.5 sec;   INR: 1.08 ratio         PTT - ( 21 Jul 2022 00:07 )  PTT:29.9 sec          RADIOLOGY & ADDITIONAL TESTS:    Imaging Personally Reviewed:    Consultant(s) Notes Reviewed:      Care Discussed with Consultants/Other Providers:   Patient is a 66y old  Female who presents with a chief complaint of dvt, booker, electrolyte disorder (20 Jul 2022 16:37)      SUBJECTIVE / OVERNIGHT EVENTS:  Patient lethargic not following commands. retracts to pain stimuli.     MEDICATIONS  (STANDING):  ascorbic acid 500 milliGRAM(s) Oral daily  cholecalciferol 1000 Unit(s) Oral daily  heparin  Infusion.  Unit(s)/Hr (11 mL/Hr) IV Continuous <Continuous>  HYDROmorphone  Injectable 0.2 milliGRAM(s) IV Push every 6 hours  levothyroxine 112 MICROGram(s) Oral daily  meropenem  IVPB      meropenem  IVPB 1000 milliGRAM(s) IV Intermittent every 12 hours  midodrine. 20 milliGRAM(s) Oral three times a day  multivitamin 1 Tablet(s) Oral daily  ondansetron Injectable 8 milliGRAM(s) IV Push every 8 hours  pantoprazole    Tablet 40 milliGRAM(s) Oral before breakfast  simvastatin 40 milliGRAM(s) Oral at bedtime  sodium bicarbonate  Infusion 0.123 mEq/kG/Hr (100 mL/Hr) IV Continuous <Continuous>  sodium chloride 1 Gram(s) Oral three times a day    MEDICATIONS  (PRN):  acetaminophen     Tablet .. 650 milliGRAM(s) Oral every 6 hours PRN Mild Pain (1 - 3), Moderate Pain (4 - 6)  haloperidol    Injectable 0.25 milliGRAM(s) IV Push every 6 hours PRN nausea/vomiting  heparin   Injectable 5000 Unit(s) IV Push every 6 hours PRN For aPTT less than 40  heparin   Injectable 2500 Unit(s) IV Push every 6 hours PRN For aPTT between 40 - 57  HYDROmorphone  Injectable 0.5 milliGRAM(s) IV Push every 3 hours PRN Severe Pain (7 - 10) / discomfort  melatonin 3 milliGRAM(s) Oral at bedtime PRN Insomnia  polyethylene glycol 3350 17 Gram(s) Oral daily PRN Constipation  senna 2 Tablet(s) Oral at bedtime PRN Constipation      Vital Signs Last 24 Hrs  T(C): 36.4 (21 Jul 2022 06:24), Max: 36.6 (20 Jul 2022 12:36)  T(F): 97.6 (21 Jul 2022 06:24), Max: 97.8 (20 Jul 2022 12:36)  HR: 93 (21 Jul 2022 06:24) (72 - 97)  BP: 94/59 (21 Jul 2022 06:24) (93/61 - 102/65)  BP(mean): --  RR: 16 (21 Jul 2022 06:24) (16 - 20)  SpO2: 98% (21 Jul 2022 06:24) (98% - 100%)    Parameters below as of 21 Jul 2022 06:24  Patient On (Oxygen Delivery Method): room air      CAPILLARY BLOOD GLUCOSE      POCT Blood Glucose.: 126 mg/dL (20 Jul 2022 23:42)  POCT Blood Glucose.: 112 mg/dL (20 Jul 2022 14:47)    I&O's Summary    20 Jul 2022 07:01  -  21 Jul 2022 07:00  --------------------------------------------------------  IN: 2000 mL / OUT: 1000 mL / NET: 1000 mL        PHYSICAL EXAM:  GENERAL: NAD, well-developed  HEAD:  Atraumatic, Normocephalic  EYES: EOMI, PERRLA, conjunctiva and sclera clear  NECK: Supple, No JVD  CHEST/LUNG: Clear to auscultation bilaterally; No wheeze  HEART: Regular rate and rhythm; No murmurs, rubs, or gallops  ABDOMEN: Soft, Nontender, Nondistended; Bowel sounds present  EXTREMITIES:  2+ Peripheral Pulses, No clubbing, cyanosis, or edema  PSYCH: AAOx3  NEUROLOGY: non-focal  SKIN: No rashes or lesions    LABS:                        7.8    38.75 )-----------( 447      ( 21 Jul 2022 00:07 )             24.3     07-21    116<LL>  |  80<L>  |  63<H>  ----------------------------<  109<H>  4.7   |  22  |  2.71<H>    Ca    7.4<L>      21 Jul 2022 00:07  Phos  4.0     07-20  Mg     2.00     07-20    TPro  5.7<L>  /  Alb  1.7<L>  /  TBili  0.4  /  DBili  x   /  AST  38<H>  /  ALT  18  /  AlkPhos  215<H>  07-21    PT/INR - ( 20 Jul 2022 15:15 )   PT: 12.5 sec;   INR: 1.08 ratio         PTT - ( 21 Jul 2022 00:07 )  PTT:29.9 sec          RADIOLOGY & ADDITIONAL TESTS:    Imaging Personally Reviewed: < from: CT Chest No Cont (07.21.22 @ 00:59) >  IMPRESSION:  1.  Essentially unchanged CT when compared to CT chest from the day   prior. No new consolidation or pneumothorax.  2.  Nondiagnostic study for evaluation of known PE due to lack of   intravenous contrast.    < end of copied text >    < from: CT Abdomen and Pelvis No Cont (07.20.22 @ 15:53) >    IMPRESSION:  1. Stable noncontrast CT appearance of the thorax with left pleural   implants, loculated malignant pleural fluid, and extensive mediastinal   and left hilar adenopathy.  2. Enlarging dominant loculated fluid collection in the anterior   peritoneal cavity with increasing internal mottled gas. These findings   are suspicious for complications of superinfection or small bowel   perforation and/or fistulization. Patient is scheduled for paracentesis.  3. Extensive peritoneal carcinomatosis with serosal implants of the liver   and spleen.    < end of copied text >      Consultant(s) Notes Reviewed:      Care Discussed with Consultants/Other Providers:

## 2022-07-21 NOTE — PROVIDER CONTACT NOTE (OTHER) - ACTION/TREATMENT ORDERED:
xray of abdomen.
None at this time,
PA assessed patient & ordered to keep patient strict NPO while patient is Lethargic. PA ordered to hold off on Labs & reviewed Labs. PA ordered to continue to hold Heparin & do not drain Pleurx.
PA aware & assessed patient & ordered Palliative Consult for Comfort Measures. PA states no need for emergency since patient's mental status varies. PA ordered to hold all PO medications.

## 2022-07-21 NOTE — PROGRESS NOTE ADULT - ASSESSMENT
This is a 66 year old female with a past medical history of  Stage IV carcinoma of unknown primary, malignant ascites and pleural effusion s/p pleural pleurix catheter, recent dx of PE/DVT on Eliquis admitted 7/13 with concern for progression of PE, hypotension and FTT. Given a positive UA, SIRS criteria being met (HR > 90, RR > 20, elevated WBC), and low BP, Treated with empiric broad spectrum antibiotics  CT chest/ abd 7/16 increased in PEs, large abd collection enhancing - malignant ascites vs abscess  Paracentesis planned  Blood cultures, urine cultures neg  WBC continued to rise- reaction to malignancy vs infectiion  antibiotics broadened for possible SBP to meropenem  paracentesis now deferred   Creat elevated    Suggest:    decreased meropenem 500 mg  iv q 12 h

## 2022-07-21 NOTE — PROVIDER CONTACT NOTE (CRITICAL VALUE NOTIFICATION) - ASSESSMENT
Na 118. Labs drawn mid rapid progressed to code stroke. Provider at the bedside
Pt A&Ox4. Complains of pain and discomfort. Denies SOB.
heparin paused as per nomogram
Patient is asymptomatic
Pt is A&Ox4. Complains of SOB and bilateral leg pain.
WBC 40.97. Labs drawn mid rapid progressed to code stroke. Provider at the bedside
No s/s of bleeding.
WBC 43.95
sodium 117
Pt A&Ox4. Complains of pain and discomfort. Denies SOB. No signs or symptoms of excessive bruising or bleeding.
Pt s/p RRT for AMS, Pt unresponsive to verbal or noxious stimuli,  Na 116 Labs drawn during RRT
patient A&Ox4 no s/s of bleeding, denies chest pain/sob
Pt s/p RRT for AMS, Pt unresponsive to verbal or noxious stimuli,  Na 117, Labs drawn during RRT

## 2022-07-21 NOTE — PROVIDER CONTACT NOTE (CRITICAL VALUE NOTIFICATION) - BACKGROUND
PMH of stage IV carcinoma, malignant ascites and pleural effusion. presented with extensive DVT concern for progression of PE and hypotension. Pt was RRT and code stroke
PMH of stage IV carcinoma, malignant ascites and pleural effusion. presented with extensive DVT concern for progression of PE and hypotension. Pt was RRT and code stroke
pt presented with b/l DVT concern for progression of PE and hypotension. pmh of ascites
PMH of stage IV carcinoma, malignant ascites and pleural effusion. presented with extensive DVT concern for progression of PE and hypotension
Pt is 66 year old female who came in with b/l extensive DVT, hypotension, and FTT. PMH of carcinomatosis, HLD, hypothyroidism, and acites.
PMH of stage IV carcinoma, malignant ascites and pleural effusion. presented with extensive DVT concern for progression of PE and hypotension.
Heparin gtt for DVT
Pt is 66 year old female who came in with b/l extensive DVT, hypotension, and FTT. PMH of carcinomatosis, HLD, hypothyroidism, and ascites.
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   + DVT - c/w heparin gtt
PMH of stage IV carcinoma, malignant ascites and pleural effusion. presented with extensive DVT concern for progression of PE and hypotension.
pmh of Stage IV carcinoma with malignant ascites
Pt is 66 year old female who came in with b/l extensive DVT, hypotension, and FTT. PMH of carcinomatosis, HLD, hypothyroidism, and ascites.
Admit Diagnosis: Hypotension

## 2022-07-21 NOTE — RAPID RESPONSE TEAM SUMMARY - NSADDTLFINDINGSRRT_GEN_ALL_CORE
Given similar episode with normal CTH and neuro already following would defer repeat/different imaging to neuro/primary team in AM. Would recheck CBC, CMP, VBG and discuss acute changes with neuro team. No interventions performed.

## 2022-07-21 NOTE — CHART NOTE - NSCHARTNOTEFT_GEN_A_CORE
Late Entry ACP Note Post RRT    S/p RRT for altered mental status, had same episodes earlier day shift resulting in RRT.  Spoke to donna Vallejo ([phone])  to further discuss GOC already established by Palliative and primary team.  Son stated will contact sister and possibly will come to the hospital.

## 2022-07-21 NOTE — CONSULT NOTE ADULT - SUBJECTIVE AND OBJECTIVE BOX
CC: 66y old Female admitted with a chief complaint of dvt, booker, electrolyte disorder, now    HPI: 66 F w/ Stage IV adenocarcinoma of unknown primary with malignant ascites and pleural effusion s/p pleural pleurix catheter, last chemotherapy 2 weeks ago with Gemzar/Abraxane, recent dx of PE/DVT on Eliquis sent in from [facility] due to new b/l extensive DVT, concerning for progression of PE, hypotension and FTT. Pt had a paracentesis last week and was off AC for 10 days (misunderstood instructions), developed worsening LE edema, SOB. Not eating, drinking; acute on chronic abd pain, passing gas. 7/20 patients mental status became altered suddenly where she remained unresponsive to verbal or pain stimuli but maintaining a blank stare. A RRT and stroke code was called. CT head was negative for CVA. CT A/P demonstrates a loculated fluid collection with concern for bowel perforation, also seen on CT 4 days ago. Paracentesis for today cancelled as patient has declining status. Family wants conservative management. Patient made DNR/DNI. Palliative care following.           PMHx: Ascites    Intra-abdominal and pelvic swelling of mass or lump    Hypothyroidism    GERD (gastroesophageal reflux disease)    Arthritis    HLD (hyperlipidemia)    Peritoneal carcinomatosis      PSHx: S/P cholecystectomy    S/P tubal ligation    S/P bunionectomy    Incarcerated hernia    History of lung surgery      Medications (inpatient): ascorbic acid 500 milliGRAM(s) Oral daily  cholecalciferol 1000 Unit(s) Oral daily  HYDROmorphone  Injectable 0.2 milliGRAM(s) IV Push every 6 hours  levothyroxine 112 MICROGram(s) Oral daily  meropenem  IVPB      meropenem  IVPB 1000 milliGRAM(s) IV Intermittent every 12 hours  midodrine. 20 milliGRAM(s) Oral three times a day  multivitamin 1 Tablet(s) Oral daily  ondansetron Injectable 8 milliGRAM(s) IV Push every 8 hours  pantoprazole    Tablet 40 milliGRAM(s) Oral before breakfast  simvastatin 40 milliGRAM(s) Oral at bedtime  sodium bicarbonate  Infusion 0.123 mEq/kG/Hr IV Continuous <Continuous>  sodium chloride 1 Gram(s) Oral three times a day    Medications (PRN):acetaminophen     Tablet .. 650 milliGRAM(s) Oral every 6 hours PRN  haloperidol    Injectable 0.25 milliGRAM(s) IV Push every 6 hours PRN  HYDROmorphone  Injectable 0.5 milliGRAM(s) IV Push every 3 hours PRN  melatonin 3 milliGRAM(s) Oral at bedtime PRN  polyethylene glycol 3350 17 Gram(s) Oral daily PRN  senna 2 Tablet(s) Oral at bedtime PRN    Allergies: Nuts (Anaphylaxis)  penicillin (Angioedema; Urticaria)  sulfa drugs (Hives; Urticaria)  (Intolerances: )  Social Hx:   Family Hx: FH: stroke (Grandparent)    FH: hypertension (Father, Mother)        Physical Exam  T(C): 36.3  HR: 95 (72 - 99)  BP: 100/62 (94/58 - 102/65)  RR: 18 (16 - 20)  SpO2: 96% (96% - 100%)  Tmax: T(C): , Max: 36.4 (07-21-22 @ 06:24)    07-20-22  -  07-21-22  --------------------------------------------------------  IN:    Sodium Bicarbonate: 1000 mL    Sodium Chloride 0.9% Bolus: 1000 mL  Total IN: 2000 mL    OUT:    Indwelling Catheter - Urethral (mL): 400 mL    Voided (mL): 600 mL  Total OUT: 1000 mL    Total NET: 1000 mL      07-21-22  -  07-21-22  --------------------------------------------------------  IN:  Total IN: 0 mL    OUT:    Indwelling Catheter - Urethral (mL): 600 mL  Total OUT: 600 mL    Total NET: -600 mL        General: unresponsive, cachectic  Neuro: unarousable, does not move to physical exam  Respiratory: airway patent, respirations unlabored  CVS: regular rate and rhythm  Abdomen: soft, non distended, unable to assess tenderness but patient does not appear peritoneal  Skin: warm, dry, appropriate color    Labs:                        7.8    38.75 )-----------( 447      ( 21 Jul 2022 00:07 )             24.3     PT/INR - ( 20 Jul 2022 15:15 )   PT: 12.5 sec;   INR: 1.08 ratio         PTT - ( 21 Jul 2022 00:07 )  PTT:29.9 sec  07-21    116<LL>  |  80<L>  |  63<H>  ----------------------------<  109<H>  4.7   |  22  |  2.71<H>    Ca    7.4<L>      21 Jul 2022 00:07  Phos  4.0     07-20  Mg     2.00     07-20    TPro  5.7<L>  /  Alb  1.7<L>  /  TBili  0.4  /  DBili  x   /  AST  38<H>  /  ALT  18  /  AlkPhos  215<H>  07-21      ABG - ( 20 Jul 2022 15:15 )  pH, Arterial: 7.43  pH, Blood: x     /  pCO2: 31    /  pO2: 141   / HCO3: 21    / Base Excess: -3.2  /  SaO2: 99.4                  Imaging and other studies:    < from: CT Abdomen and Pelvis No Cont (07.20.22 @ 15:53) >    BOWEL: Mild stool throughout the colon. There is encasement of mid small   bowel loops by the dominant loculated peritoneal collection. No bowel   obstruction.  PERITONEUM: There has been slight overall increase in size of a large   loculated tracking collection in the anterior abdomen and pelvis,   extending to the right lateral liver dome. There has been interval   increase in gas bubbles throughout the loculated collection, which may be   related to superinfection or recent paracentesis.    Again is noted soft tissue irregularity and calcification along the   serosal margin of the liver and spleen, compatible with peritoneal   implants. There is extensive soft tissue nodularity throughout the   mesenteric leaves, compatible with implants. Again are noted loculated   fluid collections in the lesser sac and left upper quadrant along the   posterior gastric fundus and body.  VESSELS: Within normal limits.  RETROPERITONEUM/LYMPH NODES: No lymphadenopathy.  ABDOMINAL WALL: Within normal limits.  BONES: Within normal limits. Multiple loose bodies in the right shoulder   joint.    IMPRESSION:  1. Stable noncontrast CT appearance of the thorax with left pleural   implants, loculated malignant pleural fluid, and extensive mediastinal   and left hilar adenopathy.  2. Enlarging dominant loculated fluid collection in the anterior   peritoneal cavity with increasing internal mottled gas. These findings   are suspicious for complications of superinfection or small bowel   perforation and/or fistulization. Patient is scheduled for paracentesis.  3. Extensive peritoneal carcinomatosis with serosal implants of the liver   and spleen.    < end of copied text >

## 2022-07-21 NOTE — CONSULT NOTE ADULT - ASSESSMENT
66F with metastatic adenocarcinoma of unknown primary, malignant loculated peritoneal fluid collection, concern for abscess vs. bowel perforation. Unable to differentiate on imaging vs. exam    -patient not a surgical candidate, would not recommend ex lap to determine if bowel is perforated  -continue comfort care measures and GOC conversations with family  -no acute surgical intervention at this time  -please call back with any questions    Charmaine Miller, PGY3  B team surgery  [phone]

## 2022-07-21 NOTE — PROVIDER CONTACT NOTE (CRITICAL VALUE NOTIFICATION) - ACTION/TREATMENT ORDERED:
provider notified, continuing IV sodium bicarbonate
Provider notified- No further interventions at this time
Nursing care continue.Call bell within reach
Provider notified, heparin paused as per nomogram
Repeat aPPT
follow heparin nomogram
Hypoglycemia protocol
Provider notified
Follow heparin nomogram
Provider notified
Provider notified
0.9 NS 1000ml Bolus ordered to be administered
Heparin gtt changed to heparin in sodium chloride and sodium chloride tablets ordered.

## 2022-07-22 NOTE — PROGRESS NOTE ADULT - SUBJECTIVE AND OBJECTIVE BOX
Patient is a 66y old  Female who presents with a chief complaint of dvt, booker, electrolyte disorder (21 Jul 2022 16:39)      SUBJECTIVE / OVERNIGHT EVENTS:  Patient awake and follows movements but nonverbal. no active issues overnight. unable to obtain ROS due to medical condition.     MEDICATIONS  (STANDING):  ascorbic acid 500 milliGRAM(s) Oral daily  cholecalciferol 1000 Unit(s) Oral daily  HYDROmorphone  Injectable 0.2 milliGRAM(s) IV Push every 6 hours  levothyroxine 112 MICROGram(s) Oral daily  meropenem  IVPB      meropenem  IVPB 1000 milliGRAM(s) IV Intermittent every 12 hours  midodrine. 20 milliGRAM(s) Oral three times a day  multivitamin 1 Tablet(s) Oral daily  ondansetron Injectable 8 milliGRAM(s) IV Push every 8 hours  pantoprazole    Tablet 40 milliGRAM(s) Oral before breakfast  simvastatin 40 milliGRAM(s) Oral at bedtime  sodium bicarbonate  Infusion 0.123 mEq/kG/Hr (100 mL/Hr) IV Continuous <Continuous>  sodium chloride 1 Gram(s) Oral three times a day    MEDICATIONS  (PRN):  acetaminophen     Tablet .. 650 milliGRAM(s) Oral every 6 hours PRN Mild Pain (1 - 3), Moderate Pain (4 - 6)  haloperidol    Injectable 0.25 milliGRAM(s) IV Push every 6 hours PRN nausea/vomiting  HYDROmorphone  Injectable 0.5 milliGRAM(s) IV Push every 3 hours PRN Severe Pain (7 - 10) / discomfort  melatonin 3 milliGRAM(s) Oral at bedtime PRN Insomnia  polyethylene glycol 3350 17 Gram(s) Oral daily PRN Constipation  senna 2 Tablet(s) Oral at bedtime PRN Constipation      Vital Signs Last 24 Hrs  T(C): 37.2 (22 Jul 2022 11:26), Max: 37.2 (22 Jul 2022 11:26)  T(F): 99 (22 Jul 2022 11:26), Max: 99 (22 Jul 2022 11:26)  HR: 123 (22 Jul 2022 11:26) (94 - 123)  BP: 95/54 (22 Jul 2022 11:26) (89/56 - 105/62)  BP(mean): --  RR: 18 (22 Jul 2022 11:26) (18 - 18)  SpO2: 96% (22 Jul 2022 11:26) (96% - 100%)    Parameters below as of 22 Jul 2022 11:26  Patient On (Oxygen Delivery Method): room air      CAPILLARY BLOOD GLUCOSE        I&O's Summary    21 Jul 2022 07:01  -  22 Jul 2022 07:00  --------------------------------------------------------  IN: 50 mL / OUT: 950 mL / NET: -900 mL        PHYSICAL EXAM:  GENERAL: NAD, well-developed  HEAD:  Atraumatic, Normocephalic  EYES: EOMI, PERRLA, conjunctiva and sclera clear  NECK: Supple, No JVD  CHEST/LUNG: Clear to auscultation bilaterally; No wheeze  HEART: Regular rate and rhythm; No murmurs, rubs, or gallops  ABDOMEN: Soft, Nontender, distended and firm mostly in Right mid to lower quadrant; Bowel sounds present  EXTREMITIES:  B/L LE edema R >L 2+ Peripheral Pulses, No clubbing, or cyanosis  PSYCH: AAOx3  NEUROLOGY: non-focal  SKIN: No rashes or lesions    LABS:                        7.8    38.75 )-----------( 447      ( 21 Jul 2022 00:07 )             24.3     07-21    116<LL>  |  80<L>  |  63<H>  ----------------------------<  109<H>  4.7   |  22  |  2.71<H>    Ca    7.4<L>      21 Jul 2022 00:07  Phos  4.0     07-20  Mg     2.00     07-20    TPro  5.7<L>  /  Alb  1.7<L>  /  TBili  0.4  /  DBili  x   /  AST  38<H>  /  ALT  18  /  AlkPhos  215<H>  07-21    PT/INR - ( 20 Jul 2022 15:15 )   PT: 12.5 sec;   INR: 1.08 ratio         PTT - ( 21 Jul 2022 00:07 )  PTT:29.9 sec          RADIOLOGY & ADDITIONAL TESTS:    Imaging Personally Reviewed:    Consultant(s) Notes Reviewed:      Care Discussed with Consultants/Other Providers:

## 2022-07-22 NOTE — PROGRESS NOTE ADULT - PROBLEM SELECTOR PLAN 4
worsening  Multifactorial from hypervolemia and intravascular volume depletion from poor PO intake  not tolerating NACL tabs b/c AMS  Will try hypertonic solution to improve mental status.  Spoke to nephrology on 7/21   can't do paracentesis to improve Na due to bowel perf

## 2022-07-22 NOTE — PROGRESS NOTE ADULT - ASSESSMENT
This is a 66 year old female with a past medical history of  Stage IV carcinoma of unknown primary, malignant ascites and pleural effusion s/p pleural pleurix catheter, recent dx of PE/DVT on Eliquis admitted 7/13 with concern for progression of PE, hypotension and FTT.  SIRS criteria being met (HR > 90, RR > 20, elevated WBC), and low BP, Treated with empiric broad spectrum antibiotics  CT chest/ abd 7/16 increased in PEs, large abd collection enhancing - malignant ascites vs abscess  Paracentesis planned  Blood cultures, urine cultures neg to date  WBC continued to rise- reaction to malignancy vs GI infection  antibiotics broadened for possible SBP to meropenem  paracentesis now deferred; concern for bowel perforation  Creat elevated    Suggest:    follow 7/20 blood culture results    c/w meropenem for broad abd coverage    if creat worsens decreased meropenem 500 mg  iv q 12 h     ID service available over weekend

## 2022-07-22 NOTE — PROGRESS NOTE ADULT - ASSESSMENT
66 y.o.  Female DNR/DNI w/ 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 s/p pleural pleurix catheter, last chemotherapy 2 weeks ago with Gemzar/Abraxane, recent dx of PE/DVT on Eliquis sent in from [facility] due to new b/l extensive DVT, concerning for progression of PE, hypotension and FTT. Patient was on Heparin gtt and was c/o abdominal pain believed to be due to worsened ascites. Pain control was difficult due to low SBPs and nausea/vomiting. Patient also has severe AG metabolic acidosis with worsening CHECO, hyponatremia ( Na 117) and lactate of 4. She was placed on a HCO3 gtt which improved the acidosis but hyponatremia and CHECO persists. Paracentesis was delayed due to lack of availability of IR or procedure team to do procedure and hemodynamic instability of patient. MICU was consulted but rejected transfer to ICU. On 7/20 patients mental status became altered suddenly where she remained unresponsive to verbal or pain stimuli but maintaining a blank stare. A RRT and stroke code was called. CT head was negative for CVA as per was on a heparin gtt. CT A/P however did show a bowel perforation. Patient not a surgical candidate. Spoke to Daughter Roberta and 2 sons and the want conservative management. Patient made DNR/DNI. Palliative care following.

## 2022-07-22 NOTE — PROGRESS NOTE ADULT - ASSESSMENT
66 F w/ 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 s/p pleural Pleurx catheter, last chemotherapy 12 days ago with Gemzar/Abraxane, recent dx of PE/DVT on Eliquis sent in from [facility] due to new b/l extensive DVT, concerning for progression of PE, hypotension and FTT. Palliative Care consulted for complex symptom management in the setting of advanced malignancy.

## 2022-07-22 NOTE — CHART NOTE - NSCHARTNOTEFT_GEN_A_CORE
Source: other [x]  nurse, medical chart   Diet rx: Soft and Bite Sized: Moderately Thick Liquids (MODTHICKLIQS); No Beef, No Pork  Supplement Feeding Modality:  Oral Ensure Enlive Cans or Servings Per Day:  1       Frequency:  Three Times a day (07-18-22 @ 05:18) [Active]    Pt 67 yo female with metastatic adenocarcinoma of unknown primary, malignant loculated peritoneal fluid collection, concern for abscess vs. bowel perforation. Unable to differentiate on imaging vs. exam  - per chart review. Of note, Pt not a surgical candidate; rec to continue comfort care measures and GOC conversations with family.     At time of visit, Pt appears lethargic. Per nurse, Pt did not eat anything for breakfast this morning. Unable to provide PO medications this morning 2/2 lethargy, reported. Of note, RRT called on Pt 2days ago; of note, Pt DNR/DNI. Defer Nutrition Plan of Care to MD based on GOC discussion and decisions of Pt's family. If alternate means of Nutrition to initiate, consult nutrition for recommendations. Case discussed with nurse. RDN remains available.     Pt's height: 4'11" (7/12)            Pt's weight: 61 kg (7/12)    Pertinent Medications: Zofran (PRN), Vit C, Vit D3, Protonix, Multivitamin,   Pertinent Labs: (7/21) WBC 38.75 H, H/H 7.8/24.3 L,  H, Na 116 L, Cl 80 L, BUN 63 H, Creat 2.71 H, Glu 109 H, Albumin 1.7 L, AST 38 L,  H;    (7/13) Triglycerides 157 H, HDL 38 L   Skin: Per flow sheet: 2+edema to R/L foot     Estimated Needs: [x] no change since previous assessment  Previous Nutrition Diagnosis: [x] Malnutrition, severe    Nutrition Diagnosis is [x] ongoing      Nutrition Interventions/Recommendations:   1. Defer Nutrition Plan of Care to MD based on GOC discussion and decisions of Pt's family;  2. If alternate means of Nutrition to initiate, consult nutrition for recommendations;  3. Monitor labs, weights, hydration status;   RDN remains available

## 2022-07-22 NOTE — PROGRESS NOTE ADULT - PROBLEM SELECTOR PLAN 2
- Continue with Zofran to 8mg IV q8hrs atc  - Add haldol 0.25mg IV q6hrs PRN for nausea/vomiting can add ativan 0.25-0.5mg IV q6hrs PRN for nausea/vomiting if haldol ineffective   - Can consider steroids if all above does not work

## 2022-07-22 NOTE — PROGRESS NOTE ADULT - PROBLEM SELECTOR PLAN 4
Now DNR/DNI, MOLST form completed and in the chart  Surrogate is daughter  Spoke to pts daughter, they're struggling as a family, not all on the same page regarding comfort and hospice. Shared my services to attempt to facilitate further conversations, she wants to discuss this with his brother (youngest brother is struggling the most). Daughter Renny is in agreement with full comfort care and hospice, discussed hospice services and shared that likely at this time it would be inpatient hospice. She will follow up with team regarding final decision, support provided.  Child life following, family appreciative of service.   Please page for uncontrolled symptoms [phone]

## 2022-07-22 NOTE — PROGRESS NOTE ADULT - PROBLEM SELECTOR PLAN 3
Stage IV  Unknown primary  [place] [facility]

## 2022-07-22 NOTE — PROGRESS NOTE ADULT - SUBJECTIVE AND OBJECTIVE BOX
66y old  Female who presents with a chief complaint of dvt, booker, electrolyte disorder (15 Jul 2022 09:57)      Interval history:    patient lethargic   receiving dilaudid  multiple family members at bedside        Allergies:   Nuts (Anaphylaxis)  penicillin (Angioedema; Urticaria)  sulfa drugs (Hives; Urticaria)      Antimicrobials:    meropenem  IVPB    meropenem  IVPB 1000 every 12 hours    Vital Signs Last 24 Hrs  T(F): 99 (07-22-22 @ 11:26), Max: 99 (07-22-22 @ 11:26)  HR: 123 (07-22-22 @ 11:26)  BP: 95/54 (07-22-22 @ 11:26)  RR: 18 (07-22-22 @ 11:26)  SpO2: 96% (07-22-22 @ 11:26) (96% - 100%)    PHYSICAL EXAM:  weak, lethargic  + port  lt sided pleurex.   slight distended abdomen, BS+  + haddad                                   7.8    38.75 )-----------( 447      ( 21 Jul 2022 00:07 )             24.3 07-21    116  |  80  |  63  ----------------------------<  109  4.7   |  22  |  2.71  Ca    7.4      21 Jul 2022 00:07  TPro  5.7  /  Alb  1.7  /  TBili  0.4  /  DBili  x   /  AST  38  /  ALT  18  /  AlkPhos  215  07-21          blood culture 7/20 no growth x 2 to date      Culture - Blood (collected 12 Jul 2022 19:00)  Source: .Blood Blood  Preliminary Report (14 Jul 2022 01:02):    No growth     Culture - Blood (collected 12 Jul 2022 18:40)  Source: .Blood Blood  Preliminary Report (14 Jul 2022 01:02):    No growth         Radiology:  < from: US Kidney and Bladder (07.14.22 @ 10:42) >  IMPRESSION:  Normal appearance of the kidneys. No hydronephrosis.    Redemonstration of known malignant ascites.      r< from: CT Abdomen and Pelvis No Cont (07.20.22 @ 15:53) >  IMPRESSION:  1. Stable noncontrast CT appearance of the thorax with left pleural   implants, loculated malignant pleural fluid, and extensive mediastinal   and left hilar adenopathy.  2. Enlarging dominant loculated fluid collection in the anterior   peritoneal cavity with increasing internal mottled gas. These findings   are suspicious for complications of superinfection or small bowel   perforation and/or fistulization. Patient is scheduled for paracentesis.  3. Extensive peritoneal carcinomatosis with serosal implants of the liver   and spleen.    --- End of Report ---            RUIZ IBARRA MD; Attending Radiologist  This document has been electronically signed. Jul 20 2022  4:32PM    < end of copied text >

## 2022-07-22 NOTE — PROGRESS NOTE ADULT - PROBLEM SELECTOR PLAN 2
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  -CT A/P on 7/20 Enlarging dominant loculated fluid collection in the anterior peritoneal cavity with increasing internal mottled gas. These findings are suspicious for complications of superinfection or small bowel perforation and/or fistulization.   -Procedure team was to do aspiration and cx but patient now unstable with bowel perforation so cancelled paracentesis.   -Patient seen by general surgery and not surgical candidate.  -Also confirmed with IR and radiology who reviewed imaging with me on 7/21 assessing likely bowel perf.   -c/w meropenem.   Patient is DNR/DNI  F/U Palliative care recs  Patient's 2 Sons and daughter aware of grave condition  Prognosis guarded and very poor.

## 2022-07-22 NOTE — PROGRESS NOTE ADULT - SUBJECTIVE AND OBJECTIVE BOX
SUBJECTIVE AND OBJECTIVE:  Pt obtunded     Indication for Geriatrics and Palliative Care Services/INTERVAL HPI: symptom management, complex decision making     OVERNIGHT EVENTS:   Pain worsening, used 5 PRNs in the past 24hrs    DNR on chart: Yes  Yes    Allergies    Nuts (Anaphylaxis)  penicillin (Angioedema; Urticaria)  sulfa drugs (Hives; Urticaria)    Intolerances    MEDICATIONS  (STANDING):  ascorbic acid 500 milliGRAM(s) Oral daily  cholecalciferol 1000 Unit(s) Oral daily  HYDROmorphone  Injectable 0.2 milliGRAM(s) IV Push every 6 hours  levothyroxine 112 MICROGram(s) Oral daily  meropenem  IVPB      meropenem  IVPB 1000 milliGRAM(s) IV Intermittent every 12 hours  midodrine. 20 milliGRAM(s) Oral three times a day  multivitamin 1 Tablet(s) Oral daily  ondansetron Injectable 8 milliGRAM(s) IV Push every 8 hours  pantoprazole    Tablet 40 milliGRAM(s) Oral before breakfast  simvastatin 40 milliGRAM(s) Oral at bedtime  sodium bicarbonate  Infusion 0.123 mEq/kG/Hr (100 mL/Hr) IV Continuous <Continuous>  sodium chloride 1 Gram(s) Oral three times a day    MEDICATIONS  (PRN):  acetaminophen     Tablet .. 650 milliGRAM(s) Oral every 6 hours PRN Mild Pain (1 - 3), Moderate Pain (4 - 6)  haloperidol    Injectable 0.25 milliGRAM(s) IV Push every 6 hours PRN nausea/vomiting  HYDROmorphone  Injectable 0.5 milliGRAM(s) IV Push every 3 hours PRN Severe Pain (7 - 10) / discomfort  melatonin 3 milliGRAM(s) Oral at bedtime PRN Insomnia  polyethylene glycol 3350 17 Gram(s) Oral daily PRN Constipation  senna 2 Tablet(s) Oral at bedtime PRN Constipation    ITEMS UNCHECKED ARE NOT PRESENT    PRESENT SYMPTOMS: [x ]Unable to self-report - see [ ] CPOT [x ] PAINADS [ x] RDOS  Source if other than patient:  [ ]Family   [ ]Team     Pain:  [ ]yes [ ]no  QOL impact -   Location -     Aggravating factors -   Quality -   Radiation -   Timing-   Severity (0-10 scale):   Minimal acceptable level (0-10 scale):     CPOT:    https://www.sccm.org/getattachment/[email or web address]/Critical-Care-Pain-Observation-Tool-(CPOT)    PAIN AD Score:	0  [email or web address] (Ctrl + left click to view)    Dyspnea:                           [ ]Mild [ ]Moderate [ ]Severe    RDOS: 0  0 to 2  minimal or no respiratory distress   3  mild distress  4 to 6 moderate distress  >7 severe distress  https://homecareinformation.net/handouts/hen/Respiratory_Distress_Observation_Scale.pdf (Ctrl +  left click to view)     Anxiety:                             [ ]Mild [ ]Moderate [ ]Severe  Fatigue:                             [ ]Mild [ ]Moderate [ ]Severe  Nausea:                             [ ]Mild [ ]Moderate [ ]Severe  Loss of appetite:              [ ]Mild [ ]Moderate [ ]Severe  Constipation:                    [ ]Mild [ ]Moderate [ ]Severe    PCSSQ[Palliative Care Spiritual Screening Question]   Severity (0-10):  Score of 4 or > indicate consideration of Chaplaincy referral.    Chaplaincy Referral: [ ] yes [ ] refused [ ] following    Other Symptoms:  [ x]All other review of systems negative     Palliative Performance Status Version 2: 30 %      http://npcrc.org/files/news/palliative_performance_scale_ppsv2.pdf    PHYSICAL EXAM:  Vital Signs Last 24 Hrs  T(C): 37.2 (22 Jul 2022 11:26), Max: 37.2 (22 Jul 2022 11:26)  T(F): 99 (22 Jul 2022 11:26), Max: 99 (22 Jul 2022 11:26)  HR: 123 (22 Jul 2022 11:26) (94 - 123)  BP: 95/54 (22 Jul 2022 11:26) (89/56 - 105/62)  BP(mean): --  RR: 18 (22 Jul 2022 11:26) (18 - 18)  SpO2: 96% (22 Jul 2022 11:26) (96% - 100%)    Parameters below as of 22 Jul 2022 11:26  Patient On (Oxygen Delivery Method): room air     I&O's Summary    21 Jul 2022 07:01  -  22 Jul 2022 07:00  --------------------------------------------------------  IN: 50 mL / OUT: 950 mL / NET: -900 mL    GENERAL: [ ]Cachexia    [ ]Alert  [ ]Oriented x   [ x]Lethargic  [ ]Unarousable  [ ]Verbal  [x ]Non-Verbal  Behavioral:   [ ]Anxiety  [ ]Delirium [ ]Agitation [ ]Other  HEENT:  [ ]Normal   [x ]Dry mouth   [ ]ET Tube/Trach  [ ]Oral lesions  PULMONARY:   [ ]Clear [ ]Tachypnea  [ ]Audible excessive secretions   [x ]Rhonchi        [ ]Right [ ]Left [ ]Bilateral  [ ]Crackles        [ ]Right [ ]Left [ ]Bilateral  [ ]Wheezing     [ ]Right [ ]Left [ ]Bilateral  [ ]Diminished BS [ ] Right [ ]Left [ ]Bilateral  CARDIOVASCULAR:    [ ]Regular [ ]Irregular [x ]Tachy  [ ]Rico [ ]Murmur [ ]Other  GASTROINTESTINAL:  [ ]Soft  [x ]Distended   [ ]x+BS  [ ]Non tender [ x]Tender  [ ]Other [ ]PEG [ ]OGT/ NGT   Last BM: 7/16  GENITOURINARY:  [ ]Normal [ ]Incontinent   [ ]Oliguria/Anuria   [ x]Powers  MUSCULOSKELETAL:   [ ]Normal   [ ]Weakness  [x ]Bed/Wheelchair bound [ ]Edema  NEUROLOGIC:   [ ]No focal deficits  [x ] Cognitive impairment  [ ] Dysphagia [ ]Dysarthria [ ] Paresis [ ]Other   SKIN:   [ x]Normal  [ ]Rash  [ ]Other  [ ]Pressure ulcer(s) [ ]y [ ]n present on admission    CRITICAL CARE:  [ ]Shock Present  [ ]Septic [ ]Cardiogenic [ ]Neurologic [ ]Hypovolemic  [ ]Vasopressors [ ]Inotropes  [ ]Respiratory failure present [ ]Mechanical Ventilation [ ]Non-invasive ventilatory support [ ]High-Flow   [ ]Acute  [ ]Chronic [ ]Hypoxic  [ ]Hypercarbic [ ]Other  [ ]Other organ failure     LABS:                        7.8    38.75 )-----------( 447      ( 21 Jul 2022 00:07 )             24.3     07-21    116<LL>  |  80<L>  |  63<H>  ----------------------------<  109<H>  4.7   |  22  |  2.71<H>    Ca    7.4<L>      21 Jul 2022 00:07  Phos  4.0     07-20  Mg     2.00     07-20    TPro  5.7<L>  /  Alb  1.7<L>  /  TBili  0.4  /  DBili  x   /  AST  38<H>  /  ALT  18  /  AlkPhos  215<H>  07-21    PT/INR - ( 20 Jul 2022 15:15 )   PT: 12.5 sec;   INR: 1.08 ratio       PTT - ( 21 Jul 2022 00:07 )  PTT:29.9 sec    RADIOLOGY & ADDITIONAL STUDIES: reviewed    Protein Calorie Malnutrition Present: [ ]mild [ ]moderate [ ]severe [ ]underweight [ ]morbid obesity  https://www.andeal.org/vault/2440/web/files/ONC/Table_Clinical%20Characteristics%20to%20Document%20Malnutrition-White%20JV%20et%20al%202012.pdf    Height (cm): 149.9 (07-12-22 @ 14:11), 145 (06-30-22 @ 09:24), 149.9 (06-07-22 @ 18:09)  Weight (kg): 61 (07-12-22 @ 22:24), 63.1 (06-30-22 @ 09:24), 63.503 (06-08-22 @ 02:03)  BMI (kg/m2): 27.1 (07-12-22 @ 22:24), 28.1 (07-12-22 @ 14:11), 30 (06-30-22 @ 09:24)    [ x]PPSV2 < or = 30%  [ ]significant weight loss [ ]poor nutritional intake [ ]anasarca[ ]Artificial Nutrition    Other REFERRALS:  [ ]Hospice  [ ]Child Life  [ ]Social Work  [ ]Case management [ ]Holistic Therapy     Goals of Care Document:BENEDICT Savage (07-18-22 @ 16:08)  Goals of Care Conversation:   Participants:  · Participants  Patient; Family; Staff  · Child(anish)  Daughter - HCP  · Provider  Kita Martinez PA - primary team    Advance Directives:  · Caregiver:  no    Conversation Discussion:  · Conversation Details  Discussed with pts daughter pts prognosis and that pt has been expressing that she wants to be comfortable and DNR/DNI, and to go home with hospice services. Shared with daughter that given acute medical condition with advanced malignancy, pts prognosis is guarded and that intubation/resuscitation would likely not impact pts long term prognosis. Recommendation made to continue with IV pain medications regardless of BP (as they had been held due to hypotension) and to listen to pt and honor her wishes. Daughter in agreement with this recommendation, support provided.  Pt is now DNR/DNI, MOLST completed and in the chart. Goal is for pt to be comfortable, will give pain medications for symptom management regardless of BP.  Family requesting f/u from pts primary oncologist.    Personal Advance Directives Treatment Guidelines:   MOLST:  · Completed  18-Jul-2022    Location of Discussion:   Time Spent on Advance Care Planning:  I spent 45 (in minutes) on advance care planning services with the patient.  This time is separate and distinct from any other care management services provided on this date.    Location of Discussion:  · Location of discussion  Face to face      Electronic Signatures:  Mishel Chavarria)  (Signed 18-Jul-2022 16:13)  	Authored: Goals of Care Conversation, Personal Advance Directives Treatment Guidelines, Location of Discussion      Last Updated: 18-Jul-2022 16:13 by Mishel Chavarria)

## 2022-07-23 NOTE — PROGRESS NOTE ADULT - PROBLEM SELECTOR PLAN 5
renal follg, renal sono showed Normal appearance of the kidneys. No hydronephrosis.  Redemonstration of known malignant ascites.  c/w HCO3 gtt for AG metabolic acidosis  renal fxn was worsening; No more blood draws to monitor renal function  Renal recs appreciated  -c/w comfort measures.

## 2022-07-23 NOTE — PROGRESS NOTE ADULT - PROBLEM SELECTOR PLAN 2
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  -CT A/P on 7/20 Enlarging dominant loculated fluid collection in the anterior peritoneal cavity with increasing internal mottled gas. These findings are suspicious for complications of superinfection or small bowel perforation and/or fistulization.   -Procedure team was to do aspiration and cx but patient now unstable with bowel perforation so cancelled paracentesis.   -Patient seen by general surgery and not surgical candidate.  -Also confirmed with IR and radiology who reviewed imaging with me on 7/21 assessing likely bowel perf.   -c/w meropenem.   -Patient is DNR/DNI  -Palliative care recs appreciated   Patient's 2 Sons and daughter aware of grave condition  Comfort care measures.  Prognosis guarded and very poor.

## 2022-07-23 NOTE — PROGRESS NOTE ADULT - PROBLEM SELECTOR PLAN 4
Multifactorial from hypervolemia and intravascular volume depletion from poor PO intake  not tolerating NACL tabs b/c AMS and vomiting  can't do paracentesis to improve Na due to bowel perf  -c/w comfort care measures

## 2022-07-23 NOTE — PROGRESS NOTE ADULT - ASSESSMENT
66 y.o.  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 Patient was on Heparin gtt and was c/o abdominal pain believed to be due to worsened ascites. Pain control was difficult due to low SBPs and nausea/vomiting. Patient also has severe AG metabolic acidosis with worsening CHECO, hyponatremia ( Na 117) and lactate of 4. She was placed on a HCO3 gtt which improved the acidosis but hyponatremia and CHECO persists. Paracentesis was delayed due to lack of availability of IR or procedure team to do procedure and hemodynamic instability of patient. MICU was consulted but rejected transfer to ICU. On 7/20 patients mental status became altered suddenly where she remained unresponsive to verbal or pain stimuli but maintaining a blank stare. A RRT and stroke code was called. CT head was negative for CVA as per was on a heparin gtt. CT A/P however did show a bowel perforation. Patient not a surgical candidate. Spoke to Daughter Roberta and 2 sons and the want conservative management. Patient made DNR/DNI and now comfort care measure with goal for inpatient hospice. Prognosis poor.     Spoke to kin regalado on 7/22 who is HCP.  66 y.o.  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 Patient was on Heparin gtt and was c/o abdominal pain believed to be due to worsened ascites. Pain control was difficult due to low SBPs and nausea/vomiting. Patient also has severe AG metabolic acidosis with worsening CHECO, hyponatremia ( Na 117) and lactate of 4. She was placed on a HCO3 gtt which improved the acidosis but hyponatremia and CHECO persists. Paracentesis was delayed due to lack of availability of IR or procedure team to do procedure and hemodynamic instability of patient. MICU was consulted but rejected transfer to ICU. On 7/20 patients mental status became altered suddenly where she remained unresponsive to verbal or pain stimuli but maintaining a blank stare. A RRT and stroke code was called. CT head was negative for CVA as per was on a heparin gtt. CT A/P however did show a bowel perforation. Patient not a surgical candidate. Spoke to Daughter Roberta and 2 sons and the want conservative management. Patient made DNR/DNI and now comfort care measure with goal for inpatient hospice. Prognosis poor.     Spoke to kin regalado on 7/23 who is HCP.

## 2022-07-23 NOTE — PROGRESS NOTE ADULT - PROBLEM SELECTOR PLAN 1
poss new PE in addition to know PE  -TTE showed Endocardium not well visualized; grossly normal left  ventricular systolic function. Mild diastolic dysfunction (Stage  --held heparin gtt due to bowel perforation  -comfort care measures  -supplemental oxygen

## 2022-07-23 NOTE — PROGRESS NOTE ADULT - SUBJECTIVE AND OBJECTIVE BOX
Patient is a 66y old  Female who presents with a chief complaint of dvt, booker, electrolyte disorder (22 Jul 2022 18:50)      SUBJECTIVE / OVERNIGHT EVENTS:    MEDICATIONS  (STANDING):  ascorbic acid 500 milliGRAM(s) Oral daily  cholecalciferol 1000 Unit(s) Oral daily  levothyroxine 112 MICROGram(s) Oral daily  meropenem  IVPB      meropenem  IVPB 1000 milliGRAM(s) IV Intermittent every 12 hours  midodrine. 20 milliGRAM(s) Oral three times a day  multivitamin 1 Tablet(s) Oral daily  ondansetron Injectable 8 milliGRAM(s) IV Push every 8 hours  pantoprazole    Tablet 40 milliGRAM(s) Oral before breakfast  simvastatin 40 milliGRAM(s) Oral at bedtime  sodium bicarbonate  Infusion 0.123 mEq/kG/Hr (100 mL/Hr) IV Continuous <Continuous>  sodium chloride 1 Gram(s) Oral three times a day    MEDICATIONS  (PRN):  acetaminophen     Tablet .. 650 milliGRAM(s) Oral every 6 hours PRN Mild Pain (1 - 3), Moderate Pain (4 - 6)  artificial  tears Solution 2 Drop(s) Both EYES every 1 hour PRN Dry Eyes  glycopyrrolate Injectable 0.4 milliGRAM(s) IV Push four times a day PRN Secretions  haloperidol    Injectable 0.25 milliGRAM(s) IV Push every 6 hours PRN nausea/vomiting  HYDROmorphone  Injectable 0.5 milliGRAM(s) IV Push every 3 hours PRN Severe Pain (7 - 10) / discomfort  HYDROmorphone  Injectable 0.5 milliGRAM(s) IV Push every 2 hours PRN Moderate pain (4-6), Severe pain (7-10), Respiratory rate greater than 22  LORazepam   Injectable 0.5 milliGRAM(s) IV Push every 4 hours PRN Anxiety  melatonin 3 milliGRAM(s) Oral at bedtime PRN Insomnia  polyethylene glycol 3350 17 Gram(s) Oral daily PRN Constipation  senna 2 Tablet(s) Oral at bedtime PRN Constipation      Vital Signs Last 24 Hrs  T(C): 36.8 (22 Jul 2022 21:14), Max: 37.2 (22 Jul 2022 11:26)  T(F): 98.3 (22 Jul 2022 21:14), Max: 99 (22 Jul 2022 11:26)  HR: 120 (22 Jul 2022 21:14) (120 - 123)  BP: 89/56 (22 Jul 2022 21:14) (89/56 - 95/54)  BP(mean): --  RR: 18 (22 Jul 2022 21:14) (18 - 18)  SpO2: 96% (22 Jul 2022 21:14) (96% - 96%)    Parameters below as of 22 Jul 2022 21:14  Patient On (Oxygen Delivery Method): room air      CAPILLARY BLOOD GLUCOSE        I&O's Summary    22 Jul 2022 07:01  -  23 Jul 2022 07:00  --------------------------------------------------------  IN: 0 mL / OUT: 25 mL / NET: -25 mL        PHYSICAL EXAM:  GENERAL: NAD, well-developed  HEAD:  Atraumatic, Normocephalic  EYES: EOMI, PERRLA, conjunctiva and sclera clear  NECK: Supple, No JVD  CHEST/LUNG: Clear to auscultation bilaterally; No wheeze  HEART: Regular rate and rhythm; No murmurs, rubs, or gallops  ABDOMEN: Soft, Nontender, Nondistended; Bowel sounds present  EXTREMITIES:  2+ Peripheral Pulses, No clubbing, cyanosis, or edema  PSYCH: AAOx3  NEUROLOGY: non-focal  SKIN: No rashes or lesions    LABS:                    RADIOLOGY & ADDITIONAL TESTS:    Imaging Personally Reviewed:    Consultant(s) Notes Reviewed:      Care Discussed with Consultants/Other Providers:   Patient is a 66y old  Female who presents with a chief complaint of dvt, booker, electrolyte disorder (22 Jul 2022 18:50)      SUBJECTIVE / OVERNIGHT EVENTS:  Patient awake and feeling noxious. Pain is controlled she says. Denies cp, or SOB     MEDICATIONS  (STANDING):  ascorbic acid 500 milliGRAM(s) Oral daily  cholecalciferol 1000 Unit(s) Oral daily  levothyroxine 112 MICROGram(s) Oral daily  meropenem  IVPB      meropenem  IVPB 1000 milliGRAM(s) IV Intermittent every 12 hours  midodrine. 20 milliGRAM(s) Oral three times a day  multivitamin 1 Tablet(s) Oral daily  ondansetron Injectable 8 milliGRAM(s) IV Push every 8 hours  pantoprazole    Tablet 40 milliGRAM(s) Oral before breakfast  simvastatin 40 milliGRAM(s) Oral at bedtime  sodium bicarbonate  Infusion 0.123 mEq/kG/Hr (100 mL/Hr) IV Continuous <Continuous>  sodium chloride 1 Gram(s) Oral three times a day    MEDICATIONS  (PRN):  acetaminophen     Tablet .. 650 milliGRAM(s) Oral every 6 hours PRN Mild Pain (1 - 3), Moderate Pain (4 - 6)  artificial  tears Solution 2 Drop(s) Both EYES every 1 hour PRN Dry Eyes  glycopyrrolate Injectable 0.4 milliGRAM(s) IV Push four times a day PRN Secretions  haloperidol    Injectable 0.25 milliGRAM(s) IV Push every 6 hours PRN nausea/vomiting  HYDROmorphone  Injectable 0.5 milliGRAM(s) IV Push every 3 hours PRN Severe Pain (7 - 10) / discomfort  HYDROmorphone  Injectable 0.5 milliGRAM(s) IV Push every 2 hours PRN Moderate pain (4-6), Severe pain (7-10), Respiratory rate greater than 22  LORazepam   Injectable 0.5 milliGRAM(s) IV Push every 4 hours PRN Anxiety  melatonin 3 milliGRAM(s) Oral at bedtime PRN Insomnia  polyethylene glycol 3350 17 Gram(s) Oral daily PRN Constipation  senna 2 Tablet(s) Oral at bedtime PRN Constipation      Vital Signs Last 24 Hrs  T(C): 36.8 (22 Jul 2022 21:14), Max: 37.2 (22 Jul 2022 11:26)  T(F): 98.3 (22 Jul 2022 21:14), Max: 99 (22 Jul 2022 11:26)  HR: 120 (22 Jul 2022 21:14) (120 - 123)  BP: 89/56 (22 Jul 2022 21:14) (89/56 - 95/54)  BP(mean): --  RR: 18 (22 Jul 2022 21:14) (18 - 18)  SpO2: 96% (22 Jul 2022 21:14) (96% - 96%)    Parameters below as of 22 Jul 2022 21:14  Patient On (Oxygen Delivery Method): room air      CAPILLARY BLOOD GLUCOSE        I&O's Summary    22 Jul 2022 07:01  -  23 Jul 2022 07:00  --------------------------------------------------------  IN: 0 mL / OUT: 25 mL / NET: -25 mL        PHYSICAL EXAM:  GENERAL: NAD, well-developed  HEAD:  Atraumatic, Normocephalic  EYES: EOMI, PERRLA, conjunctiva and sclera clear  NECK: Supple, No JVD  CHEST/LUNG: Clear to auscultation bilaterally; No wheeze  HEART: Regular rate and rhythm; No murmurs, rubs, or gallops  ABDOMEN: Soft, Nontender, distended and firm mostly in Right mid to lower quadrant; Bowel sounds present  EXTREMITIES:  B/L LE edema R >L 2+ Peripheral Pulses, No clubbing, or cyanosis  PSYCH: AAOx3  NEUROLOGY: non-focal  SKIN: No rashes or lesions    LABS:                    RADIOLOGY & ADDITIONAL TESTS:    Imaging Personally Reviewed:    Consultant(s) Notes Reviewed:      Care Discussed with Consultants/Other Providers:

## 2022-07-24 NOTE — PROGRESS NOTE ADULT - ASSESSMENT
66 y.o.  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 Patient was on Heparin gtt and was c/o abdominal pain believed to be due to worsened ascites. Pain control was difficult due to low SBPs and nausea/vomiting. Patient also has severe AG metabolic acidosis with worsening CHECO, hyponatremia ( Na 117) and lactate of 4. She was placed on a HCO3 gtt which improved the acidosis but hyponatremia and CHECO persists. Paracentesis was delayed due to lack of availability of IR or procedure team to do procedure and hemodynamic instability of patient. MICU was consulted but rejected transfer to ICU. On 7/20 patients mental status became altered suddenly where she remained unresponsive to verbal or pain stimuli but maintaining a blank stare. A RRT and stroke code was called. CT head was negative for CVA as per was on a heparin gtt. CT A/P however did show a bowel perforation. Patient not a surgical candidate. Spoke to Daughter Roberta and 2 sons and they want conservative management. Patient made DNR/DNI and now comfort care measure with goal for inpatient hospice. Prognosis poor.     Spoke to kin regalado on 7/23 who is HCP.  66 y.o.  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 Patient was on Heparin gtt and was c/o abdominal pain believed to be due to worsened ascites. Pain control was difficult due to low SBPs and nausea/vomiting. Patient also has severe AG metabolic acidosis with worsening CHECO, hyponatremia ( Na 117) and lactate of 4. She was placed on a HCO3 gtt which improved the acidosis but hyponatremia and CHECO persists. Paracentesis was delayed due to lack of availability of IR or procedure team to do procedure and hemodynamic instability of patient. MICU was consulted but rejected transfer to ICU. On 7/20 patients mental status became altered suddenly where she remained unresponsive to verbal or pain stimuli but maintaining a blank stare. A RRT and stroke code was called. CT head was negative for CVA. CT A/P however did show a bowel perforation. Patient not a surgical candidate. Spoke to Kin Regalado and 2 sons and they want conservative management. Patient made DNR/DNI and now comfort care measure with goal for inpatient hospice. Prognosis poor.     Spoke to kin regalado on 7/23 who is HCP.

## 2022-07-24 NOTE — PROGRESS NOTE ADULT - SUBJECTIVE AND OBJECTIVE BOX
Patient is a 66y old  Female who presents with a chief complaint of dvt, booker, electrolyte disorder (23 Jul 2022 09:30)      SUBJECTIVE / OVERNIGHT EVENTS:  Patient is awake and talking and say her pain is controlled. Still noxious. Denies cp, or SOB     MEDICATIONS  (STANDING):  ascorbic acid 500 milliGRAM(s) Oral daily  cholecalciferol 1000 Unit(s) Oral daily  levothyroxine 112 MICROGram(s) Oral daily  meropenem  IVPB 1000 milliGRAM(s) IV Intermittent every 12 hours  meropenem  IVPB      midodrine. 20 milliGRAM(s) Oral three times a day  multivitamin 1 Tablet(s) Oral daily  ondansetron Injectable 8 milliGRAM(s) IV Push every 8 hours  pantoprazole    Tablet 40 milliGRAM(s) Oral before breakfast  simvastatin 40 milliGRAM(s) Oral at bedtime  sodium bicarbonate  Infusion 0.123 mEq/kG/Hr (100 mL/Hr) IV Continuous <Continuous>  sodium chloride 1 Gram(s) Oral three times a day    MEDICATIONS  (PRN):  acetaminophen     Tablet .. 650 milliGRAM(s) Oral every 6 hours PRN Mild Pain (1 - 3), Moderate Pain (4 - 6)  artificial  tears Solution 2 Drop(s) Both EYES every 1 hour PRN Dry Eyes  glycopyrrolate Injectable 0.4 milliGRAM(s) IV Push four times a day PRN Secretions  haloperidol    Injectable 0.25 milliGRAM(s) IV Push every 6 hours PRN nausea/vomiting  HYDROmorphone  Injectable 0.5 milliGRAM(s) IV Push every 3 hours PRN Severe Pain (7 - 10) / discomfort  HYDROmorphone  Injectable 0.5 milliGRAM(s) IV Push every 2 hours PRN Moderate pain (4-6), Severe pain (7-10), Respiratory rate greater than 22  LORazepam   Injectable 0.5 milliGRAM(s) IV Push every 4 hours PRN Anxiety  melatonin 3 milliGRAM(s) Oral at bedtime PRN Insomnia  ondansetron Injectable 4 milliGRAM(s) IV Push every 8 hours PRN Nausea and/or Vomiting  polyethylene glycol 3350 17 Gram(s) Oral daily PRN Constipation  senna 2 Tablet(s) Oral at bedtime PRN Constipation      Vital Signs Last 24 Hrs  T(C): 36.3 (23 Jul 2022 21:01), Max: 36.8 (23 Jul 2022 11:25)  T(F): 97.3 (23 Jul 2022 21:01), Max: 98.2 (23 Jul 2022 11:25)  HR: 105 (23 Jul 2022 21:01) (105 - 120)  BP: 90/55 (23 Jul 2022 21:01) (90/55 - 95/61)  BP(mean): --  RR: 18 (23 Jul 2022 21:01) (18 - 19)  SpO2: 97% (23 Jul 2022 21:01) (97% - 99%)    Parameters below as of 23 Jul 2022 21:01  Patient On (Oxygen Delivery Method): room air      CAPILLARY BLOOD GLUCOSE        I&O's Summary    23 Jul 2022 07:01  -  24 Jul 2022 07:00  --------------------------------------------------------  IN: 0 mL / OUT: 400 mL / NET: -400 mL      PHYSICAL EXAM:  GENERAL: NAD, well-developed  HEAD:  Atraumatic, Normocephalic  EYES: EOMI, PERRLA, conjunctiva and sclera clear  NECK: Supple, No JVD  CHEST/LUNG: Clear to auscultation bilaterally; No wheeze  HEART: Regular rate and rhythm; No murmurs, rubs, or gallops  ABDOMEN: Soft, Nontender, distended and firm mostly in Right mid to lower quadrant; Bowel sounds present  EXTREMITIES:  B/L LE edema R >L 2+ Peripheral Pulses, No clubbing, or cyanosis  PSYCH: AAOx3  NEUROLOGY: non-focal  SKIN: No rashes or lesions    LABS:                    RADIOLOGY & ADDITIONAL TESTS:    Imaging Personally Reviewed:    Consultant(s) Notes Reviewed:      Care Discussed with Consultants/Other Providers:

## 2022-07-24 NOTE — PROGRESS NOTE ADULT - NUTRITIONAL ASSESSMENT
This patient has been assessed with a concern for Malnutrition and has been determined to have a diagnosis/diagnoses of Severe protein-calorie malnutrition.    This patient is being managed with:   Diet Soft and Bite Sized-  Moderately Thick Liquids (MODTHICKLIQS)  No Beef  No Pork  Supplement Feeding Modality:  Oral  Ensure Enlive Cans or Servings Per Day:  1       Frequency:  Three Times a day  Entered: Jul 18 2022  5:17AM    
This patient has been assessed with a concern for Malnutrition and has been determined to have a diagnosis/diagnoses of Severe protein-calorie malnutrition.    This patient is being managed with:   Diet Soft and Bite Sized-  Moderately Thick Liquids (MODTHICKLIQS)  No Beef  No Pork  Supplement Feeding Modality:  Oral  Ensure Enlive Cans or Servings Per Day:  1       Frequency:  Three Times a day  Entered: Jul 18 2022  5:17AM    
This patient has been assessed with a concern for Malnutrition and has been determined to have a diagnosis/diagnoses of Severe protein-calorie malnutrition.    This patient is being managed with:   Diet Regular-  1200mL Fluid Restriction ([id number])  Entered: Jul 13 2022  2:27AM    
This patient has been assessed with a concern for Malnutrition and has been determined to have a diagnosis/diagnoses of Severe protein-calorie malnutrition.    This patient is being managed with:   Diet Regular-  1200mL Fluid Restriction ([id number])  Entered: Jul 13 2022  2:27AM    
This patient has been assessed with a concern for Malnutrition and has been determined to have a diagnosis/diagnoses of Severe protein-calorie malnutrition.    This patient is being managed with:   Diet Soft and Bite Sized-  Moderately Thick Liquids (MODTHICKLIQS)  No Beef  No Pork  Supplement Feeding Modality:  Oral  Ensure Enlive Cans or Servings Per Day:  1       Frequency:  Three Times a day  Entered: Jul 18 2022  5:17AM    

## 2022-07-25 NOTE — PROGRESS NOTE ADULT - SUBJECTIVE AND OBJECTIVE BOX
SUBJECTIVE AND OBJECTIVE:  Pt more awake , taking more PO today    Indication for Geriatrics and Palliative Care Services/INTERVAL HPI: symptom management, complex decision making     OVERNIGHT EVENTS:   Used 5 PRNs in the past 24hrs    DNR on chart: Yes  Yes    Allergies    Nuts (Anaphylaxis)  penicillin (Angioedema; Urticaria)  sulfa drugs (Hives; Urticaria)    Intolerances    MEDICATIONS  (STANDING):  ascorbic acid 500 milliGRAM(s) Oral daily  cholecalciferol 1000 Unit(s) Oral daily  levothyroxine 112 MICROGram(s) Oral daily  meropenem  IVPB      meropenem  IVPB 1000 milliGRAM(s) IV Intermittent every 12 hours  midodrine. 20 milliGRAM(s) Oral three times a day  multivitamin 1 Tablet(s) Oral daily  ondansetron Injectable 8 milliGRAM(s) IV Push every 8 hours  pantoprazole    Tablet 40 milliGRAM(s) Oral before breakfast  simvastatin 40 milliGRAM(s) Oral at bedtime  sodium bicarbonate  Infusion 0.123 mEq/kG/Hr (100 mL/Hr) IV Continuous <Continuous>  sodium chloride 1 Gram(s) Oral three times a day    MEDICATIONS  (PRN):  acetaminophen     Tablet .. 650 milliGRAM(s) Oral every 6 hours PRN Mild Pain (1 - 3), Moderate Pain (4 - 6)  artificial  tears Solution 2 Drop(s) Both EYES every 1 hour PRN Dry Eyes  glycopyrrolate Injectable 0.4 milliGRAM(s) IV Push four times a day PRN Secretions  haloperidol    Injectable 0.25 milliGRAM(s) IV Push every 6 hours PRN nausea/vomiting  HYDROmorphone  Injectable 0.5 milliGRAM(s) IV Push every 2 hours PRN Moderate pain (4-6), Severe pain (7-10), Respiratory rate greater than 22  LORazepam   Injectable 0.5 milliGRAM(s) IV Push every 4 hours PRN Anxiety  melatonin 3 milliGRAM(s) Oral at bedtime PRN Insomnia  ondansetron Injectable 4 milliGRAM(s) IV Push every 8 hours PRN Nausea and/or Vomiting  polyethylene glycol 3350 17 Gram(s) Oral daily PRN Constipation  senna 2 Tablet(s) Oral at bedtime PRN Constipation    ITEMS UNCHECKED ARE NOT PRESENT    PRESENT SYMPTOMS: [x ]Unable to self-report - see [ ] CPOT [x ] PAINADS [ x] RDOS  Source if other than patient:  [ ]Family   [ ]Team     Pain:  [ ]yes [ ]no  QOL impact -   Location -     Aggravating factors -   Quality -   Radiation -   Timing-   Severity (0-10 scale):   Minimal acceptable level (0-10 scale):     CPOT:    https://www.[email or web address]/getattachment/[email or web address]/Critical-Care-Pain-Observation-Tool-(CPOT)    PAIN AD Score:	0  http://geriatrictoolkit.[email or web address]/painad.pdf (Ctrl + left click to view)    Dyspnea:                           [ ]Mild [ ]Moderate [ ]Severe    RDOS: 0  0 to 2  minimal or no respiratory distress   3  mild distress  4 to 6 moderate distress  >7 severe distress  https://homecareinformation.net/handouts/hen/Respiratory_Distress_Observation_Scale.pdf (Ctrl +  left click to view)     Anxiety:                             [ ]Mild [ ]Moderate [ ]Severe  Fatigue:                             [ ]Mild [ ]Moderate [ ]Severe  Nausea:                             [ ]Mild [ ]Moderate [ ]Severe  Loss of appetite:              [ ]Mild [ ]Moderate [ ]Severe  Constipation:                    [ ]Mild [ ]Moderate [ ]Severe    PCSSQ[Palliative Care Spiritual Screening Question]   Severity (0-10):  Score of 4 or > indicate consideration of Chaplaincy referral.    Chaplaincy Referral: [ ] yes [ ] refused [ ] following    Other Symptoms:  [ x]All other review of systems negative     Palliative Performance Status Version 2: 30 %      http://npcrc.org/files/news/palliative_performance_scale_ppsv2.pdf    PHYSICAL EXAM:  Vital Signs Last 24 Hrs  T(C): 36.5 (25 Jul 2022 12:13), Max: 36.8 (24 Jul 2022 21:13)  T(F): 97.7 (25 Jul 2022 12:13), Max: 98.3 (24 Jul 2022 21:13)  HR: 91 (25 Jul 2022 13:56) (88 - 100)  BP: 92/61 (25 Jul 2022 13:56) (90/58 - 108/65)  BP(mean): 76 (25 Jul 2022 05:02) (76 - 76)  RR: 18 (25 Jul 2022 12:13) (17 - 19)  SpO2: 100% (25 Jul 2022 12:13) (98% - 100%)    Parameters below as of 25 Jul 2022 12:13  Patient On (Oxygen Delivery Method): room air     I&O's Summary    GENERAL: [ ]Cachexia    [ ]Alert  [ ]Oriented x   [ x]Lethargic  [ ]Unarousable  [ ]Verbal  [x ]Non-Verbal  Behavioral:   [ ]Anxiety  [ ]Delirium [ ]Agitation [ ]Other  HEENT:  [ ]Normal   [x ]Dry mouth   [ ]ET Tube/Trach  [ ]Oral lesions  PULMONARY:   [ ]Clear [ ]Tachypnea  [ ]Audible excessive secretions   [x ]Rhonchi        [ ]Right [ ]Left [ ]Bilateral  [ ]Crackles        [ ]Right [ ]Left [ ]Bilateral  [ ]Wheezing     [ ]Right [ ]Left [ ]Bilateral  [ ]Diminished BS [ ] Right [ ]Left [ ]Bilateral  CARDIOVASCULAR:    [ ]Regular [ ]Irregular [x ]Tachy  [ ]Rico [ ]Murmur [ ]Other  GASTROINTESTINAL:  [ ]Soft  [x ]Distended   [ ]x+BS  [ ]Non tender [ x]Tender  [ ]Other [ ]PEG [ ]OGT/ NGT   Last BM: 7/16  GENITOURINARY:  [ ]Normal [ ]Incontinent   [ ]Oliguria/Anuria   [ x]Powers  MUSCULOSKELETAL:   [ ]Normal   [ ]Weakness  [x ]Bed/Wheelchair bound [ ]Edema  NEUROLOGIC:   [ ]No focal deficits  [x ] Cognitive impairment  [ ] Dysphagia [ ]Dysarthria [ ] Paresis [ ]Other   SKIN:   [ x]Normal  [ ]Rash  [ ]Other  [ ]Pressure ulcer(s) [ ]y [ ]n present on admission    CRITICAL CARE:  [ ]Shock Present  [ ]Septic [ ]Cardiogenic [ ]Neurologic [ ]Hypovolemic  [ ]Vasopressors [ ]Inotropes  [ ]Respiratory failure present [ ]Mechanical Ventilation [ ]Non-invasive ventilatory support [ ]High-Flow   [ ]Acute  [ ]Chronic [ ]Hypoxic  [ ]Hypercarbic [ ]Other  [ ]Other organ failure     LABS: reviewed    RADIOLOGY & ADDITIONAL STUDIES: reviewed    Protein Calorie Malnutrition Present: [ ]mild [ ]moderate [ ]severe [ ]underweight [ ]morbid obesity  https://www.andeal.org/vault/9940/web/files/ONC/Table_Clinical%20Characteristics%20to%20Document%20Malnutrition-White%20JV%20et%20al%202012.pdf    Height (cm): 149.9 (07-12-22 @ 14:11), 145 (06-30-22 @ 09:24), 149.9 (06-07-22 @ 18:09)  Weight (kg): 61 (07-12-22 @ 22:24), 63.1 (06-30-22 @ 09:24), 63.503 (06-08-22 @ 02:03)  BMI (kg/m2): 27.1 (07-12-22 @ 22:24), 28.1 (07-12-22 @ 14:11), 30 (06-30-22 @ 09:24)    [ x]PPSV2 < or = 30%  [ ]significant weight loss [ ]poor nutritional intake [ ]anasarca[ ]Artificial Nutrition    Other REFERRALS:  [x ]Hospice  [ ]Child Life  [ ]Social Work  [ ]Case management [ ]Holistic Therapy     Goals of Care Document:BENEDICT Savage (07-18-22 @ 16:08)  Goals of Care Conversation:   Participants:  · Participants  Patient; Family; Staff  · Child(anish)  Daughter - HCP  · Provider  Kita Martinez - primary team    Advance Directives:  · Caregiver:  no    Conversation Discussion:  · Conversation Details  Discussed with pts daughter pts prognosis and that pt has been expressing that she wants to be comfortable and DNR/DNI, and to go home with hospice services. Shared with daughter that given acute medical condition with advanced malignancy, pts prognosis is guarded and that intubation/resuscitation would likely not impact pts long term prognosis. Recommendation made to continue with IV pain medications regardless of BP (as they had been held due to hypotension) and to listen to pt and honor her wishes. Daughter in agreement with this recommendation, support provided.  Pt is now DNR/DNI, MOLST completed and in the chart. Goal is for pt to be comfortable, will give pain medications for symptom management regardless of BP.  Family requesting f/u from pts primary oncologist.    Personal Advance Directives Treatment Guidelines:   MOLST:  · Completed  18-Jul-2022    Location of Discussion:   Time Spent on Advance Care Planning:  I spent 45 (in minutes) on advance care planning services with the patient.  This time is separate and distinct from any other care management services provided on this date.    Location of Discussion:  · Location of discussion  Face to face      Electronic Signatures:  Mishel Chavarria)  (Signed 18-Jul-2022 16:13)  	Authored: Goals of Care Conversation, Personal Advance Directives Treatment Guidelines, Location of Discussion      Last Updated: 18-Jul-2022 16:13 by Mishel Chavarria)

## 2022-07-25 NOTE — PROGRESS NOTE ADULT - PROBLEM SELECTOR PLAN 1
Worsening  Used 5 PRNs in the past 24hrs  Will continue to give pain medication regardless of BP  Would recommend:  - Continue with 0.2mg IV and increase frequency to q4hrs atc  - Continue with 0.5mg IV q3hrs PRN for severe pain/discomfort  Discussed with pts daughter - Emma, in agreement with plan, she shared her concerns that at times medication has been held, will add provider to RN to give medications regardless Never

## 2022-07-25 NOTE — PROGRESS NOTE ADULT - PROBLEM SELECTOR PLAN 1
PE/DVT  - s/p heparin gtt, now held given perf and overall GOC  - comfort care measures  - supplemental oxygen prn

## 2022-07-25 NOTE — PROGRESS NOTE ADULT - SUBJECTIVE AND OBJECTIVE BOX
Patient is a 66y old  Female who presents with a chief complaint of dvt, booker, electrolyte disorder    SUBJECTIVE / OVERNIGHT EVENTS:    Reported pain all over, 8/10  No other complaints, unable to fully provide history, A&Ox1 at best     MEDICATIONS  (STANDING):  levothyroxine 112 MICROGram(s) Oral daily  meropenem  IVPB 1000 milliGRAM(s) IV Intermittent every 12 hours  midodrine. 20 milliGRAM(s) Oral three times a day  ondansetron    Tablet 8 milliGRAM(s) Oral every 8 hours  pantoprazole    Tablet 40 milliGRAM(s) Oral before breakfast  sodium chloride 1 Gram(s) Oral three times a day    MEDICATIONS  (PRN):  acetaminophen     Tablet .. 650 milliGRAM(s) Oral every 6 hours PRN Mild Pain (1 - 3), Moderate Pain (4 - 6)  artificial  tears Solution 2 Drop(s) Both EYES every 1 hour PRN Dry Eyes  glycopyrrolate Injectable 0.4 milliGRAM(s) IV Push four times a day PRN Secretions  haloperidol    Injectable 0.25 milliGRAM(s) IV Push every 6 hours PRN nausea/vomiting  HYDROmorphone   Tablet 2 milliGRAM(s) Oral every 4 hours PRN Moderate Pain (4 - 6)  HYDROmorphone  Injectable 0.5 milliGRAM(s) IV Push every 2 hours PRN Severe Pain (7 - 10), RR >25  LORazepam   Injectable 0.5 milliGRAM(s) IV Push every 4 hours PRN Anxiety  ondansetron Injectable 4 milliGRAM(s) IV Push every 8 hours PRN Nausea and/or Vomiting  polyethylene glycol 3350 17 Gram(s) Oral daily PRN Constipation  senna 2 Tablet(s) Oral at bedtime PRN Constipation    T(C): 36.5 (22 @ 12:13), Max: 36.8 (22 @ 21:13)  HR: 91 (22 @ 13:56) (88 - 100)  BP: 92/61 (22 @ 13:56) (90/58 - 108/65)  RR: 18 (22 @ 12:13) (17 - 18)  SpO2: 100% (22 @ 12:13) (98% - 100%)    I&O's Summary    2022 07:01  -  2022 17:55  --------------------------------------------------------  IN: 0 mL / OUT: 355 mL / NET: -355 mL    PHYSICAL EXAM:  GENERAL: ill appearing D  CHEST/LUNG: Clear to auscultation bilaterally; No wheeze  HEART: Regular rate and rhythm; No murmurs, rubs, or gallops  ABDOMEN: Soft, Nontender, distended ; Bowel sounds present  EXTREMITIES:   +edema  PSYCH: AAOx1 (self, could not state location, year or )  NEUROLOGY: non-focal  SKIN: skin ulceration/lesion above the umbilicus, no s/s of infection, serrous drainage     LABS:  no new labs    Microbiology: Culture Results:   No growth to date. ( @ 15:20)  Culture Results:   No growth to date. ( @ 15:15)    Consultant(s) Notes Reviewed:  palliative   Care Discussed with Consultants/Other Providers:

## 2022-07-25 NOTE — PROGRESS NOTE ADULT - PROBLEM SELECTOR PLAN 1
Controlled  Used 5 PRNs in the past 24hrs  Will continue to give pain medication regardless of BP  Would recommend:  - Add 2mg PO Dilaudid 14hrs PRN for moderate pain; to assess if symptoms can be managed with PO medications, daughter is now saying she wants to take pt home with hospice services.    - Continue with 0.5mg IV q3hrs PRN for severe pain/discomfort  Goal is comfort  Dispo: if pt tolerates PO meds and symptoms are controlled with PO regimen family would want pt to go home with hospice services

## 2022-07-25 NOTE — PROGRESS NOTE ADULT - PROBLEM SELECTOR PLAN 2
Would recommend:  - Change atc Zofran from IV to PO to determine if pt could be d/c home with hospice  - Continue with Zofran 8mg IV q8hrs PRN  - Add haldol 0.25mg IV q6hrs PRN for nausea/vomiting can add ativan 0.25-0.5mg IV q6hrs PRN for nausea/vomiting if haldol ineffective   - Can consider steroids if all above does not work

## 2022-07-25 NOTE — PROGRESS NOTE ADULT - ASSESSMENT
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 Patient was on Heparin gtt and was c/o abdominal pain believed to be due to worsened ascites. Pain control was difficult due to low SBPs and nausea/vomiting. Patient also has severe AG metabolic acidosis with worsening CHECO, hyponatremia ( Na 117) and lactate of 4. She was placed on a HCO3 gtt which improved the acidosis but hyponatremia and CHECO persists. Paracentesis was delayed due to lack of availability of IR or procedure team to do procedure and hemodynamic instability of patient. MICU was consulted but rejected transfer to ICU. On 7/20 patients mental status became altered suddenly where she remained unresponsive to verbal or pain stimuli but maintaining a blank stare. A RRT and stroke code was called. CT head was negative for CVA. CT A/P however did show a bowel perforation. Patient not a surgical candidate.  Now DNR/DNI, comfort, plan for inpatient vs home hospice depending on symptoms.

## 2022-07-25 NOTE — PROGRESS NOTE ADULT - PROBLEM SELECTOR PLAN 5
renal follg, renal sono showed Normal appearance of the kidneys. No hydronephrosis.  - renal fxn was worsening; No more blood draws to monitor renal function  - c/w comfort measures.

## 2022-07-25 NOTE — PROGRESS NOTE ADULT - ASSESSMENT
66 F w/ 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 s/p pleural Pleurx catheter, last chemotherapy 12 days ago with Gemzar/Abraxane, recent dx of PE/DVT on Eliquis sent in from [facility] due to new b/l extensive DVT, concerning for progression of PE, hypotension and FTT. Palliative Care consulted for complex symptom management in the setting of advanced malignancy.

## 2022-07-25 NOTE — PROGRESS NOTE ADULT - REASON FOR ADMISSION
dvt, booker, electrolyte disorder
dvt, bookre, electrolyte disorder
dvt, booker, electrolyte disorder

## 2022-07-25 NOTE — PROGRESS NOTE ADULT - PROBLEM SELECTOR PLAN 2
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 s/p pleural Pleurx catheter  - CTAP with large intra abd fluid collection poss malig ascites vs infection.  - CTAP 7/20 Enlarging dominant loculated fluid collection in the anterior peritoneal cavity with increasing internal mottled gas. These findings are suspicious for complications of superinfection or small bowel perforation and/or fistulization.   - procedure team was to do aspiration and cx but patient now unstable with bowel perforation so cancelled paracentesis.   - seen by general surgery and not surgical candidate.  - c/w meropenem, long-term abx unlikely to be curative thus can discontinue course on dc  - DNR/DNI, comfort measures  - palliative following for sx management family now hopeful can be managed at home w/ PO pain control

## 2022-07-25 NOTE — PROGRESS NOTE ADULT - PROBLEM SELECTOR PLAN 4
Now DNR/DNI, MOLST form completed and in the chart  Surrogate is daughter Now DNR/DNI, MOLST form completed and in the chart  Surrogate is daughter  Goal is comfort  Interested in home hospice if symptoms are able to be controlled with PO medications

## 2022-07-25 NOTE — PROGRESS NOTE ADULT - PROBLEM SELECTOR PLAN 4
Multifactorial from hypervolemia and intravascular volume depletion from poor PO intake  -c/w comfort care measures

## 2022-07-26 NOTE — DISCHARGE NOTE PROVIDER - DISCHARGE SERVICE FOR PATIENT
on the discharge service for the patient. I have reviewed and made amendments to the documentation where necessary. 08-Jan-2019 19:15

## 2022-07-26 NOTE — DISCHARGE NOTE PROVIDER - HOSPITAL COURSE
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 due to new b/l extensive DVT, concerning for progression of PE, hypotension and FTT. Patient was on Heparin gtt and was c/o abdominal pain believed to be due to worsened ascites. Pain control was difficult due to low SBPs and nausea/vomiting. Patient also has severe AG metabolic acidosis with worsening CHECO, hyponatremia ( Na 117) and lactate of 4. She was placed on a HCO3 gtt which improved the acidosis but hyponatremia and CHECO persists. Paracentesis was delayed due to lack of availability of IR or procedure team to do procedure and hemodynamic instability of patient. MICU was consulted but rejected transfer to ICU. On 7/20 patients mental status became altered suddenly where she remained unresponsive to verbal or pain stimuli but maintaining a blank stare. A RRT and stroke code was called. CT head was negative for CVA. CT A/P however did show a bowel perforation. Patient not a surgical candidate.  Now DNR/DNI, comfort, dispo is hospice.     VTE (venous thromboembolism)  - Possible new PE in addition to know PE  - TTE showed Endocardium not well visualized; grossly normal LV systolic function  - Held heparin gtt due to bowel perforation and overall GOC  - Comfort care measures  - Supplemental oxygen PRN     Malignancy   - Stage IV cancer of unknown primary w/ malignant ascites and pleural effusion r/p pleural pleurix cath   - Last chemo 2 weeks ago with Gemzar/Abraxane   - CTAP w/ enlarging loculated fluid collection in peritoneal cavity w/ increasing internal mottled gas, suspicious for small bowel perforation   - Unable to do paracentesis 2/2 bowel perforation   - Pt is not a surgical candidate per surgery   - IV Meropenem   - DNR/DNI/ comfort measures   - Palliative consulted for pain control - dilaudid PO, IVP PRN breakthrough     Hyponatremia  - C/w Midodrine   - S/p Salt tabs TID    CHECO   - Renal US no hydronephrosis   - S/p Sod Bicarb gtt for AG metabolic acidosis   - Comfort measures     Pleural effusion   - Pleurx care, drain 3x weekly     On ___ this case was reviewed with  ____, the patient is medically stable and optimized for discharge. All medications were reviewed and prescriptions were sent to mutually agreed upon pharmacy. 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 Patient was on Heparin gtt and was c/o abdominal pain believed to be due to worsened ascites. Pain control was difficult due to low SBPs and nausea/vomiting. Patient also has severe AG metabolic acidosis with worsening CHECO, hyponatremia ( Na 117) and lactate of 4. She was placed on a HCO3 gtt which improved the acidosis but hyponatremia and CHECO persists. Paracentesis was delayed due to lack of availability of IR or procedure team to do procedure and hemodynamic instability of patient. MICU was consulted but rejected transfer to ICU. On 7/20 patients mental status became altered suddenly where she remained unresponsive to verbal or pain stimuli but maintaining a blank stare. A RRT and stroke code was called. CT head was negative for CVA. CT A/P however did show a bowel perforation. Patient not a surgical candidate.  Now DNR/DNI, comfort, dispo is hospice.     VTE (venous thromboembolism)  - Possible new PE in addition to know PE  - TTE showed Endocardium not well visualized; grossly normal LV systolic function  - Held heparin gtt due to bowel perforation and overall GOC  - Comfort care measures  - Supplemental oxygen PRN     Malignancy   - Stage IV cancer of unknown primary w/ malignant ascites and pleural effusion r/p pleural pleurix cath   - Last chemo 2 weeks ago with Gemzar/Abraxane   - CTAP w/ enlarging loculated fluid collection in peritoneal cavity w/ increasing internal mottled gas, suspicious for small bowel perforation   - Unable to do paracentesis 2/2 bowel perforation   - Pt is not a surgical candidate per surgery   - S/p IV Meropenem   - DNR/DNI/ comfort measures   - Palliative consulted for pain control - dilaudid PO, IVP PRN breakthrough     Hyponatremia  - C/w Midodrine   - S/p Salt tabs TID    CHECO   - Renal US no hydronephrosis   - S/p Sod Bicarb gtt for AG metabolic acidosis   - Comfort measures     Pleural effusion   - Pleurx care, drain 3x weekly     On ___ this case was reviewed with  ____, the patient is medically stable and optimized for discharge. All medications were reviewed and prescriptions were sent to mutually agreed upon pharmacy. 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 Patient was on Heparin gtt and was c/o abdominal pain believed to be due to worsened ascites. Pain control was difficult due to low SBPs and nausea/vomiting. Patient also has severe AG metabolic acidosis with worsening CHECO, hyponatremia ( Na 117) and lactate of 4. She was placed on a HCO3 gtt which improved the acidosis but hyponatremia and CHECO persists. Paracentesis was delayed due to lack of availability of IR or procedure team to do procedure and hemodynamic instability of patient. MICU was consulted but rejected transfer to ICU. On 7/20 patients mental status became altered suddenly where she remained unresponsive to verbal or pain stimuli but maintaining a blank stare. A RRT and stroke code was called. CT head was negative for CVA. CT A/P however did show a bowel perforation. Patient not a surgical candidate.  Now DNR/DNI, comfort, dispo is hospice.     VTE (venous thromboembolism)  - Possible new PE in addition to know PE  - TTE showed Endocardium not well visualized; grossly normal LV systolic function  - Held heparin gtt due to bowel perforation and overall GOC  - Comfort care measures  - Supplemental oxygen PRN     Malignancy   - Stage IV cancer of unknown primary w/ malignant ascites and pleural effusion r/p pleural pleurix cath   - Last chemo 2 weeks ago with Gemzar/Abraxane   - CTAP w/ enlarging loculated fluid collection in peritoneal cavity w/ increasing internal mottled gas, suspicious for small bowel perforation   - Unable to do paracentesis 2/2 bowel perforation   - Pt is not a surgical candidate per surgery   - S/p IV Meropenem   - DNR/DNI/ comfort measures   - Palliative consulted for pain control - dilaudid PO, IVP PRN breakthrough     Hyponatremia  - C/w Midodrine   - S/p Salt tabs TID    CHECO   - Renal US no hydronephrosis   - S/p Sod Bicarb gtt for AG metabolic acidosis   - Comfort measures     Pleural effusion   - Pleurx care, drain 3x weekly     On 7/29/2022 this case was reviewed with Dr. Sutherland, the patient is medically stable and optimized for discharge. All medications were reviewed and prescriptions were sent to mutually agreed upon pharmacy. 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 Patient was on Heparin gtt and was c/o abdominal pain believed to be due to worsened ascites. Pain control was difficult due to low SBPs and nausea/vomiting. Patient also has severe AG metabolic acidosis with worsening CHECO, hyponatremia ( Na 117) and lactate of 4. She was placed on a HCO3 gtt which improved the acidosis but hyponatremia and CHECO persists. Paracentesis was delayed due to lack of availability of IR or procedure team to do procedure and hemodynamic instability of patient. MICU was consulted but rejected transfer to ICU. On 7/20 patients mental status became altered suddenly where she remained unresponsive to verbal or pain stimuli but maintaining a blank stare. A RRT and stroke code was called. CT head was negative for CVA. CT A/P however did show a bowel perforation. Patient not a surgical candidate.  Now DNR/DNI, comfort, dispo is hospice.     VTE (venous thromboembolism)  - Possible new PE in addition to know PE  - TTE showed Endocardium not well visualized; grossly normal LV systolic function  - Held heparin gtt due to bowel perforation and overall GOC  - Comfort care measures  - Supplemental oxygen PRN, on RA    Malignancy   - Stage IV cancer of unknown primary w/ malignant ascites and pleural effusion r/p pleural pleurix cath   - Last chemo 2 weeks ago with Gemzar/Abraxane   - CTAP w/ enlarging loculated fluid collection in peritoneal cavity w/ increasing internal mottled gas, suspicious for small bowel perforation   - Unable to do paracentesis 2/2 bowel perforation   - Pt is not a surgical candidate per surgery   - S/p IV Meropenem   - DNR/DNI/ comfort measures   - Palliative consulted for pain control - dilaudid PO    Hyponatremia  - S/p Salt tabs TID    CHECO   - Renal US no hydronephrosis   - S/p Sod Bicarb gtt for AG metabolic acidosis   - Comfort measures     Pleural effusion   - Pleurx care, drain 3x weekly    Dispo home hospice  Daughter/HCP in agreement with plan

## 2022-07-26 NOTE — DISCHARGE NOTE PROVIDER - NSDCMRMEDTOKEN_GEN_ALL_CORE_FT
acetaminophen 325 mg oral tablet: 2 tab(s) orally every 6 hours, As needed, Mild Pain (1 - 3)  Eliquis Starter Pack for Treatment of DVT and PE 5 mg oral tablet: take as directed   check insurance coverage  spectra [phone]  MiraLax oral powder for reconstitution: 17 grams orally once a day, As Needed -for constipation MDD:17g   Multiple Vitamins oral tablet: 1 tab(s) orally once a day  Protonix 40 mg oral delayed release tablet: 1 tab(s) orally once a day  senna oral tablet: 2 tabs orally once a day (at bedtime), As needed, Constipation MDD:2 tabs  Synthroid 100 mcg (0.1 mg) oral tablet: 1 tab(s) orally once a day  Vitamin C: 1 tab(s) orally once a day  Vitamin D3: 1 tab(s) orally once a day  Zocor 40 mg oral tablet: 1 tab(s) orally once a day (at bedtime)   acetaminophen 325 mg oral tablet: 2 tab(s) orally every 6 hours, As needed, Mild Pain (1 - 3), Moderate Pain (4 - 6)  HYDROmorphone 2 mg oral tablet: 1 tab(s) orally every 4 hours, As needed, moderate and severe pain  levothyroxine 112 mcg (0.112 mg) oral tablet: 1 tab(s) orally once a day  midodrine 10 mg oral tablet: 2 tab(s) orally 3 times a day  ocular lubricant ophthalmic solution: 2 drop(s) to each affected eye every hour, As needed, Dry Eyes  ondansetron 8 mg oral tablet: 1 tab(s) orally every 8 hours  pantoprazole 40 mg oral delayed release tablet: 1 tab(s) orally once a day (before a meal)  polyethylene glycol 3350 oral powder for reconstitution: 17 gram(s) orally once a day, As needed, Constipation  senna leaf extract oral tablet: 2 tab(s) orally once a day (at bedtime), As needed, Constipation   acetaminophen 325 mg oral tablet: 2 tab(s) orally every 6 hours, As needed, Mild Pain (1 - 3), Moderate Pain (4 - 6)  HYDROmorphone 2 mg oral tablet: 1 tab(s) orally every 4 hours, As needed for moderate and severe pain MDD:Do not take more than 12 mg a day  levothyroxine 112 mcg (0.112 mg) oral tablet: 1 tab(s) orally once a day  midodrine 10 mg oral tablet: 2 tab(s) orally 3 times a day  ocular lubricant ophthalmic solution: 2 drop(s) to each affected eye every hour, As needed, Dry Eyes  ondansetron 8 mg oral tablet: 1 tab(s) orally every 8 hours  pantoprazole 40 mg oral delayed release tablet: 1 tab(s) orally once a day (before a meal)  polyethylene glycol 3350 oral powder for reconstitution: 17 gram(s) orally once a day, As needed, Constipation  senna leaf extract oral tablet: 2 tab(s) orally once a day (at bedtime), As needed, Constipation

## 2022-07-26 NOTE — PROGRESS NOTE ADULT - PROBLEM SELECTOR PLAN 3
- c/w Pleurx care, drain three times a week  - palliative care consult appreciated, cont pain meds per pall care recs

## 2022-07-26 NOTE — DISCHARGE NOTE PROVIDER - NSDCFUSCHEDAPPT_GEN_ALL_CORE_FT
[facility]  [facility]  Scheduled Appointment: 07/28/2022    [facility]  [facility]  Scheduled Appointment: 07/28/2022    [facility]  [facility]  Scheduled Appointment: 08/10/2022    [facility]  [facility]  Scheduled Appointment: 08/11/2022    [facility]  [facility]  Scheduled Appointment: 08/25/2022    [facility]  [facility]  Scheduled Appointment: 09/08/2022     [facility]  [facility]  Scheduled Appointment: 08/08/2022

## 2022-07-26 NOTE — DISCHARGE NOTE PROVIDER - CARE PROVIDER_API CALL
Follow up with hospice within the next few days,   Phone: (   )    -  Fax: (   )    -  Follow Up Time:

## 2022-07-26 NOTE — DISCHARGE NOTE PROVIDER - DETAILS OF MALNUTRITION DIAGNOSIS/DIAGNOSES
This patient has been assessed with a concern for Malnutrition and was treated during this hospitalization for the following Nutrition diagnosis/diagnoses:     -  07/14/2022: Severe protein-calorie malnutrition

## 2022-07-26 NOTE — DISCHARGE NOTE PROVIDER - NSDCCPCAREPLAN_GEN_ALL_CORE_FT
PRINCIPAL DISCHARGE DIAGNOSIS  Diagnosis: VTE (venous thromboembolism)  Assessment and Plan of Treatment: You were found to have a possible new blood clot in the lungs. Your treatment with heparin drip was limited due to bowel perforation. You were treated conservatively and placed on oxygen supplementation via nasal cannula as needed.      SECONDARY DISCHARGE DIAGNOSES  Diagnosis: Malignancy  Assessment and Plan of Treatment: You were found to have stage 4 cancer of unknown primary source complicated by fluid in the abdomen and lungs. You had a tube placed to drain fluid out of your lungs. You were unable to have fluid drained out of your abdomen due to bowel perforation. You were treated with IV fluids and antibiotic therapy. You were seen by Palliative care and transitioned to comfort measures. You should continue pain control. Continue care of Pleurx (lung tube), drain three times weekly. Continue care of abdominal wound per wound care.    Diagnosis: CHECO (acute kidney injury)  Assessment and Plan of Treatment: You were admitted to [facility] with worsening renal function. You were treated with a sodium bicarbonate drip. Your renal function did not show much improvement. You were transitioned to comfort measures. Continue pain control.     PRINCIPAL DISCHARGE DIAGNOSIS  Diagnosis: VTE (venous thromboembolism)  Assessment and Plan of Treatment: You were found to have a possible new blood clot in the lungs. Your treatment with heparin drip was limited due to bowel perforation. You were treated conservatively and placed on oxygen supplementation via nasal cannula as needed.      SECONDARY DISCHARGE DIAGNOSES  Diagnosis: Malignancy  Assessment and Plan of Treatment: You were found to have stage 4 cancer of unknown primary source complicated by fluid in the abdomen and lungs. You had a tube placed to drain fluid out of your lungs. You were unable to have fluid drained out of your abdomen due to bowel perforation. You were treated with IV fluids and antibiotic therapy. You were seen by Palliative care and transitioned to comfort measures. You should continue pain control. Continue care of Pleurx (lung tube), drain three times weekly. Continue care of abdominal wound per wound care.    Diagnosis: CHECO (acute kidney injury)  Assessment and Plan of Treatment: You were admitted to [facility] with worsening renal function. You were treated with a sodium bicarbonate drip. Your renal function did not show much improvement. You were transitioned to comfort measures. Continue pain control.    Diagnosis: Wound of skin  Assessment and Plan of Treatment: You were evaluated by the wound team .  The recommended the following regime :  Interdry textile sheeting, under intertriginous folds leaving 2 inches exposed at ends to wick, remove to wash & dry affected area, then replace. Individual sheeting may be used for up to 5 days unless soiled. Inspect skin daily.   Midline abdominal wound - Cleanse with NS, pat dry. Apply Liquid barrier film to periwound skin (allow to dry). Fill in indentation of umbilicus with small yahir's skin barrier ring (#[id number]) to create flat pouching surface. Cut Medium size external drainable fecal  pouch opening to size of wound and apply to collect drainage. Connect to bedside drainage bag if high output noted. Change every 5 days or PRN if soiled.   Continue low air loss bed therapy, continue heel elevation, continue to turn & reposition per protocol, continue moisture management with barrier creams & single breathable pad, continue measures to decrease friction/shear/pressure. Continue with nutritional support as per dietary/orders.

## 2022-07-26 NOTE — DISCHARGE NOTE PROVIDER - YES NO FOR MLM POSITIVE OR NEGATIVE COVID RESULT
Number Of Freeze-Thaw Cycles: 1 freeze-thaw cycle
Duration Of Freeze Thaw-Cycle (Seconds): 2
Render Note In Bullet Format When Appropriate: No
Detail Level: Detailed
Post-Care Instructions: I reviewed with the patient in detail post-care instructions. Patient is to wear sunprotection, and avoid picking at any of the treated lesions. Pt may apply Vaseline to crusted or scabbing areas.
Consent: The patient's consent was obtained including but not limited to risks of crusting, scabbing, blistering, scarring, darker or lighter pigmentary change, recurrence, incomplete removal and infection.
,

## 2022-07-26 NOTE — CHART NOTE - NSCHARTNOTEFT_GEN_A_CORE
Pt tolerated Po medications, family would want pt to be d/c home with hospice services  Hospice follow up pending   Page for uncontrolled symptoms [id number]

## 2022-07-26 NOTE — PROGRESS NOTE ADULT - PROBLEM SELECTOR PLAN 4
Multifactorial from hypervolemia and intravascular volume depletion from poor PO intake  - c/w comfort care measures

## 2022-07-26 NOTE — PROGRESS NOTE ADULT - SUBJECTIVE AND OBJECTIVE BOX
Patient is a 66y old  Female who presents with a chief complaint of dvt, booker, electrolyte disorder    SUBJECTIVE / OVERNIGHT EVENTS:    Pain all over, 8/10, worse in back, reports nausea intemittnetly, poor PO  no other complaints, no CP, SOB, f/c    MEDICATIONS  (STANDING):  levothyroxine 112 MICROGram(s) Oral daily  meropenem  IVPB 1000 milliGRAM(s) IV Intermittent every 12 hours  midodrine. 20 milliGRAM(s) Oral three times a day  ondansetron    Tablet 8 milliGRAM(s) Oral every 8 hours  pantoprazole    Tablet 40 milliGRAM(s) Oral before breakfast  sodium chloride 1 Gram(s) Oral three times a day    MEDICATIONS  (PRN):  acetaminophen     Tablet .. 650 milliGRAM(s) Oral every 6 hours PRN Mild Pain (1 - 3), Moderate Pain (4 - 6)  artificial  tears Solution 2 Drop(s) Both EYES every 1 hour PRN Dry Eyes  glycopyrrolate Injectable 0.4 milliGRAM(s) IV Push four times a day PRN Secretions  haloperidol    Injectable 0.25 milliGRAM(s) IV Push every 6 hours PRN nausea/vomiting  HYDROmorphone   Tablet 2 milliGRAM(s) Oral every 4 hours PRN Moderate Pain (4 - 6)  HYDROmorphone  Injectable 0.5 milliGRAM(s) IV Push every 2 hours PRN Severe Pain (7 - 10), RR >25  LORazepam   Injectable 0.5 milliGRAM(s) IV Push every 4 hours PRN Anxiety  ondansetron Injectable 4 milliGRAM(s) IV Push every 8 hours PRN Nausea and/or Vomiting  polyethylene glycol 3350 17 Gram(s) Oral daily PRN Constipation  senna 2 Tablet(s) Oral at bedtime PRN Constipation    T(C): 36.3 (22 @ 12:07), Max: 36.4 (22 @ 21:00)  HR: 85 (22 @ 12:07) (85 - 94)  BP: 94/62 (22 @ 12:07) (91/55 - 95/59)  RR: 17 (22 @ 12:07) (16 - 19)  SpO2: 95% (22 @ 12:07) (95% - 99%)    I&O's Summary    2022 07:01  -  2022 07:00  --------------------------------------------------------  IN: 0 mL / OUT: 705 mL / NET: -705 mL    PHYSICAL EXAM:  GENERAL: ill appearing   CHEST/LUNG: Clear to auscultation bilaterally; No wheeze  HEART: Regular rate and rhythm; No murmurs, rubs, or gallops  ABDOMEN: Soft, Nontender, distended ; Bowel sounds present  EXTREMITIES:   +edema LE  PSYCH: AAOx1 (self, could not state location, year or )  NEUROLOGY: non-focal  SKIN: skin ulceration/lesion above the umbilicus, no s/s of infection, serous drainage     LABS: no new labs    Consultant(s) Notes Reviewed:    Care Discussed with Consultants/Other Providers:

## 2022-07-26 NOTE — PROGRESS NOTE ADULT - ASSESSMENT
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 Patient was on Heparin gtt and was c/o abdominal pain believed to be due to worsened ascites. Pain control was difficult due to low SBPs and nausea/vomiting. Patient also has severe AG metabolic acidosis with worsening CHECO, hyponatremia ( Na 117) and lactate of 4. She was placed on a HCO3 gtt which improved the acidosis but hyponatremia and CHECO persists. Paracentesis was delayed due to lack of availability of IR or procedure team to do procedure and hemodynamic instability of patient. MICU was consulted but rejected transfer to ICU. On 7/20 patients mental status became altered suddenly where she remained unresponsive to verbal or pain stimuli but maintaining a blank stare. A RRT and stroke code was called. CT head was negative for CVA. CT A/P however did show a bowel perforation. Patient not a surgical candidate.  Now DNR/DNI, comfort, plan for inpatient vs home hospice depending on symptoms.

## 2022-07-27 NOTE — PROGRESS NOTE ADULT - ASSESSMENT
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 Patient was on Heparin gtt and was c/o abdominal pain believed to be due to worsened ascites. Pain control was difficult due to low SBPs and nausea/vomiting. Patient also has severe AG metabolic acidosis with worsening CHECO, hyponatremia ( Na 117) and lactate of 4. She was placed on a HCO3 gtt which improved the acidosis but hyponatremia and CHECO persists. Paracentesis was delayed due to lack of availability of IR or procedure team to do procedure and hemodynamic instability of patient. MICU was consulted but rejected transfer to ICU. On 7/20 patients mental status became altered suddenly where she remained unresponsive to verbal or pain stimuli but maintaining a blank stare. A RRT and stroke code was called. CT head was negative for CVA. CT A/P however did show a bowel perforation. Patient not a surgical candidate.  Now DNR/DNI, comfort, plan for inpatient vs home hospice depending on symptoms.

## 2022-07-27 NOTE — PROGRESS NOTE ADULT - SUBJECTIVE AND OBJECTIVE BOX
Patient is a 66y old  Female who presents with a chief complaint of dvt, booker, electrolyte disorder    SUBJECTIVE / OVERNIGHT EVENTS:    States pain as usual, had not asked for pain meds  no CP, SOB, f/c/n/v  does not recall re: BMs    MEDICATIONS  (STANDING):  levothyroxine 112 MICROGram(s) Oral daily  meropenem  IVPB 1000 milliGRAM(s) IV Intermittent every 12 hours  midodrine. 20 milliGRAM(s) Oral three times a day  ondansetron    Tablet 8 milliGRAM(s) Oral every 8 hours  pantoprazole    Tablet 40 milliGRAM(s) Oral before breakfast    MEDICATIONS  (PRN):  acetaminophen     Tablet .. 650 milliGRAM(s) Oral every 6 hours PRN Mild Pain (1 - 3), Moderate Pain (4 - 6)  artificial  tears Solution 2 Drop(s) Both EYES every 1 hour PRN Dry Eyes  glycopyrrolate Injectable 0.4 milliGRAM(s) IV Push four times a day PRN Secretions  haloperidol    Injectable 0.25 milliGRAM(s) IV Push every 6 hours PRN nausea/vomiting  HYDROmorphone   Tablet 2 milliGRAM(s) Oral every 4 hours PRN moderate and severe pain  HYDROmorphone  Injectable 0.5 milliGRAM(s) IV Push every 2 hours PRN breakthrough pain  LORazepam   Injectable 0.5 milliGRAM(s) IV Push every 4 hours PRN Anxiety  ondansetron Injectable 4 milliGRAM(s) IV Push every 8 hours PRN Nausea and/or Vomiting  polyethylene glycol 3350 17 Gram(s) Oral daily PRN Constipation  senna 2 Tablet(s) Oral at bedtime PRN Constipation    T(C): 36.3 (22 @ 11:35), Max: 37 (22 @ 05:01)  HR: 99 (22 @ 13:49) (90 - 109)  BP: 84/54 (22 @ 13:49) (84/54 - 98/61)  RR: 17 (22 @ 13:49) (17 - 19)  SpO2: 98% (22 @ 13:49) (96% - 98%)    I&O's Summary    2022 07:01  -  2022 07:00  --------------------------------------------------------  IN: 120 mL / OUT: 250 mL / NET: -130 mL    PHYSICAL EXAM:  GENERAL: ill appearing   CHEST/LUNG: Clear to auscultation bilaterally; No wheeze  HEART: Regular rate and rhythm; No murmurs, rubs, or gallops  ABDOMEN: Soft, Nontender, distended ; Bowel sounds present  EXTREMITIES:   +edema LE  PSYCH: AAOx1 (self, could not state location, year or )  NEUROLOGY: non-focal  SKIN: skin ulceration/lesion above the umbilicus, no s/s of infection, white drainage (appears to be track from abdomen likely related to malignancy)    LABS: no new labs    Microbiology: Culture Results:   No Growth Final ( @ 15:20)  Culture Results:   No Growth Final ( @ 15:15)    Consultant(s) Notes Reviewed:    Care Discussed with Consultants/Other Providers:

## 2022-07-28 NOTE — PROGRESS NOTE ADULT - PROBLEM SELECTOR PLAN 5
renal follg, renal sono showed Normal appearance of the kidneys. No hydronephrosis.  - renal fxn was worsening; No more blood draws to monitor renal function  - c/w comfort measures

## 2022-07-28 NOTE — ADVANCED PRACTICE NURSE CONSULT - ASSESSMENT
General: Alert but minimally conversational, able to shake/nod to yes/no questions. Bedbound, incontinent of stool, haddad catheter in place. Skin warm, dry with increased moisture in intertriginous folds, abdominal pannus at risk for moisture associated dermatitis. Hyperpigmentation to sacrum. Blanchable erythema on bilateral heels. +3 edema to bilateral upper and lower extremities.     Midline abdominal wound - unknown etiology above umbilicus, measuring 3cmx3.5cm, unable to visualize true wound bed and measure true depth due to 90% presence of green-yellow stringy, loosely attached tissue (likely fibrin) and large amounts of continuous green-yellow creamy drainage. Able to visualize 10% adipose tissue. Able to express drainage during palpation at 3 o' clock. Periwound with no erythema, no increased warmth, no edema, no induration, no signs or symptoms of overt skin infection. Goals of care: comfort, contain drainage, protect periwound skin.

## 2022-07-28 NOTE — PROGRESS NOTE ADULT - SUBJECTIVE AND OBJECTIVE BOX
Patient is a 66y old  Female who presents with a chief complaint of dvt, booker, electrolyte disorder    SUBJECTIVE / OVERNIGHT EVENTS:    No pain today, no complaints, less talkative than prior    MEDICATIONS  (STANDING):  levothyroxine 112 MICROGram(s) Oral daily  meropenem  IVPB      meropenem  IVPB 1000 milliGRAM(s) IV Intermittent every 12 hours  midodrine. 20 milliGRAM(s) Oral three times a day  ondansetron    Tablet 8 milliGRAM(s) Oral every 8 hours  pantoprazole    Tablet 40 milliGRAM(s) Oral before breakfast    MEDICATIONS  (PRN):  acetaminophen     Tablet .. 650 milliGRAM(s) Oral every 6 hours PRN Mild Pain (1 - 3), Moderate Pain (4 - 6)  artificial  tears Solution 2 Drop(s) Both EYES every 1 hour PRN Dry Eyes  glycopyrrolate Injectable 0.4 milliGRAM(s) IV Push four times a day PRN Secretions  haloperidol    Injectable 0.25 milliGRAM(s) IV Push every 6 hours PRN nausea/vomiting  HYDROmorphone   Tablet 2 milliGRAM(s) Oral every 4 hours PRN moderate and severe pain  LORazepam   Injectable 0.5 milliGRAM(s) IV Push every 4 hours PRN Anxiety  ondansetron Injectable 4 milliGRAM(s) IV Push every 8 hours PRN Nausea and/or Vomiting  polyethylene glycol 3350 17 Gram(s) Oral daily PRN Constipation  senna 2 Tablet(s) Oral at bedtime PRN Constipation    T(C): 36.1 (22 @ 11:34), Max: 36.4 (22 @ 22:30)  HR: 89 (22 @ 11:34) (89 - 98)  BP: 102/44 (22 @ 11:34) (93/63 - 102/44)  RR: 17 (22 @ 11:34) (17 - 17)  SpO2: 99% (22 @ 11:34) (98% - 99%)    I&O's Summary    2022 07:01  -  2022 16:30  --------------------------------------------------------  IN: 0 mL / OUT: 200 mL / NET: -200 mL    PHYSICAL EXAM:  GENERAL: ill appearing   CHEST/LUNG: Clear to auscultation bilaterally; No wheeze  HEART: Regular rate and rhythm; No murmurs, rubs, or gallops  ABDOMEN: Soft, Nontender, distended ; Bowel sounds present  EXTREMITIES:   +edema LE  PSYCH: AAOx1 (self, could not state location, year or )  NEUROLOGY: non-focal  SKIN: skin ulceration/lesion above the umbilicus, no s/s of infection, white drainage (appears to be track from abdomen likely related to malignancy)    LABS: no new labs    Consultant(s) Notes Reviewed:    Care Discussed with Consultants/Other Providers:

## 2022-07-28 NOTE — ADVANCED PRACTICE NURSE CONSULT - RECOMMEDATIONS
Topical recommendations:     Abdominal pannus - Interdry textile sheeting, under intertriginous folds leaving 2 inches exposed at ends to wick, remove to wash & dry affected area, then replace. Individual sheeting may be used for up to 5 days unless soiled. Inspect skin daily.     Midline abdominal wound - Cleanse with NS, pat dry. Apply Liquid barrier film to periwound skin (allow to dry). Fill in indentation of umbilicus with small yahir's skin barrier ring (#[id number]) to create flat pouching surface. Cut Medium size external drainable fecal  pouch opening to size of wound and apply to collect drainage. Connect to bedside drainage bag if high output noted.      Continue low air loss bed therapy, continue heel elevation, continue to turn & reposition per protocol, continue moisture management with barrier creams & single breathable pad, continue measures to decrease friction/shear/pressure. Continue with nutritional support as per dietary/orders.     Please contact Wound/Ostomy Care Service Line if we can be of further assistance (ext [phone]).  Topical recommendations:     Abdominal pannus - Interdry textile sheeting, under intertriginous folds leaving 2 inches exposed at ends to wick, remove to wash & dry affected area, then replace. Individual sheeting may be used for up to 5 days unless soiled. Inspect skin daily.     Midline abdominal wound - Cleanse with NS, pat dry. Apply Liquid barrier film to periwound skin (allow to dry). Fill in indentation of umbilicus with small yahir's skin barrier ring (#[id number]) to create flat pouching surface. Cut Medium size external drainable fecal  pouch opening to size of wound and apply to collect drainage. Connect to bedside drainage bag if high output noted. Change every 5 days or PRN is soiled.     Continue low air loss bed therapy, continue heel elevation, continue to turn & reposition per protocol, continue moisture management with barrier creams & single breathable pad, continue measures to decrease friction/shear/pressure. Continue with nutritional support as per dietary/orders.     Please contact Wound/Ostomy Care Service Line if we can be of further assistance (ext [phone]).  Topical recommendations:     Abdominal pannus - Interdry textile sheeting, under intertriginous folds leaving 2 inches exposed at ends to wick, remove to wash & dry affected area, then replace. Individual sheeting may be used for up to 5 days unless soiled. Inspect skin daily.     Midline abdominal wound - Cleanse with NS, pat dry. Apply Liquid barrier film to periwound skin (allow to dry). Fill in indentation of umbilicus with small yahir's skin barrier ring (#[id number]) to create flat pouching surface. Cut Medium size external drainable fecal  pouch opening to size of wound and apply to collect drainage. Connect to bedside drainage bag if high output noted. Change every 5 days or PRN if soiled.     Continue low air loss bed therapy, continue heel elevation, continue to turn & reposition per protocol, continue moisture management with barrier creams & single breathable pad, continue measures to decrease friction/shear/pressure. Continue with nutritional support as per dietary/orders.     Please contact Wound/Ostomy Care Service Line if we can be of further assistance (ext [phone]).

## 2022-07-28 NOTE — ADVANCED PRACTICE NURSE CONSULT - REASON FOR CONSULT
Patient seen on skin care rounds after wound care referral received for assessment of skin impairment and recommendations of topical management. 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 - CTAP shows enlarging loculated fluid collection in peritoneal cavity w/ increasing internal mottled gas, suspicious for small bowel perforation, unable to do paracentesis 2/2 bowel perforation, not a surgical candidate per surgery. Chart reviewed: BMI 27.6, dave 10. Patient is DNR/DNI on comfort care measures, plan for d/c to hospice.  Patient seen on skin care rounds after wound care referral received for assessment of skin impairment and recommendations of topical management. 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  CTAP shows enlarging loculated fluid collection in peritoneal cavity w/ increasing internal mottled gas, suspicious for small bowel perforation, unable to do paracentesis 2/2 bowel perforation, not a surgical candidate per surgery. Chart reviewed: BMI 27.6, dave 10. Patient is DNR/DNI on comfort care measures, plan for d/c to hospice.

## 2022-07-28 NOTE — PROGRESS NOTE ADULT - ASSESSMENT
66F with stage IV carcinoma of unknown primary with malignant ascites and pleural effusion s/p pleural Pleurx catheter, last chemotherapy 2 weeks PTA with Gemzar/Abraxane, recent dx of PE/DVT on Eliquis sent in from [facility] due to new b/l extensive DVT, concerning for progression of PE, hypotension and FTT. Patient was on Heparin gtt and was c/o abdominal pain believed to be due to worsened ascites. Pain control was difficult due to low SBPs and nausea/vomiting. Patient also has severe AG metabolic acidosis with worsening CHECO, hyponatremia ( Na 117) and lactate of 4. She was placed on a HCO3 gtt which improved the acidosis but hyponatremia and CHECO persists. Paracentesis was delayed due to lack of availability of IR or procedure team to do procedure and hemodynamic instability of patient. MICU was consulted but rejected transfer to ICU. On 7/20 patients mental status became altered suddenly where she remained unresponsive to verbal or pain stimuli but maintaining a blank stare. A RRT and stroke code was called. CT head was negative for CVA. CT A/P however did show a bowel perforation. Patient not a surgical candidate.  Now DNR/DNI, comfort, plan for inpatient vs home hospice depending on symptoms.

## 2022-07-28 NOTE — PROGRESS NOTE ADULT - PROBLEM SELECTOR PLAN 1
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  - CTAP 7/20 enlarging dominant loculated fluid collection in the anterior peritoneal cavity with increasing internal mottled gas. These findings are suspicious for complications of superinfection or small bowel perforation and/or fistulization.   - procedure team was to do aspiration and cx but patient now unstable with bowel perforation so cancelled paracentesis.   - seen by general surgery and not surgical candidate.  - c/w meropenem, long-term abx unlikely to be curative thus can discontinue course on dc  - DNR/DNI, comfort measures  - palliative following for sx management family now hopeful can be managed at home w/ PO pain control  - wound care for wound that appears fistulization of cancer w/ abdominal wall

## 2022-07-28 NOTE — CHART NOTE - NSCHARTNOTEFT_GEN_A_CORE
Source:    other [x] medical chart, nurse   Diet rx: Soft and Bite Sized: Moderately Thick Liquids (MODTHICKLIQS)    No Beef, no Pork;   Supplement Feeding Modality:  Oral  Ensure Enlive Cans or Servings Per Day:  1       Frequency:  Three Times a day (07-18-22 @ 05:18) [Active]    Pt 65 yo female with stage IV carcinoma of unknown primary with malignant ascites and pleural effusion s/p pleural Pleurx catheter, S/P chemotherapy sent in from [facility] due to new b/l extensive DVT, concerning for progression of PE, hypotension and FTT. A RRT and stroke code called on Pt. Of note, Pt with bowel perforation, Pt not surgical candidate. Pt DNR/DNI, comfort, plan for inpatient vs home hospice - per chart review.   At time of visit, Pt lethargic. Pt with poor PO intake reported. Pt total feed reported. Pt with last BM (7/17), per flow sheet -> discussed with nurse; bowel regimen per MD discretion. Case discussed with nurse. RDN remains available.     Pt's height: 4'11" (7/12)         Pt's weight: 61 kg (7/12)   Pertinent Medications: Protonix, Miralax, Senna, Zofran (PRN),   Pertinent Labs: (7/21) WBC 38.75 H, H/H 7.8/24.3 L,  H, Na 116 L, Cl 80 L, BUN 63 H, Creat 2.71 H, Glu 109 H, Albumin 1.7 L, AST 38 H,  H;   (7/13) Triglycerides 157 H, HDL 38 L  Skin: per flow sheet: 3+edema R/L arm      Estimated Needs: [x] no change since previous assessment  Previous Nutrition Diagnosis: [x] Malnutrition, severe    Nutrition Diagnosis is [x] ongoing      Nutrition Interventions/Recommendations:   1. Defer Nutrition Plan of Care to MD based on GOC discussion and decisions of Pt's family;  2. Monitor hydration status;  3. Consult nutrition if warranted; RDN remains available

## 2022-07-29 NOTE — CHART NOTE - NSCHARTNOTESELECT_GEN_ALL_CORE
Event Note
Follow-up/Nutrition Services
Follow-up/Nutrition Services
Infectious disease/Event Note
Oncology attending/Event Note
Palliative Care/Event Note
ACP/Event Note
Event Note
Neurology
Procedure team/Event Note

## 2022-07-29 NOTE — DISCHARGE NOTE NURSING/CASE MANAGEMENT/SOCIAL WORK - NSDCPEFALRISK_GEN_ALL_CORE
For information on Fall & Injury Prevention, visit: [email or web address] OR  [email or web address] OR  https://www.cdc.gov/steadi/patient.html

## 2022-07-29 NOTE — DISCHARGE NOTE NURSING/CASE MANAGEMENT/SOCIAL WORK - PATIENT PORTAL LINK FT
You can access the FollowMyHealth Patient Portal offered by [facility] by registering at the following website: [email or web address]. By joining [facility]’s FollowMyHealth portal, you will also be able to view your health information using other applications (apps) compatible with our system.

## 2022-07-29 NOTE — PROGRESS NOTE ADULT - PROVIDER SPECIALTY LIST ADULT
Heme/Onc
Hospitalist
Infectious Disease
Nephrology
Heme/Onc
Nephrology
Hospitalist
Infectious Disease
Palliative Care
Palliative Care
Hospitalist
Hospitalist
Nephrology
Hospitalist
Nephrology
Palliative Care
Hospitalist
Hospitalist
Palliative Care
Hospitalist
Hospitalist
Palliative Care
Hospitalist

## 2022-07-29 NOTE — PROGRESS NOTE ADULT - ASSESSMENT
66F with stage IV carcinoma of unknown primary with malignant ascites and pleural effusion s/p pleural Pleurx catheter, last chemotherapy 2 weeks PTA with Gemzar/Abraxane, recent dx of PE/DVT on Eliquis sent in from [facility] due to new b/l extensive DVT, concerning for progression of PE, hypotension and FTT. Patient was on Heparin gtt and was c/o abdominal pain believed to be due to worsened ascites. Pain control was difficult due to low SBPs and nausea/vomiting. Patient also has severe AG metabolic acidosis with worsening CHECO, hyponatremia ( Na 117) and lactate of 4. She was placed on a HCO3 gtt which improved the acidosis but hyponatremia and CHECO persists. Paracentesis was delayed due to lack of availability of IR or procedure team to do procedure and hemodynamic instability of patient. MICU was consulted but rejected transfer to ICU. On 7/20 patients mental status became altered suddenly where she remained unresponsive to verbal or pain stimuli but maintaining a blank stare. A RRT and stroke code was called. CT head was negative for CVA. CT A/P however did show a bowel perforation. Patient not a surgical candidate.  Now DNR/DNI, comfort, plan for home hospice.

## 2022-07-29 NOTE — PROGRESS NOTE ADULT - PROBLEM SELECTOR PROBLEM 1
VTE (venous thromboembolism)
Malignancy
VTE (venous thromboembolism)
VTE (venous thromboembolism)
Hyponatremia
Hyponatremia
Pain due to neoplasm
Pain due to neoplasm
VTE (venous thromboembolism)
VTE (venous thromboembolism)
Hyponatremia
Pain due to neoplasm
VTE (venous thromboembolism)
VTE (venous thromboembolism)
Malignancy
Malignancy
VTE (venous thromboembolism)
Hyponatremia
Pain due to neoplasm
VTE (venous thromboembolism)
Pain due to neoplasm
VTE (venous thromboembolism)
VTE (venous thromboembolism)

## 2022-07-29 NOTE — PROGRESS NOTE ADULT - PROBLEM SELECTOR PROBLEM 6
CHECO (acute kidney injury)
Prophylactic measure
Acute UTI
CHECO (acute kidney injury)
Prophylactic measure
CHECO (acute kidney injury)
Acute UTI

## 2022-07-29 NOTE — PROGRESS NOTE ADULT - PROBLEM SELECTOR PLAN 1
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  - CTAP 7/20 enlarging dominant loculated fluid collection in the anterior peritoneal cavity with increasing internal mottled gas. These findings are suspicious for complications of superinfection or small bowel perforation and/or fistulization.   - procedure team was to do aspiration and cx but patient now unstable with bowel perforation so cancelled paracentesis.   - seen by general surgery and not surgical candidate.  - s/p course of abx for intra-abdominal collection   - DNR/DNI, comfort measures  - palliative following for sx management family now hopeful can be managed at home w/ PO pain control  - wound care for wound that appears fistulization of cancer w/ abdominal wall

## 2022-08-01 ENCOUNTER — APPOINTMENT (OUTPATIENT)
Dept: HOME HEALTH SERVICES | Facility: HOME HEALTH | Age: 67
End: 2022-08-01

## 2022-08-02 ENCOUNTER — NON-APPOINTMENT (OUTPATIENT)
Age: 67
End: 2022-08-02

## 2022-08-08 ENCOUNTER — APPOINTMENT (OUTPATIENT)
Dept: HEMATOLOGY ONCOLOGY | Facility: CLINIC | Age: 67
End: 2022-08-08

## 2022-08-10 ENCOUNTER — APPOINTMENT (OUTPATIENT)
Dept: HEMATOLOGY ONCOLOGY | Facility: CLINIC | Age: 67
End: 2022-08-10

## 2022-08-11 ENCOUNTER — APPOINTMENT (OUTPATIENT)
Dept: INFUSION THERAPY | Facility: HOSPITAL | Age: 67
End: 2022-08-11

## 2022-08-25 ENCOUNTER — APPOINTMENT (OUTPATIENT)
Dept: INFUSION THERAPY | Facility: HOSPITAL | Age: 67
End: 2022-08-25

## 2022-09-08 ENCOUNTER — APPOINTMENT (OUTPATIENT)
Dept: INFUSION THERAPY | Facility: HOSPITAL | Age: 67
End: 2022-09-08

## 2023-02-09 NOTE — PROVIDER CONTACT NOTE (CRITICAL VALUE NOTIFICATION) - SITUATION
Addended by: CORDELL KHANNA on: 2/9/2023 09:45 AM     Modules accepted: Orders    
WBC 48.43, glucose 32
call received from lab, APTT >200.
call received from lab, WBC 43.95
Na critical 118 on BMP
WBC critical 40.97
aPPT 146.9 critical per report from Hematology
call received from lab, sodium level 117
Aptt 141.1
Blood Gas- Lactate 4.1 Sodium 117
Sodium 116
Sodium 117
Patient on heparin gtt and aptt 127.4
Sodium  serum 120

## 2023-04-30 NOTE — ED PROVIDER NOTE - NSICDXPASTSURGICALHX_GEN_ALL_CORE_FT
Friend will transport to [facility]  ED for further evaluation.  Pt requires a higher level of care  Stable  ED aware of pending arrival  Zofran 8 mg in clinic today.  
PAST SURGICAL HISTORY:  S/P bunionectomy left foot    S/P cholecystectomy     S/P tubal ligation

## 2023-05-17 NOTE — DIETITIAN INITIAL EVALUATION ADULT - SIGNS/SYMPTOMS
Alexus DO PGY-3:  68-year-old female past medical history of colon malignancy status post resection, chart review shows SBO which required surgeries presents today secondary to epigastric pain.  Patient states this pain began 4 days ago and has persisted.  Currently in 9 out of 10.  Patient saw her GI doctor and was told to go to the ER for an ultrasound.  Patient denies recent fevers or chills.  Denies nausea or vomiting.  Patient passing flatus.
Pt with <75% of Kcal intake x few mons; wt change: 12% x 6 mons; physical findings described above

## 2023-07-14 NOTE — RAPID RESPONSE TEAM SUMMARY - NSSITUATIONBACKGROUNDRRT_GEN_ALL_CORE
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 due to new b/l extensive DVT, concerning for progression of PE, hypotension and FTT. Patient found to have new bilateral PE as well as abdominal fluid collection. Patient had earlier RRT and code stroke today for episode of temporary AMS, RRT called again for similar episode. Patient with BP at baseline SBP 95, HR 80s, RR 14, SpO2 100% RA, afebrile, BG 120s. She was protecting her airway and intermittently blinking to threat, not withdrawing to pain. As per primary team patient's GOC are unclear, she is DNR/DNI but remainder of care is not firmly established. 
66 y.o.  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 Prior to RRT, per nurse, patient was yelling for nurse, at baseline mentation of AOx2-3. Patient was also having worsening emesis and distended abdomen, concerning for SBO. AXR ordered, neg for obstructive pattern. On reassessment by nurse, patient was AOx0, not following commands, not responsive to vocal or painful stimuli so RRT called. On arrival to RRT, patient BP 97/67, HR 95, HH 22, Temp 97.7, SpO2 95% on RA, . Dilaudid 0.2 IV held as well. Patient also on Heparin gtt but stopped 4am today for planned paracentesis. Labs notable for hyponatremia to 117 this AM (has been hyponatremic 117-122 throughout admission), has CHECO with Cr of 2.6, BUN of 68, WBC 40s. Imaging notable for known pulmonary embolism with slightly more thrombus in the right   interlobar pulmonary artery, new emboli in the right middle lobe and possible new embolism in the left lower lobe   segmental branches, moderate size multiloculated pleural effusion is slightly decreased in size, and large intra-abdominal fluid collection with peripheral enhancement and foci of gas, concerning for malignant ascites vs potentially infection/abscess. Etiology of acute mental status change broad including metabolic derangements, neurologic (stroke vs seizure), hypoxic/hypercarbic resp failure, aspiration event, worsening sepsis.   Exam non focal, patient able to track with eyes, protecting airway.   
Partially impaired: cannot see medication labels or newsprint, but can see obstacles in path, and the surrounding layout; can count fingers at arm's length

## 2023-10-02 NOTE — ED ADULT NURSE NOTE - NSFALLRSKOUTCOME_ED_ALL_ED
Fall Risk Intermediate Repair Preamble Text (Leave Blank If You Do Not Want): Undermining was performed with blunt dissection.

## 2023-11-08 NOTE — ED PROVIDER NOTE - ADMIT DISPOSITION PRESENT ON ADMISSION SEPSIS
[facility]     Received anticoagulation referral from BRICE Lujan on 11/7/23.     Called and spoke to patient. Initial visit scheduled on 11/13 at 3:30pm.     Insurance: Medicare  PCP: [facility]  Locations to be seen: Any     If no response by 12/7/23 (1 month from referral date) OR 2 no shows/cancellations, will remove from referral list and send FYI to referring provider    [facility] at [phone], fax [phone]    Holden Coleman, PharmD, BCACP   No

## 2025-02-07 NOTE — STROKE CODE NOTE - NIH STROKE SCALE: 6A. MOTOR LEG, LEFT, QM
[FreeTextEntry1] : Luzma Mosquera is a 69 year old female who presents to establish care for Diabetes Mellitus Type 2.   PMHx: Type 2 DM, osteoporosis, Class I obesity, murmur/"valve blockage" per cardiologist, HLD PSHx: cataract surgery (2023) FMHx: Diabetes (father, grandfather), lung cancer w/bone mets (father), HTN, AAA, cancer (mother), Allergy to codeine   Diagnosed with Diabetes in October 2020 via routine labs with PCP  Referred to Dr. Diane Amezcua, Endocrinologist -- started Metformin/Rybelsus and changed diet by started seeing nutritionist Lost 55lbs between October 2020 and July 2021, A1c went down and weight was 135lbs Rybelsus was stopped after weight lost Difficult to sustain weight loss given restrictive diet so weight regained after about Fall 2021 [facility] recently previous endocrinologist Dr. Diane Amezcua stopped taking insurance Has considered restarting GLP to help with weight loss/DM control She is taking Repatha (prescribed by cardiologist after intolerance to statins) and she does not like injections  Is also worried about constipation (though has recently had more intermittent diarrhea) Says eating is out of control and thinks she urinates a lot but denies excess thirst  POCT A1c in office: 7.7% FS in office: 175 -- fasting   Current Regimen: -- Metformin 1000mg ER once daily   Previous regimens: -- Rybelsus (constipation)    Not checking BG at home Sometimes feels BG is low in late afternoon (no hypo symptoms but has low energy at this time)  - Diet: Has been higher carb lately -- has breakfast at her diner - eggs, lezama, toast and grits or potatoes Lunch - may have frozen meal in freezer or cheese and crackers Dinner - eats potato chips or corn chips for dinner with homemade dip may have snack in late afternoon if hungry - chips or crackers No soda/juice - Exercise: has been doing PT for IT band issues since October 2024, doing exercises at home   Denies complications r/t DM  Ophthalmologist: last saw January 2024 -- no DR, due for follow up  Podiatrist: Does not see, denies neuropathy   Social Hx: Has 1-2 alcohol drinks/month No nicotine, 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   Osteoporosis Diagnosed by previous Endocrinologist after history of bone fractures: L11 compression fracture around 2022 which occurred after she strained back opening window and then pain felt during dance class; 3 broken ribs (L) after a fall of unclear reason in May 2024; 1 broken rib (R) after fall while going up stairs in later 2024;  Does not think she's had DEXA scan Has not initiated medication for osteoporosis as she's not sure it'd be beneficial for her (feels skeptical)  Current medications: Metformin 1000mg once daily, Repatha every 2 weeks (3) No effort against gravity; leg falls to bed immediately

## 2025-03-24 NOTE — HISTORY OF PRESENT ILLNESS
----- Message from Jeannette sent at 3/24/2025 10:46 AM CDT -----  Need a appointment  child breathing pattern when he's asleep is off Mother: CarlaPhone: [phone]: aarti   [Disease: _____________________] : Disease: [unfilled] [AJCC Stage: ____] : AJCC Stage: [unfilled] [Therapy: ___] : Therapy: [unfilled] [Cycle: ___] : Cycle: [unfilled] [Day: ___] : Day: [unfilled] [de-identified] : 66 F h/o hypothyroidism, presents for further management of adenocarcinoma of unknown primary (GI origin). \par \par In February 2022, patient noted the development of epigastric discomfort x 2 weeks, decreased appetite, abdominal bloating. She initially presented to [facility] on 2/10/22 and her work-up revealed the following: elevated liver enzymes, CT abd/pel showed diffuse abdominal/pelvic ascites, right adnexal cyst, pleural based nodule in the left lower lobe. She was discharged with appropriate follow-ups but returned to the hospital on 2/15/22 due to persistent symptoms. She underwent paracentesis twice on 2/1/22 but it was unsuccessful. GYN was consulted and had pt set up for further imaging with pelvic + transvaginal ultrasound in outpatient setting. \par \par Patient was readmitted on 2/28/22 due to worsening abdominal pain, distention and shortness of breath at rest. Repeat CT CAP was done on 2/28/22 and it demonstrated heterogenous, lobular, complex cystic and soft tissue masses within the pelvic inseparable from the region of the uterus, cervix and adnexa, multiple suspicious nodular peritoneal, omental, hepatic and splenic lesions worrisome for metastatic disease. Patient underwent paracentesis on 3/1/22 (4 Liters removed); pathology confirmed high grade adenocarcinoma- exact etiology cannot be determined. \par \par CT Chest from 3/2/22 revealed no intrathoracic metastasis abnormal ascites with enhancing peritoneal nodules likely due to metastatic disease. Hypodensity in the spleen measuring 1.8 x 1.7 cm. On 3/4/22, she underwent EGD/Colonoscopy; biopsies were negative. Her PET/CT from 3/9/22 showed the following: increased uptake in the region of the KATHY suggests neoplasm/metastasis, FDG active in bilateral adnexa to suggest neoplasm/metastasis, numerous metabolically active foci in the abdomen/pelvis as seen\par Metabolically active foci localizing to the pleura in the left mid lower lung fields for pleural metastasis. Mild avid left pleural effusion likely malignant. \par \par Pt presented to the [facility] ED for worsening abdominal pain and no flatus on 4/22/22. Since 4/19 has had n/v/abdominal pain; she denies abdominal distention, no reported hunger. Has not kept down any PO including liquids since 4/19. At the time of the ED visit, pt was POD#4 diagnostic laparoscopy with biopsies and drainage of ascites for a pelvic mass (frozen = carcinoma). Patient found to have an Small Bowel Obstruction with incarcerated hernia through umbilical incision. Pt was taken to the OR emergently. On 4/22/2022 pt underwent Laparoscopy, diagnostic, Reduction, hernia, internal ( Incarcerated incisional hernia). Patient's hospital course was uncomplicated. She had an NGT for decompression with minimal output. NGT was removed on POD#2 and she tolerated clear liquid diet. She was passing flatus and having bowel movements. Pt was advanced to low residue diet, which she was able to tolerate. Was d/c on 4/24/22.\par \par Edith was readmitted from 4/26-4/30/22 to [facility] for worsening KLEIN and sob. Found to have a large left pleural effusion. Pt to admitted to CTS service for drainage of pleural effusion. Left pigtail placed on 4/27/22, drained 1.7L. Patient s/p L vats, pleurx cath placement, pleural biopsy and Mediport placement on 4/29/22. \par \par Referred to medical oncology for further management. \par \par 5/12-5/26/22: [id number] Gemzar/Abraxane\par 6/8-6/9/22: [facility] hospitalization due to RLL PE + RLE DVT\par 6/17/22: C2D1 \par 6/30/22: C2D15\par 7/7/22: Paracentesis: 5 L removed \par 7/12/22: US Doppler: extensive DVT, worse than prior \par \par  [de-identified] : mucinous moderately differentiated adenocarcinoma [de-identified] : c/o worsening LE edema, RLE pain, cannot ambulate independently. Dopplers today with worsening b/l extensive DVT. Had been off Eliquis for 10 days due to paracentesis (someone told her she had to be off that long). Restarted it yesterday. \par Worsening SOB, cough. Minimal pleurix cath output, every other day. \par Worsening RUQ pain, pain also at the site of pleurix cath \par Not eating or drinking. Cannot stand up on the scale today. In a wheelchair currently. \par + nausea not relieved with antiemetics, intermittent vomiting \par + pain is not controlled with Oxycodone 10 mg Q 4-6 hrs

## 2025-05-17 NOTE — DISCHARGE NOTE PROVIDER - NSDCMRMEDTOKEN_GEN_ALL_CORE_FT
Pt denies presyncope, states it was a mechanical fall   acetaminophen 325 mg oral tablet: 2 tab(s) orally every 6 hours, As needed, Mild Pain (1 - 3)  oxyCODONE 5 mg oral tablet: 1 tab(s) orally every 6 hours, As Needed MDD:4 tabs (20 mg)   Protonix 40 mg oral delayed release tablet: 1 tab(s) orally once a day  Synthroid 125 mcg (0.125 mg) oral tablet: 1 tab(s) orally once a day  Vitamin C: 1 tab(s) orally once a day  Vitamin D3: 1 tab(s) orally once a day  Zocor 40 mg oral tablet: 1 tab(s) orally once a day (at bedtime)   acetaminophen 325 mg oral tablet: 2 tab(s) orally every 6 hours, As needed, Mild Pain (1 - 3)  Chest Xray AP/PA: Please use this prescription should you choose to get your chest XRay done at an outpatient imaging center.     Please fax results to Dr. Ravi ar [phone]  MiraLax oral powder for reconstitution: 17 gram(s) orally once a day, As Needed -for constipation MDD:17g   oxyCODONE 5 mg oral tablet: 1 tab(s) orally every 6 hours, As Needed -for moderate pain MDD:4 tabs   Protonix 40 mg oral delayed release tablet: 1 tab(s) orally once a day  senna oral tablet: 2 tab(s) orally once a day (at bedtime), As needed, Constipation MDD:2 tabs  Synthroid 125 mcg (0.125 mg) oral tablet: 1 tab(s) orally once a day  Vitamin C: 1 tab(s) orally once a day  Vitamin D3: 1 tab(s) orally once a day  Zocor 40 mg oral tablet: 1 tab(s) orally once a day (at bedtime)

## (undated) DEVICE — GLV 7.5 PROTEXIS (WHITE)

## (undated) DEVICE — SUT VICRYL 1 27" CTX

## (undated) DEVICE — SYR ASEPTO

## (undated) DEVICE — DRAPE INSTRUMENT POUCH 6.75" X 11"

## (undated) DEVICE — PACK PERI GYN

## (undated) DEVICE — SUT POLYSORB 0 30" GS-23

## (undated) DEVICE — ADAPTER FIBEROPTIC BRONCHOSCOPE DUAL AXIS SWIVEL

## (undated) DEVICE — DRSG MASTISOL

## (undated) DEVICE — ELCTR REM POLYHESIVE ADULT PT RETURN 15FT

## (undated) DEVICE — SUT VICRYL 0 18" TIES UNDYED

## (undated) DEVICE — ELCTR BOVIE TIP BLADE INSULATED 2.75" EDGE

## (undated) DEVICE — CANISTER SUCTION 2000CC

## (undated) DEVICE — SUT SILK 2-0 24" TIES

## (undated) DEVICE — TAPE SILK 3"

## (undated) DEVICE — ELCTR BOVIE TIP BLADE INSULATED 6.5" EDGE

## (undated) DEVICE — POSITIONER STRAP ARMBOARD VELCRO TS-30

## (undated) DEVICE — GLV 6.5 PROTEXIS (BLUE)

## (undated) DEVICE — PACK MAJOR ABDOMINAL WITH LAP

## (undated) DEVICE — DRAPE MAGNETIC INSTRUMENT MEDIUM

## (undated) DEVICE — GLV 6.5 PROTEXIS W HYDROGEL

## (undated) DEVICE — DRSG STERISTRIPS 0.5 X 4"

## (undated) DEVICE — SUT VICRYL 3-0 27" SH UNDYED

## (undated) DEVICE — SUT QUILL MONODERM 3-0 30CM PS-2

## (undated) DEVICE — STAPLER ECHELON FLEX POWERED PLUS 340MM

## (undated) DEVICE — FOLEY TRAY 16FR 5CC LF UMETER CLOSED

## (undated) DEVICE — Device

## (undated) DEVICE — ELCTR BOVIE BLADE 3/4" EXTENDED LENGTH 6"

## (undated) DEVICE — D HELP - CLEARVIEW CLEARIFY SYSTEM

## (undated) DEVICE — LIGASURE BLUNT TIP 37CM

## (undated) DEVICE — SOL IRR POUR H2O 250ML

## (undated) DEVICE — DRSG TEGADERM 4X4.75"

## (undated) DEVICE — SUT SILK 2-0 30" TIES

## (undated) DEVICE — POSITIONER PINK PAD PIGAZZI SYSTEM

## (undated) DEVICE — DRSG BENZOIN 0.6CC

## (undated) DEVICE — UTERINE MANIPULATOR COOPER SURGICAL 5MM 33CM GREEN

## (undated) DEVICE — DRSG TELFA 3 X 8

## (undated) DEVICE — DRSG MEDIPORE + PAD 3.5X10"

## (undated) DEVICE — DRAPE 3/4 SHEET 52X76"

## (undated) DEVICE — TROCAR COVIDIEN BLUNT TIP HASSAN 10MM

## (undated) DEVICE — CHEST DRAIN OASIS DRY SUCTION WATER SEAL

## (undated) DEVICE — SUT VICRYL 2-0 27" UR-6

## (undated) DEVICE — DRAPE LARGE SHEET 72X85"

## (undated) DEVICE — DRAPE UNDER BUTTOCKS W SCREEN

## (undated) DEVICE — SOL ANTI FOG

## (undated) DEVICE — SUT MONOCRYL 4-0 27" PS-2 UNDYED

## (undated) DEVICE — HAND-AID ARTERIAL WRIST SUPPORT

## (undated) DEVICE — DRSG OPSITE 2.5 X 2"

## (undated) DEVICE — ENDOCATCH 10MM SPECIMEN POUCH

## (undated) DEVICE — TUBING SUCTION NONCONDUCTIVE 6MM X 12FT

## (undated) DEVICE — SOL IRR POUR NS 0.9% 500ML

## (undated) DEVICE — TROCAR COVIDIEN BLUNT TIP HASSAN 10MM STANDARD

## (undated) DEVICE — SUT VICRYL 0 36" CT-1 UNDYED

## (undated) DEVICE — TUBING HYDRO-SURG PLUS IRRIGATOR W SMOKEVAC & PROBE

## (undated) DEVICE — LIGASURE IMPACT

## (undated) DEVICE — ELCTR EXTENSION STRAIGHT

## (undated) DEVICE — WARMING BLANKET UPPER ADULT

## (undated) DEVICE — VENODYNE/SCD SLEEVE CALF MEDIUM

## (undated) DEVICE — GOWN XL

## (undated) DEVICE — STAPLER SKIN VISI-STAT 35 WIDE

## (undated) DEVICE — SUT VICRYL 0 27" CT-1 UNDYED

## (undated) DEVICE — VALVE YELLOW PORT SEAL PLUS 5MM

## (undated) DEVICE — DRSG DERMABOND 0.7ML

## (undated) DEVICE — MEDICINE CUP WITH LID 60ML

## (undated) DEVICE — BASIN SET SINGLE

## (undated) DEVICE — GOWN LG

## (undated) DEVICE — PREP BETADINE SPONGE STICKS

## (undated) DEVICE — PACK MAJOR ABDOMINAL W ENDO DRAPE

## (undated) DEVICE — GLV 7 PROTEXIS (WHITE)

## (undated) DEVICE — DISSECTOR ENDO PEANUT 5MM

## (undated) DEVICE — VISITEC 4X4

## (undated) DEVICE — CONNECTOR REDUCING STRAIGHT 3/8X0.25"

## (undated) DEVICE — ELCTR GROUNDING PAD ADULT COVIDIEN

## (undated) DEVICE — PACK GENERAL LAPAROSCOPY

## (undated) DEVICE — SUT VICRYL 2-0 27" SH UNDYED

## (undated) DEVICE — SYR SLIP 10CC

## (undated) DEVICE — PREP CHLORAPREP HI-LITE ORANGE 26ML

## (undated) DEVICE — WARMING BLANKET LOWER ADULT

## (undated) DEVICE — POSITIONER PURPLE ARM ONE STEP (LARGE)

## (undated) DEVICE — ENDOCATCH II 15MM

## (undated) DEVICE — SUT PROLENE 0 30" CT-1

## (undated) DEVICE — LAP PAD 18 X 18"

## (undated) DEVICE — SUT VICRYL 4-0 18" PS-2 UNDYED

## (undated) DEVICE — DRAPE IOBAN 33" X 23"

## (undated) DEVICE — SPONGE PEANUT AUTO COUNT

## (undated) DEVICE — CANISTER DISPOSABLE THIN WALL 3000CC

## (undated) DEVICE — SUT SILK 0 18" TIES

## (undated) DEVICE — SUT NOVAFIL 2 60" T-60

## (undated) DEVICE — POSITIONER FOAM EGG CRATE ULNAR 2PCS (PINK)

## (undated) DEVICE — TUBING OLYMPUS INSUFFLATION

## (undated) DEVICE — DISSECTOR ENDOSCOPIC KITTNER SINGLE TIP